# Patient Record
Sex: FEMALE | Race: WHITE | Employment: OTHER
[De-identification: names, ages, dates, MRNs, and addresses within clinical notes are randomized per-mention and may not be internally consistent; named-entity substitution may affect disease eponyms.]

---

## 2017-01-23 RX ORDER — ERGOCALCIFEROL 1.25 MG/1
CAPSULE ORAL
Qty: 12 CAPSULE | Refills: 0 | Status: SHIPPED | OUTPATIENT
Start: 2017-01-23 | End: 2017-04-18 | Stop reason: SDUPTHER

## 2017-01-26 ENCOUNTER — TELEPHONE (OUTPATIENT)
Dept: FAMILY MEDICINE CLINIC | Facility: CLINIC | Age: 52
End: 2017-01-26

## 2017-02-06 ENCOUNTER — OFFICE VISIT (OUTPATIENT)
Dept: FAMILY MEDICINE CLINIC | Facility: CLINIC | Age: 52
End: 2017-02-06

## 2017-02-06 ENCOUNTER — HOSPITAL ENCOUNTER (OUTPATIENT)
Age: 52
Setting detail: SPECIMEN
Discharge: HOME | End: 2017-02-06
Payer: COMMERCIAL

## 2017-02-06 VITALS
SYSTOLIC BLOOD PRESSURE: 126 MMHG | HEART RATE: 82 BPM | WEIGHT: 264.4 LBS | BODY MASS INDEX: 42.49 KG/M2 | DIASTOLIC BLOOD PRESSURE: 84 MMHG | HEIGHT: 66 IN | TEMPERATURE: 97.7 F | OXYGEN SATURATION: 98 %

## 2017-02-06 DIAGNOSIS — F32.A DEPRESSION, UNSPECIFIED DEPRESSION TYPE: ICD-10-CM

## 2017-02-06 DIAGNOSIS — Z72.0 TOBACCO ABUSE: ICD-10-CM

## 2017-02-06 DIAGNOSIS — J44.9 CHRONIC OBSTRUCTIVE PULMONARY DISEASE, UNSPECIFIED COPD TYPE (HCC): ICD-10-CM

## 2017-02-06 DIAGNOSIS — F41.9 ANXIETY: ICD-10-CM

## 2017-02-06 DIAGNOSIS — K76.0 HEPATIC STEATOSIS: ICD-10-CM

## 2017-02-06 DIAGNOSIS — E55.9 VITAMIN D DEFICIENCY: ICD-10-CM

## 2017-02-06 DIAGNOSIS — D58.2 ELEVATED HEMOGLOBIN (HCC): ICD-10-CM

## 2017-02-06 DIAGNOSIS — R73.09 OTHER ABNORMAL GLUCOSE: ICD-10-CM

## 2017-02-06 DIAGNOSIS — R73.9 HYPERGLYCEMIA: Primary | ICD-10-CM

## 2017-02-06 DIAGNOSIS — E88.81 INSULIN RESISTANCE: ICD-10-CM

## 2017-02-06 DIAGNOSIS — R73.03 PREDIABETES: ICD-10-CM

## 2017-02-06 LAB
ABSOLUTE EOS #: 0.1 K/UL (ref 0–0.4)
ABSOLUTE LYMPH #: 1.9 K/UL (ref 1–4.8)
ABSOLUTE MONO #: 0.6 K/UL (ref 0.1–1.2)
BASOPHILS # BLD: 0 % (ref 0–2)
BASOPHILS ABSOLUTE: 0 K/UL (ref 0–0.2)
DIFFERENTIAL TYPE: ABNORMAL
EOSINOPHILS RELATIVE PERCENT: 1 % (ref 1–4)
HCT VFR BLD CALC: 50.7 % (ref 36–46)
HEMOGLOBIN: 16.6 G/DL (ref 12–16)
LYMPHOCYTES # BLD: 23 % (ref 24–44)
MCH RBC QN AUTO: 29.7 PG (ref 26–34)
MCHC RBC AUTO-ENTMCNC: 32.7 G/DL (ref 31–37)
MCV RBC AUTO: 91 FL (ref 80–100)
MONOCYTES # BLD: 7 % (ref 2–11)
PDW BLD-RTO: 15.6 % (ref 12.5–15.4)
PLATELET # BLD: 235 K/UL (ref 140–450)
PLATELET ESTIMATE: ABNORMAL
PMV BLD AUTO: 10.3 FL (ref 6–12)
RBC # BLD: 5.58 M/UL (ref 4–5.2)
RBC # BLD: ABNORMAL 10*6/UL
SEG NEUTROPHILS: 69 % (ref 36–66)
SEGMENTED NEUTROPHILS ABSOLUTE COUNT: 6 K/UL (ref 1.8–7.7)
VITAMIN D 25-HYDROXY: 15.8 NG/ML (ref 30–100)
WBC # BLD: 8.6 K/UL (ref 3.5–11)
WBC # BLD: ABNORMAL 10*3/UL

## 2017-02-06 PROCEDURE — 99214 OFFICE O/P EST MOD 30 MIN: CPT | Performed by: PHYSICIAN ASSISTANT

## 2017-02-06 RX ORDER — NICOTINE 21 MG/24HR
1 PATCH, TRANSDERMAL 24 HOURS TRANSDERMAL EVERY 24 HOURS
Qty: 30 PATCH | Refills: 1 | Status: SHIPPED | OUTPATIENT
Start: 2017-02-06 | End: 2017-07-24 | Stop reason: ALTCHOICE

## 2017-02-06 RX ORDER — METHOCARBAMOL 500 MG/1
500 TABLET, FILM COATED ORAL 4 TIMES DAILY PRN
Qty: 60 TABLET | Refills: 1 | Status: SHIPPED | OUTPATIENT
Start: 2017-02-06 | End: 2017-05-08 | Stop reason: SDUPTHER

## 2017-02-06 RX ORDER — BUSPIRONE HYDROCHLORIDE 5 MG/1
5 TABLET ORAL
COMMUNITY
Start: 2017-01-19 | End: 2017-02-18

## 2017-02-06 RX ORDER — METFORMIN HYDROCHLORIDE 500 MG/1
TABLET, EXTENDED RELEASE ORAL
Qty: 60 TABLET | Refills: 2 | Status: SHIPPED | OUTPATIENT
Start: 2017-02-06 | End: 2017-05-07 | Stop reason: SDUPTHER

## 2017-02-06 ASSESSMENT — ENCOUNTER SYMPTOMS
BOWEL INCONTINENCE: 0
NAUSEA: 0
EYE DISCHARGE: 0
BACK PAIN: 1
CHEST TIGHTNESS: 0
SHORTNESS OF BREATH: 1
EYE ITCHING: 0
CHEST TIGHTNESS: 1
ABDOMINAL PAIN: 0
COUGH: 1
RHINORRHEA: 0
SINUS PRESSURE: 0
DIARRHEA: 0
TROUBLE SWALLOWING: 0
VOMITING: 0
SORE THROAT: 0

## 2017-02-06 ASSESSMENT — COPD QUESTIONNAIRES: COPD: 1

## 2017-02-07 LAB
ESTIMATED AVERAGE GLUCOSE: 108 MG/DL
HBA1C MFR BLD: 5.4 % (ref 4–6)

## 2017-02-24 RX ORDER — PREGABALIN 50 MG/1
50 CAPSULE ORAL 3 TIMES DAILY
Qty: 90 CAPSULE | Refills: 0 | OUTPATIENT
Start: 2017-02-24

## 2017-03-06 RX ORDER — OMEPRAZOLE 20 MG/1
CAPSULE, DELAYED RELEASE ORAL
Qty: 30 CAPSULE | Refills: 4 | Status: SHIPPED | OUTPATIENT
Start: 2017-03-06 | End: 2017-08-20 | Stop reason: SDUPTHER

## 2017-03-17 ENCOUNTER — HOSPITAL ENCOUNTER (OUTPATIENT)
Age: 52
Discharge: HOME | End: 2017-03-17
Payer: COMMERCIAL

## 2017-03-28 ENCOUNTER — TELEPHONE (OUTPATIENT)
Dept: FAMILY MEDICINE CLINIC | Age: 52
End: 2017-03-28

## 2017-04-10 ENCOUNTER — OFFICE VISIT (OUTPATIENT)
Dept: FAMILY MEDICINE CLINIC | Age: 52
End: 2017-04-10
Payer: COMMERCIAL

## 2017-04-10 VITALS
SYSTOLIC BLOOD PRESSURE: 126 MMHG | DIASTOLIC BLOOD PRESSURE: 82 MMHG | TEMPERATURE: 97.8 F | BODY MASS INDEX: 43.92 KG/M2 | WEIGHT: 273.3 LBS | OXYGEN SATURATION: 95 % | HEART RATE: 88 BPM | HEIGHT: 66 IN

## 2017-04-10 DIAGNOSIS — E88.81 INSULIN RESISTANCE: ICD-10-CM

## 2017-04-10 DIAGNOSIS — M47.22 OSTEOARTHRITIS OF SPINE WITH RADICULOPATHY, CERVICAL REGION: ICD-10-CM

## 2017-04-10 DIAGNOSIS — M47.814 SPONDYLOSIS OF THORACIC REGION WITHOUT MYELOPATHY OR RADICULOPATHY: ICD-10-CM

## 2017-04-10 DIAGNOSIS — J38.1 VOCAL CORD POLYP: Primary | ICD-10-CM

## 2017-04-10 DIAGNOSIS — J04.0 LARYNGITIS: ICD-10-CM

## 2017-04-10 DIAGNOSIS — R73.9 HYPERGLYCEMIA: ICD-10-CM

## 2017-04-10 DIAGNOSIS — E66.3 OVERWEIGHT: ICD-10-CM

## 2017-04-10 DIAGNOSIS — F41.9 ANXIETY: ICD-10-CM

## 2017-04-10 DIAGNOSIS — M79.672 PAIN IN BOTH FEET: ICD-10-CM

## 2017-04-10 DIAGNOSIS — Z72.0 TOBACCO ABUSE: ICD-10-CM

## 2017-04-10 DIAGNOSIS — E55.9 VITAMIN D DEFICIENCY: ICD-10-CM

## 2017-04-10 DIAGNOSIS — M17.0 PRIMARY OSTEOARTHRITIS OF BOTH KNEES: ICD-10-CM

## 2017-04-10 DIAGNOSIS — J44.9 CHRONIC OBSTRUCTIVE PULMONARY DISEASE, UNSPECIFIED COPD TYPE (HCC): ICD-10-CM

## 2017-04-10 DIAGNOSIS — M79.671 PAIN IN BOTH FEET: ICD-10-CM

## 2017-04-10 DIAGNOSIS — F32.A DEPRESSION, UNSPECIFIED DEPRESSION TYPE: ICD-10-CM

## 2017-04-10 PROCEDURE — 99214 OFFICE O/P EST MOD 30 MIN: CPT | Performed by: PHYSICIAN ASSISTANT

## 2017-04-10 RX ORDER — AZITHROMYCIN 250 MG/1
TABLET, FILM COATED ORAL
Qty: 1 PACKET | Refills: 0 | Status: SHIPPED | OUTPATIENT
Start: 2017-04-10 | End: 2017-04-20

## 2017-04-10 RX ORDER — VARENICLINE TARTRATE 0.5 MG/1
TABLET, FILM COATED ORAL
Qty: 95 TABLET | Refills: 0 | Status: SHIPPED | OUTPATIENT
Start: 2017-04-10 | End: 2017-05-31 | Stop reason: ALTCHOICE

## 2017-04-10 RX ORDER — FLUTICASONE PROPIONATE 50 MCG
2 SPRAY, SUSPENSION (ML) NASAL DAILY
Qty: 1 BOTTLE | Refills: 3 | Status: SHIPPED | OUTPATIENT
Start: 2017-04-10 | End: 2017-08-18 | Stop reason: ALTCHOICE

## 2017-04-10 RX ORDER — OXYCODONE HYDROCHLORIDE AND ACETAMINOPHEN 5; 325 MG/1; MG/1
TABLET ORAL
COMMUNITY
Start: 2017-03-20 | End: 2017-04-10 | Stop reason: ALTCHOICE

## 2017-04-10 ASSESSMENT — ENCOUNTER SYMPTOMS
RHINORRHEA: 0
SORE THROAT: 0
COUGH: 0
EYE ITCHING: 0
VOMITING: 0
NAUSEA: 0
DIARRHEA: 0
ABDOMINAL PAIN: 0
CHEST TIGHTNESS: 0
SINUS PRESSURE: 0
SHORTNESS OF BREATH: 0
EYE DISCHARGE: 0

## 2017-04-12 DIAGNOSIS — J44.9 CHRONIC OBSTRUCTIVE PULMONARY DISEASE, UNSPECIFIED COPD TYPE (HCC): ICD-10-CM

## 2017-04-12 DIAGNOSIS — M79.671 PAIN IN BOTH FEET: Primary | ICD-10-CM

## 2017-04-12 DIAGNOSIS — M79.672 PAIN IN BOTH FEET: Primary | ICD-10-CM

## 2017-04-12 RX ORDER — BUDESONIDE AND FORMOTEROL FUMARATE DIHYDRATE 160; 4.5 UG/1; UG/1
AEROSOL RESPIRATORY (INHALATION)
Qty: 1 INHALER | Refills: 3 | Status: SHIPPED | OUTPATIENT
Start: 2017-04-12 | End: 2017-08-20 | Stop reason: SDUPTHER

## 2017-04-18 RX ORDER — ERGOCALCIFEROL 1.25 MG/1
CAPSULE ORAL
Qty: 12 CAPSULE | Refills: 0 | Status: SHIPPED | OUTPATIENT
Start: 2017-04-18 | End: 2017-07-12 | Stop reason: SDUPTHER

## 2017-04-20 ENCOUNTER — HOSPITAL ENCOUNTER (OUTPATIENT)
Dept: MRI IMAGING | Facility: CLINIC | Age: 52
Discharge: HOME | End: 2017-04-20
Payer: COMMERCIAL

## 2017-04-20 ENCOUNTER — HOSPITAL ENCOUNTER (OUTPATIENT)
Facility: CLINIC | Age: 52
Discharge: HOME | End: 2017-04-20
Payer: COMMERCIAL

## 2017-04-20 DIAGNOSIS — E88.81 INSULIN RESISTANCE: ICD-10-CM

## 2017-04-20 DIAGNOSIS — J44.9 CHRONIC OBSTRUCTIVE PULMONARY DISEASE, UNSPECIFIED COPD TYPE (HCC): ICD-10-CM

## 2017-04-20 DIAGNOSIS — Z72.0 TOBACCO ABUSE: ICD-10-CM

## 2017-04-20 DIAGNOSIS — E55.9 VITAMIN D DEFICIENCY: ICD-10-CM

## 2017-04-20 DIAGNOSIS — M79.672 PAIN IN BOTH FEET: ICD-10-CM

## 2017-04-20 DIAGNOSIS — J04.0 LARYNGITIS: ICD-10-CM

## 2017-04-20 DIAGNOSIS — M17.0 PRIMARY OSTEOARTHRITIS OF BOTH KNEES: ICD-10-CM

## 2017-04-20 DIAGNOSIS — J38.1 VOCAL CORD POLYP: ICD-10-CM

## 2017-04-20 DIAGNOSIS — G89.29 CHRONIC RIGHT-SIDED LOW BACK PAIN WITH RIGHT-SIDED SCIATICA: ICD-10-CM

## 2017-04-20 DIAGNOSIS — M47.814 SPONDYLOSIS OF THORACIC REGION WITHOUT MYELOPATHY OR RADICULOPATHY: ICD-10-CM

## 2017-04-20 DIAGNOSIS — M54.41 CHRONIC RIGHT-SIDED LOW BACK PAIN WITH RIGHT-SIDED SCIATICA: ICD-10-CM

## 2017-04-20 DIAGNOSIS — G89.29 CHRONIC NECK PAIN: ICD-10-CM

## 2017-04-20 DIAGNOSIS — F41.9 ANXIETY: ICD-10-CM

## 2017-04-20 DIAGNOSIS — F32.A DEPRESSION, UNSPECIFIED DEPRESSION TYPE: ICD-10-CM

## 2017-04-20 DIAGNOSIS — M79.671 PAIN IN BOTH FEET: ICD-10-CM

## 2017-04-20 DIAGNOSIS — E66.3 OVERWEIGHT: ICD-10-CM

## 2017-04-20 DIAGNOSIS — M47.22 OSTEOARTHRITIS OF SPINE WITH RADICULOPATHY, CERVICAL REGION: ICD-10-CM

## 2017-04-20 DIAGNOSIS — M54.2 CHRONIC NECK PAIN: ICD-10-CM

## 2017-04-20 DIAGNOSIS — R73.9 HYPERGLYCEMIA: ICD-10-CM

## 2017-04-20 LAB
ABSOLUTE EOS #: 0.1 K/UL (ref 0–0.4)
ABSOLUTE LYMPH #: 1.7 K/UL (ref 1–4.8)
ABSOLUTE MONO #: 0.6 K/UL (ref 0.1–1.2)
ALBUMIN SERPL-MCNC: 4.2 G/DL (ref 3.5–5.2)
ALBUMIN/GLOBULIN RATIO: 1.4 (ref 1–2.5)
ALP BLD-CCNC: 73 U/L (ref 35–104)
ALT SERPL-CCNC: 20 U/L (ref 5–33)
ANION GAP SERPL CALCULATED.3IONS-SCNC: 9 MMOL/L (ref 9–17)
AST SERPL-CCNC: 16 U/L
BASOPHILS # BLD: 0 % (ref 0–2)
BASOPHILS ABSOLUTE: 0 K/UL (ref 0–0.2)
BILIRUB SERPL-MCNC: 0.51 MG/DL (ref 0.3–1.2)
BUN BLDV-MCNC: 14 MG/DL (ref 6–20)
BUN/CREAT BLD: ABNORMAL (ref 9–20)
CALCIUM SERPL-MCNC: 9.1 MG/DL (ref 8.6–10.4)
CHLORIDE BLD-SCNC: 104 MMOL/L (ref 98–107)
CHOLESTEROL/HDL RATIO: 3.8
CHOLESTEROL: 167 MG/DL
CO2: 31 MMOL/L (ref 20–31)
CREAT SERPL-MCNC: 0.56 MG/DL (ref 0.5–0.9)
DIFFERENTIAL TYPE: ABNORMAL
EOSINOPHILS RELATIVE PERCENT: 1 % (ref 1–4)
ESTIMATED AVERAGE GLUCOSE: 111 MG/DL
GFR AFRICAN AMERICAN: >60 ML/MIN
GFR NON-AFRICAN AMERICAN: >60 ML/MIN
GFR SERPL CREATININE-BSD FRML MDRD: ABNORMAL ML/MIN/{1.73_M2}
GFR SERPL CREATININE-BSD FRML MDRD: ABNORMAL ML/MIN/{1.73_M2}
GLUCOSE BLD-MCNC: 102 MG/DL (ref 70–99)
HBA1C MFR BLD: 5.5 % (ref 4–6)
HCT VFR BLD CALC: 46.5 % (ref 36–46)
HDLC SERPL-MCNC: 44 MG/DL
HEMOGLOBIN: 15.6 G/DL (ref 12–16)
INSULIN COMMENT: NORMAL
INSULIN REFERENCE RANGE:: NORMAL
INSULIN: 9.8 MU/L
LDL CHOLESTEROL: 94 MG/DL (ref 0–130)
LYMPHOCYTES # BLD: 18 % (ref 24–44)
MCH RBC QN AUTO: 29.9 PG (ref 26–34)
MCHC RBC AUTO-ENTMCNC: 33.6 G/DL (ref 31–37)
MCV RBC AUTO: 89 FL (ref 80–100)
MONOCYTES # BLD: 6 % (ref 2–11)
PDW BLD-RTO: 14.8 % (ref 12.5–15.4)
PLATELET # BLD: 246 K/UL (ref 140–450)
PLATELET ESTIMATE: ABNORMAL
PMV BLD AUTO: 9.5 FL (ref 6–12)
POTASSIUM SERPL-SCNC: 4.7 MMOL/L (ref 3.7–5.3)
RBC # BLD: 5.23 M/UL (ref 4–5.2)
RBC # BLD: ABNORMAL 10*6/UL
SEG NEUTROPHILS: 75 % (ref 36–66)
SEGMENTED NEUTROPHILS ABSOLUTE COUNT: 7.4 K/UL (ref 1.8–7.7)
SODIUM BLD-SCNC: 144 MMOL/L (ref 135–144)
TOTAL PROTEIN: 7.1 G/DL (ref 6.4–8.3)
TRIGL SERPL-MCNC: 143 MG/DL
TSH SERPL DL<=0.05 MIU/L-ACNC: 2.11 MIU/L (ref 0.3–5)
VITAMIN D 25-HYDROXY: 14 NG/ML (ref 30–100)
VLDLC SERPL CALC-MCNC: NORMAL MG/DL (ref 1–30)
WBC # BLD: 9.9 K/UL (ref 3.5–11)
WBC # BLD: ABNORMAL 10*3/UL

## 2017-04-20 PROCEDURE — 72148 MRI LUMBAR SPINE W/O DYE: CPT

## 2017-04-20 PROCEDURE — 82306 VITAMIN D 25 HYDROXY: CPT

## 2017-04-20 PROCEDURE — 80053 COMPREHEN METABOLIC PANEL: CPT

## 2017-04-20 PROCEDURE — 85025 COMPLETE CBC W/AUTO DIFF WBC: CPT

## 2017-04-20 PROCEDURE — 84443 ASSAY THYROID STIM HORMONE: CPT

## 2017-04-20 PROCEDURE — 80061 LIPID PANEL: CPT

## 2017-04-20 PROCEDURE — 36415 COLL VENOUS BLD VENIPUNCTURE: CPT

## 2017-04-20 PROCEDURE — 83525 ASSAY OF INSULIN: CPT

## 2017-04-20 PROCEDURE — 83036 HEMOGLOBIN GLYCOSYLATED A1C: CPT

## 2017-04-20 PROCEDURE — 72141 MRI NECK SPINE W/O DYE: CPT

## 2017-04-21 DIAGNOSIS — M47.816 SPONDYLOSIS OF LUMBAR REGION WITHOUT MYELOPATHY OR RADICULOPATHY: Primary | ICD-10-CM

## 2017-05-07 DIAGNOSIS — R73.03 PREDIABETES: ICD-10-CM

## 2017-05-08 RX ORDER — METHOCARBAMOL 500 MG/1
TABLET, FILM COATED ORAL
Qty: 60 TABLET | Refills: 0 | Status: SHIPPED | OUTPATIENT
Start: 2017-05-08 | End: 2017-07-24 | Stop reason: SDUPTHER

## 2017-05-08 RX ORDER — METFORMIN HYDROCHLORIDE 500 MG/1
TABLET, EXTENDED RELEASE ORAL
Qty: 60 TABLET | Refills: 1 | Status: SHIPPED | OUTPATIENT
Start: 2017-05-08 | End: 2017-07-14 | Stop reason: SDUPTHER

## 2017-05-15 RX ORDER — LORATADINE 10 MG/1
10 TABLET ORAL DAILY
Qty: 30 TABLET | Refills: 11 | Status: SHIPPED | OUTPATIENT
Start: 2017-05-15 | End: 2017-12-05

## 2017-05-31 ENCOUNTER — OFFICE VISIT (OUTPATIENT)
Dept: NEUROLOGY | Age: 52
End: 2017-05-31
Payer: COMMERCIAL

## 2017-05-31 VITALS
HEIGHT: 66 IN | BODY MASS INDEX: 44.84 KG/M2 | SYSTOLIC BLOOD PRESSURE: 119 MMHG | WEIGHT: 279 LBS | HEART RATE: 73 BPM | DIASTOLIC BLOOD PRESSURE: 82 MMHG

## 2017-05-31 DIAGNOSIS — G89.29 CHRONIC LOW BACK PAIN WITH SCIATICA, SCIATICA LATERALITY UNSPECIFIED, UNSPECIFIED BACK PAIN LATERALITY: ICD-10-CM

## 2017-05-31 DIAGNOSIS — M54.40 CHRONIC LOW BACK PAIN WITH SCIATICA, SCIATICA LATERALITY UNSPECIFIED, UNSPECIFIED BACK PAIN LATERALITY: ICD-10-CM

## 2017-05-31 DIAGNOSIS — M50.20 CERVICAL DISC HERNIATION: Primary | ICD-10-CM

## 2017-05-31 PROCEDURE — 99204 OFFICE O/P NEW MOD 45 MIN: CPT | Performed by: PSYCHIATRY & NEUROLOGY

## 2017-05-31 RX ORDER — GABAPENTIN 300 MG/1
CAPSULE ORAL
Qty: 90 CAPSULE | Refills: 3 | Status: SHIPPED | OUTPATIENT
Start: 2017-05-31 | End: 2017-09-01 | Stop reason: SDUPTHER

## 2017-06-06 RX ORDER — FUROSEMIDE 20 MG/1
TABLET ORAL
Qty: 60 TABLET | Refills: 2 | Status: SHIPPED | OUTPATIENT
Start: 2017-06-06 | End: 2017-12-02 | Stop reason: SDUPTHER

## 2017-07-12 RX ORDER — ERGOCALCIFEROL 1.25 MG/1
CAPSULE ORAL
Qty: 12 CAPSULE | Refills: 0 | Status: SHIPPED | OUTPATIENT
Start: 2017-07-12 | End: 2017-10-05 | Stop reason: SDUPTHER

## 2017-07-14 DIAGNOSIS — R73.03 PREDIABETES: ICD-10-CM

## 2017-07-17 RX ORDER — METFORMIN HYDROCHLORIDE 500 MG/1
TABLET, EXTENDED RELEASE ORAL
Qty: 60 TABLET | Refills: 0 | Status: SHIPPED | OUTPATIENT
Start: 2017-07-17 | End: 2017-08-20 | Stop reason: SDUPTHER

## 2017-07-24 ENCOUNTER — OFFICE VISIT (OUTPATIENT)
Dept: FAMILY MEDICINE CLINIC | Age: 52
End: 2017-07-24
Payer: COMMERCIAL

## 2017-07-24 ENCOUNTER — HOSPITAL ENCOUNTER (OUTPATIENT)
Age: 52
Setting detail: SPECIMEN
Discharge: HOME | End: 2017-07-24
Payer: COMMERCIAL

## 2017-07-24 VITALS
BODY MASS INDEX: 45.77 KG/M2 | DIASTOLIC BLOOD PRESSURE: 80 MMHG | WEIGHT: 284.8 LBS | SYSTOLIC BLOOD PRESSURE: 124 MMHG | TEMPERATURE: 97.4 F | HEART RATE: 91 BPM | HEIGHT: 66 IN | OXYGEN SATURATION: 96 %

## 2017-07-24 DIAGNOSIS — Z12.11 SCREENING FOR COLON CANCER: ICD-10-CM

## 2017-07-24 DIAGNOSIS — F32.A DEPRESSION, UNSPECIFIED DEPRESSION TYPE: Primary | ICD-10-CM

## 2017-07-24 DIAGNOSIS — H91.90 DECREASED HEARING, UNSPECIFIED LATERALITY: ICD-10-CM

## 2017-07-24 DIAGNOSIS — Z13.9 SCREENING: ICD-10-CM

## 2017-07-24 DIAGNOSIS — H93.13 TINNITUS, BILATERAL: ICD-10-CM

## 2017-07-24 DIAGNOSIS — F41.9 ANXIETY: ICD-10-CM

## 2017-07-24 DIAGNOSIS — R35.0 URINE FREQUENCY: ICD-10-CM

## 2017-07-24 DIAGNOSIS — E55.9 VITAMIN D DEFICIENCY: ICD-10-CM

## 2017-07-24 DIAGNOSIS — J44.9 CHRONIC OBSTRUCTIVE PULMONARY DISEASE, UNSPECIFIED COPD TYPE (HCC): ICD-10-CM

## 2017-07-24 DIAGNOSIS — R73.9 HYPERGLYCEMIA: ICD-10-CM

## 2017-07-24 LAB
BILIRUBIN, POC: NORMAL
BLOOD URINE, POC: NORMAL
CLARITY, POC: CLEAR
COLOR, POC: YELLOW
GLUCOSE URINE, POC: NORMAL
KETONES, POC: NORMAL
LEUKOCYTE EST, POC: NORMAL
NITRITE, POC: NORMAL
PH, POC: 6.5
PROTEIN, POC: NORMAL
SPECIFIC GRAVITY, POC: 1.02
UROBILINOGEN, POC: 0.2

## 2017-07-24 PROCEDURE — G0328 FECAL BLOOD SCRN IMMUNOASSAY: HCPCS | Performed by: PHYSICIAN ASSISTANT

## 2017-07-24 PROCEDURE — 99214 OFFICE O/P EST MOD 30 MIN: CPT | Performed by: PHYSICIAN ASSISTANT

## 2017-07-24 PROCEDURE — 81003 URINALYSIS AUTO W/O SCOPE: CPT | Performed by: PHYSICIAN ASSISTANT

## 2017-07-24 RX ORDER — DULOXETIN HYDROCHLORIDE 30 MG/1
30 CAPSULE, DELAYED RELEASE ORAL DAILY
COMMUNITY
End: 2017-08-18 | Stop reason: DRUGHIGH

## 2017-07-24 RX ORDER — VARENICLINE TARTRATE 1 MG/1
1 TABLET, FILM COATED ORAL 2 TIMES DAILY
COMMUNITY
End: 2017-09-27

## 2017-07-24 ASSESSMENT — LIFESTYLE VARIABLES
HOW OFTEN DURING THE LAST YEAR HAVE YOU FOUND THAT YOU WERE NOT ABLE TO STOP DRINKING ONCE YOU HAD STARTED: 0
HOW MANY STANDARD DRINKS CONTAINING ALCOHOL DO YOU HAVE ON A TYPICAL DAY: 0
HOW OFTEN DO YOU HAVE A DRINK CONTAINING ALCOHOL: 1
AUDIT TOTAL SCORE: 1
AUDIT-C TOTAL SCORE: 1
HAS A RELATIVE, FRIEND, DOCTOR, OR ANOTHER HEALTH PROFESSIONAL EXPRESSED CONCERN ABOUT YOUR DRINKING OR SUGGESTED YOU CUT DOWN: 0
HOW OFTEN DURING THE LAST YEAR HAVE YOU HAD A FEELING OF GUILT OR REMORSE AFTER DRINKING: 0
HOW OFTEN DURING THE LAST YEAR HAVE YOU NEEDED AN ALCOHOLIC DRINK FIRST THING IN THE MORNING TO GET YOURSELF GOING AFTER A NIGHT OF HEAVY DRINKING: 0
HOW OFTEN DURING THE LAST YEAR HAVE YOU BEEN UNABLE TO REMEMBER WHAT HAPPENED THE NIGHT BEFORE BECAUSE YOU HAD BEEN DRINKING: 0
HAVE YOU OR SOMEONE ELSE BEEN INJURED AS A RESULT OF YOUR DRINKING: 0
HOW OFTEN DO YOU HAVE SIX OR MORE DRINKS ON ONE OCCASION: 0
HOW OFTEN DURING THE LAST YEAR HAVE YOU FAILED TO DO WHAT WAS NORMALLY EXPECTED FROM YOU BECAUSE OF DRINKING: 0

## 2017-07-24 ASSESSMENT — PATIENT HEALTH QUESTIONNAIRE - PHQ9: SUM OF ALL RESPONSES TO PHQ QUESTIONS 1-9: 2

## 2017-07-24 ASSESSMENT — ANXIETY QUESTIONNAIRES: GAD7 TOTAL SCORE: 3

## 2017-07-24 ASSESSMENT — ENCOUNTER SYMPTOMS
SINUS PRESSURE: 0
NAUSEA: 0
SHORTNESS OF BREATH: 0
EYE DISCHARGE: 0
EYE ITCHING: 0
CHEST TIGHTNESS: 0

## 2017-07-25 LAB
CULTURE: NORMAL
CULTURE: NORMAL
Lab: NORMAL
SPECIMEN DESCRIPTION: NORMAL
STATUS: NORMAL
VITAMIN D 25-HYDROXY: 23.5 NG/ML (ref 30–100)

## 2017-08-02 RX ORDER — IBUPROFEN 800 MG/1
TABLET ORAL
Qty: 60 TABLET | Refills: 0 | Status: SHIPPED | OUTPATIENT
Start: 2017-08-02 | End: 2017-09-09 | Stop reason: SDUPTHER

## 2017-08-10 ENCOUNTER — HOSPITAL ENCOUNTER (OUTPATIENT)
Dept: MAMMOGRAPHY | Facility: CLINIC | Age: 52
Discharge: HOME | End: 2017-08-10
Payer: COMMERCIAL

## 2017-08-10 DIAGNOSIS — Z12.31 ENCOUNTER FOR SCREENING MAMMOGRAM FOR MALIGNANT NEOPLASM OF BREAST: ICD-10-CM

## 2017-08-10 DIAGNOSIS — Z12.39 SCREENING FOR BREAST CANCER: ICD-10-CM

## 2017-08-10 PROCEDURE — G0202 SCR MAMMO BI INCL CAD: HCPCS

## 2017-08-17 ENCOUNTER — TELEPHONE (OUTPATIENT)
Dept: FAMILY MEDICINE CLINIC | Age: 52
End: 2017-08-17

## 2017-08-18 ENCOUNTER — OFFICE VISIT (OUTPATIENT)
Dept: NEUROLOGY | Age: 52
End: 2017-08-18
Payer: COMMERCIAL

## 2017-08-18 VITALS — BODY MASS INDEX: 45.93 KG/M2 | HEIGHT: 66 IN | WEIGHT: 285.8 LBS

## 2017-08-18 DIAGNOSIS — M50.20 CERVICAL DISC HERNIATION: Primary | ICD-10-CM

## 2017-08-18 DIAGNOSIS — M54.40 CHRONIC LOW BACK PAIN WITH SCIATICA, SCIATICA LATERALITY UNSPECIFIED, UNSPECIFIED BACK PAIN LATERALITY: ICD-10-CM

## 2017-08-18 DIAGNOSIS — G89.29 CHRONIC LOW BACK PAIN WITH SCIATICA, SCIATICA LATERALITY UNSPECIFIED, UNSPECIFIED BACK PAIN LATERALITY: ICD-10-CM

## 2017-08-18 PROCEDURE — 99214 OFFICE O/P EST MOD 30 MIN: CPT | Performed by: PSYCHIATRY & NEUROLOGY

## 2017-08-18 RX ORDER — GABAPENTIN 600 MG/1
TABLET ORAL
Qty: 60 TABLET | Refills: 5 | Status: SHIPPED | OUTPATIENT
Start: 2017-08-18 | End: 2018-04-09 | Stop reason: SDUPTHER

## 2017-08-18 RX ORDER — DULOXETIN HYDROCHLORIDE 60 MG/1
1 CAPSULE, DELAYED RELEASE ORAL DAILY
COMMUNITY
Start: 2017-08-16 | End: 2018-07-12

## 2017-08-20 DIAGNOSIS — J44.9 CHRONIC OBSTRUCTIVE PULMONARY DISEASE, UNSPECIFIED COPD TYPE (HCC): ICD-10-CM

## 2017-08-20 DIAGNOSIS — R73.03 PREDIABETES: ICD-10-CM

## 2017-08-21 RX ORDER — OMEPRAZOLE 20 MG/1
CAPSULE, DELAYED RELEASE ORAL
Qty: 30 CAPSULE | Refills: 3 | Status: SHIPPED | OUTPATIENT
Start: 2017-08-21 | End: 2017-12-29 | Stop reason: SDUPTHER

## 2017-08-21 RX ORDER — BUDESONIDE AND FORMOTEROL FUMARATE DIHYDRATE 160; 4.5 UG/1; UG/1
AEROSOL RESPIRATORY (INHALATION)
Qty: 1 INHALER | Refills: 3 | Status: SHIPPED | OUTPATIENT
Start: 2017-08-21 | End: 2018-07-12 | Stop reason: ALTCHOICE

## 2017-08-21 RX ORDER — METFORMIN HYDROCHLORIDE 500 MG/1
TABLET, EXTENDED RELEASE ORAL
Qty: 60 TABLET | Refills: 3 | Status: SHIPPED | OUTPATIENT
Start: 2017-08-21 | End: 2017-12-29 | Stop reason: SDUPTHER

## 2017-09-01 ENCOUNTER — HOSPITAL ENCOUNTER (OUTPATIENT)
Age: 52
Setting detail: SPECIMEN
Discharge: HOME | End: 2017-09-01
Payer: COMMERCIAL

## 2017-09-01 ENCOUNTER — OFFICE VISIT (OUTPATIENT)
Dept: FAMILY MEDICINE CLINIC | Age: 52
End: 2017-09-01
Payer: COMMERCIAL

## 2017-09-01 VITALS
SYSTOLIC BLOOD PRESSURE: 122 MMHG | OXYGEN SATURATION: 98 % | DIASTOLIC BLOOD PRESSURE: 80 MMHG | HEIGHT: 66 IN | BODY MASS INDEX: 46.59 KG/M2 | HEART RATE: 85 BPM | WEIGHT: 289.9 LBS | TEMPERATURE: 97.7 F

## 2017-09-01 DIAGNOSIS — Z23 NEED FOR INFLUENZA VACCINATION: ICD-10-CM

## 2017-09-01 DIAGNOSIS — Z12.11 SCREENING FOR COLORECTAL CANCER: ICD-10-CM

## 2017-09-01 DIAGNOSIS — L03.90 CELLULITIS, UNSPECIFIED CELLULITIS SITE: ICD-10-CM

## 2017-09-01 DIAGNOSIS — R73.9 HYPERGLYCEMIA: Primary | ICD-10-CM

## 2017-09-01 DIAGNOSIS — R35.0 URINARY FREQUENCY: ICD-10-CM

## 2017-09-01 DIAGNOSIS — Z23 NEED FOR PNEUMOCOCCAL VACCINATION: ICD-10-CM

## 2017-09-01 DIAGNOSIS — F32.A DEPRESSION, UNSPECIFIED DEPRESSION TYPE: ICD-10-CM

## 2017-09-01 DIAGNOSIS — E88.81 INSULIN RESISTANCE: ICD-10-CM

## 2017-09-01 DIAGNOSIS — E66.9 OBESITY, UNSPECIFIED OBESITY SEVERITY, UNSPECIFIED OBESITY TYPE: ICD-10-CM

## 2017-09-01 DIAGNOSIS — J44.9 CHRONIC OBSTRUCTIVE PULMONARY DISEASE, UNSPECIFIED COPD TYPE (HCC): ICD-10-CM

## 2017-09-01 DIAGNOSIS — M47.22 OSTEOARTHRITIS OF SPINE WITH RADICULOPATHY, CERVICAL REGION: ICD-10-CM

## 2017-09-01 DIAGNOSIS — F41.9 ANXIETY: ICD-10-CM

## 2017-09-01 DIAGNOSIS — Z72.0 TOBACCO ABUSE: ICD-10-CM

## 2017-09-01 DIAGNOSIS — Z12.12 SCREENING FOR COLORECTAL CANCER: ICD-10-CM

## 2017-09-01 DIAGNOSIS — E55.9 VITAMIN D DEFICIENCY: ICD-10-CM

## 2017-09-01 PROCEDURE — 99214 OFFICE O/P EST MOD 30 MIN: CPT | Performed by: PHYSICIAN ASSISTANT

## 2017-09-01 PROCEDURE — 81003 URINALYSIS AUTO W/O SCOPE: CPT | Performed by: PHYSICIAN ASSISTANT

## 2017-09-01 PROCEDURE — G0009 ADMIN PNEUMOCOCCAL VACCINE: HCPCS | Performed by: PHYSICIAN ASSISTANT

## 2017-09-01 PROCEDURE — 90732 PPSV23 VACC 2 YRS+ SUBQ/IM: CPT | Performed by: PHYSICIAN ASSISTANT

## 2017-09-01 PROCEDURE — 90686 IIV4 VACC NO PRSV 0.5 ML IM: CPT | Performed by: PHYSICIAN ASSISTANT

## 2017-09-01 PROCEDURE — G0008 ADMIN INFLUENZA VIRUS VAC: HCPCS | Performed by: PHYSICIAN ASSISTANT

## 2017-09-01 RX ORDER — DOXYCYCLINE HYCLATE 100 MG
100 TABLET ORAL 2 TIMES DAILY
Qty: 20 TABLET | Refills: 0 | Status: SHIPPED | OUTPATIENT
Start: 2017-09-01 | End: 2017-09-11

## 2017-09-01 RX ORDER — BUSPIRONE HYDROCHLORIDE 15 MG/1
30 TABLET ORAL 2 TIMES DAILY
COMMUNITY
End: 2019-09-17

## 2017-09-01 ASSESSMENT — ENCOUNTER SYMPTOMS
EYE ITCHING: 0
TROUBLE SWALLOWING: 0
CHEST TIGHTNESS: 0
SHORTNESS OF BREATH: 0
SINUS PRESSURE: 0
ABDOMINAL PAIN: 0
EYE DISCHARGE: 0
BELCHING: 0
NAUSEA: 0

## 2017-09-02 LAB
CULTURE: NORMAL
CULTURE: NORMAL
Lab: NORMAL
SPECIMEN DESCRIPTION: NORMAL
STATUS: NORMAL

## 2017-09-11 ENCOUNTER — TELEPHONE (OUTPATIENT)
Dept: FAMILY MEDICINE CLINIC | Age: 52
End: 2017-09-11

## 2017-09-12 RX ORDER — IBUPROFEN 800 MG/1
TABLET ORAL
Qty: 60 TABLET | Refills: 0 | Status: SHIPPED | OUTPATIENT
Start: 2017-09-12 | End: 2017-11-02 | Stop reason: SDUPTHER

## 2017-09-20 RX ORDER — NYSTATIN 100000 [USP'U]/G
POWDER TOPICAL
Qty: 1 BOTTLE | Refills: 2 | Status: SHIPPED | OUTPATIENT
Start: 2017-09-20 | End: 2017-10-09 | Stop reason: SDUPTHER

## 2017-09-26 ENCOUNTER — TELEPHONE (OUTPATIENT)
Dept: FAMILY MEDICINE CLINIC | Age: 52
End: 2017-09-26

## 2017-09-27 ENCOUNTER — HOSPITAL ENCOUNTER (OUTPATIENT)
Age: 52
Setting detail: SPECIMEN
Discharge: HOME | End: 2017-09-27
Payer: COMMERCIAL

## 2017-09-27 ENCOUNTER — OFFICE VISIT (OUTPATIENT)
Dept: FAMILY MEDICINE CLINIC | Age: 52
End: 2017-09-27
Payer: COMMERCIAL

## 2017-09-27 VITALS
TEMPERATURE: 97.7 F | BODY MASS INDEX: 46.52 KG/M2 | DIASTOLIC BLOOD PRESSURE: 80 MMHG | HEIGHT: 66 IN | WEIGHT: 289.5 LBS | OXYGEN SATURATION: 95 % | SYSTOLIC BLOOD PRESSURE: 124 MMHG | HEART RATE: 102 BPM

## 2017-09-27 DIAGNOSIS — N81.10 BLADDER PROLAPSE, FEMALE, ACQUIRED: ICD-10-CM

## 2017-09-27 DIAGNOSIS — R32 URINARY INCONTINENCE, UNSPECIFIED TYPE: Primary | ICD-10-CM

## 2017-09-27 DIAGNOSIS — Z12.11 SCREENING FOR COLON CANCER: ICD-10-CM

## 2017-09-27 DIAGNOSIS — N76.0 ACUTE VAGINITIS: ICD-10-CM

## 2017-09-27 LAB
DIRECT EXAM: ABNORMAL
Lab: ABNORMAL
SPECIMEN DESCRIPTION: ABNORMAL
STATUS: ABNORMAL

## 2017-09-27 PROCEDURE — 99214 OFFICE O/P EST MOD 30 MIN: CPT | Performed by: PHYSICIAN ASSISTANT

## 2017-09-27 RX ORDER — FLUCONAZOLE 150 MG/1
150 TABLET ORAL ONCE
Qty: 1 TABLET | Refills: 0 | Status: SHIPPED | OUTPATIENT
Start: 2017-09-27 | End: 2017-09-27

## 2017-09-27 RX ORDER — VARENICLINE TARTRATE
KIT
COMMUNITY
Start: 2017-06-27 | End: 2017-09-27

## 2017-09-27 ASSESSMENT — ENCOUNTER SYMPTOMS
SHORTNESS OF BREATH: 0
CHEST TIGHTNESS: 0
SORE THROAT: 0
VOMITING: 0
DIARRHEA: 0
COUGH: 0
CONSTIPATION: 0
RHINORRHEA: 0
EYE DISCHARGE: 0
EYE ITCHING: 0
BACK PAIN: 0
NAUSEA: 0
ABDOMINAL PAIN: 0
SINUS PRESSURE: 0
TROUBLE SWALLOWING: 0
BELCHING: 0

## 2017-09-27 NOTE — PROGRESS NOTES
Value   2017 94   10/20/2016 77     LDL Calculated (mg/dL)   Date Value   2015 81       (goal LDL is <100)   AST (U/L)   Date Value   2017 16     ALT (U/L)   Date Value   2017 20     BUN (mg/dL)   Date Value   2017 14     BP Readings from Last 3 Encounters:   17 124/80   17 122/80   17 124/80          (goal 120/80)    Past Medical History:   Diagnosis Date    Anxiety     Asthma     Cancer ([facility] 1996    cervical; treated with hysterectomy    COPD (chronic obstructive pulmonary disease) ([facility]     Depression     Diabetes mellitus ([facility]     Epigastric pain     Hepatic steatosis     History of cataract extraction with lens replacement     bilateral    History of stress test     Hyperglycemia     Intertrigo     Lumbar degenerative disc disease     Nausea     Obesity 2017    Osteoarthritis     right knee    Osteoarthritis cervical spine     Osteoarthritis thoracic spine     Tobacco abuse 2017      Past Surgical History:   Procedure Laterality Date    CATARACT REMOVAL       SECTION      x1    HYSTERECTOMY      secondary to cervical cancer    THROAT SURGERY      throat polyps removed       Family History   Problem Relation Age of Onset    Diabetes Mother     Heart Disease Mother     Arthritis Mother     Depression Mother     Mental Illness Mother     Diabetes Father     Heart Disease Father     Depression Brother     Cancer Brother      bladder    Depression Sister     Cancer Maternal Grandmother      breast    Asthma Other      grandson    Cancer Other      breast     High Blood Pressure Brother     Diabetes Brother     Arthritis Sister     Cancer Maternal Uncle      pancreatic     High Cholesterol Neg Hx     Kidney Disease Neg Hx     Stroke Neg Hx        Social History   Substance Use Topics    Smoking status: Current Every Day Smoker     Packs/day: 0.25     Years: 35.00     Types: Cigarettes    Smokeless tobacco: Never Used    Alcohol use No      Current Outpatient Prescriptions   Medication Sig Dispense Refill    PROAIR  (90 Base) MCG/ACT inhaler INHALE TWO PUFFS BY MOUTH EVERY 6 HOURS AS NEEDED FOR WHEEZING 1 Inhaler 4    NYAMYC 926203 UNIT/GM powder APPLY TO AFFECTED AREA(S) TWO TIMES A DAY 1 Bottle 2    ibuprofen (ADVIL;MOTRIN) 800 MG tablet TAKE ONE TABLET BY MOUTH EVERY 8 HOURS AS NEEDED FOR PAIN 60 tablet 0    diclofenac sodium 1 % GEL Apply 2 g topically 2 times daily 1 Tube 3    busPIRone (BUSPAR) 15 MG tablet Take 15 mg by mouth 2 times daily      omeprazole (PRILOSEC) 20 MG delayed release capsule TAKE ONE CAPSULE BY MOUTH DAILY 30 capsule 3    SYMBICORT 160-4.5 MCG/ACT AERO INHALE TWO PUFFS BY MOUTH TWICE A DAY 1 Inhaler 3    metFORMIN (GLUCOPHAGE-XR) 500 MG extended release tablet TAKE ONE TABLET BY MOUTH TWICE A DAY 60 tablet 3    DULoxetine (CYMBALTA) 60 MG extended release capsule 1 capsule daily      gabapentin (NEURONTIN) 600 MG tablet Take 1 po bid 60 tablet 5    methocarbamol (ROBAXIN) 500 MG tablet TAKE ONE TABLET BY MOUTH FOUR TIMES A DAY AS NEEDED FOR SPASM 60 tablet 3    vitamin D (ERGOCALCIFEROL) 36242 units CAPS capsule TAKE ONE CAPSULE BY MOUTH ONCE WEEKLY 12 capsule 0    furosemide (LASIX) 20 MG tablet TAKE ONE TABLET BY MOUTH DAILY 60 tablet 2    glucose blood VI test strips (ASCENSIA AUTODISC VI;ONE TOUCH ULTRA TEST VI) strip 1 each by In Vitro route daily As needed.  100 each 3    Blood Glucose Monitoring Suppl (ACURA BLOOD GLUCOSE METER) W/DEVICE KIT 1 each by Does not apply route 2 times daily 1 kit 0    loratadine (CLARITIN) 10 MG tablet Take 1 tablet by mouth daily 30 tablet 11    Thumb Spica MISC 1 each by Does not apply route continuous 1 each 0    Elastic Bandages & Supports (KNEE BRACE) MISC 1 Device by Does not apply route daily Right knee pain; osteoarthritis right knee 1 each 1    Respiratory Therapy Supplies (NEBULIZER COMPRESSOR) KIT 1 kit by Does not apply route once for 1 dose Patient with mild to moderate COPD per pulmonary function test; to use up to 4 times per day with albuterol 1 kit 0    Nebulizers (COMPRESSOR/NEBULIZER) MISC 1 vial by Does not apply route 4 times daily as needed. Patient has albuterol prescription at home; needs aerosol machine and set up diagnosis chronic bronchitis 1 each 0     No current facility-administered medications for this visit. Allergies   Allergen Reactions    Augmentin [Amoxicillin-Pot Clavulanate] Diarrhea    Medrol [Methylprednisolone] Other (See Comments)     Redness of hands and itching  Redness of hands and itching    Tape [Adhesive Tape] Rash       Health Maintenance   Topic Date Due    Hepatitis C screen  1965    Diabetic foot exam  03/30/1975    Diabetic retinal exam  03/30/1975    HIV screen  03/30/1980    Diabetic microalbuminuria test  03/30/1983    Cervical cancer screen  09/27/2017    Diabetic hemoglobin A1C test  04/20/2018    Lipid screen  04/20/2018    Breast cancer screen  08/10/2019    Colon cancer screen colonoscopy  03/17/2026    DTaP/Tdap/Td vaccine (2 - Td) 08/24/2026    Flu vaccine  Completed    Pneumococcal med risk  Completed       Subjective:      Review of Systems   Constitutional: Negative for appetite change, chills, diaphoresis, fatigue and fever. HENT: Negative for congestion, ear discharge, ear pain, postnasal drip, rhinorrhea, sinus pressure, sore throat and trouble swallowing. Eyes: Negative for discharge and itching. Respiratory: Negative for cough, chest tightness and shortness of breath. Cardiovascular: Negative for chest pain, palpitations, leg swelling and syncope. Gastrointestinal: Negative for abdominal pain, anorexia, constipation, diarrhea, nausea and vomiting. Genitourinary: Positive for vaginal discharge. Negative for dysuria, frequency, missed menses and pelvic pain. Musculoskeletal: Positive for neck pain.  Negative for arthralgias, back pain and neck stiffness. Skin: Negative for rash. Neurological: Negative for dizziness, weakness, light-headedness, numbness and headaches. Psychiatric/Behavioral: The patient does not have insomnia. All other systems reviewed and are negative. Objective:     Physical Exam   Constitutional: She is oriented to person, place, and time. She appears well-developed and well-nourished. No distress. /82  Pulse 88  Temp 97.8 °F (36.6 °C) (Oral)   Ht 5' 6\" (1.676 m)  Wt 273 lb 4.8 oz (124 kg)  SpO2 95%  BMI 44.11 kg/m2     HENT:   Head: Normocephalic and atraumatic. Right Ear: External ear normal.   Left Ear: External ear normal.   Nose: Nose normal.   Mouth/Throat: Oropharynx is clear and moist.   PND   Eyes: Conjunctivae and EOM are normal. Pupils are equal, round, and reactive to light. Right eye exhibits no discharge. Left eye exhibits no discharge. No scleral icterus. Neck: Normal range of motion. Neck supple. No tracheal deviation present. No thyromegaly present. Cardiovascular: Normal rate, regular rhythm and normal heart sounds. Exam reveals no gallop and no friction rub. No murmur heard. Pulmonary/Chest: Effort normal and breath sounds normal. No stridor. No respiratory distress. She has no wheezes. She has no rales. She exhibits no tenderness. Abdominal: Soft. Bowel sounds are normal. She exhibits no distension. There is no tenderness. There is no rebound and no guarding. Genitourinary: Vaginal discharge found. Genitourinary Comments: Poss cysto/rectocele    Musculoskeletal: She exhibits no edema. Neurological: She is alert and oriented to person, place, and time. Gait normal.   Skin: Skin is warm and dry. No rash noted. She is not diaphoretic. There is erythema. Suprapubic wound with surrounding erythema and calor - quarter size   Psychiatric: She has a normal mood and affect. Her affect is not inappropriate.    Nursing note and vitals reviewed. /80  Pulse 102  Temp 97.7 °F (36.5 °C) (Oral)   Ht 5' 6\" (1.676 m)  Wt 289 lb 8 oz (131.3 kg)  SpO2 95%  BMI 46.73 kg/m2    Assessment:      1. Urinary incontinence, unspecified type  [address] for Urogynecology & [address] [facility], DO   2. Bladder prolapse, female, acquired  [address] for [address], DO   3. Screening for colon cancer  Tabatha Ritter MD, [facility]*   4. Acute vaginitis  VAGINITIS DNA PROBE    C. Trachomatis / N. Gonorrhoeae, DNA             Plan:      No Follow-up on file. Orders Placed This Encounter   Procedures    VAGINITIS DNA PROBE    C. Trachomatis / N. Gonorrhoeae, DNA     Standing Status:   Future     Number of Occurrences:   1     Standing Expiration Date:   9/27/2018   [facility] for [address] [facility],      Referral Priority:   Routine     Referral Type:   Consult for Advice and Opinion     Referral Reason:   Specialty Services Required     Referred to Provider:   Arsenio Pan DO     Requested Specialty:   Urogynecology     Number of Visits Requested:   David Hall MD, [facility]*     Referral Priority:   Routine     Referral Type:   Consult for Advice and Opinion     Referral Reason:   Specialty Services Required     Referred to Provider:   Ann Hastings MD     Requested Specialty:   Gastroenterology     Number of Visits Requested:   1     Orders Placed This Encounter   Medications    fluconazole (DIFLUCAN) 150 MG tablet     Sig: Take 1 tablet by mouth once for 1 dose     Dispense:  1 tablet     Refill:  0    Labs from 4/2017 reviewed      Insulin resistance - On metformin. Diet and exercise encouraged.      Anxiety and depression - Taking celexa with some relief. No SI or HI. Also on xanax. Referral to psych. Started on buspar. Doing much better with med. Seeing psych.   GOing to counseling now.     Elevated HGB - Pt reports family hx of same. Will repeat. Referral to heme. Saw Dr. Afia Cardoza.        Lung nodule - Seen on CT chest 5/11/16 stable. Seeing pulm.      Tobacco abuse/COPD - Smoking since age 13, 1/2-1 PPD. On symbicort and prn albuterol. 1/18/17 emphysema. Pt has been on chantix in past.  No SI. HI or vivid dreams. Will watch for and go to ER if it develops.        Chronic back pain / right hip pain - Radicular pain down right leg. No recent imaging. Xray showed degenerative changes 9/1/16. MRI ordered. On robaxin. Previously on norco. Now on ultram. Referral to pain management.       Chronic neck pain - Radiation down right arm. Pt does c/o right hand weakness. Xray showed degenerative changes 10/20/16. MRI ordered. Saw pain management. No rx as pt abuses marijuana      Fibromyalgia - On lyrica.      Hepatic steatosis - F/u GI.      GERD - On prilosec.       Peripheral edema - On lasix      GB disease - States suppose to have HIDA scan through GI, but GI wouldn't take ins. Still with nausea. HIDA ordered. F/u GI. CT chest 5/11/16 Showed mild distention of the gallbladder with slight pericholecystic haziness. HIDDA scan nl 9/1/16      CP - Admitted 1/2017. Nl stress echo.     Vit D def - Will replace and recheck 12 wks.     Laryngitis / vocal cord polyp - Surgery 3/2017 plans to go back in to remove more. Heel spur - L foot. Bilat foot pain. Referral to pod    Balanced diet and routine exercise encouraged.     Multivitamin with vitamin D daily encouraged.     Good sleep hygiene encouraged.     Dental exam q 6 mo.     Vision yearly.     Sunscreen encouraged.     Immunizations reviewed.     DEXA - 5/2014 NL. Repeat ordered.      [facility] - Pt has orders. Did not get. Colonoscopy - FIT kid given.      Smoking marijuana. Pt encouraged to stop.     F/u for chantix    Schedule f/[facility] appt with [facility] for dilated eye exam, with dentistry for cleaning, and audiology.     Flu - 2017    Prevnar 13 - 8/24/16    Pneumovax 23 - 9/1/17    Tdap - 8/24/17     Patient given educational materials - see patient instructions. Discussed use, benefit, and side effects of prescribed medications. All patient questions answered. Pt voiced understanding. Reviewed health maintenance. Instructed to continue current medications, diet and exercise. Patient agreed with treatment plan. Follow up as directed.      Electronically signed by Carter Nelson PA-C on 10/3/2017 at 8:40 AM

## 2017-09-27 NOTE — MR AVS SNAPSHOT
After Visit Summary             Carmen De Paz   2017 4:00 PM   Office Visit    Description:  Female : 1965   Provider:  Rosey Mtz PA-C   Department:  [facility]              Your Follow-Up and Future Appointments         Below is a list of your follow-up and future appointments. This may not be a complete list as you may have made appointments directly with providers that we are not aware of or your providers may have made some for you. Please call your providers to confirm appointments. It is important to keep your appointments. Please bring your current insurance card, photo ID, co-pay, and all medication bottles to your appointment. If self-pay, payment is expected at the time of service. Your To-Do List     Future Appointments Provider Department Dept Phone    10/9/2017 9:45 AM Selma Decker [phone]    2017 9:15 AM Rosey Mtz PA-C [facility] [phone]    Please arrive 15 minutes prior to appointment, bring photo ID and insurance card. 2017 11:20 AM Candelaria Nobles, [address] [place] [address] Neurology Specialist [phone]    Please arrive 15 minutes prior to appointment time, bring insurance card and photo ID. Future Orders Complete By Amandeep Ham / NUA Ochoa [[id number] Custom]  10/27/2017 (Approximate) 2018         Information from Your Visit        Department     Name Address Phone Fax    [address] Mario Alberto Cordova [phone]      You Were Seen for:         Comments    Urinary incontinence, unspecified type   [[id number]]         Vital Signs     Blood Pressure Pulse Temperature Height Weight Oxygen Saturation    124/80 102 97.7 °F (36.5 °C) (Oral) 5' 6\" (1.676 m) 289 lb 8 oz (131.3 kg) 95%    Body Mass Index Smoking Status 46.73 kg/m2 Current Every Day Smoker          Additional Information about your Body Mass Index (BMI)           Your BMI as listed above is considered obese (30 or more). BMI is an estimate of body fat, calculated from your height and weight. The higher your BMI, the greater your risk of heart disease, high blood pressure, type 2 diabetes, stroke, gallstones, arthritis, sleep apnea, and certain cancers. BMI is not perfect. It may overestimate body fat in athletes and people who are more muscular. Even a small weight loss (between 5 and 10 percent of your current weight) by decreasing your calorie intake and becoming more physically active will help lower your risk of developing or worsening diseases associated with obesity. Learn more at: Filepicker.io.uk             Today's Medication Changes          These changes are accurate as of: 9/27/17  4:56 PM.  If you have any questions, ask your nurse or doctor. START taking these medications           fluconazole 150 MG tablet   Commonly known as:  DIFLUCAN   Instructions:   Take 1 tablet by mouth once for 1 dose   Quantity:  1 tablet   Refills:  0   Started by:  Marciano Holt PA-C         STOP taking these medications           CHANTIX 1 MG tablet   Generic drug:  varenicline   Stopped by:  Marciano Holt PA-C       CHANTIX STARTING MONTH KRISTI 0.5 MG X 11 & 1 MG X 42 tablet   Generic drug:  varenicline   Stopped by:  Marciano Holt PA-C            Where to Get Your Medications      These medications were sent to [place] [address] [phone] Marcelino Bond [phone]  [facility], [address] [facility]     Phone:  [phone]     fluconazole 150 MG tablet               Your Current Medications Are              fluconazole (DIFLUCAN) 150 MG tablet Take 1 tablet by mouth once for 1 dose    PROAIR  (90 Base) MCG/ACT inhaler INHALE TWO PUFFS BY MOUTH EVERY 6 HOURS AS NEEDED FOR WHEEZING    NYAMYC 673950 UNIT/GM powder APPLY TO AFFECTED AREA(S) TWO TIMES A DAY    ibuprofen (ADVIL;MOTRIN) 800 MG tablet TAKE ONE TABLET BY MOUTH EVERY 8 HOURS AS NEEDED FOR PAIN    diclofenac sodium 1 % GEL Apply 2 g topically 2 times daily    busPIRone (BUSPAR) 15 MG tablet Take 15 mg by mouth 2 times daily    omeprazole (PRILOSEC) 20 MG delayed release capsule TAKE ONE CAPSULE BY MOUTH DAILY    SYMBICORT 160-4.5 MCG/ACT AERO INHALE TWO PUFFS BY MOUTH TWICE A DAY    metFORMIN (GLUCOPHAGE-XR) 500 MG extended release tablet TAKE ONE TABLET BY MOUTH TWICE A DAY    DULoxetine (CYMBALTA) 60 MG extended release capsule 1 capsule daily    gabapentin (NEURONTIN) 600 MG tablet Take 1 po bid    methocarbamol (ROBAXIN) 500 MG tablet TAKE ONE TABLET BY MOUTH FOUR TIMES A DAY AS NEEDED FOR SPASM    vitamin D (ERGOCALCIFEROL) 77432 units CAPS capsule TAKE ONE CAPSULE BY MOUTH ONCE WEEKLY    furosemide (LASIX) 20 MG tablet TAKE ONE TABLET BY MOUTH DAILY    glucose blood VI test strips (ASCENSIA AUTODISC VI;ONE TOUCH ULTRA TEST VI) strip 1 each by In Vitro route daily As needed. Blood Glucose Monitoring Suppl (ACURA BLOOD GLUCOSE METER) W/DEVICE KIT 1 each by Does not apply route 2 times daily    loratadine (CLARITIN) 10 MG tablet Take 1 tablet by mouth daily    Thumb Spica MISC 1 each by Does not apply route continuous    Elastic Bandages & Supports (KNEE BRACE) MISC 1 Device by Does not apply route daily Right knee pain; osteoarthritis right knee    Respiratory Therapy Supplies (NEBULIZER COMPRESSOR) KIT 1 kit by Does not apply route once for 1 dose Patient with mild to moderate COPD per pulmonary function test; to use up to 4 times per day with albuterol    Nebulizers (COMPRESSOR/NEBULIZER) MISC 1 vial by Does not apply route 4 times daily as needed.  Patient has albuterol prescription at home; needs aerosol machine and set up diagnosis chronic bronchitis      Allergies Augmentin [Amoxicillin-pot Clavulanate] Diarrhea    Medrol [Methylprednisolone] Other (See Comments)    Redness of hands and itching  Redness of hands and itching    Tape Beau Regulus Tape] Rash      We Ordered/Performed the following           Tangela Adan MD, [facility]*     Scheduling Instructions:    [facility]- [address], [address]  [phone]    Comments: The patient can be scheduled with any member of the group, including the provider with the first available appointments. [address] for [address] [place], [place]     Scheduling Instructions:    [address] for Urogynecology & [address] [place] [place] [address], [address] [place] [address]   [phone]    Comments: The patient can be scheduled with any member of the group, including the provider with the first available appointments.      VAGINITIS DNA PROBE          Additional Information        Basic Information     Date Of Birth Sex Race Ethnicity Preferred Language    1965 Female White Non-/Non  English      Problem List as of 9/27/2017  Date Reviewed: 9/27/2017                Obesity    Vocal cord polyp    Laryngitis    Vitamin D deficiency    Tobacco abuse    Hepatic steatosis    Epigastric pain    Nausea    Peripheral edema    COPD (chronic obstructive pulmonary disease) (HCC)    Osteoarthritis thoracic spine    Osteoarthritis cervical spine    Prediabetes    Insulin resistance    Degenerative arthritis of lumbar spine    Osteoarthritis of knee    Hyperglycemia    Anxiety    Bronchitis    Depression      Immunizations as of 9/27/2017     Name Date    Influenza Virus Vaccine 1/18/2014    Influenza, Intradermal, Preservative free 11/1/2016, 1/18/2014    Influenza, Quadrivalent, 3 Years and above, IM 11/1/2016    Influenza, Quadrivalent, 3 yrs and above, IM, Preservative Free 9/1/2017 Pneumococcal 13-valent Conjugate ([id number]) 8/24/2016    Pneumococcal Polysaccharide ([id number]) 9/1/2017    Tdap (Boostrix, Adacel) 8/24/2016      Preventive Care        Date Due    Hepatitis C screening is recommended for all adults regardless of risk factors born between [facility] at least once (lifetime) who have never been tested. 1965    Diabetic Foot Exam 3/30/1975    Eye Exam By An Eye Doctor 3/30/1975    HIV screening is recommended for all people regardless of risk factors  aged 15-65 years at least once (lifetime) who have never been HIV tested. 3/30/1980    Urine Check For Kidney Problems 3/30/1983    Pap Smear 9/27/2017    Hemoglobin A1C (Test For Long-Term Glucose Control) 4/20/2018    Cholesterol Screening 4/20/2018    Mammograms are recommended every 2 years for low/average risk patients aged 48 - 69, and every year for high risk patients per updated national guidelines. However these guidelines can be individualized by your provider. 8/10/2019    Colonoscopy 3/17/2026    Tetanus Combination Vaccine (2 - Td) 8/24/2026            FoneSense Signup           Our records indicate that you have an active FoneS[employer] account. You can view your After Visit Summary by going to [email or web address] and logging in with your FoneSense username and password. If you don't have a FoneSense username and password but a parent or guardian has access to your record, the parent or guardian should login with their own FoneSense username and password and access your record to view the After Visit Summary. Additional Information  If you have questions, please contact the physician practice where you receive care. Remember, FoneSense is NOT to be used for urgent needs. For medical emergencies, dial 911. For questions regarding your FoneSense account call [phone]. If you have a clinical question, please call your doctor's office.

## 2017-09-28 LAB
C TRACH DNA GENITAL QL NAA+PROBE: NEGATIVE
N. GONORRHOEAE DNA: NEGATIVE

## 2017-10-03 ENCOUNTER — APPOINTMENT (OUTPATIENT)
Dept: GENERAL RADIOLOGY | Facility: CLINIC | Age: 52
End: 2017-10-03
Payer: COMMERCIAL

## 2017-10-03 ENCOUNTER — HOSPITAL ENCOUNTER (EMERGENCY)
Facility: CLINIC | Age: 52
Discharge: HOME | End: 2017-10-03
Attending: EMERGENCY MEDICINE
Payer: COMMERCIAL

## 2017-10-03 VITALS
WEIGHT: 287 LBS | SYSTOLIC BLOOD PRESSURE: 131 MMHG | BODY MASS INDEX: 46.12 KG/M2 | DIASTOLIC BLOOD PRESSURE: 69 MMHG | HEART RATE: 96 BPM | TEMPERATURE: 97.5 F | RESPIRATION RATE: 18 BRPM | OXYGEN SATURATION: 97 % | HEIGHT: 66 IN

## 2017-10-03 DIAGNOSIS — S63.602A SPRAIN OF LEFT THUMB, UNSPECIFIED SITE OF FINGER, INITIAL ENCOUNTER: Primary | ICD-10-CM

## 2017-10-03 PROCEDURE — 73130 X-RAY EXAM OF HAND: CPT

## 2017-10-03 PROCEDURE — 99283 EMERGENCY DEPT VISIT LOW MDM: CPT

## 2017-10-03 RX ORDER — TRAMADOL HYDROCHLORIDE 50 MG/1
50 TABLET ORAL EVERY 6 HOURS PRN
Qty: 10 TABLET | Refills: 0 | Status: SHIPPED | OUTPATIENT
Start: 2017-10-03 | End: 2017-10-13

## 2017-10-03 ASSESSMENT — ENCOUNTER SYMPTOMS
ABDOMINAL PAIN: 0
SHORTNESS OF BREATH: 0
EYE PAIN: 0
BLOOD IN STOOL: 0
NAUSEA: 0
COUGH: 0
VOMITING: 0
DIARRHEA: 0
BACK PAIN: 0
CONSTIPATION: 0

## 2017-10-03 ASSESSMENT — PAIN DESCRIPTION - DESCRIPTORS: DESCRIPTORS: TINGLING;SHARP

## 2017-10-03 ASSESSMENT — PAIN DESCRIPTION - LOCATION: LOCATION: HAND

## 2017-10-03 ASSESSMENT — PAIN DESCRIPTION - FREQUENCY: FREQUENCY: CONTINUOUS

## 2017-10-03 ASSESSMENT — PAIN DESCRIPTION - PROGRESSION: CLINICAL_PROGRESSION: NOT CHANGED

## 2017-10-03 ASSESSMENT — PAIN DESCRIPTION - ONSET: ONSET: ON-GOING

## 2017-10-03 ASSESSMENT — PAIN DESCRIPTION - ORIENTATION: ORIENTATION: LEFT

## 2017-10-03 ASSESSMENT — PAIN SCALES - GENERAL: PAINLEVEL_OUTOF10: 8

## 2017-10-03 ASSESSMENT — PAIN DESCRIPTION - PAIN TYPE: TYPE: ACUTE PAIN

## 2017-10-03 NOTE — ED PROVIDER NOTES
[facility]  [address] [id number]  Phone: [phone]        Pt Name: Syble Schaumann  MRN: [id number]  [place] 1965  Date of evaluation: 10/3/17      CHIEF COMPLAINT       Chief Complaint   Patient presents with    Hand Pain     thumb on the left hand. lifted 4yr old grandson up and felt like a pop with instant pain. HISTORY OF PRESENT ILLNESS    Syble Schaumann is a 46 y.o. female who presentsHis left thumb pain, patient injured herself lifting up her granddaughter felt a pop in the thumb points to the base of the thumb and the thenar eminence is a region of the most pain is no pain to the wrist elbow or shoulder. This is her nondominant hand. She does have a history of arthritis. At her with rest.  Injury happened on 8:30 this morning      REVIEW OF SYSTEMS         Review of Systems   Constitutional: Negative for chills and fever. HENT: Negative for congestion and ear pain. Eyes: Negative for pain and visual disturbance. Respiratory: Negative for cough and shortness of breath. Cardiovascular: Negative for chest pain, palpitations and leg swelling. Gastrointestinal: Negative for abdominal pain, blood in stool, constipation, diarrhea, nausea and vomiting. Endocrine: Negative for polydipsia and polyuria. Genitourinary: Negative for difficulty urinating, dysuria, frequency, vaginal bleeding and vaginal discharge. Musculoskeletal: Negative for back pain, joint swelling, myalgias, neck pain and neck stiffness. Left thumb pain   Skin: Negative for rash. Neurological: Negative for dizziness, weakness and headaches. Hematological: Negative for adenopathy. Does not bruise/bleed easily. Psychiatric/Behavioral: Negative for confusion, self-injury and suicidal ideas. PAST MEDICAL HISTORY    has a past medical history of Anxiety;  Asthma; Bladder prolapse, female, acquired; Cancer ([facility] [address] COPD (chronic obstructive pulmonary disease) ([facility] [address] [facility] Depression; Diabetes mellitus ([facility] [address] Epigastric pain; Hepatic steatosis; History of cataract extraction with lens replacement; History of stress test; Hyperglycemia; Intertrigo; Lumbar degenerative disc disease; Nausea; Obesity; Osteoarthritis; Osteoarthritis cervical spine; Osteoarthritis thoracic spine; and Tobacco abuse. SURGICAL HISTORY      has a past surgical history that includes Hysterectomy ();  section; Cataract removal (); and Throat surgery (). CURRENT MEDICATIONS       Previous Medications    BLOOD GLUCOSE MONITORING SUPPL (ACURA BLOOD GLUCOSE METER) W/DEVICE KIT    1 each by Does not apply route 2 times daily    BUSPIRONE (BUSPAR) 15 MG TABLET    Take 30 mg by mouth 2 times daily     DICLOFENAC SODIUM 1 % GEL    Apply 2 g topically 2 times daily    DULOXETINE (CYMBALTA) 60 MG EXTENDED RELEASE CAPSULE    1 capsule daily    ELASTIC BANDAGES & SUPPORTS (KNEE BRACE) MISC    1 Device by Does not apply route daily Right knee pain; osteoarthritis right knee    FUROSEMIDE (LASIX) 20 MG TABLET    TAKE ONE TABLET BY MOUTH DAILY    GABAPENTIN (NEURONTIN) 600 MG TABLET    Take 1 po bid    GLUCOSE BLOOD VI TEST STRIPS (ASCENSIA AUTODISC VI;ONE TOUCH ULTRA TEST VI) STRIP    1 each by In Vitro route daily As needed. IBUPROFEN (ADVIL;MOTRIN) 800 MG TABLET    TAKE ONE TABLET BY MOUTH EVERY 8 HOURS AS NEEDED FOR PAIN    LORATADINE (CLARITIN) 10 MG TABLET    Take 1 tablet by mouth daily    METFORMIN (GLUCOPHAGE-XR) 500 MG EXTENDED RELEASE TABLET    TAKE ONE TABLET BY MOUTH TWICE A DAY    METHOCARBAMOL (ROBAXIN) 500 MG TABLET    TAKE ONE TABLET BY MOUTH FOUR TIMES A DAY AS NEEDED FOR SPASM    NEBULIZERS (COMPRESSOR/NEBULIZER) MISC    1 vial by Does not apply route 4 times daily as needed.  Patient has albuterol prescription at home; needs aerosol machine and set up diagnosis chronic bronchitis    [id number] [place]GM POWDER    APPLY TO AFFECTED AREA(S) TWO TIMES A DAY    OMEPRAZOLE (PRILOSEC) smokeless tobacco. She reports that she does not drink alcohol or use illicit drugs. PHYSICAL EXAM     INITIAL VITALS:  height is 5' 6\" (1.676 m) and weight is 130.2 kg (287 lb). Her oral temperature is 97.5 °F (36.4 °C). Her blood pressure is 131/69 and her pulse is 96. Her respiration is 18 and oxygen saturation is 97%. Physical Exam   Constitutional: She is oriented to person, place, and time. She appears well-developed and well-nourished. No distress. HENT:   Head: Normocephalic and atraumatic. Eyes: Conjunctivae are normal. Pupils are equal, round, and reactive to light. Neck: Normal range of motion. Neck supple. Cardiovascular: Normal rate, regular rhythm, normal heart sounds and intact distal pulses. Pulmonary/Chest: Effort normal and breath sounds normal.   Musculoskeletal: Normal range of motion. She exhibits tenderness. She exhibits no edema. Patient has tenderness over the thenar eminence of the left hand is not is deformity there is no tenderness at the IP joint or the MCP joint no tenderness in the snuffbox or with axial load of the thumb neurovascular status of the hands attack is no tenderness at the wrist elbow or shoulder   Lymphadenopathy:     She has no cervical adenopathy. Neurological: She is alert and oriented to person, place, and time. No cranial nerve deficit. Skin: Skin is dry. No rash noted. She is not diaphoretic. Psychiatric: She has a normal mood and affect.  Her behavior is normal.       DIFFERENTIAL DIAGNOSIS/ MDM:     Thumb sprain versus DJD    DIAGNOSTIC RESULTS     EKG: All EKG's are interpreted by the Emergency Department Physician who either signs or Co-signs this chart in the absence of a cardiologist.        RADIOLOGY:   Non-plain film images such as CT, Ultrasound and MRI are read by the radiologist. Plain radiographic images are visualized and the radiologist interpretations are reviewed as follows:        1.  No acute osseous abnormality in the left hand.  2.  Moderate   osteoarthritis at the base of the thumb/ 1st carpometacarpal joint.             LABS:  No results found for this visit on 10/03/17. EMERGENCY DEPARTMENT COURSE:   Vitals:    Vitals:    10/03/17 1407   BP: 131/69   Pulse: 96   Resp: 18   Temp: 97.5 °F (36.4 °C)   TempSrc: Oral   SpO2: 97%   Weight: 130.2 kg (287 lb)   Height: 5' 6\" (1.676 m)     -------------------------  BP: 131/69, Temp: 97.5 °F (36.4 °C), Pulse: 96, Resp: 18    Patient has her own thumb splint which she has placed on we'll discharge her home in stable condition    CONSULTS:  Controlled Substances Monitoring:     Attestation: The Prescription Monitoring Report for this patient was reviewed today. Duane Gonzalez MD)  Documentation: No signs of potential drug abuse or diversion identified. Duane Gonzalez MD)    PROCEDURES:  None    FINAL IMPRESSION      1. Sprain of left thumb, unspecified site of finger, initial encounter          DISPOSITION/PLAN     Discharge in stable condition  PATIENT REFERRED TO:  Geraldine Diaz PA-C  [address] [place] [address]  [phone]    Schedule an appointment as soon as possible for a visit in 3 days        DISCHARGE MEDICATIONS:  New Prescriptions    TRAMADOL (ULTRAM) 50 MG TABLET    Take 1 tablet by mouth every 6 hours as needed for Pain       (Please note that portions of this note were completed with a voice recognition program.  Efforts were made to edit the dictations but occasionally words are mis-transcribed.)    Reyna MD, F.A.A.E.M.   Attending Emergency Medicine Physician         Duane Gonzalez MD  10/03/17 3747

## 2017-10-03 NOTE — ED NOTES
Patient states she was lifting her grandchild at 0830 this am and felt a pop in her left thumb at the base. Patient states 8/10 pain. Patient states some tingling in her fingers. Hx of arthritis, pt used to type frequently before becoming disabled.       Annelise Chu RN  10/03/17 4724

## 2017-10-03 NOTE — ED AVS SNAPSHOT
After Visit Summary  (Discharge Instructions)    Medication List for Home    Based on the information you provided to us as well as any changes during this visit, the following is your updated medication list.  Compare this with your prescription bottles at home. If you have any questions or concerns, contact your primary care physician's office. Daily Medication List (This medication list can be shared with any Healthcare provider who is helping you manage your medications)      There are NEW medications for you. START taking them after you leave the hospital     traMADol 50 MG tablet   Commonly known as:  ULTRAM   Take 1 tablet by mouth every 6 hours as needed for Pain         ASK your doctor about these medications if you have questions     ACURA BLOOD GLUCOSE METER w/Device Kit   1 each by Does not apply route 2 times daily       busPIRone 15 MG tablet   Commonly known as:  BUSPAR   Take 30 mg by mouth 2 times daily       Compressor/Nebulizer Misc   1 vial by Does not apply route 4 times daily as needed. Patient has albuterol prescription at home; needs aerosol machine and set up diagnosis chronic bronchitis       diclofenac sodium 1 % Gel   Apply 2 g topically 2 times daily       DULoxetine 60 MG extended release capsule   Commonly known as:  CYMBALTA   1 capsule daily       furosemide 20 MG tablet   Commonly known as:  LASIX   TAKE ONE TABLET BY MOUTH DAILY       gabapentin 600 MG tablet   Commonly known as:  NEURONTIN   Take 1 po bid       glucose blood VI test strips strip   Commonly known as:  ASCENSIA AUTODISC VI;ONE TOUCH ULTRA TEST VI   1 each by In Vitro route daily As needed.        ibuprofen 800 MG tablet   Commonly known as:  ADVIL;MOTRIN   TAKE ONE TABLET BY MOUTH EVERY 8 HOURS AS NEEDED FOR PAIN       Knee Brace Misc   1 Device by Does not apply route daily Right knee pain; osteoarthritis right knee       loratadine 10 MG tablet   Commonly known as:  CLARITIN Influenza, Quadv, 3 yrs and older, IM, Preservative Free 2017 0.5 mL 2015 Intramuscular    Pneumococcal 13-valent Conjugate ([id number]) 2016 0.5 mL 2015 Intramuscular    Pneumococcal Polysaccharide (Wlmnctogp10) 2017 0.5 mL 2015 Intramuscular    Tdap (Boostrix, Adacel) 2016 0.5 mL 2015 Intramuscular         After Visit Summary    This summary was created for you. Thank you for entrusting your care to us. The following information includes details about your hospital/visit stay along with steps you should take to help with your recovery once you leave the hospital.  In this packet, you will find information about the topics listed below:    · Instructions about your medications including a list of your home medications  · A summary of your hospital visit  · Follow-up appointments once you have left the hospital  · Your care plan at home      You may receive a survey regarding the care you received during your stay. Your input is valuable to us. We encourage you to complete and return your survey in the envelope provided. We hope you will choose us in the future for your healthcare needs. Patient Information     Patient Name AMITA Echavarria 1965      Care Provided at:     Name             [facility]              Your Visit    Here you will find information about your visit, including the reason for your visit. Please take this sheet with you when you visit your doctor or other health care provider in the future. It will help determine the best possible medical care for you at that time. If you have any questions once you leave the hospital, please call the department phone number listed below. Diagnoses this visit     Your diagnosis was SPRAIN OF LEFT THUMB, UNSPECIFIED SITE OF FINGER, INITIAL ENCOUNTER.       Visit Information     Date of Visit Department Dept Phone    10/3/2017 [facility] [phone] You were seen by     You were seen by Urmila Radford MD.       Follow-up Appointments    Below is a list of your follow-up and future appointments. This may not be a complete list as you may have made appointments directly with providers that we are not aware of or your providers may have made some for you. Please call your providers to confirm appointments. It is important to keep your appointments. Please bring your current insurance card, photo ID, co-pay, and all medication bottles to your appointment. If self-pay, payment is expected at the time of service. Follow-up Information     Follow up with Zina Nino PA-C. Schedule an appointment as soon as possible for a visit in 3 days. Specialties:  Physician Assistant, Family Medicine, Emergency Medicine    Contact information:    [address] [address] [phone]        Future Appointments     10/9/2017 9:45 AM     Appointment with Chau Granda PA-C at [facility] ([phone])   [address] [facility] [address]       12/5/2017 9:15 AM     Appointment with Chau Granda PA-C at [facility] ([phone])   Please arrive 15 minutes prior to appointment, bring photo ID and insurance card. [address] [facility] [address]       12/20/2017 11:20 AM     Appointment with Jose David Escoto MD at [facility] Neurology Specialist ([phone])   Please arrive 15 minutes prior to appointment time, bring insurance card and photo ID.    [facility] [address] [facility] [address]         Preventive Care        Date Due    Hepatitis C screening is recommended for all adults regardless of risk factors born between [facility] at least once (lifetime) who have never been tested.  1965    Diabetic Foot Exam 3/30/1975    Eye Exam By An Eye Doctor 3/30/1975    HIV screening is recommended for all people regardless of risk factors 2. Touch your affected thumb to each finger, one finger at a time. This will look like an \"okay\" sign, but try to keep your other fingers straight and pointing upward as much as you can. 3. Repeat 8 to 12 times. Follow-up care is a key part of your treatment and safety. Be sure to make and go to all appointments, and call your doctor if you are having problems. It's also a good idea to know your test results and keep a list of the medicines you take. Where can you learn more? Go to [email or web address] org and sign in to your OpenSearchServer account. Enter D278 in the Personal On Demand box to learn more about \"Thumb Sprain: Rehab Exercises. \"     If you do not have an account, please click on the \"Sign Up Now\" link. Current as of: March 21, 2017  Content Version: 11.3  © 3293-8347 [employer]. Care instructions adapted under license by [facility] ([facility]). If you have questions about a medical condition or this instruction, always ask your healthcare professional. [address] [address] any warranty or liability for your use of this information.

## 2017-10-06 RX ORDER — ERGOCALCIFEROL 1.25 MG/1
CAPSULE ORAL
Qty: 12 CAPSULE | Refills: 0 | Status: SHIPPED | OUTPATIENT
Start: 2017-10-06 | End: 2017-12-29 | Stop reason: SDUPTHER

## 2017-10-06 NOTE — TELEPHONE ENCOUNTER
resistance     COPD (chronic obstructive pulmonary disease) (HCC)     Osteoarthritis thoracic spine     Osteoarthritis cervical spine     Prediabetes     Peripheral edema     Hepatic steatosis     Epigastric pain     Nausea     Vitamin D deficiency     Tobacco abuse     Vocal cord polyp     Laryngitis     Obesity

## 2017-10-09 ENCOUNTER — HOSPITAL ENCOUNTER (OUTPATIENT)
Age: 52
Setting detail: SPECIMEN
Discharge: HOME | End: 2017-10-09
Payer: COMMERCIAL

## 2017-10-09 ENCOUNTER — OFFICE VISIT (OUTPATIENT)
Dept: FAMILY MEDICINE CLINIC | Age: 52
End: 2017-10-09
Payer: COMMERCIAL

## 2017-10-09 VITALS
TEMPERATURE: 98 F | HEIGHT: 66 IN | HEART RATE: 82 BPM | OXYGEN SATURATION: 97 % | DIASTOLIC BLOOD PRESSURE: 82 MMHG | WEIGHT: 288 LBS | SYSTOLIC BLOOD PRESSURE: 124 MMHG | BODY MASS INDEX: 46.28 KG/M2

## 2017-10-09 DIAGNOSIS — E88.81 INSULIN RESISTANCE: ICD-10-CM

## 2017-10-09 DIAGNOSIS — L91.8 SKIN TAG: ICD-10-CM

## 2017-10-09 DIAGNOSIS — R73.9 HYPERGLYCEMIA: ICD-10-CM

## 2017-10-09 DIAGNOSIS — M79.642 PAIN OF LEFT HAND: Primary | ICD-10-CM

## 2017-10-09 DIAGNOSIS — R73.03 PREDIABETES: ICD-10-CM

## 2017-10-09 DIAGNOSIS — G89.29 OTHER CHRONIC PAIN: ICD-10-CM

## 2017-10-09 LAB
CREATININE URINE POCT: 100
MICROALBUMIN/CREAT 24H UR: 10 MG/G{CREAT}
MICROALBUMIN/CREAT UR-RTO: <30

## 2017-10-09 PROCEDURE — 11100 PR BIOPSY OF SKIN LESION: CPT | Performed by: PHYSICIAN ASSISTANT

## 2017-10-09 PROCEDURE — 99214 OFFICE O/P EST MOD 30 MIN: CPT | Performed by: PHYSICIAN ASSISTANT

## 2017-10-09 PROCEDURE — 82044 UR ALBUMIN SEMIQUANTITATIVE: CPT | Performed by: PHYSICIAN ASSISTANT

## 2017-10-09 RX ORDER — TRIAMCINOLONE ACETONIDE 40 MG/ML
40 INJECTION, SUSPENSION INTRA-ARTICULAR; INTRAMUSCULAR ONCE
Status: COMPLETED | OUTPATIENT
Start: 2017-10-09 | End: 2017-10-09

## 2017-10-09 RX ORDER — DULOXETIN HYDROCHLORIDE 30 MG/1
30 CAPSULE, DELAYED RELEASE ORAL DAILY
Qty: 30 CAPSULE | Refills: 3 | Status: SHIPPED | OUTPATIENT
Start: 2017-10-09 | End: 2018-07-12 | Stop reason: DRUGHIGH

## 2017-10-09 RX ORDER — KETOROLAC TROMETHAMINE 30 MG/ML
60 INJECTION, SOLUTION INTRAMUSCULAR; INTRAVENOUS ONCE
Status: COMPLETED | OUTPATIENT
Start: 2017-10-09 | End: 2017-10-09

## 2017-10-09 RX ORDER — NYSTATIN 100000 [USP'U]/G
POWDER TOPICAL
Qty: 45 G | Refills: 1 | Status: SHIPPED | OUTPATIENT
Start: 2017-10-09 | End: 2018-06-11 | Stop reason: SDUPTHER

## 2017-10-09 RX ADMIN — KETOROLAC TROMETHAMINE 60 MG: 30 INJECTION, SOLUTION INTRAMUSCULAR; INTRAVENOUS at 10:32

## 2017-10-09 RX ADMIN — TRIAMCINOLONE ACETONIDE 40 MG: 40 INJECTION, SUSPENSION INTRA-ARTICULAR; INTRAMUSCULAR at 10:32

## 2017-10-09 ASSESSMENT — ENCOUNTER SYMPTOMS
SINUS PRESSURE: 0
EYE DISCHARGE: 0
SHORTNESS OF BREATH: 0
CHEST TIGHTNESS: 0
TROUBLE SWALLOWING: 0
COUGH: 0
RHINORRHEA: 0
BELCHING: 0
EYE ITCHING: 0

## 2017-10-09 NOTE — PROGRESS NOTES
pain is present in the midline. The pain is moderate. Pertinent negatives include no chest pain, leg pain, numbness, pain with swallowing, syncope, trouble swallowing or weakness. Mental Health Problem     Additional symptoms of the illness include anhedonia. Additional symptoms of the illness do not include insomnia, hypersomnia, appetite change or fatigue. She does not admit to suicidal ideas. She does not have a plan to commit suicide. She does not contemplate harming herself. She has not already injured self. She does not contemplate injuring another person. She has not already  injured another person. Foot Pain   This is a chronic problem. Associated symptoms include neck pain. Pertinent negatives include no arthralgias, chest pain, congestion, coughing, diaphoresis, fatigue, numbness or weakness. Wound Check   Previous treatment included wound cleansing or irrigation. There is new redness present. There is new swelling present. Gastroesophageal Reflux   She reports no belching, no chest pain or no coughing. This is a chronic problem. Pertinent negatives include no fatigue. Hand Pain    The incident occurred more than 1 week ago. There was no injury mechanism. The quality of the pain is described as aching. The pain is at a severity of 6/10. The pain is moderate. Pertinent negatives include no chest pain or numbness.        Hemoglobin A1C (%)   Date Value   04/20/2017 5.5   02/06/2017 5.4   10/20/2016 5.2             ( goal A1C is < 7)   No results found for: LABMICR  LDL Cholesterol (mg/dL)   Date Value   04/20/2017 94   10/20/2016 77     LDL Calculated (mg/dL)   Date Value   04/27/2015 81       (goal LDL is <100)   AST (U/L)   Date Value   04/20/2017 16     ALT (U/L)   Date Value   04/20/2017 20     BUN (mg/dL)   Date Value   04/20/2017 14     BP Readings from Last 3 Encounters:   10/09/17 124/82   10/03/17 131/69   09/27/17 124/80          (goal 120/80)    Past Medical History:   Diagnosis Date    tablet by mouth every 6 hours as needed for Pain 10 tablet 0    PROAIR  (90 Base) MCG/ACT inhaler INHALE TWO PUFFS BY MOUTH EVERY 6 HOURS AS NEEDED FOR WHEEZING 1 Inhaler 4    NYAMYC 784867 UNIT/GM powder APPLY TO AFFECTED AREA(S) TWO TIMES A DAY 1 Bottle 2    ibuprofen (ADVIL;MOTRIN) 800 MG tablet TAKE ONE TABLET BY MOUTH EVERY 8 HOURS AS NEEDED FOR PAIN 60 tablet 0    diclofenac sodium 1 % GEL Apply 2 g topically 2 times daily 1 Tube 3    busPIRone (BUSPAR) 15 MG tablet Take 30 mg by mouth 2 times daily       omeprazole (PRILOSEC) 20 MG delayed release capsule TAKE ONE CAPSULE BY MOUTH DAILY 30 capsule 3    SYMBICORT 160-4.5 MCG/ACT AERO INHALE TWO PUFFS BY MOUTH TWICE A DAY 1 Inhaler 3    metFORMIN (GLUCOPHAGE-XR) 500 MG extended release tablet TAKE ONE TABLET BY MOUTH TWICE A DAY 60 tablet 3    DULoxetine (CYMBALTA) 60 MG extended release capsule 1 capsule daily      gabapentin (NEURONTIN) 600 MG tablet Take 1 po bid (Patient taking differently: 600 mg 2 times daily Take 1 po bid) 60 tablet 5    methocarbamol (ROBAXIN) 500 MG tablet TAKE ONE TABLET BY MOUTH FOUR TIMES A DAY AS NEEDED FOR SPASM 60 tablet 3    furosemide (LASIX) 20 MG tablet TAKE ONE TABLET BY MOUTH DAILY 60 tablet 2    glucose blood VI test strips (ASCENSIA AUTODISC VI;ONE TOUCH ULTRA TEST VI) strip 1 each by In Vitro route daily As needed.  100 each 3    Blood Glucose Monitoring Suppl (ACURA BLOOD GLUCOSE METER) W/DEVICE KIT 1 each by Does not apply route 2 times daily 1 kit 0    loratadine (CLARITIN) 10 MG tablet Take 1 tablet by mouth daily 30 tablet 11    Thumb Spica MISC 1 each by Does not apply route continuous 1 each 0    Elastic Bandages & Supports (KNEE BRACE) MISC 1 Device by Does not apply route daily Right knee pain; osteoarthritis right knee 1 each 1    Respiratory Therapy Supplies (NEBULIZER COMPRESSOR) KIT 1 kit by Does not apply route once for 1 dose Patient with mild to moderate COPD per pulmonary function Outpatient             Plan:      No Follow-up on file. Orders Placed This Encounter   Procedures    Specimen to Pathology Outpatient     Order Specific Question:   PREVIOUS BIOPSY     Answer:   No     Order Specific Question:   PREOP DIAGNOSIS     Answer:   Skin tag     Order Specific Question:   FROZEN SECTION - NO OR YES/SPECIMEN     Answer:   No    Amb External Referral To Orthopedic Surgery     Referral Priority:   Routine     Referral Type:   Consult for Advice and Opinion     Referred to Provider:   Carlos A Greene MD     Requested Specialty:   Orthopedic Surgery     Number of Visits Requested:   1    POCT microalbumin    92012 - [place] BIOPSY OF SKIN LESION     Orders Placed This Encounter   Medications    triamcinolone acetonide (KENALOG-40) injection 40 mg    ketorolac (TORADOL) injection 60 mg    DULoxetine (CYMBALTA) 30 MG extended release capsule     Sig: Take 1 capsule by mouth daily     Dispense:  30 capsule     Refill:  3    Labs from 4/2017 reviewed      Insulin resistance - On metformin. Diet and exercise encouraged.      Anxiety and depression - Taking celexa with some relief. No SI or HI. Also on xanax. Referral to psych. Started on buspar. Doing much better with med. Seeing psych. GOing to counseling now.     Elevated HGB - Pt reports family hx of same. Will repeat. Referral to heme. Saw Dr. Eugene Velez.        Lung nodule - Seen on CT chest 5/11/16 stable. Seeing pulm.      Tobacco abuse/COPD - Smoking since age 13, 1/2-1 PPD. On symbicort and prn albuterol. 1/18/17 emphysema. Pt has been on chantix in past.  No SI. HI or vivid dreams. Will watch for and go to ER if it develops.        Chronic back pain / right hip pain - Radicular pain down right leg. No recent imaging. Xray showed degenerative changes 9/1/16. MRI ordered. On robaxin. Previously on norco. Now on ultram. Referral to pain management.       Chronic neck pain - Radiation down right arm. Pt does c/o right hand weakness.  Xray

## 2017-10-11 LAB — DERMATOLOGY PATHOLOGY REPORT: NORMAL

## 2017-10-11 RX ORDER — AZITHROMYCIN 250 MG/1
TABLET, FILM COATED ORAL
Qty: 1 PACKET | Refills: 0 | Status: SHIPPED | OUTPATIENT
Start: 2017-10-11 | End: 2017-10-21

## 2017-10-30 ENCOUNTER — TELEPHONE (OUTPATIENT)
Dept: OTHER | Facility: CLINIC | Age: 52
End: 2017-10-30

## 2017-11-03 RX ORDER — IBUPROFEN 800 MG/1
TABLET ORAL
Qty: 60 TABLET | Refills: 0 | Status: SHIPPED | OUTPATIENT
Start: 2017-11-03 | End: 2018-01-04 | Stop reason: SDUPTHER

## 2017-11-06 RX ORDER — POLYETHYLENE GLYCOL 3350 17 G/17G
POWDER, FOR SOLUTION ORAL
Qty: 255 G | Refills: 0 | Status: SHIPPED | OUTPATIENT
Start: 2017-11-06 | End: 2017-12-01

## 2017-12-04 ENCOUNTER — HOSPITAL ENCOUNTER (OUTPATIENT)
Facility: CLINIC | Age: 52
Discharge: HOME | End: 2017-12-04
Payer: MEDICARE

## 2017-12-04 DIAGNOSIS — E55.9 VITAMIN D DEFICIENCY: ICD-10-CM

## 2017-12-04 DIAGNOSIS — Z72.0 TOBACCO ABUSE: ICD-10-CM

## 2017-12-04 DIAGNOSIS — E66.9 OBESITY, UNSPECIFIED OBESITY SEVERITY, UNSPECIFIED OBESITY TYPE: ICD-10-CM

## 2017-12-04 DIAGNOSIS — Z23 NEED FOR INFLUENZA VACCINATION: ICD-10-CM

## 2017-12-04 DIAGNOSIS — L03.90 CELLULITIS, UNSPECIFIED CELLULITIS SITE: ICD-10-CM

## 2017-12-04 DIAGNOSIS — J44.9 CHRONIC OBSTRUCTIVE PULMONARY DISEASE, UNSPECIFIED COPD TYPE (HCC): ICD-10-CM

## 2017-12-04 DIAGNOSIS — Z23 NEED FOR PNEUMOCOCCAL VACCINATION: ICD-10-CM

## 2017-12-04 DIAGNOSIS — F32.A DEPRESSION, UNSPECIFIED DEPRESSION TYPE: ICD-10-CM

## 2017-12-04 DIAGNOSIS — F41.9 ANXIETY: ICD-10-CM

## 2017-12-04 DIAGNOSIS — Z12.12 SCREENING FOR COLORECTAL CANCER: ICD-10-CM

## 2017-12-04 DIAGNOSIS — E88.81 INSULIN RESISTANCE: ICD-10-CM

## 2017-12-04 DIAGNOSIS — Z12.11 SCREENING FOR COLORECTAL CANCER: ICD-10-CM

## 2017-12-04 DIAGNOSIS — R73.9 HYPERGLYCEMIA: ICD-10-CM

## 2017-12-04 DIAGNOSIS — M47.22 OSTEOARTHRITIS OF SPINE WITH RADICULOPATHY, CERVICAL REGION: ICD-10-CM

## 2017-12-04 LAB
-: ABNORMAL
ABSOLUTE EOS #: 0.17 K/UL (ref 0–0.44)
ABSOLUTE IMMATURE GRANULOCYTE: 0.04 K/UL (ref 0–0.3)
ABSOLUTE LYMPH #: 2.23 K/UL (ref 1.1–3.7)
ABSOLUTE MONO #: 0.82 K/UL (ref 0.1–1.2)
ALBUMIN SERPL-MCNC: 4.1 G/DL (ref 3.5–5.2)
ALBUMIN/GLOBULIN RATIO: 1.4 (ref 1–2.5)
ALP BLD-CCNC: 79 U/L (ref 35–104)
ALT SERPL-CCNC: 24 U/L (ref 5–33)
AMORPHOUS: ABNORMAL
ANION GAP SERPL CALCULATED.3IONS-SCNC: 13 MMOL/L (ref 9–17)
AST SERPL-CCNC: 18 U/L
BACTERIA: ABNORMAL
BASOPHILS # BLD: 1 % (ref 0–2)
BASOPHILS ABSOLUTE: 0.05 K/UL (ref 0–0.2)
BILIRUB SERPL-MCNC: 0.27 MG/DL (ref 0.3–1.2)
BILIRUBIN URINE: NEGATIVE
BUN BLDV-MCNC: 20 MG/DL (ref 6–20)
BUN/CREAT BLD: ABNORMAL (ref 9–20)
CALCIUM SERPL-MCNC: 9 MG/DL (ref 8.6–10.4)
CASTS UA: ABNORMAL /LPF (ref 0–2)
CHLORIDE BLD-SCNC: 105 MMOL/L (ref 98–107)
CO2: 28 MMOL/L (ref 20–31)
COLOR: YELLOW
COMMENT UA: ABNORMAL
CREAT SERPL-MCNC: 0.95 MG/DL (ref 0.5–0.9)
CRYSTALS, UA: ABNORMAL /HPF
DIFFERENTIAL TYPE: ABNORMAL
EOSINOPHILS RELATIVE PERCENT: 2 % (ref 1–4)
EPITHELIAL CELLS UA: ABNORMAL /HPF (ref 0–5)
GFR AFRICAN AMERICAN: >60 ML/MIN
GFR NON-AFRICAN AMERICAN: >60 ML/MIN
GFR SERPL CREATININE-BSD FRML MDRD: ABNORMAL ML/MIN/{1.73_M2}
GFR SERPL CREATININE-BSD FRML MDRD: ABNORMAL ML/MIN/{1.73_M2}
GLUCOSE BLD-MCNC: 127 MG/DL (ref 70–99)
GLUCOSE URINE: NEGATIVE
HCT VFR BLD CALC: 51.4 % (ref 36.3–47.1)
HEMOGLOBIN: 15.9 G/DL (ref 11.9–15.1)
IMMATURE GRANULOCYTES: 0 %
INSULIN COMMENT: NORMAL
INSULIN REFERENCE RANGE:: NORMAL
INSULIN: 109.8 MU/L
KETONES, URINE: ABNORMAL
LEUKOCYTE ESTERASE, URINE: ABNORMAL
LYMPHOCYTES # BLD: 22 % (ref 24–43)
MCH RBC QN AUTO: 29.3 PG (ref 25.2–33.5)
MCHC RBC AUTO-ENTMCNC: 30.9 G/DL (ref 28.4–34.8)
MCV RBC AUTO: 94.7 FL (ref 82.6–102.9)
MONOCYTES # BLD: 8 % (ref 3–12)
MUCUS: ABNORMAL
NITRITE, URINE: NEGATIVE
OTHER OBSERVATIONS UA: ABNORMAL
PDW BLD-RTO: 14.2 % (ref 11.8–14.4)
PH UA: 5 (ref 5–8)
PLATELET # BLD: 287 K/UL (ref 138–453)
PLATELET ESTIMATE: ABNORMAL
PMV BLD AUTO: 11.9 FL (ref 8.1–13.5)
POTASSIUM SERPL-SCNC: 3.9 MMOL/L (ref 3.7–5.3)
PROTEIN UA: NEGATIVE
RBC # BLD: 5.43 M/UL (ref 3.95–5.11)
RBC # BLD: ABNORMAL 10*6/UL
RBC UA: ABNORMAL /HPF (ref 0–2)
RENAL EPITHELIAL, UA: ABNORMAL /HPF
SEG NEUTROPHILS: 67 % (ref 36–65)
SEGMENTED NEUTROPHILS ABSOLUTE COUNT: 6.71 K/UL (ref 1.5–8.1)
SODIUM BLD-SCNC: 146 MMOL/L (ref 135–144)
SPECIFIC GRAVITY UA: 1.03 (ref 1–1.03)
TOTAL PROTEIN: 7 G/DL (ref 6.4–8.3)
TRICHOMONAS: ABNORMAL
TSH SERPL DL<=0.05 MIU/L-ACNC: 2.56 MIU/L (ref 0.3–5)
TURBIDITY: ABNORMAL
URINE HGB: NEGATIVE
UROBILINOGEN, URINE: NORMAL
VITAMIN B-12: 266 PG/ML (ref 211–946)
WBC # BLD: 10 K/UL (ref 3.5–11.3)
WBC # BLD: ABNORMAL 10*3/UL
WBC UA: ABNORMAL /HPF (ref 0–5)
YEAST: ABNORMAL

## 2017-12-04 PROCEDURE — 84443 ASSAY THYROID STIM HORMONE: CPT

## 2017-12-04 PROCEDURE — 83525 ASSAY OF INSULIN: CPT

## 2017-12-04 PROCEDURE — 85025 COMPLETE CBC W/AUTO DIFF WBC: CPT

## 2017-12-04 PROCEDURE — 80053 COMPREHEN METABOLIC PANEL: CPT

## 2017-12-04 PROCEDURE — 81001 URINALYSIS AUTO W/SCOPE: CPT

## 2017-12-04 PROCEDURE — 82607 VITAMIN B-12: CPT

## 2017-12-04 PROCEDURE — 83036 HEMOGLOBIN GLYCOSYLATED A1C: CPT

## 2017-12-04 PROCEDURE — 36415 COLL VENOUS BLD VENIPUNCTURE: CPT

## 2017-12-04 RX ORDER — FUROSEMIDE 20 MG/1
TABLET ORAL
Qty: 60 TABLET | Refills: 1 | Status: SHIPPED | OUTPATIENT
Start: 2017-12-04 | End: 2018-04-02 | Stop reason: SDUPTHER

## 2017-12-04 NOTE — TELEPHONE ENCOUNTER
Next Visit Date:  Future Appointments  Date Time Provider Hollis Fayi   12/5/2017 9:15 AM SONIDO Villalba [place] [facility]   12/20/2017 11:20 AM Jackie Delacruz MD Neuro Spec [address] Maintenance   Topic Date Due    Hepatitis C screen  1965    Diabetic foot exam  03/30/1975    Diabetic retinal exam  03/30/1975    HIV screen  03/30/1980    Cervical cancer screen  09/27/2017    Diabetic hemoglobin A1C test  04/20/2018    Lipid screen  04/20/2018    Diabetic microalbuminuria test  10/09/2018    Breast cancer screen  08/10/2019    Colon cancer screen colonoscopy  03/17/2026    DTaP/Tdap/Td vaccine (2 - Td) 08/24/2026    Flu vaccine  Completed    Pneumococcal med risk  Completed       Hemoglobin A1C (%)   Date Value   04/20/2017 5.5   02/06/2017 5.4   10/20/2016 5.2             ( goal A1C is < 7)   No results found for: LABMICR  LDL Cholesterol (mg/dL)   Date Value   04/20/2017 94     LDL Calculated (mg/dL)   Date Value   04/27/2015 81       (goal LDL is <100)   AST (U/L)   Date Value   04/20/2017 16     ALT (U/L)   Date Value   04/20/2017 20     BUN (mg/dL)   Date Value   04/20/2017 14     BP Readings from Last 3 Encounters:   10/09/17 124/82   10/03/17 131/69   09/27/17 124/80          (goal 120/80)    All Future Testing planned in [facility]  Lab Frequency Next Occurrence   PT eval and treat Once 07/15/2017   POCT Fecal Immunochemical Test (FIT) Once 09/01/2018   CBC Auto Differential Once 09/01/2018   Comprehensive Metabolic Panel Once 17/07/3810   Lipid Panel Once 09/01/2018   TSH with Reflex Once 09/01/2018   Insulin, Total Once 09/01/2018   Hemoglobin A1C Once 09/01/2018   Vitamin B12 Once 09/01/2018   Vitamin D 25 Hydroxy Once 09/01/2018               Patient Active Problem List:     Hyperglycemia     Anxiety     Bronchitis     Depression     Degenerative arthritis of lumbar spine     Osteoarthritis of knee     Insulin resistance     COPD (chronic obstructive pulmonary disease) (HCC)     Osteoarthritis thoracic spine     Osteoarthritis cervical spine     Prediabetes     Peripheral edema     Hepatic steatosis     Epigastric pain     Nausea     Vitamin D deficiency     Tobacco abuse     Vocal cord polyp     Laryngitis     Obesity

## 2017-12-05 ENCOUNTER — HOSPITAL ENCOUNTER (OUTPATIENT)
Age: 52
Setting detail: SPECIMEN
Discharge: HOME | End: 2017-12-05
Payer: MEDICARE

## 2017-12-05 ENCOUNTER — OFFICE VISIT (OUTPATIENT)
Dept: FAMILY MEDICINE CLINIC | Age: 52
End: 2017-12-05
Payer: MEDICARE

## 2017-12-05 VITALS
BODY MASS INDEX: 46.61 KG/M2 | WEIGHT: 290 LBS | SYSTOLIC BLOOD PRESSURE: 125 MMHG | OXYGEN SATURATION: 97 % | DIASTOLIC BLOOD PRESSURE: 84 MMHG | HEART RATE: 76 BPM | TEMPERATURE: 98.1 F | HEIGHT: 66 IN

## 2017-12-05 DIAGNOSIS — Z01.00 DIABETIC EYE EXAM (HCC): ICD-10-CM

## 2017-12-05 DIAGNOSIS — E88.81 INSULIN RESISTANCE: ICD-10-CM

## 2017-12-05 DIAGNOSIS — R73.03 PREDIABETES: ICD-10-CM

## 2017-12-05 DIAGNOSIS — Z11.59 NEED FOR HEPATITIS C SCREENING TEST: ICD-10-CM

## 2017-12-05 DIAGNOSIS — G47.33 OSA (OBSTRUCTIVE SLEEP APNEA): ICD-10-CM

## 2017-12-05 DIAGNOSIS — Z11.4 ENCOUNTER FOR SCREENING FOR HIV: ICD-10-CM

## 2017-12-05 DIAGNOSIS — E11.9 DIABETIC EYE EXAM (HCC): ICD-10-CM

## 2017-12-05 DIAGNOSIS — E55.9 VITAMIN D DEFICIENCY: Primary | ICD-10-CM

## 2017-12-05 DIAGNOSIS — J44.9 CHRONIC OBSTRUCTIVE PULMONARY DISEASE, UNSPECIFIED COPD TYPE (HCC): ICD-10-CM

## 2017-12-05 DIAGNOSIS — R53.83 OTHER FATIGUE: ICD-10-CM

## 2017-12-05 DIAGNOSIS — R35.0 URINARY FREQUENCY: ICD-10-CM

## 2017-12-05 DIAGNOSIS — F32.A DEPRESSION, UNSPECIFIED DEPRESSION TYPE: ICD-10-CM

## 2017-12-05 DIAGNOSIS — E11.9 ENCOUNTER FOR DIABETIC FOOT EXAM (HCC): ICD-10-CM

## 2017-12-05 LAB
ESTIMATED AVERAGE GLUCOSE: 111 MG/DL
HBA1C MFR BLD: 5.5 % (ref 4–6)
HEPATITIS C ANTIBODY: NONREACTIVE
HIV AG/AB: NONREACTIVE

## 2017-12-05 PROCEDURE — 3023F SPIROM DOC REV: CPT | Performed by: PHYSICIAN ASSISTANT

## 2017-12-05 PROCEDURE — 3014F SCREEN MAMMO DOC REV: CPT | Performed by: PHYSICIAN ASSISTANT

## 2017-12-05 PROCEDURE — G8926 SPIRO NO PERF OR DOC: HCPCS | Performed by: PHYSICIAN ASSISTANT

## 2017-12-05 PROCEDURE — 99214 OFFICE O/P EST MOD 30 MIN: CPT | Performed by: PHYSICIAN ASSISTANT

## 2017-12-05 PROCEDURE — G8427 DOCREV CUR MEDS BY ELIG CLIN: HCPCS | Performed by: PHYSICIAN ASSISTANT

## 2017-12-05 PROCEDURE — 3017F COLORECTAL CA SCREEN DOC REV: CPT | Performed by: PHYSICIAN ASSISTANT

## 2017-12-05 PROCEDURE — 3044F HG A1C LEVEL LT 7.0%: CPT | Performed by: PHYSICIAN ASSISTANT

## 2017-12-05 PROCEDURE — G8417 CALC BMI ABV UP PARAM F/U: HCPCS | Performed by: PHYSICIAN ASSISTANT

## 2017-12-05 PROCEDURE — 4004F PT TOBACCO SCREEN RCVD TLK: CPT | Performed by: PHYSICIAN ASSISTANT

## 2017-12-05 PROCEDURE — G8484 FLU IMMUNIZE NO ADMIN: HCPCS | Performed by: PHYSICIAN ASSISTANT

## 2017-12-05 RX ORDER — METHOCARBAMOL 500 MG/1
500 TABLET, FILM COATED ORAL 4 TIMES DAILY
Qty: 60 TABLET | Refills: 3 | Status: SHIPPED | OUTPATIENT
Start: 2017-12-05 | End: 2018-10-03

## 2017-12-05 ASSESSMENT — ENCOUNTER SYMPTOMS
TROUBLE SWALLOWING: 0
SINUS PRESSURE: 0
SHORTNESS OF BREATH: 0
BELCHING: 0
VISUAL CHANGE: 0
RHINORRHEA: 0
PHOTOPHOBIA: 0
EYE DISCHARGE: 0
EYE ITCHING: 0
CHOKING: 0
COUGH: 0
CHEST TIGHTNESS: 0
GLOBUS SENSATION: 0

## 2017-12-05 NOTE — PROGRESS NOTES
Visit Information    Have you changed or started any medications since your last visit including any over-the-counter medicines, vitamins, or herbal medicines? no   Have you stopped taking any of your medications? Is so, why? -  no  Are you having any side effects from any of your medications? - no    Have you seen any other physician or provider since your last visit?  no   Have you had any other diagnostic tests since your last visit?  no   Have you been seen in the emergency room and/or had an admission in a hospital since we last saw you?  no   Have you had your routine dental cleaning in the past 6 months?  no     Do you have an active MyChart account? If no, what is the barrier?   Yes    Patient Care Team:  Libertad Quintanilla PA-C as PCP - General (Physician Assistant)  Chiqui Lott MD as Consulting Physician (Gastroenterology)    Medical History Review  Past Medical, Family, and Social History reviewed and does contribute to the patient presenting condition    Health Maintenance   Topic Date Due    Hepatitis C screen  1965    Diabetic foot exam  03/30/1975    Diabetic retinal exam  03/30/1975    HIV screen  03/30/1980    Cervical cancer screen  09/27/2017    Diabetic hemoglobin A1C test  04/20/2018    Lipid screen  04/20/2018    Diabetic microalbuminuria test  10/09/2018    Breast cancer screen  08/10/2019    Colon cancer screen colonoscopy  03/17/2026    DTaP/Tdap/Td vaccine (2 - Td) 08/24/2026    Flu vaccine  Completed    Pneumococcal med risk  Completed

## 2017-12-05 NOTE — PROGRESS NOTES
[facility]  [address] [place] [id number]  Dept: [phone]  Dept Fax: [phone]    Marci Wheeler is a 46 y.o. female who presents today for her medical conditions/complaints as noted below. Marci Wheeler is c/o of   Chief Complaint   Patient presents with    Fatigue     couple of weeks    Other     hot flash and tongue issue     Discuss Labs     Visit Information    Have you changed or started any medications since your last visit including any over-the-counter medicines, vitamins, or herbal medicines? no   Have you stopped taking any of your medications? Is so, why? -  no  Are you having any side effects from any of your medications? - no    Have you seen any other physician or provider since your last visit? yes - ENT   Have you had any other diagnostic tests since your last visit?  no   Have you been seen in the emergency room and/or had an admission in a hospital since we last saw you?  yes - Anxiety    Have you had your routine dental cleaning in the past 6 months?  no     Do you have an active MyChart account? If no, what is the barrier?   Yes    Patient Care Team:  Laverne Walsh PA-C as PCP - General (Physician Assistant)  Glen Gusman MD as Consulting Physician (Gastroenterology)    Medical History Review  Past Medical, Family, and Social History reviewed and does contribute to the patient presenting condition    Health Maintenance   Topic Date Due    Hepatitis C screen  1965    Diabetic foot exam  03/30/1975    Diabetic retinal exam  03/30/1975    HIV screen  03/30/1980    Cervical cancer screen  09/27/2017    Diabetic hemoglobin A1C test  04/20/2018    Lipid screen  04/20/2018    Diabetic microalbuminuria test  10/09/2018    Breast cancer screen  08/10/2019    Colon cancer screen colonoscopy  03/17/2026    DTaP/Tdap/Td vaccine (2 - Td) 08/24/2026    Flu vaccine  Completed    Pneumococcal med risk  Completed Depression     Diabetes mellitus ([address]     Epigastric pain     Hepatic steatosis     History of cataract extraction with lens replacement 2004    bilateral    History of stress test     Hyperglycemia     Intertrigo     Lumbar degenerative disc disease     Nausea     Obesity 2017    Osteoarthritis     right knee    Osteoarthritis cervical spine     Osteoarthritis thoracic spine     Tobacco abuse 2017      Past Surgical History:   Procedure Laterality Date    CATARACT REMOVAL  2008     SECTION      x1    HYSTERECTOMY  1996    secondary to cervical cancer    THROAT SURGERY      throat polyps removed       Family History   Problem Relation Age of Onset    Diabetes Mother     Heart Disease Mother     Arthritis Mother     Depression Mother     Mental Illness Mother     Diabetes Father     Heart Disease Father     Depression Brother     Cancer Brother      bladder    Depression Sister     Cancer Maternal Grandmother      breast    Asthma Other      grandson    Cancer Other      breast     High Blood Pressure Brother     Diabetes Brother     Arthritis Sister     Cancer Maternal Uncle      pancreatic     High Cholesterol Neg Hx     Kidney Disease Neg Hx     Stroke Neg Hx        Social History   Substance Use Topics    Smoking status: Current Every Day Smoker     Packs/day: 0.25     Years: 35.00     Types: Cigarettes    Smokeless tobacco: Never Used    Alcohol use No      Current Outpatient Prescriptions   Medication Sig Dispense Refill    methocarbamol (ROBAXIN) 500 MG tablet Take 1 tablet by mouth 4 times daily 60 tablet 3    furosemide (LASIX) 20 MG tablet TAKE ONE TABLET BY MOUTH DAILY 60 tablet 1    ibuprofen (ADVIL;MOTRIN) 800 MG tablet TAKE ONE TABLET BY MOUTH EVERY 8 HOURS AS NEEDED FOR PAIN 60 tablet 0    DULoxetine (CYMBALTA) 30 MG extended release capsule Take 1 capsule by mouth daily 30 capsule 3    NYAMYC 823588 UNIT/GM powder APPLY TO AFFECTED AREA(S) TWO TIMES A DAY 45 g 1    vitamin D (ERGOCALCIFEROL) 11571 units CAPS capsule TAKE ONE CAPSULE BY MOUTH ONCE WEEKLY 12 capsule 0    PROAIR  (90 Base) MCG/ACT inhaler INHALE TWO PUFFS BY MOUTH EVERY 6 HOURS AS NEEDED FOR WHEEZING 1 Inhaler 4    diclofenac sodium 1 % GEL Apply 2 g topically 2 times daily 1 Tube 3    busPIRone (BUSPAR) 15 MG tablet Take 30 mg by mouth 2 times daily       omeprazole (PRILOSEC) 20 MG delayed release capsule TAKE ONE CAPSULE BY MOUTH DAILY 30 capsule 3    SYMBICORT 160-4.5 MCG/ACT AERO INHALE TWO PUFFS BY MOUTH TWICE A DAY 1 Inhaler 3    metFORMIN (GLUCOPHAGE-XR) 500 MG extended release tablet TAKE ONE TABLET BY MOUTH TWICE A DAY 60 tablet 3    DULoxetine (CYMBALTA) 60 MG extended release capsule 1 capsule daily      gabapentin (NEURONTIN) 600 MG tablet Take 1 po bid (Patient taking differently: 600 mg 2 times daily Take 1 po bid) 60 tablet 5    glucose blood VI test strips (ASCENSIA AUTODISC VI;ONE TOUCH ULTRA TEST VI) strip 1 each by In Vitro route daily As needed. 100 each 3    Blood Glucose Monitoring Suppl (ACURA BLOOD GLUCOSE METER) W/DEVICE KIT 1 each by Does not apply route 2 times daily 1 kit 0    Thumb Spica MISC 1 each by Does not apply route continuous 1 each 0    Elastic Bandages & Supports (KNEE BRACE) MISC 1 Device by Does not apply route daily Right knee pain; osteoarthritis right knee 1 each 1    Respiratory Therapy Supplies (NEBULIZER COMPRESSOR) KIT 1 kit by Does not apply route once for 1 dose Patient with mild to moderate COPD per pulmonary function test; to use up to 4 times per day with albuterol 1 kit 0    Nebulizers (COMPRESSOR/NEBULIZER) MISC 1 vial by Does not apply route 4 times daily as needed. Patient has albuterol prescription at home; needs aerosol machine and set up diagnosis chronic bronchitis 1 each 0     No current facility-administered medications for this visit.       Allergies   Allergen Reactions    Augmentin Culture   9. Encounter for screening for HIV  HIV Screen   10. Need for hepatitis C screening test  Hepatitis C Antibody   11. Other fatigue  Amb External Referral To Sleep Medicine   12. Encounter for diabetic foot exam ([facility] [address]   DIABETES FOOT EXAM   13. Diabetic eye exam [facility])  Jelani Alex MD             Plan:      No Follow-up on file. Orders Placed This Encounter   Procedures    Urine Culture     Order Specific Question:   Specify (ex-cath, midstream, cysto, etc)? Answer:   clean catch    Hepatitis C Antibody     Standing Status:   Future     Standing Expiration Date:   12/5/2018    HIV Screen     Standing Status:   Future     Standing Expiration Date:   12/5/2018    Amb External Referral To Sleep Medicine     Referral Priority:   Routine     Referral Type:   Consult for Advice and Opinion     Referral Reason:   Specialty Services Required     Referred to Provider:   Diana Carroll MD     Requested Specialty:   Psychiatry     Number of Visits Requested:   Tucker Rocha MD     Referral Priority:   Routine     Referral Type:   Consult for Advice and Opinion     Referral Reason:   Specialty Services Required     Referred to Provider:   Ivette Baird MD     Requested Specialty:   Ophthalmology     Number of Visits Requested:   1     DIABETES FOOT EXAM     Orders Placed This Encounter   Medications    methocarbamol (ROBAXIN) 500 MG tablet     Sig: Take 1 tablet by mouth 4 times daily     Dispense:  60 tablet     Refill:  3    Labs from 12/2017 reviewed      Insulin resistance - On metformin. Diet and exercise encouraged.      Anxiety and depression - Taking celexa with some relief. No SI or HI. Also on xanax. Referral to psych. Started on buspar. Doing much better with med. Seeing psych. GOing to counseling now.     Elevated HGB - Pt reports family hx of same. Will repeat. Referral to heme.   Saw Dr. Marisol Bolton

## 2017-12-06 LAB
CULTURE: ABNORMAL
CULTURE: ABNORMAL
Lab: ABNORMAL
ORGANISM: ABNORMAL
SPECIMEN DESCRIPTION: ABNORMAL
STATUS: ABNORMAL

## 2017-12-08 RX ORDER — NITROFURANTOIN 25; 75 MG/1; MG/1
100 CAPSULE ORAL 2 TIMES DAILY
Qty: 14 CAPSULE | Refills: 0 | Status: SHIPPED | OUTPATIENT
Start: 2017-12-08 | End: 2017-12-15

## 2017-12-19 ENCOUNTER — TELEPHONE (OUTPATIENT)
Dept: FAMILY MEDICINE CLINIC | Age: 52
End: 2017-12-19

## 2017-12-29 DIAGNOSIS — R73.03 PREDIABETES: ICD-10-CM

## 2017-12-29 RX ORDER — METFORMIN HYDROCHLORIDE 500 MG/1
TABLET, EXTENDED RELEASE ORAL
Qty: 60 TABLET | Refills: 2 | Status: SHIPPED | OUTPATIENT
Start: 2017-12-29 | End: 2018-01-16

## 2017-12-29 RX ORDER — OMEPRAZOLE 20 MG/1
CAPSULE, DELAYED RELEASE ORAL
Qty: 30 CAPSULE | Refills: 2 | Status: SHIPPED | OUTPATIENT
Start: 2017-12-29 | End: 2019-01-14 | Stop reason: SDUPTHER

## 2017-12-29 RX ORDER — ERGOCALCIFEROL 1.25 MG/1
CAPSULE ORAL
Qty: 12 CAPSULE | Refills: 0 | Status: SHIPPED | OUTPATIENT
Start: 2017-12-29 | End: 2018-10-03

## 2017-12-29 NOTE — TELEPHONE ENCOUNTER
Next Visit Date:  No future appointments.     Health Maintenance   Topic Date Due    Diabetic retinal exam  03/30/1975    Cervical cancer screen  09/27/2017    Lipid screen  04/20/2018    Diabetic microalbuminuria test  10/09/2018    Diabetic hemoglobin A1C test  12/04/2018    Diabetic foot exam  12/05/2018    Breast cancer screen  08/10/2019    Colon cancer screen colonoscopy  03/17/2026    DTaP/Tdap/Td vaccine (2 - Td) 08/24/2026    Flu vaccine  Completed    Pneumococcal med risk  Completed    Hepatitis C screen  Completed    HIV screen  Completed       Hemoglobin A1C (%)   Date Value   12/04/2017 5.5   04/20/2017 5.5   02/06/2017 5.4             ( goal A1C is < 7)   No results found for: LABMICR  LDL Cholesterol (mg/dL)   Date Value   04/20/2017 94     LDL Calculated (mg/dL)   Date Value   04/27/2015 81       (goal LDL is <100)   AST (U/L)   Date Value   12/04/2017 18     ALT (U/L)   Date Value   12/04/2017 24     BUN (mg/dL)   Date Value   12/04/2017 20     BP Readings from Last 3 Encounters:   12/05/17 125/84   10/09/17 124/82   10/03/17 131/69          (goal 120/80)    All Future Testing planned in [facility]  Lab Frequency Next Occurrence   PT eval and treat Once 07/15/2017   POCT Fecal Immunochemical Test (FIT) Once 09/01/2018   Lipid Panel Once 09/01/2018   Vitamin D 25 Hydroxy Once 09/01/2018               Patient Active Problem List:     Hyperglycemia     Anxiety     Bronchitis     Depression     Degenerative arthritis of lumbar spine     Osteoarthritis of knee     Insulin resistance     COPD (chronic obstructive pulmonary disease) (HCC)     Osteoarthritis thoracic spine     Osteoarthritis cervical spine     Prediabetes     Peripheral edema     Hepatic steatosis     Epigastric pain     Nausea     Vitamin D deficiency     Tobacco abuse     Vocal cord polyp     Laryngitis     Obesity     Encounter for diabetic foot exam ([facility] [address]

## 2018-01-05 RX ORDER — IBUPROFEN 800 MG/1
TABLET ORAL
Qty: 60 TABLET | Refills: 0 | Status: SHIPPED | OUTPATIENT
Start: 2018-01-05 | End: 2018-03-02 | Stop reason: SDUPTHER

## 2018-01-16 ENCOUNTER — OFFICE VISIT (OUTPATIENT)
Dept: FAMILY MEDICINE CLINIC | Age: 53
End: 2018-01-16
Payer: MEDICARE

## 2018-01-16 ENCOUNTER — HOSPITAL ENCOUNTER (OUTPATIENT)
Age: 53
Setting detail: SPECIMEN
Discharge: HOME | End: 2018-01-16
Payer: MEDICARE

## 2018-01-16 VITALS
SYSTOLIC BLOOD PRESSURE: 124 MMHG | TEMPERATURE: 97.3 F | BODY MASS INDEX: 46.61 KG/M2 | OXYGEN SATURATION: 98 % | HEIGHT: 66 IN | HEART RATE: 76 BPM | WEIGHT: 290 LBS | DIASTOLIC BLOOD PRESSURE: 82 MMHG

## 2018-01-16 DIAGNOSIS — R79.9 ABNORMAL FINDING OF BLOOD CHEMISTRY: ICD-10-CM

## 2018-01-16 DIAGNOSIS — R11.0 NAUSEA: ICD-10-CM

## 2018-01-16 DIAGNOSIS — R73.9 HYPERGLYCEMIA: ICD-10-CM

## 2018-01-16 DIAGNOSIS — J44.9 CHRONIC OBSTRUCTIVE PULMONARY DISEASE, UNSPECIFIED COPD TYPE (HCC): ICD-10-CM

## 2018-01-16 DIAGNOSIS — E88.81 INSULIN RESISTANCE: ICD-10-CM

## 2018-01-16 DIAGNOSIS — R53.83 OTHER FATIGUE: ICD-10-CM

## 2018-01-16 DIAGNOSIS — G89.29 CHRONIC NECK AND BACK PAIN: ICD-10-CM

## 2018-01-16 DIAGNOSIS — M54.9 CHRONIC NECK AND BACK PAIN: ICD-10-CM

## 2018-01-16 DIAGNOSIS — Z72.0 TOBACCO ABUSE: ICD-10-CM

## 2018-01-16 DIAGNOSIS — M25.552 PAIN OF LEFT HIP JOINT: Primary | ICD-10-CM

## 2018-01-16 DIAGNOSIS — E55.9 VITAMIN D DEFICIENCY: ICD-10-CM

## 2018-01-16 DIAGNOSIS — M25.50 ARTHRALGIA, UNSPECIFIED JOINT: ICD-10-CM

## 2018-01-16 DIAGNOSIS — F32.A DEPRESSION, UNSPECIFIED DEPRESSION TYPE: ICD-10-CM

## 2018-01-16 DIAGNOSIS — F41.9 ANXIETY: ICD-10-CM

## 2018-01-16 DIAGNOSIS — M54.2 CHRONIC NECK AND BACK PAIN: ICD-10-CM

## 2018-01-16 LAB
C-REACTIVE PROTEIN: 2.9 MG/L (ref 0–5)
FERRITIN: 74 UG/L (ref 13–150)
IRON SATURATION: 21 % (ref 20–55)
IRON: 67 UG/DL (ref 37–145)
RHEUMATOID FACTOR: <10 IU/ML
SEDIMENTATION RATE, ERYTHROCYTE: 0 MM (ref 0–20)
TOTAL IRON BINDING CAPACITY: 322 UG/DL (ref 250–450)
UNSATURATED IRON BINDING CAPACITY: 255 UG/DL (ref 112–347)
VITAMIN B-12: 324 PG/ML (ref 232–1245)

## 2018-01-16 PROCEDURE — G8926 SPIRO NO PERF OR DOC: HCPCS | Performed by: PHYSICIAN ASSISTANT

## 2018-01-16 PROCEDURE — 4004F PT TOBACCO SCREEN RCVD TLK: CPT | Performed by: PHYSICIAN ASSISTANT

## 2018-01-16 PROCEDURE — G8417 CALC BMI ABV UP PARAM F/U: HCPCS | Performed by: PHYSICIAN ASSISTANT

## 2018-01-16 PROCEDURE — 99214 OFFICE O/P EST MOD 30 MIN: CPT | Performed by: PHYSICIAN ASSISTANT

## 2018-01-16 PROCEDURE — 3014F SCREEN MAMMO DOC REV: CPT | Performed by: PHYSICIAN ASSISTANT

## 2018-01-16 PROCEDURE — 3017F COLORECTAL CA SCREEN DOC REV: CPT | Performed by: PHYSICIAN ASSISTANT

## 2018-01-16 PROCEDURE — G8427 DOCREV CUR MEDS BY ELIG CLIN: HCPCS | Performed by: PHYSICIAN ASSISTANT

## 2018-01-16 PROCEDURE — 96372 THER/PROPH/DIAG INJ SC/IM: CPT | Performed by: PHYSICIAN ASSISTANT

## 2018-01-16 PROCEDURE — 3023F SPIROM DOC REV: CPT | Performed by: PHYSICIAN ASSISTANT

## 2018-01-16 PROCEDURE — G8484 FLU IMMUNIZE NO ADMIN: HCPCS | Performed by: PHYSICIAN ASSISTANT

## 2018-01-16 RX ORDER — BUDESONIDE AND FORMOTEROL FUMARATE DIHYDRATE 160; 4.5 UG/1; UG/1
AEROSOL RESPIRATORY (INHALATION)
COMMUNITY
Start: 2016-12-28 | End: 2018-01-16

## 2018-01-16 RX ORDER — TRIAMCINOLONE ACETONIDE 40 MG/ML
40 INJECTION, SUSPENSION INTRA-ARTICULAR; INTRAMUSCULAR ONCE
Status: COMPLETED | OUTPATIENT
Start: 2018-01-16 | End: 2018-01-16

## 2018-01-16 RX ORDER — METFORMIN HYDROCHLORIDE 750 MG/1
1500 TABLET, EXTENDED RELEASE ORAL
Qty: 60 TABLET | Refills: 3 | Status: SHIPPED | OUTPATIENT
Start: 2018-01-16 | End: 2018-04-16

## 2018-01-16 RX ADMIN — TRIAMCINOLONE ACETONIDE 40 MG: 40 INJECTION, SUSPENSION INTRA-ARTICULAR; INTRAMUSCULAR at 11:53

## 2018-01-16 ASSESSMENT — ENCOUNTER SYMPTOMS
GLOBUS SENSATION: 0
VISUAL CHANGE: 0
PHOTOPHOBIA: 0
SINUS PRESSURE: 0
HEARTBURN: 0
EYE DISCHARGE: 0
EYE ITCHING: 0
BLURRED VISION: 0
CHEST TIGHTNESS: 0
TROUBLE SWALLOWING: 0
BELCHING: 0
SHORTNESS OF BREATH: 0
RHINORRHEA: 0
COUGH: 0
HOARSE VOICE: 0
CHOKING: 0

## 2018-01-16 NOTE — PROGRESS NOTES
[facility] [address]  [address] [place] [id number]  Dept: [phone]  Dept Fax: [phone]    Charlee Doss is a 46 y.o. female who presents today for her medical conditions/complaints as noted below. Charlee Doss is c/o of   Chief Complaint   Patient presents with    Hip Pain     left - sharp pain constant     Visit Information    Have you changed or started any medications since your last visit including any over-the-counter medicines, vitamins, or herbal medicines? no   Have you stopped taking any of your medications? Is so, why? -  no  Are you having any side effects from any of your medications? - no    Have you seen any other physician or provider since your last visit? yes - ENT   Have you had any other diagnostic tests since your last visit?  no   Have you been seen in the emergency room and/or had an admission in a hospital since we last saw you?  yes - Anxiety    Have you had your routine dental cleaning in the past 6 months?  no     Do you have an active [employer] account? If no, what is the barrier?   Yes    Patient Care Team:  Corona Deluna PA-C as PCP - General (Physician Assistant)  Jagdeep Masters MD as Consulting Physician (Gastroenterology)    Medical History Review  Past Medical, Family, and Social History reviewed and does contribute to the patient presenting condition    Health Maintenance   Topic Date Due    Diabetic retinal exam  03/30/1975    Cervical cancer screen  09/27/2017    Lipid screen  04/20/2018    Diabetic microalbuminuria test  10/09/2018    A1C test (Diabetic or Prediabetic)  12/04/2018    Potassium monitoring  12/04/2018    Creatinine monitoring  12/04/2018    Diabetic foot exam  12/05/2018    Breast cancer screen  08/10/2019    Colon cancer screen colonoscopy  03/17/2026    DTaP/Tdap/Td vaccine (2 - Td) 08/24/2026    Flu vaccine  Completed    Pneumococcal med risk  Completed    Hepatitis C screen Completed    HIV screen  Completed               HPI:     Neck Pain    This is a chronic problem. The pain is present in the midline. The pain is moderate. Pertinent negatives include no chest pain, fever, headaches, leg pain, pain with swallowing, paresis, photophobia, syncope, trouble swallowing, visual change or weakness. Mental Health Problem     Additional symptoms of the illness include anhedonia. Additional symptoms of the illness do not include insomnia, hypersomnia, appetite change, fatigue, psychomotor retardation, feelings of worthlessness, attention impairment, euphoric mood, decreased need for sleep, distractible, visual change or headaches. She does not admit to suicidal ideas. She does not have a plan to commit suicide. She does not contemplate harming herself. She has not already injured self. She does not contemplate injuring another person. She has not already  injured another person. Gastroesophageal Reflux   She reports no belching, no chest pain, no choking, no coughing, no early satiety, no globus sensation, no heartburn or no hoarse voice. This is a chronic problem. Pertinent negatives include no fatigue. Diabetes   Pertinent negatives for hypoglycemia include no dizziness, headaches, mood changes or nervousness/anxiousness. Pertinent negatives for diabetes include no blurred vision, no chest pain, no fatigue, no visual change and no weakness. Hip Pain    The incident occurred more than 1 week ago. The pain is at a severity of 5/10. The pain is moderate. Associated symptoms include an inability to bear weight and a loss of motion.        Hemoglobin A1C (%)   Date Value   12/04/2017 5.5   04/20/2017 5.5   02/06/2017 5.4             ( goal A1C is < 7)   No results found for: LABMICR  LDL Cholesterol (mg/dL)   Date Value   04/20/2017 94   10/20/2016 77     LDL Calculated (mg/dL)   Date Value   04/27/2015 81       (goal LDL is <100)   AST (U/L)   Date Value   12/04/2017 18     ALT (U/L) Seeing psych. GOing to counseling now.     Elevated HGB - Pt reports family hx of same. Will repeat. Referral to heme. Saw Dr. Uche Fernandez.        Lung nodule - Seen on CT chest 5/11/16 stable. Seeing pulm.      Tobacco abuse/COPD - Smoking since age 13, 1/2-1 PPD. On symbicort and prn albuterol. 1/18/17 emphysema. Pt has been on chantix in past.  No SI. HI or vivid dreams. Will watch for and go to ER if it develops.        Chronic back pain / right hip pain - Radicular pain down right leg. No recent imaging. Xray showed degenerative changes 9/1/16. MRI ordered. On robaxin. Previously on norco. Now on ultram. Referral to pain management.       Chronic neck pain - Radiation down right arm. Pt does c/o right hand weakness. Xray showed degenerative changes 10/20/16. MRI ordered. Saw pain management. No rx as pt abuses marijuana      Fibromyalgia - On lyrica.      Hepatic steatosis - F/u GI.      GERD - On prilosec.       Peripheral edema - On lasix      GB disease - States suppose to have HIDA scan through GI, but GI wouldn't take ins. Still with nausea. HIDA ordered. F/u GI. CT chest 5/11/16 Showed mild distention of the gallbladder with slight pericholecystic haziness. HIDDA scan nl 9/1/16      CP - Admitted 1/2017. Nl stress echo.     Vit D def - Will replace and recheck 12 wks.     Laryngitis / vocal cord polyp - Surgery 3/2017 plans to go back in to remove more. Heel spur - L foot. Bilat foot pain. Referral to pod    Fatigue - No wt loss or night sweats. Upcoming sleep study. Labs,     Balanced diet and routine exercise encouraged.     Multivitamin with vitamin D daily encouraged.     Good sleep hygiene encouraged.     Dental exam q 6 mo.     Vision yearly.     Sunscreen encouraged.     Immunizations reviewed.     DEXA - 5/2014 NL. Repeat ordered.      [facility] - Pt has orders. Did not get. Colonoscopy - FIT kid given.      Smoking marijuana. Pt encouraged to stop.     Schedule f/u appt with

## 2018-01-16 NOTE — PROGRESS NOTES
After obtaining consent, and per orders of Dr. Rico Bravo, injection of toradol and kenalog given in left and right gluteal by Sly Corona. Patient instructed to remain in clinic for 20 minutes afterwards, and to report any adverse reaction to me immediately.  No reactions at this time

## 2018-01-17 ENCOUNTER — HOSPITAL ENCOUNTER (OUTPATIENT)
Facility: CLINIC | Age: 53
Discharge: HOME | End: 2018-01-17
Payer: MEDICARE

## 2018-01-17 ENCOUNTER — HOSPITAL ENCOUNTER (OUTPATIENT)
Dept: GENERAL RADIOLOGY | Facility: CLINIC | Age: 53
Discharge: HOME | End: 2018-01-17
Payer: MEDICARE

## 2018-01-17 DIAGNOSIS — M25.552 PAIN OF LEFT HIP JOINT: ICD-10-CM

## 2018-01-17 DIAGNOSIS — J44.9 CHRONIC OBSTRUCTIVE PULMONARY DISEASE, UNSPECIFIED COPD TYPE (HCC): ICD-10-CM

## 2018-01-17 LAB — ANTI-NUCLEAR ANTIBODY (ANA): NEGATIVE

## 2018-01-17 PROCEDURE — 73502 X-RAY EXAM HIP UNI 2-3 VIEWS: CPT

## 2018-01-17 PROCEDURE — 71046 X-RAY EXAM CHEST 2 VIEWS: CPT

## 2018-02-01 ENCOUNTER — TELEPHONE (OUTPATIENT)
Dept: FAMILY MEDICINE CLINIC | Age: 53
End: 2018-02-01

## 2018-02-02 ENCOUNTER — OFFICE VISIT (OUTPATIENT)
Dept: ORTHOPEDIC SURGERY | Age: 53
End: 2018-02-02
Payer: MEDICARE

## 2018-02-02 VITALS
BODY MASS INDEX: 46.61 KG/M2 | HEART RATE: 86 BPM | HEIGHT: 66 IN | SYSTOLIC BLOOD PRESSURE: 124 MMHG | WEIGHT: 290 LBS | DIASTOLIC BLOOD PRESSURE: 85 MMHG | RESPIRATION RATE: 18 BRPM

## 2018-02-02 DIAGNOSIS — G89.29 CHRONIC LEFT HIP PAIN: Primary | ICD-10-CM

## 2018-02-02 DIAGNOSIS — M25.552 CHRONIC LEFT HIP PAIN: Primary | ICD-10-CM

## 2018-02-02 PROCEDURE — G8427 DOCREV CUR MEDS BY ELIG CLIN: HCPCS | Performed by: ORTHOPAEDIC SURGERY

## 2018-02-02 PROCEDURE — 3014F SCREEN MAMMO DOC REV: CPT | Performed by: ORTHOPAEDIC SURGERY

## 2018-02-02 PROCEDURE — 4004F PT TOBACCO SCREEN RCVD TLK: CPT | Performed by: ORTHOPAEDIC SURGERY

## 2018-02-02 PROCEDURE — 20610 DRAIN/INJ JOINT/BURSA W/O US: CPT | Performed by: ORTHOPAEDIC SURGERY

## 2018-02-02 PROCEDURE — G8417 CALC BMI ABV UP PARAM F/U: HCPCS | Performed by: ORTHOPAEDIC SURGERY

## 2018-02-02 PROCEDURE — 3017F COLORECTAL CA SCREEN DOC REV: CPT | Performed by: ORTHOPAEDIC SURGERY

## 2018-02-02 PROCEDURE — G8484 FLU IMMUNIZE NO ADMIN: HCPCS | Performed by: ORTHOPAEDIC SURGERY

## 2018-02-02 PROCEDURE — 99203 OFFICE O/P NEW LOW 30 MIN: CPT | Performed by: ORTHOPAEDIC SURGERY

## 2018-02-02 RX ORDER — LIDOCAINE HYDROCHLORIDE 10 MG/ML
3 INJECTION, SOLUTION EPIDURAL; INFILTRATION; INTRACAUDAL; PERINEURAL ONCE
Status: COMPLETED | OUTPATIENT
Start: 2018-02-02 | End: 2018-02-02

## 2018-02-02 RX ORDER — BETAMETHASONE SODIUM PHOSPHATE AND BETAMETHASONE ACETATE 3; 3 MG/ML; MG/ML
12 INJECTION, SUSPENSION INTRA-ARTICULAR; INTRALESIONAL; INTRAMUSCULAR; SOFT TISSUE ONCE
Status: COMPLETED | OUTPATIENT
Start: 2018-02-02 | End: 2018-02-02

## 2018-02-02 RX ORDER — BUPIVACAINE HYDROCHLORIDE 5 MG/ML
3 INJECTION, SOLUTION PERINEURAL ONCE
Status: COMPLETED | OUTPATIENT
Start: 2018-02-02 | End: 2018-02-02

## 2018-02-02 RX ADMIN — LIDOCAINE HYDROCHLORIDE 3 ML: 10 INJECTION, SOLUTION EPIDURAL; INFILTRATION; INTRACAUDAL; PERINEURAL at 11:19

## 2018-02-02 RX ADMIN — BUPIVACAINE HYDROCHLORIDE 15 MG: 5 INJECTION, SOLUTION PERINEURAL at 11:19

## 2018-02-02 RX ADMIN — BETAMETHASONE SODIUM PHOSPHATE AND BETAMETHASONE ACETATE 12 MG: 3; 3 INJECTION, SUSPENSION INTRA-ARTICULAR; INTRALESIONAL; INTRAMUSCULAR; SOFT TISSUE at 11:18

## 2018-02-02 NOTE — PROGRESS NOTES
spine     Tobacco abuse 2017     Past Surgical History:   Procedure Laterality Date    CATARACT REMOVAL       SECTION      x1    HYSTERECTOMY      secondary to cervical cancer    THROAT SURGERY  2017    throat polyps removed     Family History   Problem Relation Age of Onset    Diabetes Mother     Heart Disease Mother     Arthritis Mother     Depression Mother     Mental Illness Mother     Diabetes Father     Heart Disease Father     Depression Brother     Cancer Brother      bladder    Depression Sister     Cancer Maternal Grandmother      breast    Asthma Other      grandson    Cancer Other      breast     High Blood Pressure Brother     Diabetes Brother     Arthritis Sister     Cancer Maternal Uncle      pancreatic     High Cholesterol Neg Hx     Kidney Disease Neg Hx     Stroke Neg Hx            Orders Placed This Encounter   Procedures    MT ARTHROCENTESIS ASPIR&/INJ MAJOR JT/BURSA W/O US       1. Chronic left hip pain         MD Jackelin    Please note that this chart was generated using voice recognition Dragon dictation software. Although every effort was made to ensure the accuracy of this automated transcription, some errors in transcription may have occurred.

## 2018-02-07 ENCOUNTER — TELEPHONE (OUTPATIENT)
Dept: FAMILY MEDICINE CLINIC | Age: 53
End: 2018-02-07

## 2018-02-07 ENCOUNTER — PATIENT MESSAGE (OUTPATIENT)
Dept: FAMILY MEDICINE CLINIC | Age: 53
End: 2018-02-07

## 2018-02-07 NOTE — TELEPHONE ENCOUNTER
From: Ottoniel Leonard  To: Monika Kay PA-C  Sent: 2/7/2018 3:04 AM EST  Subject: Prescription Question    Dalton Corona  I was hoping you could prescribe a zpak to me. I feel I have a sinus infection and bronchitis . My symptoms are. Headache,thick phlem in nose and also coughing up. My drainage is green in color.    Thank you   Mulugeta Lea

## 2018-02-19 ENCOUNTER — TELEPHONE (OUTPATIENT)
Dept: GASTROENTEROLOGY | Age: 53
End: 2018-02-19

## 2018-02-20 NOTE — TELEPHONE ENCOUNTER
Cx Colon/EGD w/MAC  [facility] @[email or web address]  [facility] [place] mon 1/22/18@ 830am colon egd w/mac miralax sw    Patient was scheduled in 2016 and cancelled - last OV was previous to this scheduling --patient was recently scheduled for procedure and NO showed. Is  [facility] is okay to reschedule this again? OV?

## 2018-03-07 RX ORDER — IBUPROFEN 800 MG/1
TABLET ORAL
Qty: 60 TABLET | Refills: 0 | Status: SHIPPED | OUTPATIENT
Start: 2018-03-07 | End: 2018-05-01 | Stop reason: SDUPTHER

## 2018-04-03 ENCOUNTER — TELEPHONE (OUTPATIENT)
Dept: FAMILY MEDICINE CLINIC | Age: 53
End: 2018-04-03

## 2018-04-09 DIAGNOSIS — M50.20 CERVICAL DISC HERNIATION: ICD-10-CM

## 2018-04-09 DIAGNOSIS — G89.29 CHRONIC LOW BACK PAIN WITH SCIATICA, SCIATICA LATERALITY UNSPECIFIED, UNSPECIFIED BACK PAIN LATERALITY: Primary | ICD-10-CM

## 2018-04-09 DIAGNOSIS — M54.40 CHRONIC LOW BACK PAIN WITH SCIATICA, SCIATICA LATERALITY UNSPECIFIED, UNSPECIFIED BACK PAIN LATERALITY: Primary | ICD-10-CM

## 2018-04-09 RX ORDER — GABAPENTIN 600 MG/1
TABLET ORAL
Qty: 60 TABLET | Refills: 1 | Status: SHIPPED | OUTPATIENT
Start: 2018-04-09 | End: 2018-06-29 | Stop reason: SDUPTHER

## 2018-04-10 ENCOUNTER — HOSPITAL ENCOUNTER (OUTPATIENT)
Age: 53
Setting detail: SPECIMEN
Discharge: HOME | End: 2018-04-10
Payer: MEDICARE

## 2018-04-10 ENCOUNTER — OFFICE VISIT (OUTPATIENT)
Dept: FAMILY MEDICINE CLINIC | Age: 53
End: 2018-04-10
Payer: MEDICARE

## 2018-04-10 VITALS
WEIGHT: 293 LBS | TEMPERATURE: 98 F | HEART RATE: 89 BPM | BODY MASS INDEX: 47.09 KG/M2 | DIASTOLIC BLOOD PRESSURE: 82 MMHG | SYSTOLIC BLOOD PRESSURE: 126 MMHG | OXYGEN SATURATION: 98 % | HEIGHT: 66 IN

## 2018-04-10 DIAGNOSIS — M79.645 THUMB PAIN, LEFT: ICD-10-CM

## 2018-04-10 DIAGNOSIS — B96.89 ACUTE BACTERIAL SINUSITIS: ICD-10-CM

## 2018-04-10 DIAGNOSIS — R60.9 PERIPHERAL EDEMA: ICD-10-CM

## 2018-04-10 DIAGNOSIS — D64.9 ANEMIA, UNSPECIFIED TYPE: ICD-10-CM

## 2018-04-10 DIAGNOSIS — J01.90 ACUTE BACTERIAL SINUSITIS: ICD-10-CM

## 2018-04-10 DIAGNOSIS — J40 BRONCHITIS: ICD-10-CM

## 2018-04-10 DIAGNOSIS — E55.9 VITAMIN D DEFICIENCY: ICD-10-CM

## 2018-04-10 DIAGNOSIS — R73.9 HYPERGLYCEMIA: Primary | ICD-10-CM

## 2018-04-10 DIAGNOSIS — Z13.220 SCREENING FOR HYPERLIPIDEMIA: ICD-10-CM

## 2018-04-10 DIAGNOSIS — J44.9 CHRONIC OBSTRUCTIVE PULMONARY DISEASE, UNSPECIFIED COPD TYPE (HCC): ICD-10-CM

## 2018-04-10 DIAGNOSIS — R79.9 ABNORMAL FINDING OF BLOOD CHEMISTRY: ICD-10-CM

## 2018-04-10 DIAGNOSIS — Z23 NEED FOR PROPHYLACTIC VACCINATION AND INOCULATION AGAINST VARICELLA: ICD-10-CM

## 2018-04-10 DIAGNOSIS — E88.81 INSULIN RESISTANCE: ICD-10-CM

## 2018-04-10 DIAGNOSIS — R21 RASH: ICD-10-CM

## 2018-04-10 DIAGNOSIS — Z72.0 TOBACCO ABUSE: ICD-10-CM

## 2018-04-10 DIAGNOSIS — R35.0 URINARY FREQUENCY: ICD-10-CM

## 2018-04-10 DIAGNOSIS — F32.A DEPRESSION, UNSPECIFIED DEPRESSION TYPE: ICD-10-CM

## 2018-04-10 LAB
BILIRUBIN, POC: NORMAL
BLOOD URINE, POC: NORMAL
CLARITY, POC: CLEAR
COLOR, POC: NORMAL
GLUCOSE URINE, POC: NORMAL
HBA1C MFR BLD: 5.5 %
KETONES, POC: NORMAL
LEUKOCYTE EST, POC: NORMAL
NITRITE, POC: NORMAL
PH, POC: 5
PROTEIN, POC: NORMAL
SPECIFIC GRAVITY, POC: 1.02
UROBILINOGEN, POC: 0.2

## 2018-04-10 PROCEDURE — G8427 DOCREV CUR MEDS BY ELIG CLIN: HCPCS | Performed by: PHYSICIAN ASSISTANT

## 2018-04-10 PROCEDURE — 3023F SPIROM DOC REV: CPT | Performed by: PHYSICIAN ASSISTANT

## 2018-04-10 PROCEDURE — 3017F COLORECTAL CA SCREEN DOC REV: CPT | Performed by: PHYSICIAN ASSISTANT

## 2018-04-10 PROCEDURE — G8926 SPIRO NO PERF OR DOC: HCPCS | Performed by: PHYSICIAN ASSISTANT

## 2018-04-10 PROCEDURE — 81002 URINALYSIS NONAUTO W/O SCOPE: CPT | Performed by: PHYSICIAN ASSISTANT

## 2018-04-10 PROCEDURE — 4004F PT TOBACCO SCREEN RCVD TLK: CPT | Performed by: PHYSICIAN ASSISTANT

## 2018-04-10 PROCEDURE — G8417 CALC BMI ABV UP PARAM F/U: HCPCS | Performed by: PHYSICIAN ASSISTANT

## 2018-04-10 PROCEDURE — 83036 HEMOGLOBIN GLYCOSYLATED A1C: CPT | Performed by: PHYSICIAN ASSISTANT

## 2018-04-10 PROCEDURE — 99214 OFFICE O/P EST MOD 30 MIN: CPT | Performed by: PHYSICIAN ASSISTANT

## 2018-04-10 PROCEDURE — 3014F SCREEN MAMMO DOC REV: CPT | Performed by: PHYSICIAN ASSISTANT

## 2018-04-10 RX ORDER — DOXYCYCLINE HYCLATE 100 MG
100 TABLET ORAL 2 TIMES DAILY
Qty: 20 TABLET | Refills: 0 | Status: SHIPPED | OUTPATIENT
Start: 2018-04-10 | End: 2018-04-20

## 2018-04-10 RX ORDER — BLOOD SUGAR DIAGNOSTIC
1 STRIP MISCELLANEOUS DAILY
Qty: 100 EACH | Refills: 11 | Status: SHIPPED | OUTPATIENT
Start: 2018-04-10 | End: 2018-09-18 | Stop reason: SDUPTHER

## 2018-04-10 RX ORDER — PREDNISONE 20 MG/1
TABLET ORAL
Qty: 18 TABLET | Refills: 0 | Status: SHIPPED | OUTPATIENT
Start: 2018-04-10 | End: 2018-04-20

## 2018-04-11 LAB
CULTURE: NORMAL
CULTURE: NORMAL
Lab: NORMAL
SPECIMEN DESCRIPTION: NORMAL
STATUS: NORMAL

## 2018-04-16 DIAGNOSIS — R73.03 PREDIABETES: ICD-10-CM

## 2018-04-20 ASSESSMENT — ENCOUNTER SYMPTOMS
COUGH: 0
SINUS PRESSURE: 1
EYE DISCHARGE: 0
CHEST TIGHTNESS: 0
RHINORRHEA: 0
VISUAL CHANGE: 0
PHOTOPHOBIA: 0
NAUSEA: 0
CHOKING: 0
EYE ITCHING: 0
SHORTNESS OF BREATH: 0
TROUBLE SWALLOWING: 0
GLOBUS SENSATION: 0
BELCHING: 0
BLURRED VISION: 0
HEARTBURN: 0
HOARSE VOICE: 0

## 2018-05-01 RX ORDER — IBUPROFEN 800 MG/1
TABLET ORAL
Qty: 60 TABLET | Refills: 0 | Status: SHIPPED | OUTPATIENT
Start: 2018-05-01 | End: 2018-05-18

## 2018-05-18 ENCOUNTER — OFFICE VISIT (OUTPATIENT)
Dept: FAMILY MEDICINE CLINIC | Age: 53
End: 2018-05-18
Payer: MEDICARE

## 2018-05-18 ENCOUNTER — HOSPITAL ENCOUNTER (OUTPATIENT)
Facility: CLINIC | Age: 53
Discharge: HOME | End: 2018-05-18
Payer: MEDICARE

## 2018-05-18 VITALS
TEMPERATURE: 97.6 F | BODY MASS INDEX: 47.09 KG/M2 | OXYGEN SATURATION: 97 % | HEIGHT: 66 IN | WEIGHT: 293 LBS | DIASTOLIC BLOOD PRESSURE: 78 MMHG | HEART RATE: 90 BPM | SYSTOLIC BLOOD PRESSURE: 122 MMHG

## 2018-05-18 DIAGNOSIS — R60.9 PERIPHERAL EDEMA: ICD-10-CM

## 2018-05-18 DIAGNOSIS — R53.83 FATIGUE, UNSPECIFIED TYPE: ICD-10-CM

## 2018-05-18 DIAGNOSIS — R06.09 DOE (DYSPNEA ON EXERTION): ICD-10-CM

## 2018-05-18 DIAGNOSIS — R07.9 CHEST PAIN, UNSPECIFIED TYPE: ICD-10-CM

## 2018-05-18 DIAGNOSIS — R07.9 CHEST PAIN, UNSPECIFIED TYPE: Primary | ICD-10-CM

## 2018-05-18 PROBLEM — R60.0 BILATERAL LEG EDEMA: Status: ACTIVE | Noted: 2018-05-18

## 2018-05-18 PROBLEM — R42 DIZZINESS: Status: ACTIVE | Noted: 2018-05-18

## 2018-05-18 PROBLEM — Z82.49 FAMILY HISTORY OF MI (MYOCARDIAL INFARCTION): Status: ACTIVE | Noted: 2018-05-18

## 2018-05-18 PROBLEM — R06.02 SOB (SHORTNESS OF BREATH): Status: ACTIVE | Noted: 2018-05-18

## 2018-05-18 LAB
ABSOLUTE EOS #: 0.17 K/UL (ref 0–0.44)
ABSOLUTE IMMATURE GRANULOCYTE: 0.03 K/UL (ref 0–0.3)
ABSOLUTE LYMPH #: 1.98 K/UL (ref 1.1–3.7)
ABSOLUTE MONO #: 0.59 K/UL (ref 0.1–1.2)
ALBUMIN SERPL-MCNC: 4 G/DL (ref 3.5–5.2)
ALBUMIN/GLOBULIN RATIO: 1.5 (ref 1–2.5)
ALP BLD-CCNC: 71 U/L (ref 35–104)
ALT SERPL-CCNC: 28 U/L (ref 5–33)
ANION GAP SERPL CALCULATED.3IONS-SCNC: 11 MMOL/L (ref 9–17)
AST SERPL-CCNC: 19 U/L
BASOPHILS # BLD: 1 % (ref 0–2)
BASOPHILS ABSOLUTE: 0.05 K/UL (ref 0–0.2)
BILIRUB SERPL-MCNC: 0.42 MG/DL (ref 0.3–1.2)
BNP INTERPRETATION: NORMAL
BUN BLDV-MCNC: 12 MG/DL (ref 6–20)
BUN/CREAT BLD: ABNORMAL (ref 9–20)
CALCIUM SERPL-MCNC: 8.8 MG/DL (ref 8.6–10.4)
CHLORIDE BLD-SCNC: 101 MMOL/L (ref 98–107)
CO2: 28 MMOL/L (ref 20–31)
CREAT SERPL-MCNC: 0.5 MG/DL (ref 0.5–0.9)
DIFFERENTIAL TYPE: ABNORMAL
EOSINOPHILS RELATIVE PERCENT: 2 % (ref 1–4)
GFR AFRICAN AMERICAN: >60 ML/MIN
GFR NON-AFRICAN AMERICAN: >60 ML/MIN
GFR SERPL CREATININE-BSD FRML MDRD: ABNORMAL ML/MIN/{1.73_M2}
GFR SERPL CREATININE-BSD FRML MDRD: ABNORMAL ML/MIN/{1.73_M2}
GLUCOSE BLD-MCNC: 113 MG/DL (ref 70–99)
HCT VFR BLD CALC: 47.4 % (ref 36.3–47.1)
HEMOGLOBIN: 14.9 G/DL (ref 11.9–15.1)
IMMATURE GRANULOCYTES: 0 %
LYMPHOCYTES # BLD: 20 % (ref 24–43)
MCH RBC QN AUTO: 29.7 PG (ref 25.2–33.5)
MCHC RBC AUTO-ENTMCNC: 31.4 G/DL (ref 28.4–34.8)
MCV RBC AUTO: 94.4 FL (ref 82.6–102.9)
MONOCYTES # BLD: 6 % (ref 3–12)
NRBC AUTOMATED: 0 PER 100 WBC
PDW BLD-RTO: 14.7 % (ref 11.8–14.4)
PLATELET # BLD: 237 K/UL (ref 138–453)
PLATELET ESTIMATE: ABNORMAL
PMV BLD AUTO: 11.4 FL (ref 8.1–13.5)
POTASSIUM SERPL-SCNC: 3.6 MMOL/L (ref 3.7–5.3)
PRO-BNP: 50 PG/ML
RBC # BLD: 5.02 M/UL (ref 3.95–5.11)
RBC # BLD: ABNORMAL 10*6/UL
SEG NEUTROPHILS: 71 % (ref 36–65)
SEGMENTED NEUTROPHILS ABSOLUTE COUNT: 7.17 K/UL (ref 1.5–8.1)
SODIUM BLD-SCNC: 140 MMOL/L (ref 135–144)
T4 TOTAL: 8.4 UG/DL (ref 4.5–12)
TOTAL PROTEIN: 6.7 G/DL (ref 6.4–8.3)
TSH SERPL DL<=0.05 MIU/L-ACNC: 4.17 MIU/L (ref 0.3–5)
WBC # BLD: 10 K/UL (ref 3.5–11.3)
WBC # BLD: ABNORMAL 10*3/UL

## 2018-05-18 PROCEDURE — 99214 OFFICE O/P EST MOD 30 MIN: CPT | Performed by: PHYSICIAN ASSISTANT

## 2018-05-18 PROCEDURE — 84443 ASSAY THYROID STIM HORMONE: CPT

## 2018-05-18 PROCEDURE — 85025 COMPLETE CBC W/AUTO DIFF WBC: CPT

## 2018-05-18 PROCEDURE — 36415 COLL VENOUS BLD VENIPUNCTURE: CPT

## 2018-05-18 PROCEDURE — G8417 CALC BMI ABV UP PARAM F/U: HCPCS | Performed by: PHYSICIAN ASSISTANT

## 2018-05-18 PROCEDURE — G8427 DOCREV CUR MEDS BY ELIG CLIN: HCPCS | Performed by: PHYSICIAN ASSISTANT

## 2018-05-18 PROCEDURE — 80053 COMPREHEN METABOLIC PANEL: CPT

## 2018-05-18 PROCEDURE — 4004F PT TOBACCO SCREEN RCVD TLK: CPT | Performed by: PHYSICIAN ASSISTANT

## 2018-05-18 PROCEDURE — 84436 ASSAY OF TOTAL THYROXINE: CPT

## 2018-05-18 PROCEDURE — 3017F COLORECTAL CA SCREEN DOC REV: CPT | Performed by: PHYSICIAN ASSISTANT

## 2018-05-18 PROCEDURE — 83880 ASSAY OF NATRIURETIC PEPTIDE: CPT

## 2018-05-18 ASSESSMENT — ENCOUNTER SYMPTOMS
VISUAL CHANGE: 0
GLOBUS SENSATION: 0
PHOTOPHOBIA: 0
NAUSEA: 0
CHOKING: 0
CHEST TIGHTNESS: 0
HEARTBURN: 0
TROUBLE SWALLOWING: 0
BLURRED VISION: 0
EYE DISCHARGE: 0
BELCHING: 0
EYE ITCHING: 0

## 2018-05-24 ASSESSMENT — ENCOUNTER SYMPTOMS: SHORTNESS OF BREATH: 1

## 2018-06-11 RX ORDER — NYSTATIN 100000 [USP'U]/G
POWDER TOPICAL
Qty: 45 G | Refills: 1 | Status: SHIPPED | OUTPATIENT
Start: 2018-06-11 | End: 2018-07-12

## 2018-06-21 ENCOUNTER — HOSPITAL ENCOUNTER (OUTPATIENT)
Dept: GENERAL RADIOLOGY | Facility: CLINIC | Age: 53
Discharge: HOME | End: 2018-06-23
Payer: MEDICARE

## 2018-06-21 ENCOUNTER — HOSPITAL ENCOUNTER (OUTPATIENT)
Facility: CLINIC | Age: 53
Discharge: HOME | End: 2018-06-23
Payer: MEDICARE

## 2018-06-21 DIAGNOSIS — M79.642 HAND PAIN, LEFT: ICD-10-CM

## 2018-06-21 PROCEDURE — 73130 X-RAY EXAM OF HAND: CPT

## 2018-06-26 PROBLEM — G47.33 OSA ON CPAP: Status: ACTIVE | Noted: 2018-06-26

## 2018-06-26 PROBLEM — Z99.89 OSA ON CPAP: Status: ACTIVE | Noted: 2018-06-26

## 2018-06-26 PROBLEM — I27.20 PULMONARY HTN (HCC): Status: ACTIVE | Noted: 2018-06-26

## 2018-06-26 PROBLEM — R42 DIZZINESS: Status: RESOLVED | Noted: 2018-05-18 | Resolved: 2018-06-26

## 2018-06-29 DIAGNOSIS — M54.40 CHRONIC LOW BACK PAIN WITH SCIATICA, SCIATICA LATERALITY UNSPECIFIED, UNSPECIFIED BACK PAIN LATERALITY: ICD-10-CM

## 2018-06-29 DIAGNOSIS — M50.20 CERVICAL DISC HERNIATION: ICD-10-CM

## 2018-06-29 DIAGNOSIS — G89.29 CHRONIC LOW BACK PAIN WITH SCIATICA, SCIATICA LATERALITY UNSPECIFIED, UNSPECIFIED BACK PAIN LATERALITY: ICD-10-CM

## 2018-06-29 RX ORDER — GABAPENTIN 600 MG/1
TABLET ORAL
Qty: 60 TABLET | Refills: 0 | Status: SHIPPED | OUTPATIENT
Start: 2018-06-29 | End: 2018-10-03

## 2018-07-12 ENCOUNTER — HOSPITAL ENCOUNTER (OUTPATIENT)
Age: 53
Setting detail: SPECIMEN
Discharge: HOME | End: 2018-07-12
Payer: MEDICARE

## 2018-07-12 ENCOUNTER — OFFICE VISIT (OUTPATIENT)
Dept: FAMILY MEDICINE CLINIC | Age: 53
End: 2018-07-12
Payer: MEDICARE

## 2018-07-12 VITALS
BODY MASS INDEX: 47.09 KG/M2 | HEIGHT: 66 IN | OXYGEN SATURATION: 96 % | DIASTOLIC BLOOD PRESSURE: 68 MMHG | SYSTOLIC BLOOD PRESSURE: 112 MMHG | TEMPERATURE: 97.4 F | HEART RATE: 72 BPM | WEIGHT: 293 LBS

## 2018-07-12 DIAGNOSIS — J44.9 CHRONIC OBSTRUCTIVE PULMONARY DISEASE, UNSPECIFIED COPD TYPE (HCC): ICD-10-CM

## 2018-07-12 DIAGNOSIS — F41.9 ANXIETY: ICD-10-CM

## 2018-07-12 DIAGNOSIS — R60.9 PERIPHERAL EDEMA: ICD-10-CM

## 2018-07-12 DIAGNOSIS — E55.9 VITAMIN D DEFICIENCY: ICD-10-CM

## 2018-07-12 DIAGNOSIS — Z72.0 TOBACCO ABUSE: ICD-10-CM

## 2018-07-12 DIAGNOSIS — E88.81 INSULIN RESISTANCE: ICD-10-CM

## 2018-07-12 DIAGNOSIS — F32.A DEPRESSION, UNSPECIFIED DEPRESSION TYPE: ICD-10-CM

## 2018-07-12 DIAGNOSIS — H66.002 ACUTE SUPPURATIVE OTITIS MEDIA OF LEFT EAR WITHOUT SPONTANEOUS RUPTURE OF TYMPANIC MEMBRANE, RECURRENCE NOT SPECIFIED: ICD-10-CM

## 2018-07-12 DIAGNOSIS — Z99.89 OSA ON CPAP: ICD-10-CM

## 2018-07-12 DIAGNOSIS — R73.9 HYPERGLYCEMIA: Primary | ICD-10-CM

## 2018-07-12 DIAGNOSIS — G47.33 OSA ON CPAP: ICD-10-CM

## 2018-07-12 LAB
ANION GAP SERPL CALCULATED.3IONS-SCNC: 13 MMOL/L (ref 9–17)
BUN BLDV-MCNC: 11 MG/DL (ref 6–20)
BUN/CREAT BLD: NORMAL (ref 9–20)
CALCIUM SERPL-MCNC: 9.1 MG/DL (ref 8.6–10.4)
CHLORIDE BLD-SCNC: 100 MMOL/L (ref 98–107)
CO2: 28 MMOL/L (ref 20–31)
CREAT SERPL-MCNC: 0.61 MG/DL (ref 0.5–0.9)
GFR AFRICAN AMERICAN: >60 ML/MIN
GFR NON-AFRICAN AMERICAN: >60 ML/MIN
GFR SERPL CREATININE-BSD FRML MDRD: NORMAL ML/MIN/{1.73_M2}
GFR SERPL CREATININE-BSD FRML MDRD: NORMAL ML/MIN/{1.73_M2}
GLUCOSE BLD-MCNC: 75 MG/DL (ref 70–99)
POTASSIUM SERPL-SCNC: 4.2 MMOL/L (ref 3.7–5.3)
SODIUM BLD-SCNC: 141 MMOL/L (ref 135–144)

## 2018-07-12 PROCEDURE — G8417 CALC BMI ABV UP PARAM F/U: HCPCS | Performed by: PHYSICIAN ASSISTANT

## 2018-07-12 PROCEDURE — 3023F SPIROM DOC REV: CPT | Performed by: PHYSICIAN ASSISTANT

## 2018-07-12 PROCEDURE — 99214 OFFICE O/P EST MOD 30 MIN: CPT | Performed by: PHYSICIAN ASSISTANT

## 2018-07-12 PROCEDURE — 3017F COLORECTAL CA SCREEN DOC REV: CPT | Performed by: PHYSICIAN ASSISTANT

## 2018-07-12 PROCEDURE — G8427 DOCREV CUR MEDS BY ELIG CLIN: HCPCS | Performed by: PHYSICIAN ASSISTANT

## 2018-07-12 PROCEDURE — G8926 SPIRO NO PERF OR DOC: HCPCS | Performed by: PHYSICIAN ASSISTANT

## 2018-07-12 PROCEDURE — 4004F PT TOBACCO SCREEN RCVD TLK: CPT | Performed by: PHYSICIAN ASSISTANT

## 2018-07-12 RX ORDER — METHOCARBAMOL 500 MG/1
TABLET, FILM COATED ORAL
COMMUNITY
End: 2018-07-12

## 2018-07-12 RX ORDER — NYSTATIN 100000 [USP'U]/G
POWDER TOPICAL
COMMUNITY
End: 2019-09-17 | Stop reason: SDUPTHER

## 2018-07-12 RX ORDER — AZITHROMYCIN 250 MG/1
TABLET, FILM COATED ORAL
Qty: 1 PACKET | Refills: 0 | Status: SHIPPED | OUTPATIENT
Start: 2018-07-12 | End: 2018-07-22

## 2018-07-12 RX ORDER — IBUPROFEN 800 MG/1
TABLET ORAL
COMMUNITY
End: 2018-10-03

## 2018-07-12 RX ORDER — GABAPENTIN 600 MG/1
TABLET ORAL
COMMUNITY
End: 2018-07-12

## 2018-07-12 RX ORDER — CELECOXIB 100 MG/1
100 CAPSULE ORAL DAILY
Qty: 90 CAPSULE | Refills: 1 | Status: SHIPPED | OUTPATIENT
Start: 2018-07-12 | End: 2018-09-16 | Stop reason: SDUPTHER

## 2018-07-12 RX ORDER — DULOXETIN HYDROCHLORIDE 60 MG/1
CAPSULE, DELAYED RELEASE ORAL
COMMUNITY
End: 2019-09-17

## 2018-07-12 RX ORDER — METFORMIN HYDROCHLORIDE 500 MG/1
TABLET, EXTENDED RELEASE ORAL
COMMUNITY
End: 2018-07-27

## 2018-07-12 RX ORDER — BUSPIRONE HYDROCHLORIDE 15 MG/1
TABLET ORAL
COMMUNITY
End: 2018-07-12

## 2018-07-12 RX ORDER — IBUPROFEN 800 MG/1
800 TABLET ORAL EVERY 8 HOURS PRN
Qty: 120 TABLET | Refills: 0 | Status: CANCELLED | OUTPATIENT
Start: 2018-07-12

## 2018-07-12 RX ORDER — VARENICLINE TARTRATE 1 MG/1
TABLET, FILM COATED ORAL
COMMUNITY
End: 2018-10-03

## 2018-07-12 RX ORDER — ARIPIPRAZOLE 2 MG/1
2 TABLET ORAL DAILY
COMMUNITY
End: 2018-10-03

## 2018-07-12 RX ORDER — OMEPRAZOLE 20 MG/1
CAPSULE, DELAYED RELEASE ORAL
COMMUNITY
End: 2018-07-12

## 2018-07-12 RX ORDER — GABAPENTIN 300 MG/1
CAPSULE ORAL
COMMUNITY
End: 2018-07-12

## 2018-07-12 RX ORDER — FUROSEMIDE 20 MG/1
TABLET ORAL
COMMUNITY
End: 2018-07-12

## 2018-07-12 RX ORDER — LORATADINE 10 MG/1
TABLET ORAL
COMMUNITY
End: 2020-07-01

## 2018-07-12 ASSESSMENT — ENCOUNTER SYMPTOMS
CHOKING: 0
SORE THROAT: 0
NAUSEA: 0
SWOLLEN GLANDS: 0
BELCHING: 0
BACK PAIN: 0
ABDOMINAL PAIN: 0
GLOBUS SENSATION: 0
EYE ITCHING: 0
VISUAL CHANGE: 0
SHORTNESS OF BREATH: 1
CHEST TIGHTNESS: 0
TROUBLE SWALLOWING: 0
BLURRED VISION: 0
PHOTOPHOBIA: 0
EYE DISCHARGE: 0
HEARTBURN: 0

## 2018-07-12 NOTE — PROGRESS NOTES
[address] [id number]  [address] [place] [id number]  Dept: [phone]  Dept Fax: [phone]    Melissa Tracey is a 48 y.o. female who presents today for her medical conditions/complaints as noted below. Melissa Tracey is c/o of   Chief Complaint   Patient presents with    Fatigue     patient wants tramadol    Medication Refill     Visit Information    Have you changed or started any medications since your last visit including any over-the-counter medicines, vitamins, or herbal medicines? no   Have you stopped taking any of your medications? Is so, why? -  no  Are you having any side effects from any of your medications? - no    Have you seen any other physician or provider since your last visit? yes - ENT   Have you had any other diagnostic tests since your last visit?  no   Have you been seen in the emergency room and/or had an admission in a hospital since we last saw you?  yes - Anxiety    Have you had your routine dental cleaning in the past 6 months?  no     Do you have an active [employer] account? If no, what is the barrier?   Yes    Patient Care Team:  Broderick Curtis PA-C as PCP - General (Physician Assistant)  Genoveva Quezada MD as Consulting Physician (Gastroenterology)    Medical History Review  Past Medical, Family, and Social History reviewed and does contribute to the patient presenting condition    Health Maintenance   Topic Date Due    Diabetic retinal exam  03/30/1975    Cervical cancer screen  09/27/2017    Lipid screen  04/20/2018    Shingles Vaccine (1 of 2 - 2 Dose Series) 07/12/2018 (Originally 3/30/2015)    Flu vaccine (1) 09/01/2018    Diabetic microalbuminuria test  10/09/2018    Diabetic foot exam  12/05/2018    A1C test (Diabetic or Prediabetic)  04/10/2019    Potassium monitoring  05/18/2019    Creatinine monitoring  05/18/2019    Breast cancer screen  08/10/2019    Colon cancer screen colonoscopy  03/17/2026    each 11    furosemide (LASIX) 20 MG tablet TAKE ONE TABLET BY MOUTH DAILY 60 tablet 3    omeprazole (PRILOSEC) 20 MG delayed release capsule TAKE ONE CAPSULE BY MOUTH DAILY 30 capsule 2    methocarbamol (ROBAXIN) 500 MG tablet Take 1 tablet by mouth 4 times daily 60 tablet 3    diclofenac sodium 1 % GEL Apply 2 g topically 2 times daily 1 Tube 3    busPIRone (BUSPAR) 15 MG tablet Take 30 mg by mouth 2 times daily       glucose blood VI test strips (ASCENSIA AUTODISC VI;ONE TOUCH ULTRA TEST VI) strip 1 each by In Vitro route daily As needed. 100 each 3    Blood Glucose Monitoring Suppl (ACURA BLOOD GLUCOSE METER) W/DEVICE KIT 1 each by Does not apply route 2 times daily 1 kit 0    Thumb Spica MISC 1 each by Does not apply route continuous 1 each 0    Elastic Bandages & Supports (KNEE BRACE) MISC 1 Device by Does not apply route daily Right knee pain; osteoarthritis right knee 1 each 1    Respiratory Therapy Supplies (NEBULIZER COMPRESSOR) KIT 1 kit by Does not apply route once for 1 dose Patient with mild to moderate COPD per pulmonary function test; to use up to 4 times per day with albuterol 1 kit 0    Nebulizers (COMPRESSOR/NEBULIZER) MISC 1 vial by Does not apply route 4 times daily as needed.  Patient has albuterol prescription at home; needs aerosol machine and set up diagnosis chronic bronchitis 1 each 0    ibuprofen (ADVIL;MOTRIN) 800 MG tablet ibuprofen 800 mg tablet      loratadine (CLARITIN) 10 MG tablet loratadine 10 mg tablet      diclofenac sodium 1 % GEL diclofenac 1 % topical gel      DULoxetine (CYMBALTA) 60 MG extended release capsule duloxetine 60 mg capsule,delayed release      metFORMIN (GLUCOPHAGE-XR) 500 MG extended release tablet metformin  mg tablet,extended release 24 hr      nystatin (NYAMYC) 112729 UNIT/GM powder Nyamyc 100,000 unit/gram topical powder      vitamin D (ERGOCALCIFEROL) 32240 units CAPS capsule TAKE ONE CAPSULE BY MOUTH ONCE WEEKLY 12 capsule 0     No current facility-administered medications for this visit. Allergies   Allergen Reactions    Augmentin [Amoxicillin-Pot Clavulanate] Diarrhea and Other (See Comments)    Medrol [Methylprednisolone] Other (See Comments)     Redness of hands and itching  Redness of hands and itching    Tape [Adhesive Tape] Rash       Health Maintenance   Topic Date Due    Diabetic retinal exam  03/30/1975    Cervical cancer screen  09/27/2017    Lipid screen  04/20/2018    Shingles Vaccine (1 of 2 - 2 Dose Series) 07/12/2018 (Originally 3/30/2015)    Flu vaccine (1) 09/01/2018    Diabetic microalbuminuria test  10/09/2018    Diabetic foot exam  12/05/2018    A1C test (Diabetic or Prediabetic)  04/10/2019    Potassium monitoring  05/18/2019    Creatinine monitoring  05/18/2019    Breast cancer screen  08/10/2019    Colon cancer screen colonoscopy  03/17/2026    DTaP/Tdap/Td vaccine (2 - Td) 08/24/2026    Pneumococcal med risk  Completed    Hepatitis C screen  Completed    HIV screen  Completed       Subjective:      Review of Systems   Constitutional: Positive for fatigue. Negative for appetite change and weight loss. HENT: Negative for ear discharge, postnasal drip, sore throat and trouble swallowing. Eyes: Negative for blurred vision, photophobia, discharge and itching. Respiratory: Positive for shortness of breath. Negative for choking and chest tightness. Cardiovascular: Negative for chest pain, palpitations, leg swelling and syncope. Gastrointestinal: Negative for abdominal pain, dysphagia, heartburn and nausea. Endocrine: Negative for polydipsia. Musculoskeletal: Positive for neck pain. Negative for back pain and neck stiffness. Skin: Negative for rash. Neurological: Negative for dizziness, tingling, weakness, light-headedness, numbness and headaches. Psychiatric/Behavioral: Negative for dysphoric mood. The patient is not nervous/anxious and does not have insomnia.     All other systems reviewed and are negative. Objective:     Physical Exam   Constitutional: She is oriented to person, place, and time. She appears well-developed and well-nourished. No distress. /82  Pulse 88  Temp 97.8 °F (36.6 °C) (Oral)   Ht 5' 6\" (1.676 m)  Wt 273 lb 4.8 oz (124 kg)  SpO2 95%  BMI 44.11 kg/m2     HENT:   Head: Normocephalic and atraumatic. Right Ear: External ear normal.   Left Ear: External ear normal.   Nose: Nose normal.   Mouth/Throat: Oropharynx is clear and moist.   Eyes: Conjunctivae and EOM are normal. Pupils are equal, round, and reactive to light. Right eye exhibits no discharge. Left eye exhibits no discharge. No scleral icterus. Neck: Normal range of motion. Neck supple. No tracheal deviation present. No thyromegaly present. Cardiovascular: Normal rate, regular rhythm and normal heart sounds. Exam reveals no gallop and no friction rub. No murmur heard. Distant   Pulmonary/Chest: Effort normal and breath sounds normal. No stridor. No respiratory distress. She has no wheezes. She has no rales. She exhibits no tenderness. Abdominal: Soft. Bowel sounds are normal. She exhibits no distension. There is no tenderness. There is no rebound and no guarding. Genitourinary: No vaginal discharge found. Musculoskeletal: She exhibits edema. Palpable pedal pulses. 2+ edema bilat  Neg bea   Neurological: She is alert and oriented to person, place, and time. Gait normal.   Skin: Skin is warm and dry. No rash noted. She is not diaphoretic. No erythema. Psychiatric: She has a normal mood and affect. Her affect is not inappropriate. Nursing note and vitals reviewed.     Diabetic Foot Exam  Observation: normal  Sensation:   Monofilament Sensation:  abnormal - Decreased distally    Light Touch: abnormal Decreased distally  Color:  normal  Pulses:  normal,   Edema: normal  Skin: normal        /68   Pulse 72   Temp 97.4 °F (36.3 °C) (Oral)   Ht 5' 6\" (1.676 m)   Wt (!) 303 lb 1.6 oz (137.5 kg)   SpO2 96%   BMI 48.92 kg/m²     Assessment:       Diagnosis Orders   1. Hyperglycemia     2. Chronic obstructive pulmonary disease, unspecified COPD type ([address]     3. Anxiety     4. Depression, unspecified depression type     5. Insulin resistance     6. Peripheral edema  Basic Metabolic Panel   7. Vitamin D deficiency     8. Tobacco abuse     9. TAMARA on CPAP               Plan:      No Follow-up on file. Orders Placed This Encounter   Procedures    Basic Metabolic Panel     Standing Status:   Future     Standing Expiration Date:   2019     Orders Placed This Encounter   Medications    Elastic Bandages & Supports (JOBST KNEE HIGH COMPRESSION SM) MISC     Si each by Does not apply route daily Please measure     Dispense:  3 each     Refill:  2    celecoxib (CELEBREX) 100 MG capsule     Sig: Take 1 capsule by mouth daily     Dispense:  90 capsule     Refill:  1         Insulin resistance - On metformin. Diet and exercise encouraged. Insulin level extremely high. Increase metformin.      Anxiety and depression - Taking celexa with some relief. No SI or HI. Also on xanax. Referral to psych. Started on buspar. Doing much better with med. Seeing psych. GOing to counseling now.     Elevated HGB - Pt reports family hx of same. Will repeat. Referral to heme. Saw Dr. Pam Carbajal.        Lung nodule - Seen on CT chest 16 stable. Seeing pulm.      Tobacco abuse/COPD - Smoking since age 13, 1/2-1 PPD. On symbicort and prn albuterol. 17 emphysema. Pt has been on chantix in past.  No SI. HI or vivid dreams. Will watch for and go to ER if it develops.        Chronic back pain / right hip pain - Radicular pain down right leg. No recent imaging. Xray showed degenerative changes 16. MRI ordered. On robaxin. Previously on norco. Now on ultram. Referral to pain management.       Chronic neck pain - Radiation down right arm. Pt does c/o right hand weakness.  Xray showed degenerative changes 10/20/16. MRI ordered. Saw pain management. No rx as pt abuses marijuana. Start celebrex. Risks s/s GI bleeding discussed.      Fibromyalgia - On lyrica.      Hepatic steatosis - F/u GI.      GERD - On prilosec.       Peripheral edema - On lasix      GB disease - States suppose to have HIDA scan through GI, but GI wouldn't take ins. Still with nausea. HIDA ordered. F/u GI. CT chest 5/11/16 Showed mild distention of the gallbladder with slight pericholecystic haziness. HIDDA scan nl 9/1/16      CP - Admitted 1/2017. Nl stress echo. 5/2018 worsening edema 2+ bilat pitting. 10 lb wt gain in 2-4 wks. SOB/MARY. CP coming and going substernal without radiation. No CP now. Pt sent to cardio - appt today concerns for cardiac event. Labs. Start asa.     Vit D def - Will replace and recheck 12 wks.     Laryngitis / vocal cord polyp - Surgery 3/2017 plans to go back in to remove more. Heel spur - L foot. Bilat foot pain. Referral to pod    Fatigue - No wt loss or night sweats. Upcoming sleep study. Labs,     Balanced diet and routine exercise encouraged.     Multivitamin with vitamin D daily encouraged.     Good sleep hygiene encouraged.     Dental exam q 6 mo.     Vision yearly.     Sunscreen encouraged.     Immunizations reviewed.     DEXA - 5/2014 NL. Repeat ordered.      [facility] - Pt has orders. Did not get. Colonoscopy - FIT kid given.      Smoking marijuana. Pt encouraged to stop. Schedule f/u appt with [facility] for dilated eye exam, with dentistry for cleaning, and audiology. Flu - 2017    Prevnar 13 - 8/24/16    Pneumovax 23 - 9/1/17    Tdap - 8/24/17     Patient given educational materials - see patient instructions. Discussed use, benefit, and side effects of prescribed medications. All patient questions answered. Pt voiced understanding. Reviewed health maintenance. Instructed to continue current medications, diet and exercise.   Patient agreed with treatment plan. Follow up as directed.      Electronically signed by Kat Ortiz PA-C on 7/12/2018 at 11:20 AM

## 2018-07-13 ENCOUNTER — TELEPHONE (OUTPATIENT)
Dept: FAMILY MEDICINE CLINIC | Age: 53
End: 2018-07-13

## 2018-07-16 NOTE — TELEPHONE ENCOUNTER
Also this is a new problem.  She fawnut saw Galindo Barney and there is nothing in note about this problem

## 2018-07-16 NOTE — TELEPHONE ENCOUNTER
Yes ,i reviewed her appt with RB. Nothing noted about cramping. . If she would like to discuss  /cramping swelling, I feel she should make appt. Then I can evaluate the swelling and cramping she is having. I have never see this pt and cannot diagnose pt w/o seeing her . Thank you.

## 2018-07-27 NOTE — TELEPHONE ENCOUNTER
Once 04/10/2019   Iron and TIBC Once 04/10/2019   Ferritin Once 04/10/2019   Folate Once 04/10/2019   Vitamin B1 Once 04/10/2019   Vitamin B12 Once 04/10/2019   XR CHEST STANDARD (2 VW) Once 05/18/2019   XR HAND RIGHT (MIN 3 VIEWS) Once 06/19/2018   US Venous Insuff Left Once 06/26/2018   US Venous Insuff Right Once 06/26/2018               Patient Active Problem List:     Hyperglycemia     Anxiety     Bronchitis     Depression     Degenerative arthritis of lumbar spine     Osteoarthritis of knee     Insulin resistance     COPD (chronic obstructive pulmonary disease) (HCC)     Osteoarthritis thoracic spine     Osteoarthritis cervical spine     Prediabetes     Peripheral edema     Hepatic steatosis     Epigastric pain     Nausea     Vitamin D deficiency     Tobacco abuse     Vocal cord polyp     Laryngitis     Obesity     Encounter for diabetic foot exam ([facility] [address]     SOB (shortness of breath)     Family history of MI (myocardial infarction)     Bilateral leg edema     Pulmonary HTN     TAMARA on CPAP

## 2018-09-17 RX ORDER — CELECOXIB 100 MG/1
100 CAPSULE ORAL DAILY
Qty: 90 CAPSULE | Refills: 1 | Status: SHIPPED | OUTPATIENT
Start: 2018-09-17 | End: 2018-10-03

## 2018-09-18 ENCOUNTER — TELEPHONE (OUTPATIENT)
Dept: FAMILY MEDICINE CLINIC | Age: 53
End: 2018-09-18

## 2018-09-18 RX ORDER — BLOOD SUGAR DIAGNOSTIC
1 STRIP MISCELLANEOUS DAILY
Qty: 100 EACH | Refills: 11 | Status: ON HOLD | OUTPATIENT
Start: 2018-09-18 | End: 2020-10-07

## 2018-09-18 NOTE — TELEPHONE ENCOUNTER
Patient needs a script for one touch ultra  Lancets she stated she never got a script for them but prescribed the meter.

## 2018-09-18 NOTE — TELEPHONE ENCOUNTER
Next Visit Date:  Future Appointments  Date Time Provider Hollis Jacobo   10/12/2018 10:15 AM Margarita Owens PA-C [facility] [id number] [facility]   Topic Date Due    Diabetic retinal exam  03/30/1975    Shingles Vaccine (1 of 2 - 2 Dose Series) 03/30/2015    Cervical cancer screen  09/27/2017    Lipid screen  04/20/2018    Flu vaccine (1) 09/01/2018    Diabetic microalbuminuria test  10/09/2018    Diabetic foot exam  12/05/2018    A1C test (Diabetic or Prediabetic)  04/10/2019    Potassium monitoring  07/12/2019    Creatinine monitoring  07/12/2019    Breast cancer screen  08/10/2019    Colon cancer screen colonoscopy  03/17/2026    DTaP/Tdap/Td vaccine (2 - Td) 08/24/2026    Pneumococcal med risk  Completed    Hepatitis C screen  Completed    HIV screen  Completed       Hemoglobin A1C (%)   Date Value   04/10/2018 5.5   12/04/2017 5.5   04/20/2017 5.5             ( goal A1C is < 7)   No results found for: LABMICR  LDL Cholesterol (mg/dL)   Date Value   04/20/2017 94   10/20/2016 77     LDL Calculated (mg/dL)   Date Value   04/27/2015 81       (goal LDL is <100)   AST (U/L)   Date Value   05/18/2018 19     ALT (U/L)   Date Value   05/18/2018 28     BUN (mg/dL)   Date Value   07/12/2018 11     BP Readings from Last 3 Encounters:   07/18/18 (!) 124/91   07/12/18 112/68   06/26/18 110/68          (goal 120/80)    All Future Testing planned in [facility]  Lab Frequency Next Occurrence   Basic Metabolic Panel Once 58/24/0922   Insulin, Total Once 01/16/2019   Hemoglobin A1C Once 01/16/2019   CBC Auto Differential Once 04/10/2019   Comprehensive Metabolic Panel Once 87/29/2333   Lipid Panel Once 04/10/2019   TSH with Reflex Once 04/10/2019   Insulin, Total Once 04/10/2019   Hemoglobin A1C Once 04/10/2019   Iron and TIBC Once 04/10/2019   Ferritin Once 04/10/2019   Folate Once 04/10/2019   Vitamin B1 Once 04/10/2019   Vitamin B12 Once 04/10/2019   XR CHEST STANDARD (2 VW) Once 05/18/2019   XR HAND RIGHT (MIN 3 VIEWS) Once 06/19/2018   US Venous Insuff Left Once 06/26/2018   US Venous Insuff Right Once 06/26/2018               Patient Active Problem List:     Hyperglycemia     Anxiety     Bronchitis     Depression     Degenerative arthritis of lumbar spine     Osteoarthritis of knee     Insulin resistance     COPD (chronic obstructive pulmonary disease) (HCC)     Osteoarthritis thoracic spine     Osteoarthritis cervical spine     Prediabetes     Peripheral edema     Hepatic steatosis     Epigastric pain     Nausea     Vitamin D deficiency     Tobacco abuse     Vocal cord polyp     Laryngitis     Obesity     Encounter for diabetic foot exam ([facility] [address]     SOB (shortness of breath)     Family history of MI (myocardial infarction)     Bilateral leg edema     Pulmonary HTN (HCC)     TAMARA on CPAP

## 2018-10-03 ENCOUNTER — OFFICE VISIT (OUTPATIENT)
Dept: FAMILY MEDICINE CLINIC | Age: 53
End: 2018-10-03
Payer: MEDICARE

## 2018-10-03 VITALS
OXYGEN SATURATION: 93 % | TEMPERATURE: 97.7 F | HEART RATE: 83 BPM | DIASTOLIC BLOOD PRESSURE: 74 MMHG | SYSTOLIC BLOOD PRESSURE: 122 MMHG | HEIGHT: 66 IN | WEIGHT: 293 LBS | BODY MASS INDEX: 47.09 KG/M2

## 2018-10-03 DIAGNOSIS — R05.9 COUGH: ICD-10-CM

## 2018-10-03 DIAGNOSIS — Z12.39 SCREENING FOR BREAST CANCER: ICD-10-CM

## 2018-10-03 DIAGNOSIS — E55.9 VITAMIN D DEFICIENCY: ICD-10-CM

## 2018-10-03 DIAGNOSIS — R73.9 HYPERGLYCEMIA: ICD-10-CM

## 2018-10-03 DIAGNOSIS — Z72.0 TOBACCO ABUSE: ICD-10-CM

## 2018-10-03 DIAGNOSIS — F32.A DEPRESSION, UNSPECIFIED DEPRESSION TYPE: ICD-10-CM

## 2018-10-03 DIAGNOSIS — R73.03 PRE-DIABETES: ICD-10-CM

## 2018-10-03 DIAGNOSIS — R06.02 SOB (SHORTNESS OF BREATH): ICD-10-CM

## 2018-10-03 DIAGNOSIS — J44.9 CHRONIC OBSTRUCTIVE PULMONARY DISEASE, UNSPECIFIED COPD TYPE (HCC): ICD-10-CM

## 2018-10-03 DIAGNOSIS — F41.9 ANXIETY: ICD-10-CM

## 2018-10-03 DIAGNOSIS — R32 URINARY INCONTINENCE, UNSPECIFIED TYPE: ICD-10-CM

## 2018-10-03 DIAGNOSIS — E88.81 INSULIN RESISTANCE: Primary | ICD-10-CM

## 2018-10-03 LAB
BILIRUBIN, POC: NEGATIVE
BLOOD URINE, POC: NORMAL
CLARITY, POC: CLEAR
COLOR, POC: YELLOW
GLUCOSE URINE, POC: NEGATIVE
HBA1C MFR BLD: 5.3 %
KETONES, POC: NORMAL
LEUKOCYTE EST, POC: NEGATIVE
NITRITE, POC: NEGATIVE
PH, POC: 5.5
PROTEIN, POC: NEGATIVE
SPECIFIC GRAVITY, POC: >=1.03
UROBILINOGEN, POC: NORMAL

## 2018-10-03 PROCEDURE — 83036 HEMOGLOBIN GLYCOSYLATED A1C: CPT | Performed by: PHYSICIAN ASSISTANT

## 2018-10-03 PROCEDURE — 3023F SPIROM DOC REV: CPT | Performed by: PHYSICIAN ASSISTANT

## 2018-10-03 PROCEDURE — 99214 OFFICE O/P EST MOD 30 MIN: CPT | Performed by: PHYSICIAN ASSISTANT

## 2018-10-03 PROCEDURE — G8926 SPIRO NO PERF OR DOC: HCPCS | Performed by: PHYSICIAN ASSISTANT

## 2018-10-03 PROCEDURE — 81003 URINALYSIS AUTO W/O SCOPE: CPT | Performed by: PHYSICIAN ASSISTANT

## 2018-10-03 PROCEDURE — G8427 DOCREV CUR MEDS BY ELIG CLIN: HCPCS | Performed by: PHYSICIAN ASSISTANT

## 2018-10-03 PROCEDURE — 4004F PT TOBACCO SCREEN RCVD TLK: CPT | Performed by: PHYSICIAN ASSISTANT

## 2018-10-03 PROCEDURE — G8484 FLU IMMUNIZE NO ADMIN: HCPCS | Performed by: PHYSICIAN ASSISTANT

## 2018-10-03 PROCEDURE — G8417 CALC BMI ABV UP PARAM F/U: HCPCS | Performed by: PHYSICIAN ASSISTANT

## 2018-10-03 PROCEDURE — 3017F COLORECTAL CA SCREEN DOC REV: CPT | Performed by: PHYSICIAN ASSISTANT

## 2018-10-03 RX ORDER — ARIPIPRAZOLE 5 MG/1
5 TABLET ORAL DAILY
Qty: 30 TABLET | Refills: 3 | Status: SHIPPED | OUTPATIENT
Start: 2018-10-03 | End: 2019-09-17 | Stop reason: ALTCHOICE

## 2018-10-03 ASSESSMENT — ENCOUNTER SYMPTOMS
GLOBUS SENSATION: 0
SORE THROAT: 0
PHOTOPHOBIA: 0
VISUAL CHANGE: 0
CHOKING: 0
BELCHING: 0
ABDOMINAL PAIN: 0
TROUBLE SWALLOWING: 0
BACK PAIN: 0
SWOLLEN GLANDS: 0
CHEST TIGHTNESS: 0
NAUSEA: 0
EYE DISCHARGE: 0
BLURRED VISION: 0
SHORTNESS OF BREATH: 1
RHINORRHEA: 0
HEARTBURN: 0
EYE ITCHING: 0

## 2018-10-03 ASSESSMENT — COPD QUESTIONNAIRES: COPD: 1

## 2018-10-03 NOTE — PROGRESS NOTES
[facility] [address] [place] [id number]  Dept: [phone]  Dept Fax: [phone]    Manish Olivera is a 48 y.o. female who presents today for her medical conditions/complaints as noted below. Manish Olivera is c/o of   Chief Complaint   Patient presents with    COPD     3 month visit. Pt states SOB since starting Celebrex. Onset 2 weeks.  Incontinence     Urinary frequency, unable to make it to the bathroom to void. Onset 2 months.  Health Maintenance     Declines flu shot today due to SOB issues. Visit Information    Have you changed or started any medications since your last visit including any over-the-counter medicines, vitamins, or herbal medicines? no   Have you stopped taking any of your medications? Is so, why? -  no  Are you having any side effects from any of your medications? - no    Have you seen any other physician or provider since your last visit? yes - ENT   Have you had any other diagnostic tests since your last visit?  no   Have you been seen in the emergency room and/or had an admission in a hospital since we last saw you?  yes - Anxiety    Have you had your routine dental cleaning in the past 6 months?  no     Do you have an active MyChart account? If no, what is the barrier?   Yes    Patient Care Team:  Adalgisa Enrique PA-C as PCP - General (Physician Assistant)  Alyce Mckeon MD as Consulting Physician (Gastroenterology)    Medical History Review  Past Medical, Family, and Social History reviewed and does contribute to the patient presenting condition    Health Maintenance   Topic Date Due    Diabetic retinal exam  03/30/1975    Shingles Vaccine (1 of 2 - 2 Dose Series) 03/30/2015    Cervical cancer screen  09/27/2017    Lipid screen  04/20/2018    Flu vaccine (1) 09/01/2018    Diabetic microalbuminuria test  10/09/2018    Diabetic foot exam  12/05/2018    A1C test (Diabetic or Prediabetic)  04/10/2019

## 2018-10-08 ENCOUNTER — TELEPHONE (OUTPATIENT)
Dept: FAMILY MEDICINE CLINIC | Age: 53
End: 2018-10-08

## 2018-10-08 RX ORDER — IBUPROFEN 800 MG/1
800 TABLET ORAL EVERY 6 HOURS PRN
Qty: 60 TABLET | Refills: 1 | Status: SHIPPED | OUTPATIENT
Start: 2018-10-08 | End: 2018-12-04 | Stop reason: SDUPTHER

## 2018-10-09 ENCOUNTER — HOSPITAL ENCOUNTER (OUTPATIENT)
Dept: GENERAL RADIOLOGY | Facility: CLINIC | Age: 53
Discharge: HOME | End: 2018-10-11
Payer: MEDICARE

## 2018-10-09 ENCOUNTER — HOSPITAL ENCOUNTER (OUTPATIENT)
Dept: MAMMOGRAPHY | Facility: CLINIC | Age: 53
Discharge: HOME | End: 2018-10-11
Payer: MEDICARE

## 2018-10-09 ENCOUNTER — TELEPHONE (OUTPATIENT)
Dept: FAMILY MEDICINE CLINIC | Age: 53
End: 2018-10-09

## 2018-10-09 DIAGNOSIS — R05.9 COUGH: ICD-10-CM

## 2018-10-09 DIAGNOSIS — Z12.39 SCREENING FOR BREAST CANCER: ICD-10-CM

## 2018-10-09 DIAGNOSIS — Z72.0 TOBACCO ABUSE: ICD-10-CM

## 2018-10-09 DIAGNOSIS — R06.02 SOB (SHORTNESS OF BREATH): ICD-10-CM

## 2018-10-09 PROCEDURE — 71046 X-RAY EXAM CHEST 2 VIEWS: CPT

## 2018-10-09 PROCEDURE — 77067 SCR MAMMO BI INCL CAD: CPT

## 2018-10-09 NOTE — TELEPHONE ENCOUNTER
PT informed. Wants to know if you are calling In anything for the bladder control or no ?  Please advice, thank you

## 2018-10-09 NOTE — TELEPHONE ENCOUNTER
Sometimes this can happen with irritation/kidney stones, contaminated specimen etc.  We will repeat it again next visit.   If present will often do a CT and urology referral

## 2018-10-15 NOTE — TELEPHONE ENCOUNTER
Please Approve or Refuse.   Send to Pharmacy per Pt's Request:      Next Visit Date:  Visit date not found   Last Visit Date: Visit date not found    Hemoglobin A1C (%)   Date Value   10/03/2018 5.3   04/10/2018 5.5   12/04/2017 5.5             ( goal A1C is < 7)   BP Readings from Last 3 Encounters:   10/03/18 122/74   07/18/18 (!) 124/91   07/12/18 112/68          (goal 120/80)  BUN   Date Value Ref Range Status   07/12/2018 11 6 - 20 mg/dL Final     CREATININE   Date Value Ref Range Status   07/12/2018 0.61 0.50 - 0.90 mg/dL Final     Potassium   Date Value Ref Range Status   07/12/2018 4.2 3.7 - 5.3 mmol/L Final

## 2018-12-04 RX ORDER — IBUPROFEN 800 MG/1
TABLET ORAL
Qty: 60 TABLET | Refills: 0 | Status: SHIPPED | OUTPATIENT
Start: 2018-12-04 | End: 2019-02-02 | Stop reason: SDUPTHER

## 2019-01-11 RX ORDER — MIRABEGRON 25 MG/1
TABLET, FILM COATED, EXTENDED RELEASE ORAL
Qty: 90 TABLET | Refills: 1 | Status: SHIPPED | OUTPATIENT
Start: 2019-01-11 | End: 2019-07-31 | Stop reason: SDUPTHER

## 2019-01-14 RX ORDER — OMEPRAZOLE 20 MG/1
20 CAPSULE, DELAYED RELEASE ORAL DAILY
Qty: 90 CAPSULE | Refills: 1 | Status: SHIPPED | OUTPATIENT
Start: 2019-01-14 | End: 2019-07-31 | Stop reason: SDUPTHER

## 2019-02-04 RX ORDER — IBUPROFEN 800 MG/1
TABLET ORAL
Qty: 90 TABLET | Refills: 1 | Status: SHIPPED | OUTPATIENT
Start: 2019-02-04 | End: 2020-07-01 | Stop reason: ALTCHOICE

## 2019-09-17 ENCOUNTER — OFFICE VISIT (OUTPATIENT)
Dept: FAMILY MEDICINE CLINIC | Age: 54
End: 2019-09-17
Payer: MEDICARE

## 2019-09-17 ENCOUNTER — HOSPITAL ENCOUNTER (OUTPATIENT)
Age: 54
Setting detail: SPECIMEN
Discharge: HOME | End: 2019-09-17
Payer: MEDICARE

## 2019-09-17 VITALS
TEMPERATURE: 97 F | HEART RATE: 70 BPM | HEIGHT: 66 IN | OXYGEN SATURATION: 94 % | DIASTOLIC BLOOD PRESSURE: 71 MMHG | BODY MASS INDEX: 46.77 KG/M2 | WEIGHT: 291 LBS | SYSTOLIC BLOOD PRESSURE: 128 MMHG

## 2019-09-17 DIAGNOSIS — M17.0 PRIMARY OSTEOARTHRITIS OF BOTH KNEES: ICD-10-CM

## 2019-09-17 DIAGNOSIS — Z12.73 SCREENING FOR OVARIAN CANCER: ICD-10-CM

## 2019-09-17 DIAGNOSIS — R53.83 OTHER FATIGUE: ICD-10-CM

## 2019-09-17 DIAGNOSIS — Z72.0 TOBACCO ABUSE: ICD-10-CM

## 2019-09-17 DIAGNOSIS — Z99.89 OSA ON CPAP: ICD-10-CM

## 2019-09-17 DIAGNOSIS — E88.81 INSULIN RESISTANCE: ICD-10-CM

## 2019-09-17 DIAGNOSIS — E55.9 VITAMIN D DEFICIENCY: ICD-10-CM

## 2019-09-17 DIAGNOSIS — R60.9 PERIPHERAL EDEMA: ICD-10-CM

## 2019-09-17 DIAGNOSIS — R11.2 NAUSEA AND VOMITING, INTRACTABILITY OF VOMITING NOT SPECIFIED, UNSPECIFIED VOMITING TYPE: ICD-10-CM

## 2019-09-17 DIAGNOSIS — R44.2 PHANTOSMIA: ICD-10-CM

## 2019-09-17 DIAGNOSIS — R73.9 HYPERGLYCEMIA: Primary | ICD-10-CM

## 2019-09-17 DIAGNOSIS — R93.3 ABNORMAL FINDINGS ON DIAGNOSTIC IMAGING OF OTHER PARTS OF DIGESTIVE TRACT: ICD-10-CM

## 2019-09-17 DIAGNOSIS — E66.01 CLASS 3 SEVERE OBESITY WITH BODY MASS INDEX (BMI) OF 45.0 TO 49.9 IN ADULT, UNSPECIFIED OBESITY TYPE, UNSPECIFIED WHETHER SERIOUS COMORBIDITY PRESENT (HCC): ICD-10-CM

## 2019-09-17 DIAGNOSIS — Z23 NEED FOR PROPHYLACTIC VACCINATION AND INOCULATION AGAINST VARICELLA: ICD-10-CM

## 2019-09-17 DIAGNOSIS — F32.A DEPRESSION, UNSPECIFIED DEPRESSION TYPE: ICD-10-CM

## 2019-09-17 DIAGNOSIS — R35.0 URINARY FREQUENCY: ICD-10-CM

## 2019-09-17 DIAGNOSIS — J44.9 CHRONIC OBSTRUCTIVE PULMONARY DISEASE, UNSPECIFIED COPD TYPE (HCC): ICD-10-CM

## 2019-09-17 DIAGNOSIS — Z13.220 SCREENING FOR LIPID DISORDERS: ICD-10-CM

## 2019-09-17 DIAGNOSIS — R32 URINARY INCONTINENCE, UNSPECIFIED TYPE: ICD-10-CM

## 2019-09-17 DIAGNOSIS — I27.20 PULMONARY HTN (HCC): ICD-10-CM

## 2019-09-17 DIAGNOSIS — G47.33 OSA ON CPAP: ICD-10-CM

## 2019-09-17 DIAGNOSIS — Z12.11 SCREENING FOR COLON CANCER: ICD-10-CM

## 2019-09-17 LAB
BILIRUBIN, POC: NORMAL
BLOOD URINE, POC: NORMAL
CLARITY, POC: CLEAR
COLOR, POC: NORMAL
GLUCOSE URINE, POC: NORMAL
KETONES, POC: NORMAL
LEUKOCYTE EST, POC: NORMAL
NITRITE, POC: NORMAL
PH, POC: 6
PROTEIN, POC: NORMAL
SPECIFIC GRAVITY, POC: 1.03
UROBILINOGEN, POC: 0.2

## 2019-09-17 PROCEDURE — 3017F COLORECTAL CA SCREEN DOC REV: CPT | Performed by: PHYSICIAN ASSISTANT

## 2019-09-17 PROCEDURE — 4004F PT TOBACCO SCREEN RCVD TLK: CPT | Performed by: PHYSICIAN ASSISTANT

## 2019-09-17 PROCEDURE — 99215 OFFICE O/P EST HI 40 MIN: CPT | Performed by: PHYSICIAN ASSISTANT

## 2019-09-17 PROCEDURE — G8427 DOCREV CUR MEDS BY ELIG CLIN: HCPCS | Performed by: PHYSICIAN ASSISTANT

## 2019-09-17 PROCEDURE — G8417 CALC BMI ABV UP PARAM F/U: HCPCS | Performed by: PHYSICIAN ASSISTANT

## 2019-09-17 PROCEDURE — G8926 SPIRO NO PERF OR DOC: HCPCS | Performed by: PHYSICIAN ASSISTANT

## 2019-09-17 PROCEDURE — 3023F SPIROM DOC REV: CPT | Performed by: PHYSICIAN ASSISTANT

## 2019-09-17 PROCEDURE — 81003 URINALYSIS AUTO W/O SCOPE: CPT | Performed by: PHYSICIAN ASSISTANT

## 2019-09-17 RX ORDER — NYSTATIN 100000 [USP'U]/G
POWDER TOPICAL
Qty: 4 BOTTLE | Refills: 0 | Status: SHIPPED | OUTPATIENT
Start: 2019-09-17 | End: 2019-09-17

## 2019-09-17 RX ORDER — ARIPIPRAZOLE 10 MG/1
10 TABLET ORAL DAILY
COMMUNITY
End: 2020-07-01 | Stop reason: SDDI

## 2019-09-17 RX ORDER — ALBUTEROL SULFATE 90 UG/1
2 AEROSOL, METERED RESPIRATORY (INHALATION) EVERY 6 HOURS PRN
Qty: 1 INHALER | Refills: 3 | Status: SHIPPED | OUTPATIENT
Start: 2019-09-17 | End: 2020-07-01 | Stop reason: SDUPTHER

## 2019-09-17 RX ORDER — IBUPROFEN 800 MG/1
800 TABLET ORAL EVERY 6 HOURS PRN
Qty: 90 TABLET | Refills: 1 | Status: CANCELLED | OUTPATIENT
Start: 2019-09-17

## 2019-09-17 ASSESSMENT — ENCOUNTER SYMPTOMS
NAUSEA: 0
GLOBUS SENSATION: 0
VOMITING: 0
RHINORRHEA: 0
DIARRHEA: 0
SWOLLEN GLANDS: 0
SHORTNESS OF BREATH: 0
CHEST TIGHTNESS: 0
ABDOMINAL PAIN: 0
EYE DISCHARGE: 0
SORE THROAT: 0
COUGH: 0
SINUS PRESSURE: 0
EYE ITCHING: 0

## 2019-09-17 NOTE — PROGRESS NOTES
[address] PRIMARY CARE  [address] [facility] [address]  Dept: [phone]  Dept Fax: [phone]     Ashlie Sands is a 47 y.o. female who presents today for her medical conditions/complaintsas noted below. Ashlie Sands is c/o of   Chief Complaint   Patient presents with    Knee Pain     left     Taste Change    Hand Pain     thumb painleft hand         HPI:      Hypertension   This is a chronic problem. Associated symptoms include anxiety. Pertinent negatives include no chest pain, headaches, neck pain, palpitations or shortness of breath. Mental Health Problem     Additional symptoms of the illness include anhedonia and fatigue. Additional symptoms of the illness do not include psychomotor retardation, feelings of worthlessness, attention impairment, headaches or abdominal pain. She does not admit to suicidal ideas. She does not have a plan to commit suicide. She does not contemplate harming herself. She has not already injured self. She does not contemplate injuring another person. She has not already  injured another person. Knee Pain    The incident occurred more than 1 week ago. There was no injury mechanism. The pain is at a severity of 5/10. The pain is moderate. Associated symptoms include an inability to bear weight and a loss of motion. Pertinent negatives include no numbness. Gastroesophageal Reflux   She reports no abdominal pain, no chest pain, no coughing, no early satiety, no globus sensation, no nausea or no sore throat. This is a chronic problem. Associated symptoms include fatigue. Fatigue   This is a chronic problem. Associated symptoms include arthralgias, fatigue and urinary symptoms. Pertinent negatives include no abdominal pain, anorexia, chest pain, chills, congestion, coughing, diaphoresis, fever, headaches, nausea, neck pain, numbness, rash, sore throat, swollen glands, vomiting or weakness.    Urinary Frequency grandson    Cancer Other         breast     High Blood Pressure Brother     Diabetes Brother     Arthritis Sister     Cancer Maternal Uncle         pancreatic     High Cholesterol Neg Hx     Kidney Disease Neg Hx     Stroke Neg Hx           Social History     Tobacco Use    Smoking status: Current Every Day Smoker     Packs/day: 0.25     Years: 35.00     Pack years: 8.75     Types: Cigarettes    Smokeless tobacco: Never Used   Substance Use Topics    Alcohol use: No     Alcohol/week: 0.0 standard drinks         Current Outpatient Medications   Medication Sig Dispense Refill    ARIPiprazole (ABILIFY) 10 MG tablet Take 10 mg by mouth daily      VORTIoxetine (TRINTELLIX) 5 MG tablet Take 10 mg by mouth daily      nystatin (NYAMYC) 981897 UNIT/GM powder Apply topically once as needed (PRN) Apply topically 4 times daily. 4 Bottle 0    albuterol sulfate HFA (PROAIR HFA) 108 (90 Base) MCG/ACT inhaler Inhale 2 puffs into the lungs every 6 hours as needed for Wheezing 1 Inhaler 3    zoster recombinant adjuvanted vaccine (SHINGRIX) 50 MCG/0.5ML SUSR injection Inject 0.5 mLs into the muscle once for 1 dose 50 MCG IM then repeat 2-6 months. 1 each 1    omeprazole (PRILOSEC) 20 MG delayed release capsule TAKE ONE CAPSULE BY MOUTH DAILY 30 capsule 0     MG tablet TAKE ONE TABLET BY MOUTH EVERY 6 HOURS AS NEEDED FOR PAIN 90 tablet 1    metFORMIN (GLUCOPHAGE) 500 MG tablet TAKE TWO TABLETS BY MOUTH TWICE A DAY WITH MEALS 360 tablet 2    Insulin Syringe-Needle U-100 30G X 5/16\" 0.5 ML MISC 1 each by Does not apply route daily 100 each 11    blood glucose test strips (ASCENSIA AUTODISC VI;ONE TOUCH ULTRA TEST VI) strip 1 each by In Vitro route daily As needed.  100 each 3    loratadine (CLARITIN) 10 MG tablet loratadine 10 mg tablet      Elastic Bandages & Supports (JOBST KNEE HIGH COMPRESSION SM) MISC 1 each by Does not apply route daily Please measure 3 each 2    aspirin 81 MG tablet Take 1 diaphoresis and fever. HENT: Negative for congestion, ear discharge, ear pain, postnasal drip, rhinorrhea, sinus pressure and sore throat. Eyes: Negative for discharge and itching. Respiratory: Negative for cough, chest tightness and shortness of breath. Cardiovascular: Negative for chest pain, palpitations and leg swelling. Gastrointestinal: Negative for abdominal pain, anorexia, diarrhea, nausea and vomiting. Genitourinary: Negative for dysuria, frequency, hematuria and hesitancy. Musculoskeletal: Positive for arthralgias. Negative for neck pain and neck stiffness. Skin: Negative for rash. Neurological: Negative for dizziness, weakness, light-headedness, numbness and headaches. All other systems reviewed and are negative. Objective:     Physical Exam   Constitutional: She is oriented to person, place, and time. She appears well-developed and well-nourished. No distress. /71   Pulse 70   Temp 97 °F (36.1 °C) (Oral)   Ht 5' 6\" (1.676 m)   Wt 291 lb (132 kg)   SpO2 94%   BMI 46.97 kg/m²      HENT:   Head: Normocephalic and atraumatic. Right Ear: External ear normal.   Left Ear: External ear normal.   Nose: Nose normal.   Mouth/Throat: Oropharynx is clear and moist.   Eyes: Pupils are equal, round, and reactive to light. Conjunctivae and EOM are normal. Right eye exhibits no discharge. Left eye exhibits no discharge. No scleral icterus. Neck: Normal range of motion. Neck supple. No tracheal deviation present. No thyromegaly present. Cardiovascular: Normal rate, regular rhythm and normal heart sounds. Exam reveals no gallop and no friction rub. No murmur heard. Pulmonary/Chest: Effort normal and breath sounds normal. No stridor. No respiratory distress. She has no wheezes. She has no rales. She exhibits no tenderness. Abdominal: Soft. Bowel sounds are normal. She exhibits no distension. There is no tenderness. There is no rebound and no guarding.    Musculoskeletal: Standing Status:   Future     Standing Expiration Date:   9/17/2020    Folate     Standing Status:   Future     Standing Expiration Date:   9/17/2020    Vitamin B1     Standing Status:   Future     Standing Expiration Date:   9/17/2020    H. Pylori Breath Test     Standing Status:   Future     Standing Expiration Date:   9/17/2020   Sherie Manzo MD, Gastroenterology, [place]     Referral Priority:   Routine     Referral Type:   Eval and Treat     Referral Reason:   Specialty Services Required     Referred to Provider:   Rosamaria Fisher MD     Requested Specialty:   Gastroenterology     Number of Visits Requested:   1   Erik Hudson, Urogynecology, [place]     Referral Priority:   Routine     Referral Type:   Eval and Treat     Referral Reason:   Specialty Services Required     Referred to Provider:   Abbie Goudl DO     Requested Specialty:   Urogynecology     Number of Visits Requested:   Dagoberto Keating MD, Orthopedic Surgery, [place]     Referral Priority:   Routine     Referral Type:   Eval and Treat     Referral Reason:   Specialty Services Required     Referred to Provider:   Rhiannon James MD     Requested Specialty:   Orthopedic Surgery     Number of Visits Requested:   1    POCT Urinalysis No Micro (Auto)        Orders Placed This Encounter   Medications    nystatin ([address]) 457289 UNIT/GM powder     Sig: Apply topically once as needed (PRN) Apply topically 4 times daily. Dispense:  4 Bottle     Refill:  0    albuterol sulfate HFA (PROAIR HFA) 108 (90 Base) MCG/ACT inhaler     Sig: Inhale 2 puffs into the lungs every 6 hours as needed for Wheezing     Dispense:  1 Inhaler     Refill:  3    zoster recombinant adjuvanted vaccine (SHINGRIX) 50 MCG/0.5ML SUSR injection     Sig: Inject 0.5 mLs into the muscle once for 1 dose 50 MCG IM then repeat 2-6 months. Dispense:  1 each     Refill:  1        Insulin resistance - On metformin.  Diet and exercise encouraged. Insulin level extremely high. Increase metformin.      Anxiety and depression - Seeing psych. On abilify and trintellix. No SI or HI. Elevated HGB - Pt reports family hx of same. Will repeat. Referral to heme. Saw Dr. Bob Rodriguez.        Lung nodule - Seen on CT chest 5/11/16 stable. Seeing pulm. CT ordered      Tobacco abuse/COPD - Smoking since age 13, 1/2-1 PPD. On symbicort and prn albuterol. 1/18/17 emphysema. Pt has been on chantix in past.  No SI. HI or vivid dreams. Will watch for and go to ER if it develops.        Chronic back pain / right hip pain - Radicular pain down right leg. No recent imaging. Xray showed degenerative changes 9/1/16. MRI ordered. On robaxin. Previously on norco. Now on ultram. Referral to pain management.       Chronic neck pain - Radiation down right arm. Pt does c/o right hand weakness. Xray showed degenerative changes 10/20/16. MRI ordered. Saw pain management. No rx as pt abuses marijuana. Fibromyalgia - Was on lyrica. Not currently taking.      Hepatic steatosis - F/u GI.      GERD - On prilosec prn.      Peripheral edema - On lasix prn      Nausea - Chronic. Occ vomiting. STOP NSAIDs. Labs. Referral to GI for EGD. Vit D def - Will replace and recheck 12 wks.     Urinary incontinence - UA. Meds if not improving.     Laryngitis / vocal cord polyp - Surgery 3/2017 plans to go back in to remove more. TAMARA - Not wearing CPAP. Upcoming appt with Dr. Joi Atkinson    Fatigue - No wt loss, night sweats or chills. F/u sleep study. Labs. Phantosmia - Months. Some HA, extreme fatigue, MRI.     Balanced diet and routine exercise encouraged.     Multivitamin with vitamin D daily encouraged.     Good sleep hygiene encouraged.     Dental exam q 6 mo.     Vision yearly.     Sunscreen encouraged.     Immunizations reviewed.     DEXA - 5/2014 NL. Repeat ordered.      [facility] - Pt has orders.   Did not get.       Colonoscopy - FIT kid

## 2019-09-18 ENCOUNTER — TELEPHONE (OUTPATIENT)
Dept: FAMILY MEDICINE CLINIC | Age: 54
End: 2019-09-18

## 2019-09-18 LAB
CULTURE: NORMAL
Lab: NORMAL
SPECIMEN DESCRIPTION: NORMAL

## 2019-09-20 ENCOUNTER — TELEPHONE (OUTPATIENT)
Dept: FAMILY MEDICINE CLINIC | Age: 54
End: 2019-09-20

## 2019-09-20 ENCOUNTER — HOSPITAL ENCOUNTER (OUTPATIENT)
Facility: CLINIC | Age: 54
Discharge: HOME | End: 2019-09-20
Payer: MEDICARE

## 2019-09-20 DIAGNOSIS — Z23 NEED FOR PROPHYLACTIC VACCINATION AND INOCULATION AGAINST VARICELLA: ICD-10-CM

## 2019-09-20 DIAGNOSIS — R93.3 ABNORMAL FINDINGS ON DIAGNOSTIC IMAGING OF OTHER PARTS OF DIGESTIVE TRACT: ICD-10-CM

## 2019-09-20 DIAGNOSIS — F32.A DEPRESSION, UNSPECIFIED DEPRESSION TYPE: ICD-10-CM

## 2019-09-20 DIAGNOSIS — E66.01 CLASS 3 SEVERE OBESITY WITH BODY MASS INDEX (BMI) OF 45.0 TO 49.9 IN ADULT, UNSPECIFIED OBESITY TYPE, UNSPECIFIED WHETHER SERIOUS COMORBIDITY PRESENT (HCC): ICD-10-CM

## 2019-09-20 DIAGNOSIS — I27.20 PULMONARY HTN (HCC): ICD-10-CM

## 2019-09-20 DIAGNOSIS — E88.81 INSULIN RESISTANCE: ICD-10-CM

## 2019-09-20 DIAGNOSIS — G47.33 OSA ON CPAP: ICD-10-CM

## 2019-09-20 DIAGNOSIS — M17.0 PRIMARY OSTEOARTHRITIS OF BOTH KNEES: ICD-10-CM

## 2019-09-20 DIAGNOSIS — Z99.89 OSA ON CPAP: ICD-10-CM

## 2019-09-20 DIAGNOSIS — R73.9 HYPERGLYCEMIA: ICD-10-CM

## 2019-09-20 DIAGNOSIS — R53.83 OTHER FATIGUE: ICD-10-CM

## 2019-09-20 DIAGNOSIS — E55.9 VITAMIN D DEFICIENCY: ICD-10-CM

## 2019-09-20 DIAGNOSIS — Z72.0 TOBACCO ABUSE: ICD-10-CM

## 2019-09-20 DIAGNOSIS — J44.9 CHRONIC OBSTRUCTIVE PULMONARY DISEASE, UNSPECIFIED COPD TYPE (HCC): ICD-10-CM

## 2019-09-20 DIAGNOSIS — Z87.891 PERSONAL HISTORY OF TOBACCO USE, PRESENTING HAZARDS TO HEALTH: Primary | ICD-10-CM

## 2019-09-20 DIAGNOSIS — R11.2 NAUSEA AND VOMITING, INTRACTABILITY OF VOMITING NOT SPECIFIED, UNSPECIFIED VOMITING TYPE: ICD-10-CM

## 2019-09-20 DIAGNOSIS — R44.2 PHANTOSMIA: ICD-10-CM

## 2019-09-20 DIAGNOSIS — Z13.220 SCREENING FOR LIPID DISORDERS: ICD-10-CM

## 2019-09-20 DIAGNOSIS — R60.9 PERIPHERAL EDEMA: ICD-10-CM

## 2019-09-20 LAB
ABSOLUTE EOS #: 0.13 K/UL (ref 0–0.44)
ABSOLUTE IMMATURE GRANULOCYTE: <0.03 K/UL (ref 0–0.3)
ABSOLUTE LYMPH #: 1.25 K/UL (ref 1.1–3.7)
ABSOLUTE MONO #: 0.52 K/UL (ref 0.1–1.2)
ALBUMIN SERPL-MCNC: 4.1 G/DL (ref 3.5–5.2)
ALBUMIN/GLOBULIN RATIO: 1.4 (ref 1–2.5)
ALP BLD-CCNC: 84 U/L (ref 35–104)
ALT SERPL-CCNC: 26 U/L (ref 5–33)
ANION GAP SERPL CALCULATED.3IONS-SCNC: 14 MMOL/L (ref 9–17)
AST SERPL-CCNC: 18 U/L
BASOPHILS # BLD: 1 % (ref 0–2)
BASOPHILS ABSOLUTE: 0.08 K/UL (ref 0–0.2)
BILIRUB SERPL-MCNC: 0.43 MG/DL (ref 0.3–1.2)
BUN BLDV-MCNC: 14 MG/DL (ref 6–20)
BUN/CREAT BLD: ABNORMAL (ref 9–20)
CALCIUM SERPL-MCNC: 9.3 MG/DL (ref 8.6–10.4)
CHLORIDE BLD-SCNC: 105 MMOL/L (ref 98–107)
CHOLESTEROL/HDL RATIO: 3.2
CHOLESTEROL: 135 MG/DL
CO2: 26 MMOL/L (ref 20–31)
CREAT SERPL-MCNC: 0.5 MG/DL (ref 0.5–0.9)
DIFFERENTIAL TYPE: ABNORMAL
EOSINOPHILS RELATIVE PERCENT: 2 % (ref 1–4)
ESTIMATED AVERAGE GLUCOSE: 111 MG/DL
FERRITIN: 46 UG/L (ref 13–150)
FOLATE: 9.9 NG/ML
GFR AFRICAN AMERICAN: >60 ML/MIN
GFR NON-AFRICAN AMERICAN: >60 ML/MIN
GFR SERPL CREATININE-BSD FRML MDRD: ABNORMAL ML/MIN/{1.73_M2}
GFR SERPL CREATININE-BSD FRML MDRD: ABNORMAL ML/MIN/{1.73_M2}
GLUCOSE BLD-MCNC: 104 MG/DL (ref 70–99)
HBA1C MFR BLD: 5.5 % (ref 4–6)
HCT VFR BLD CALC: 52.8 % (ref 36.3–47.1)
HDLC SERPL-MCNC: 42 MG/DL
HEMOGLOBIN: 17 G/DL (ref 11.9–15.1)
IMMATURE GRANULOCYTES: 0 %
INSULIN COMMENT: NORMAL
INSULIN REFERENCE RANGE:: NORMAL
INSULIN: 22 MU/L
IRON SATURATION: 22 % (ref 20–55)
IRON: 68 UG/DL (ref 37–145)
LDL CHOLESTEROL: 69 MG/DL (ref 0–130)
LYMPHOCYTES # BLD: 16 % (ref 24–43)
MCH RBC QN AUTO: 30.3 PG (ref 25.2–33.5)
MCHC RBC AUTO-ENTMCNC: 32.2 G/DL (ref 28.4–34.8)
MCV RBC AUTO: 94.1 FL (ref 82.6–102.9)
MONOCYTES # BLD: 7 % (ref 3–12)
NRBC AUTOMATED: 0 PER 100 WBC
PDW BLD-RTO: 14.4 % (ref 11.8–14.4)
PLATELET # BLD: 250 K/UL (ref 138–453)
PLATELET ESTIMATE: ABNORMAL
PMV BLD AUTO: 11.6 FL (ref 8.1–13.5)
POTASSIUM SERPL-SCNC: 4.2 MMOL/L (ref 3.7–5.3)
RBC # BLD: 5.61 M/UL (ref 3.95–5.11)
RBC # BLD: ABNORMAL 10*6/UL
SEG NEUTROPHILS: 74 % (ref 36–65)
SEGMENTED NEUTROPHILS ABSOLUTE COUNT: 5.75 K/UL (ref 1.5–8.1)
SODIUM BLD-SCNC: 145 MMOL/L (ref 135–144)
TOTAL IRON BINDING CAPACITY: 308 UG/DL (ref 250–450)
TOTAL PROTEIN: 7 G/DL (ref 6.4–8.3)
TRIGL SERPL-MCNC: 119 MG/DL
TSH SERPL DL<=0.05 MIU/L-ACNC: 1.63 MIU/L (ref 0.3–5)
UNSATURATED IRON BINDING CAPACITY: 240 UG/DL (ref 112–347)
VITAMIN B-12: 344 PG/ML (ref 232–1245)
VITAMIN D 25-HYDROXY: 17.6 NG/ML (ref 30–100)
VLDLC SERPL CALC-MCNC: NORMAL MG/DL (ref 1–30)
WBC # BLD: 7.8 K/UL (ref 3.5–11.3)
WBC # BLD: ABNORMAL 10*3/UL

## 2019-09-20 PROCEDURE — 82607 VITAMIN B-12: CPT

## 2019-09-20 PROCEDURE — 82746 ASSAY OF FOLIC ACID SERUM: CPT

## 2019-09-20 PROCEDURE — 83540 ASSAY OF IRON: CPT

## 2019-09-20 PROCEDURE — 83550 IRON BINDING TEST: CPT

## 2019-09-20 PROCEDURE — 85025 COMPLETE CBC W/AUTO DIFF WBC: CPT

## 2019-09-20 PROCEDURE — 83525 ASSAY OF INSULIN: CPT

## 2019-09-20 PROCEDURE — 36415 COLL VENOUS BLD VENIPUNCTURE: CPT

## 2019-09-20 PROCEDURE — 80053 COMPREHEN METABOLIC PANEL: CPT

## 2019-09-20 PROCEDURE — 80061 LIPID PANEL: CPT

## 2019-09-20 PROCEDURE — 84443 ASSAY THYROID STIM HORMONE: CPT

## 2019-09-20 PROCEDURE — 84425 ASSAY OF VITAMIN B-1: CPT

## 2019-09-20 PROCEDURE — 82728 ASSAY OF FERRITIN: CPT

## 2019-09-20 PROCEDURE — 83036 HEMOGLOBIN GLYCOSYLATED A1C: CPT

## 2019-09-20 PROCEDURE — 82306 VITAMIN D 25 HYDROXY: CPT

## 2019-09-25 LAB — VITAMIN B1 WHOLE BLOOD: 141 NMOL/L (ref 70–180)

## 2019-09-26 DIAGNOSIS — M25.561 ACUTE PAIN OF BOTH KNEES: Primary | ICD-10-CM

## 2019-09-26 DIAGNOSIS — M25.562 ACUTE PAIN OF BOTH KNEES: Primary | ICD-10-CM

## 2019-09-28 ENCOUNTER — HOSPITAL ENCOUNTER (EMERGENCY)
Facility: CLINIC | Age: 54
Discharge: HOME | End: 2019-09-28
Attending: EMERGENCY MEDICINE
Payer: MEDICARE

## 2019-09-28 ENCOUNTER — APPOINTMENT (OUTPATIENT)
Dept: GENERAL RADIOLOGY | Facility: CLINIC | Age: 54
End: 2019-09-28
Payer: MEDICARE

## 2019-09-28 VITALS
HEIGHT: 67 IN | TEMPERATURE: 97.9 F | RESPIRATION RATE: 16 BRPM | DIASTOLIC BLOOD PRESSURE: 56 MMHG | OXYGEN SATURATION: 97 % | WEIGHT: 287 LBS | BODY MASS INDEX: 45.04 KG/M2 | SYSTOLIC BLOOD PRESSURE: 103 MMHG | HEART RATE: 81 BPM

## 2019-09-28 DIAGNOSIS — S86.912A STRAIN OF LEFT KNEE, INITIAL ENCOUNTER: Primary | ICD-10-CM

## 2019-09-28 PROCEDURE — 73562 X-RAY EXAM OF KNEE 3: CPT

## 2019-09-28 PROCEDURE — 6370000000 HC RX 637 (ALT 250 FOR IP): Performed by: EMERGENCY MEDICINE

## 2019-09-28 PROCEDURE — 99283 EMERGENCY DEPT VISIT LOW MDM: CPT

## 2019-09-28 RX ORDER — HYDROCODONE BITARTRATE AND ACETAMINOPHEN 5; 325 MG/1; MG/1
1 TABLET ORAL EVERY 6 HOURS PRN
Qty: 15 TABLET | Refills: 0 | Status: SHIPPED | OUTPATIENT
Start: 2019-09-28 | End: 2019-10-05

## 2019-09-28 RX ORDER — HYDROCODONE BITARTRATE AND ACETAMINOPHEN 5; 325 MG/1; MG/1
1 TABLET ORAL ONCE
Status: COMPLETED | OUTPATIENT
Start: 2019-09-28 | End: 2019-09-28

## 2019-09-28 RX ADMIN — HYDROCODONE BITARTRATE AND ACETAMINOPHEN 1 TABLET: 5; 325 TABLET ORAL at 14:45

## 2019-09-28 ASSESSMENT — PAIN - FUNCTIONAL ASSESSMENT: PAIN_FUNCTIONAL_ASSESSMENT: PREVENTS OR INTERFERES SOME ACTIVE ACTIVITIES AND ADLS

## 2019-09-28 ASSESSMENT — PAIN DESCRIPTION - PROGRESSION: CLINICAL_PROGRESSION: GRADUALLY WORSENING

## 2019-09-28 ASSESSMENT — PAIN SCALES - GENERAL
PAINLEVEL_OUTOF10: 6
PAINLEVEL_OUTOF10: 8
PAINLEVEL_OUTOF10: 8

## 2019-09-28 ASSESSMENT — PAIN DESCRIPTION - LOCATION: LOCATION: KNEE

## 2019-09-28 ASSESSMENT — PAIN DESCRIPTION - ONSET: ONSET: AWAKENED FROM SLEEP

## 2019-09-28 ASSESSMENT — PAIN DESCRIPTION - DESCRIPTORS: DESCRIPTORS: BURNING;STABBING;THROBBING

## 2019-09-28 ASSESSMENT — PAIN DESCRIPTION - FREQUENCY: FREQUENCY: CONTINUOUS

## 2019-09-28 ASSESSMENT — PAIN DESCRIPTION - PAIN TYPE: TYPE: ACUTE PAIN

## 2019-09-28 ASSESSMENT — PAIN DESCRIPTION - ORIENTATION: ORIENTATION: LEFT;ANTERIOR

## 2019-09-28 NOTE — ED PROVIDER NOTES
[facility]  [address] [facility]  Phone: [address]      Pt Name: Braeden Cobian  MRN: [id number]  [place] 1965  Date of evaluation: 9/28/2019    CHIEF COMPLAINT       Chief Complaint   Patient presents with    Knee Pain     left knee; started about a month ago, pain is progressively worsening       HISTORY OF PRESENT ILLNESS    Braeden Cobian is a 47 y.o. female who presents to the emergency department complaining of left anterior knee pain throbbing burning and stabbing over the past 3 to 4 weeks. Progressively worsening has an appointment with her orthopedist coming up but the pain is unbearable. Denies any significant swelling no specific injury. No other symptoms. No calf pain. No chest pain or shortness of breath. She rates her pain 8/10 worse with movement    REVIEW OF SYSTEMS       Constitutional: No fevers or chills   HEENT: No sore throat, rhinorrhea, or earache   Eyes: No blurry vision or double vision no drainage   Cardiovascular: No chest pain or tachycardia   Respiratory: No wheezing or shortness of breath no cough   Gastrointestinal: No nausea, vomiting, diarrhea, constipation, or abdominal pain   : No hematuria or dysuria   Musculoskeletal: Left knee pain  Skin: No rash   Neurological: No focal neurologic complaints, paresthesias, weakness, or headache     PAST MEDICAL HISTORY    has a past medical history of Anxiety, Asthma, Bladder prolapse, female, acquired, Cancer ([address] COPD (chronic obstructive pulmonary disease) ([address] Depression, Diabetes mellitus ([address] Epigastric pain, Hepatic steatosis, History of cataract extraction with lens replacement, History of stress test, Hyperglycemia, Intertrigo, Lumbar degenerative disc disease, Nausea, Obesity, Osteoarthritis, Osteoarthritis cervical spine, Osteoarthritis thoracic spine, and Tobacco abuse.     SURGICAL HISTORY      has a past surgical history that includes There is no calf tenderness there is no posterior knee pain. There is no swelling erythema or ecchymosis. No deformity. Low suspicion for DVT. Denies chest pain or shortness of breath. Discharged with a prescription for Vicodin for breakthrough pain which is her biggest concern until she follows up with her orthopedist.  No driving when taking. I have reviewed the disposition diagnosis with the patient and or their family/guardian. I have answered their questions and given discharge instructions. They voiced understanding of these instructions and did not have any furtherquestions or complaints. CRITICAL CARE:    None    CONSULTS:    None    PROCEDURES:    None      OARRS Report if indicated    Periodic Controlled Substance Monitoring: No signs of potential drug abuse or diversion identified. (Broderick Severino DO)        FINAL IMPRESSION      1. Strain of left knee, initial encounter          DISPOSITION/PLAN   DISPOSITION Decision To Discharge 09/28/2019 03:01:07 PM        CONDITION ON DISPOSITION       PATIENT REFERRED TO:  Orthopedist    Go to   as scheduled      DISCHARGE MEDICATIONS:  New Prescriptions    HYDROCODONE-ACETAMINOPHEN (NORCO) 5-325 MG PER TABLET    Take 1 tablet by mouth every 6 hours as needed for Pain for up to 7 days. WARNING:  May cause drowsiness. May impair ability to operate vehicles or machinery. Do not use in combination with alcohol.     OARRS Reviewed: ICD-10-CM Diagnosis Code R52 : Pain       (Please note that portions of thisnote were completed with a voice recognition program.  Efforts were made to edit the dictations but occasionally words are mis-transcribed.)    Antonio DO  Attending Emergency Physician        Hardy Young DO  09/28/19 1414

## 2019-09-28 NOTE — ED NOTES
Pt to ED with c/o left knee pain which started one month ago and has been worsening. Pt denies injury or trauma to the area. C/o pain directly below the patella. Pt ambulatory with a limp. Full sensation, limited ROM due to pain. Pain worse with weightbearing. Pt has not taken any pain meds prior to arrival. No swelling, redness, or deformity noted. Pt resting in bed, call light within reach.       Alvaro Santoyo RN  09/28/19 [id number]

## 2019-10-02 ENCOUNTER — OFFICE VISIT (OUTPATIENT)
Dept: ORTHOPEDIC SURGERY | Age: 54
End: 2019-10-02
Payer: MEDICARE

## 2019-10-02 VITALS — BODY MASS INDEX: 46.12 KG/M2 | HEIGHT: 66 IN | WEIGHT: 287 LBS

## 2019-10-02 DIAGNOSIS — M17.12 PRIMARY OSTEOARTHRITIS OF LEFT KNEE: Primary | ICD-10-CM

## 2019-10-02 PROCEDURE — 20610 DRAIN/INJ JOINT/BURSA W/O US: CPT | Performed by: ORTHOPAEDIC SURGERY

## 2019-10-02 PROCEDURE — G8484 FLU IMMUNIZE NO ADMIN: HCPCS | Performed by: ORTHOPAEDIC SURGERY

## 2019-10-02 PROCEDURE — 99213 OFFICE O/P EST LOW 20 MIN: CPT | Performed by: ORTHOPAEDIC SURGERY

## 2019-10-02 PROCEDURE — G8427 DOCREV CUR MEDS BY ELIG CLIN: HCPCS | Performed by: ORTHOPAEDIC SURGERY

## 2019-10-02 PROCEDURE — G8417 CALC BMI ABV UP PARAM F/U: HCPCS | Performed by: ORTHOPAEDIC SURGERY

## 2019-10-02 RX ORDER — TRIAMCINOLONE ACETONIDE 40 MG/ML
40 INJECTION, SUSPENSION INTRA-ARTICULAR; INTRAMUSCULAR ONCE
Status: COMPLETED | OUTPATIENT
Start: 2019-10-02 | End: 2019-10-02

## 2019-10-02 RX ORDER — BUPIVACAINE HYDROCHLORIDE 2.5 MG/ML
2 INJECTION, SOLUTION INFILTRATION; PERINEURAL ONCE
Status: COMPLETED | OUTPATIENT
Start: 2019-10-02 | End: 2019-10-02

## 2019-10-02 RX ADMIN — TRIAMCINOLONE ACETONIDE 40 MG: 40 INJECTION, SUSPENSION INTRA-ARTICULAR; INTRAMUSCULAR at 13:29

## 2019-10-02 RX ADMIN — BUPIVACAINE HYDROCHLORIDE 5 MG: 2.5 INJECTION, SOLUTION INFILTRATION; PERINEURAL at 13:29

## 2019-10-02 ASSESSMENT — ENCOUNTER SYMPTOMS
DIARRHEA: 0
COUGH: 0
CONSTIPATION: 0
NAUSEA: 0

## 2019-10-04 ENCOUNTER — OFFICE VISIT (OUTPATIENT)
Dept: FAMILY MEDICINE CLINIC | Age: 54
End: 2019-10-04
Payer: MEDICARE

## 2019-10-04 VITALS
OXYGEN SATURATION: 96 % | SYSTOLIC BLOOD PRESSURE: 123 MMHG | HEART RATE: 80 BPM | DIASTOLIC BLOOD PRESSURE: 74 MMHG | TEMPERATURE: 97.1 F | HEIGHT: 66 IN | WEIGHT: 288.2 LBS | BODY MASS INDEX: 46.32 KG/M2

## 2019-10-04 DIAGNOSIS — Z12.39 SCREENING FOR BREAST CANCER: ICD-10-CM

## 2019-10-04 DIAGNOSIS — Z00.00 MEDICARE ANNUAL WELLNESS VISIT, INITIAL: Primary | ICD-10-CM

## 2019-10-04 DIAGNOSIS — Z12.31 ENCOUNTER FOR SCREENING MAMMOGRAM FOR MALIGNANT NEOPLASM OF BREAST: ICD-10-CM

## 2019-10-04 PROCEDURE — 3017F COLORECTAL CA SCREEN DOC REV: CPT | Performed by: PHYSICIAN ASSISTANT

## 2019-10-04 PROCEDURE — G0008 ADMIN INFLUENZA VIRUS VAC: HCPCS | Performed by: PHYSICIAN ASSISTANT

## 2019-10-04 PROCEDURE — 90686 IIV4 VACC NO PRSV 0.5 ML IM: CPT | Performed by: PHYSICIAN ASSISTANT

## 2019-10-04 PROCEDURE — G0402 INITIAL PREVENTIVE EXAM: HCPCS | Performed by: PHYSICIAN ASSISTANT

## 2019-10-04 ASSESSMENT — ENCOUNTER SYMPTOMS
RHINORRHEA: 0
VOMITING: 0
EYE ITCHING: 0
SORE THROAT: 0
DIARRHEA: 0
ABDOMINAL PAIN: 0
EYE DISCHARGE: 0
CHEST TIGHTNESS: 0
NAUSEA: 0
SINUS PRESSURE: 0
GLOBUS SENSATION: 0
SHORTNESS OF BREATH: 0
SWOLLEN GLANDS: 0
COUGH: 0

## 2019-10-04 ASSESSMENT — PATIENT HEALTH QUESTIONNAIRE - PHQ9
SUM OF ALL RESPONSES TO PHQ QUESTIONS 1-9: 0
SUM OF ALL RESPONSES TO PHQ QUESTIONS 1-9: 0

## 2019-10-04 ASSESSMENT — LIFESTYLE VARIABLES: HOW OFTEN DO YOU HAVE A DRINK CONTAINING ALCOHOL: 0

## 2019-10-07 ENCOUNTER — TELEPHONE (OUTPATIENT)
Dept: ONCOLOGY | Age: 54
End: 2019-10-07

## 2019-10-07 DIAGNOSIS — Z87.891 PERSONAL HISTORY OF NICOTINE DEPENDENCE: Primary | ICD-10-CM

## 2019-10-10 ENCOUNTER — APPOINTMENT (OUTPATIENT)
Dept: CT IMAGING | Age: 54
End: 2019-10-10
Payer: MEDICARE

## 2019-10-10 ENCOUNTER — HOSPITAL ENCOUNTER (OUTPATIENT)
Dept: MRI IMAGING | Age: 54
Discharge: HOME | End: 2019-10-12
Payer: MEDICARE

## 2019-10-10 DIAGNOSIS — R44.2 PHANTOSMIA: ICD-10-CM

## 2019-10-10 DIAGNOSIS — R53.83 OTHER FATIGUE: ICD-10-CM

## 2019-10-10 PROCEDURE — 70551 MRI BRAIN STEM W/O DYE: CPT

## 2019-10-22 ENCOUNTER — TELEPHONE (OUTPATIENT)
Dept: GASTROENTEROLOGY | Age: 54
End: 2019-10-22

## 2020-01-02 NOTE — TELEPHONE ENCOUNTER
Faxed medication request pending. Please advise. Thank you! Last visit: 10/4/19   Last Med refill: 01/19/20      Next Visit Date:  No future appointments.     Health Maintenance   Topic Date Due    Shingles Vaccine (1 of 2) 03/30/2015    Cervical cancer screen  09/27/2017    A1C test (Diabetic or Prediabetic)  09/20/2020    Potassium monitoring  09/20/2020    Creatinine monitoring  09/20/2020    Annual Wellness Visit (AWV)  10/03/2020    Breast cancer screen  10/09/2020    Lipid screen  09/20/2024    Colon cancer screen colonoscopy  03/17/2026    DTaP/Tdap/Td vaccine (2 - Td) 08/24/2026    Flu vaccine  Completed    Pneumococcal 0-64 years Vaccine  Completed    Hepatitis C screen  Completed    HIV screen  Completed       Hemoglobin A1C (%)   Date Value   09/20/2019 5.5   10/03/2018 5.3   04/10/2018 5.5             ( goal A1C is < 7)   No results found for: LABMICR  LDL Cholesterol (mg/dL)   Date Value   09/20/2019 69   04/20/2017 94     LDL Calculated (mg/dL)   Date Value   04/27/2015 81       (goal LDL is <100)   AST (U/L)   Date Value   09/20/2019 18     ALT (U/L)   Date Value   09/20/2019 26     BUN (mg/dL)   Date Value   09/20/2019 14     BP Readings from Last 3 Encounters:   10/04/19 123/74   09/28/19 (!) 103/56   09/17/19 128/71          (goal 120/80)    All Future Testing planned in [facility]  Lab Frequency Next Occurrence   H. Pylori Breath Test Once 09/17/2019   CT lung screen [Initial/Annual] Once 09/17/2019   XR KNEE LEFT (3 VIEWS) Once 09/17/2019   CT lung screen [Initial/Annual] Once 09/24/2019   ZEN DIGITAL SCREEN W CAD BILATERAL Once 11/03/2019   CT LUNG SCREENING Once 10/07/2019               Patient Active Problem List:     Hyperglycemia     Anxiety     Bronchitis     Depression     Degenerative arthritis of lumbar spine     Osteoarthritis of knee     Insulin resistance     COPD (chronic obstructive pulmonary disease) ([facility] [address] [facility]     Osteoarthritis thoracic spine     Osteoarthritis

## 2020-03-25 ENCOUNTER — TELEPHONE (OUTPATIENT)
Dept: ONCOLOGY | Age: 55
End: 2020-03-25

## 2020-03-25 NOTE — LETTER
3/25/2020        [address] [place] [address] [employer] [id number]    Dear Hallie Poon: Your healthcare provider has ordered a low dose CT scan of the chest for lung cancer screening. You will find enclosed, information about CT lung screening. Please review the statement of understanding, you will be asked to sign a copy of this at the time of your CT scan    If you have not already been contacted to make the appointment for your scan, please call our scheduling department at [phone]    Keep in mind that CT lung screening does not take the place of smoking cessation. If you are a current smoker, you will find enclosed smoking cessation resources. Please do not hesitate to contact me if you have any questions or concerns.     [address] [place] [address] Lung Screening Program  [phone]

## 2020-05-27 ENCOUNTER — HOSPITAL ENCOUNTER (OUTPATIENT)
Dept: CT IMAGING | Facility: CLINIC | Age: 55
Discharge: HOME | End: 2020-05-29
Payer: MEDICARE

## 2020-05-27 PROCEDURE — G0297 LDCT FOR LUNG CA SCREEN: HCPCS

## 2020-07-01 ENCOUNTER — OFFICE VISIT (OUTPATIENT)
Dept: FAMILY MEDICINE CLINIC | Age: 55
End: 2020-07-01
Payer: MEDICARE

## 2020-07-01 VITALS
TEMPERATURE: 97.5 F | BODY MASS INDEX: 46.65 KG/M2 | DIASTOLIC BLOOD PRESSURE: 76 MMHG | SYSTOLIC BLOOD PRESSURE: 118 MMHG | WEIGHT: 289 LBS

## 2020-07-01 PROCEDURE — 99214 OFFICE O/P EST MOD 30 MIN: CPT | Performed by: FAMILY MEDICINE

## 2020-07-01 PROCEDURE — 3017F COLORECTAL CA SCREEN DOC REV: CPT | Performed by: FAMILY MEDICINE

## 2020-07-01 PROCEDURE — G8427 DOCREV CUR MEDS BY ELIG CLIN: HCPCS | Performed by: FAMILY MEDICINE

## 2020-07-01 PROCEDURE — G8926 SPIRO NO PERF OR DOC: HCPCS | Performed by: FAMILY MEDICINE

## 2020-07-01 PROCEDURE — 3023F SPIROM DOC REV: CPT | Performed by: FAMILY MEDICINE

## 2020-07-01 PROCEDURE — G8417 CALC BMI ABV UP PARAM F/U: HCPCS | Performed by: FAMILY MEDICINE

## 2020-07-01 PROCEDURE — 4004F PT TOBACCO SCREEN RCVD TLK: CPT | Performed by: FAMILY MEDICINE

## 2020-07-01 RX ORDER — NYSTATIN 100000 [USP'U]/G
POWDER TOPICAL
Qty: 1 BOTTLE | Refills: 3 | Status: SHIPPED | OUTPATIENT
Start: 2020-07-01 | End: 2021-02-03 | Stop reason: SDUPTHER

## 2020-07-01 RX ORDER — MELOXICAM 7.5 MG/1
7.5 TABLET ORAL DAILY PRN
Qty: 30 TABLET | Refills: 0 | Status: SHIPPED
Start: 2020-07-01 | End: 2020-10-08

## 2020-07-01 RX ORDER — FEXOFENADINE HCL 180 MG/1
180 TABLET ORAL DAILY
Qty: 90 TABLET | Refills: 1 | Status: SHIPPED | OUTPATIENT
Start: 2020-07-01 | End: 2020-08-31

## 2020-07-01 RX ORDER — BUDESONIDE AND FORMOTEROL FUMARATE DIHYDRATE 160; 4.5 UG/1; UG/1
AEROSOL RESPIRATORY (INHALATION)
Qty: 1 INHALER | Refills: 2 | Status: SHIPPED | OUTPATIENT
Start: 2020-07-01 | End: 2021-02-03 | Stop reason: SDUPTHER

## 2020-07-01 RX ORDER — AZELASTINE HYDROCHLORIDE 0.5 MG/ML
1 SOLUTION/ DROPS OPHTHALMIC 2 TIMES DAILY
Qty: 6 ML | Refills: 1 | Status: SHIPPED | OUTPATIENT
Start: 2020-07-01 | End: 2020-07-31

## 2020-07-01 RX ORDER — ALBUTEROL SULFATE 90 UG/1
2 AEROSOL, METERED RESPIRATORY (INHALATION) EVERY 6 HOURS PRN
Qty: 1 INHALER | Refills: 3 | Status: SHIPPED | OUTPATIENT
Start: 2020-07-01 | End: 2021-02-03 | Stop reason: SDUPTHER

## 2020-07-01 RX ORDER — OXYBUTYNIN CHLORIDE 5 MG/1
5 TABLET, EXTENDED RELEASE ORAL DAILY
Qty: 30 TABLET | Refills: 1 | Status: SHIPPED | OUTPATIENT
Start: 2020-07-01 | End: 2020-09-05

## 2020-07-01 NOTE — PROGRESS NOTES
[address] PRIMARY CARE  [address] [place] [address] [address] [place] [address]  Dept: [phone]  Dept Fax: [phone]    Tish Hansen is a 54 y.o. female who presents today for her medical conditions/complaints as noted below. Tish Hansen is c/o of   Chief Complaint   Patient presents with    Establish Care         HPI:     The patient presents to [facility]. The patient does complain of a scratchy throat and having seasonal allergies. She also complains of itchy eyes which she attributes to seasonal allergies. She has a history of prediabetes but has had normal a1c levels for the past 4-5 years. She is not currently taking any medications for diabetes. The patient does have a history of anxiety and depression which was well controlled on medications through psychiatry but she has not  followed up with them in a while. She was on trintellix and a mood stabilizer. She denies any SI/HI. She is interested in getting back on trintellix. The patient also has a history of COPD in the past and is using her inhalers as prescribed. She denies any recent increase in sputum production, fever, chills. She does have a lot of pain d/t arthritis and chronic back issues. She has not seen pain management. Diabetes   She presents for her follow-up diabetic visit. Diabetes type: prediabetes. Her disease course has been stable. There are no hypoglycemic associated symptoms. There are no diabetic associated symptoms. There are no hypoglycemic complications. There are no diabetic complications. Risk factors for coronary artery disease include diabetes mellitus, dyslipidemia, family history, hypertension, obesity, sedentary lifestyle and tobacco exposure. Her weight is stable. She is following a generally unhealthy diet. When asked about meal planning, she reported none. She has not had a previous visit with a dietitian.        Hemoglobin A1C (%)   Date Value 2019 5.5   10/03/2018 5.3   04/10/2018 5.5             ( goal A1Cis < 7)   No results found for: LABMICR  LDL Cholesterol (mg/dL)   Date Value   2019 69   2017 94   10/20/2016 77     LDL Calculated (mg/dL)   Date Value   2015 81       (goal LDL is <100)   AST (U/L)   Date Value   2019 18     ALT (U/L)   Date Value   2019 26     BUN (mg/dL)   Date Value   2019 14     BP Readings from Last 3 Encounters:   20 118/76   10/04/19 123/74   19 (!) 103/56          (goal 120/80)    Past Medical History:   Diagnosis Date    Anxiety     Asthma     Bladder prolapse, female, acquired     Cancer ([facility] 1996    cervical; treated with hysterectomy    COPD (chronic obstructive pulmonary disease) ([facility] [facility]     Depression     Diabetes mellitus ([facility]     Epigastric pain     Hepatic steatosis     History of cataract extraction with lens replacement     bilateral    History of stress test     Hyperglycemia     Intertrigo     Lumbar degenerative disc disease     Nausea     Obesity 2017    Osteoarthritis     right knee    Osteoarthritis cervical spine     Osteoarthritis thoracic spine     Tobacco abuse 2017      Past Surgical History:   Procedure Laterality Date    CATARACT REMOVAL       SECTION      x1    HYSTERECTOMY, VAGINAL      secondary to cervical cancer--ovaries still present    THROAT SURGERY      throat polyps removed       Family History   Problem Relation Age of Onset    Diabetes Mother         from pancreatic resection    Heart Disease Mother     Arthritis Mother     Depression Mother     Mental Illness Mother     Diabetes Father     Heart Disease Father     Depression Brother     Cancer Brother         bladder    Lung Cancer Brother 64        cause of death    Depression Sister     Breast Cancer Maternal Grandmother     Asthma Other         grandson    Breast Cancer Other         diagnosed in her 39's    (ASCENSIA AUTODISC VI;ONE TOUCH ULTRA TEST VI) strip 1 each by In Vitro route daily As needed. 100 each 3    Elastic Bandages & Supports (JOBST KNEE HIGH COMPRESSION SM) MISC 1 each by Does not apply route daily Please measure 3 each 2    Blood Glucose Monitoring Suppl (ACURA BLOOD GLUCOSE METER) W/DEVICE KIT 1 each by Does not apply route 2 times daily 1 kit 0    Elastic Bandages & Supports (KNEE BRACE) MISC 1 Device by Does not apply route daily Right knee pain; osteoarthritis right knee 1 each 1    Nebulizers (COMPRESSOR/NEBULIZER) MISC 1 vial by Does not apply route 4 times daily as needed. Patient has albuterol prescription at home; needs aerosol machine and set up diagnosis chronic bronchitis 1 each 0    nicotine (NICODERM CQ) 21 MG/24HR Place 1 patch onto the skin daily 42 patch 0    aspirin 81 MG tablet Take 1 tablet by mouth daily (with breakfast) (Patient not taking: Reported on 7/1/2020) 90 tablet 1    furosemide (LASIX) 20 MG tablet TAKE ONE TABLET BY MOUTH DAILY (Patient not taking: Reported on 7/1/2020) 60 tablet 3    diclofenac sodium 1 % GEL Apply 2 g topically 2 times daily (Patient not taking: Reported on 7/1/2020) 1 Tube 3     No current facility-administered medications for this visit.       Allergies   Allergen Reactions    Amoxicillin-Pot Clavulanate Diarrhea and Other (See Comments)    Medrol [Methylprednisolone] Other (See Comments)     Redness of hands and itching  Redness of hands and itching    Tape [Adhesive Tape] Rash       Health Maintenance   Topic Date Due    Shingles Vaccine (1 of 2) 03/30/2015    Flu vaccine (1) 09/01/2020    A1C test (Diabetic or Prediabetic)  09/20/2020    Potassium monitoring  09/20/2020    Creatinine monitoring  09/20/2020    Annual Wellness Visit (AWV)  10/04/2020    Breast cancer screen  10/09/2020    Low dose CT lung screening  05/27/2021    Lipid screen  09/20/2024    Colon cancer screen colonoscopy  03/17/2026    DTaP/Tdap/Td vaccine (2 - Td) 08/24/2026    Pneumococcal 0-64 years Vaccine  Completed    Hepatitis C screen  Completed    HIV screen  Completed    Hepatitis A vaccine  Aged Out    Hepatitis B vaccine  Aged Out    Hib vaccine  Aged Out    Meningococcal (ACWY) vaccine  Aged Out   REVIEWED--ORDERS PLACED    Subjective:     Review of Systems   Constitutional: Negative. HENT: Positive for congestion, postnasal drip, sneezing and sore throat. Eyes: Negative. Respiratory: Positive for cough (chronic in nature from copd). Cardiovascular: Negative. Gastrointestinal: Negative. Genitourinary: Negative. Stress incontinence. Musculoskeletal: Negative. Skin: Positive for rash (underneath the breast). Allergic/Immunologic: Negative for environmental allergies, food allergies and immunocompromised state. Neurological: Negative. Psychiatric/Behavioral: Negative. Objective:     Physical Exam  Vitals signs and nursing note reviewed. Constitutional:       General: She is awake. She is not in acute distress. Appearance: Normal appearance. She is obese. She is not toxic-appearing. HENT:      Head: Normocephalic and atraumatic. Right Ear: External ear normal.      Left Ear: External ear normal.      Nose: Nose normal.   Eyes:      Extraocular Movements: Extraocular movements intact. Conjunctiva/sclera: Conjunctivae normal.   Neck:      Musculoskeletal: Normal range of motion. Cardiovascular:      Rate and Rhythm: Normal rate and regular rhythm. Pulses: Normal pulses. Heart sounds: Normal heart sounds. No murmur. Pulmonary:      Effort: Pulmonary effort is normal.      Breath sounds: Normal breath sounds. Abdominal:      General: Abdomen is flat. Bowel sounds are normal.      Palpations: Abdomen is soft. Musculoskeletal: Normal range of motion. Neurological:      General: No focal deficit present. Mental Status: She is alert and oriented to person, place, and time. Psychiatric:         Attention and Perception: Attention normal.         Mood and Affect: Mood and affect normal.         Speech: Speech normal.         Behavior: Behavior normal.       /76   Temp 97.5 °F (36.4 °C)   Wt 289 lb (131.1 kg)   BMI 46.65 kg/m²     Assessment:       Diagnosis Orders   1. Chronic obstructive pulmonary disease, unspecified COPD type ([facility])  budesonide-formoterol (SYMBICORT) 160-4.5 MCG/ACT AERO    Spirometry With Bronchodilator   2. Class 3 severe obesity with body mass index (BMI) of 45.0 to 49.9 in adult, unspecified obesity type, unspecified whether serious comorbidity present ([facility])  Lipid, Fasting   3. Laryngitis     4. Seasonal allergies  fexofenadine (ALLEGRA) 180 MG tablet    azelastine (OPTIVAR) 0.05 % ophthalmic solution   5. Allergic conjunctivitis of both eyes  azelastine (OPTIVAR) 0.05 % ophthalmic solution   6. Depression, unspecified depression type  VORTIoxetine (TRINTELLIX) 5 MG tablet    CBC Auto Differential    TSH With Reflex Ft4   7. Anxiety  VORTIoxetine (TRINTELLIX) 5 MG tablet    CBC Auto Differential    TSH With Reflex Ft4   8. Prediabetes  Hemoglobin A1C   9. Insulin resistance  Hemoglobin A1C   10. Primary osteoarthritis of both knees  meloxicam (MOBIC) 7.5 MG tablet    Abdirashid Pina MD, Pain Management, [place]   11. Spondylosis of lumbar region without myelopathy or radiculopathy  meloxicam (MOBIC) 7.5 MG tablet    Abdirashid Pina MD, Pain Management, [place]   12. Spondylosis of thoracic region without myelopathy or radiculopathy  meloxicam (MOBIC) 7.5 MG tablet    Abdirashid Pina MD, Pain Management, [place]   13. Osteoarthritis of spine with radiculopathy, cervical region  meloxicam (MOBIC) 7.5 MG tablet    Abdirashid Pina MD, Pain Management, [place]   14. Hepatic steatosis  Comprehensive Metabolic Panel, Fasting   15. Hyperglycemia  Comprehensive Metabolic Panel, Fasting   16. Vitamin D deficiency     17.  Tobacco abuse     18. Overactive bladder  oxybutynin (DITROPAN-XL) 5 MG extended release tablet   19. Skin irritation  nystatin (NYAMYC) 995342 UNIT/GM powder   20. Post-menopausal  DEXA BONE DENSITY 2 SITES   21. Osteoporosis screening  DEXA BONE DENSITY 2 SITES   22. Encounter for screening colonoscopy  [facility]   23. Visit for screening mammogram  ZEN DIGITAL SCREEN W CAD BILATERAL   24. Need for prophylactic vaccination and inoculation against varicella  zoster recombinant adjuvanted vaccine [facility]) 50 MCG/0.5ML SUSR injection             Plan:    COPD - PFT's ordered to get baseline idea of copd severity. Continue current medications as discussed. Encouraged smoking cessation. Obesity - d/w patient the importance of weight loss to prevent diabetes, CVD, and other morbidities    Laryngitis/seasonal allergies/allergic conjunctivitis - patient to take allegra daily. Optivar also prescribed to help with eye symptoms    Anxiety/depression - patient restart on trintellix. Encouraged her to be compliant with medication and to return if depression/anxiety worsens. The patient declines returning to psychiatry at this time. Pre diabetes/insulin resistance - patient not on medication. Encouraged weight loss to prevent diabetes and encouraged her to make sure she is compliant with treatment plan. OA Knees/OA spine - patient to go to pain management to discuss treatment options. Obesity does contribute to symptoms as well. Patient on mobic. Continue as needed    Hepatic steatosis - patient encouraged to get fasting labs to done and if elevated LFT's will reassess liver status    Vitamin D def - check labs, replace as needed    Tobacco use - patient encouraged to quit smoking    Overactive bladder - patient on ditropan    Skin irritation underneath the breast - patient to use nystatin powder on the area as needed and encouraged her to keep the area clean and dry.     Return in about 3 months (around 10/1/2020) for prediabetes, obesity. Orders Placed This Encounter   Procedures    ZEN DIGITAL SCREEN W CAD BILATERAL     Standing Status:   Future     Standing Expiration Date:   2021     Order Specific Question:   Reason for exam:     Answer:   screening Z12.31    DEXA BONE DENSITY 2 SITES     Standing Status:   Future     Standing Expiration Date:   2021     Order Specific Question:   Reason for exam:     Answer:   osteoporosis screening    Lipid, Fasting     Standing Status:   Future     Standing Expiration Date:   2021    Comprehensive Metabolic Panel, Fasting     Standing Status:   Future     Standing Expiration Date:   2021    CBC Auto Differential     Standing Status:   Future     Standing Expiration Date:   2021    TSH With Reflex Ft4     Standing Status:   Future     Standing Expiration Date:   2021    Hemoglobin A1C     Standing Status:   Future     Standing Expiration Date:   2021    Raymon Leon MD, Pain Management, [place]     Referral Priority:   Routine     Referral Type:   Eval and Treat     Referral Reason:   Specialty Services Required     Referred to Provider:   Alfredo Loza MD     Requested Specialty:   Pain Management     Number of Visits Requested:   [address] Screening Colonoscopy     Referral Priority:   Routine     Referral Type: Other     Referral Reason:   Specialty Services Required     Number of Visits Requested:   1    Spirometry With Bronchodilator     Standing Status:   Future     Standing Expiration Date:   2021     Orders Placed This Encounter   Medications    zoster recombinant adjuvanted vaccine (SHINGRIX) 50 MCG/0.5ML SUSR injection     Si MCG IM then repeat 2-6 months.      Dispense:  0.5 mL     Refill:  1    fexofenadine (ALLEGRA) 180 MG tablet     Sig: Take 1 tablet by mouth daily     Dispense:  90 tablet     Refill:  1    azelastine (OPTIVAR) 0.05 % ophthalmic solution     Sig: Place 1 drop into both eyes 2 times daily     Dispense:  6 mL     Refill:  1    VORTIoxetine (TRINTELLIX) 5 MG tablet     Sig: Take 1 tablet by mouth daily     Dispense:  30 tablet     Refill:  2    meloxicam (MOBIC) 7.5 MG tablet     Sig: Take 1 tablet by mouth daily as needed for Pain     Dispense:  30 tablet     Refill:  0    oxybutynin (DITROPAN-XL) 5 MG extended release tablet     Sig: Take 1 tablet by mouth daily     Dispense:  30 tablet     Refill:  1    albuterol sulfate HFA (PROAIR HFA) 108 (90 Base) MCG/ACT inhaler     Sig: Inhale 2 puffs into the lungs every 6 hours as needed for Wheezing     Dispense:  1 Inhaler     Refill:  3    budesonide-formoterol (SYMBICORT) 160-4.5 MCG/ACT AERO     Sig: INHALE TWO PUFFS BY MOUTH TWICE A DAY     Dispense:  1 Inhaler     Refill:  2    nystatin (NYAMYC) 189607 UNIT/GM powder     Sig: APPLY ONE DOSE TOPICALLY TWICE A DAY     Dispense:  1 Bottle     Refill:  3       Patient given educational materials - see patient instructions. Discussed use, benefit, and side effects of prescribed medications. All patientquestions answered. Pt voiced understanding. Reviewed health maintenance. Instructedto continue current medications, diet and exercise. Patient agreed with treatmentplan. Follow up as directed.      Electronically signed by Jessi Brown MD on 7/8/2020 at 7:07 AM

## 2020-07-02 ENCOUNTER — PATIENT MESSAGE (OUTPATIENT)
Dept: FAMILY MEDICINE CLINIC | Age: 55
End: 2020-07-02

## 2020-07-02 RX ORDER — NICOTINE 21 MG/24HR
1 PATCH, TRANSDERMAL 24 HOURS TRANSDERMAL DAILY
Qty: 42 PATCH | Refills: 0 | Status: SHIPPED | OUTPATIENT
Start: 2020-07-02 | End: 2020-08-31

## 2020-07-02 NOTE — TELEPHONE ENCOUNTER
From: Jameel Azul  To: Maci Cruz MD  Sent: 7/2/2020 9:43 AM EDT  Subject: Prescription Question    I would like the patch to help me quit smoking. Could you please send me a script to my pharmacy?

## 2020-07-08 ENCOUNTER — TELEPHONE (OUTPATIENT)
Dept: GASTROENTEROLOGY | Age: 55
End: 2020-07-08

## 2020-07-08 ASSESSMENT — ENCOUNTER SYMPTOMS
GASTROINTESTINAL NEGATIVE: 1
COUGH: 1
EYES NEGATIVE: 1
SORE THROAT: 1

## 2020-08-19 ENCOUNTER — HOSPITAL ENCOUNTER (OUTPATIENT)
Facility: CLINIC | Age: 55
Discharge: HOME | End: 2020-08-19
Payer: MEDICARE

## 2020-08-19 LAB
ABSOLUTE EOS #: 0.15 K/UL (ref 0–0.44)
ABSOLUTE IMMATURE GRANULOCYTE: <0.03 K/UL (ref 0–0.3)
ABSOLUTE LYMPH #: 1.51 K/UL (ref 1.1–3.7)
ABSOLUTE MONO #: 0.62 K/UL (ref 0.1–1.2)
ALBUMIN SERPL-MCNC: 4 G/DL (ref 3.5–5.2)
ALBUMIN/GLOBULIN RATIO: 1.6 (ref 1–2.5)
ALP BLD-CCNC: 82 U/L (ref 35–104)
ALT SERPL-CCNC: 23 U/L (ref 5–33)
ANION GAP SERPL CALCULATED.3IONS-SCNC: 14 MMOL/L (ref 9–17)
AST SERPL-CCNC: 19 U/L
BASOPHILS # BLD: 1 % (ref 0–2)
BASOPHILS ABSOLUTE: 0.04 K/UL (ref 0–0.2)
BILIRUB SERPL-MCNC: 0.45 MG/DL (ref 0.3–1.2)
BUN BLDV-MCNC: 14 MG/DL (ref 6–20)
BUN/CREAT BLD: ABNORMAL (ref 9–20)
CALCIUM SERPL-MCNC: 9.2 MG/DL (ref 8.6–10.4)
CHLORIDE BLD-SCNC: 104 MMOL/L (ref 98–107)
CHOLESTEROL, FASTING: 135 MG/DL
CHOLESTEROL/HDL RATIO: 3.5
CO2: 25 MMOL/L (ref 20–31)
CREAT SERPL-MCNC: 0.53 MG/DL (ref 0.5–0.9)
DIFFERENTIAL TYPE: ABNORMAL
EOSINOPHILS RELATIVE PERCENT: 2 % (ref 1–4)
ESTIMATED AVERAGE GLUCOSE: 123 MG/DL
GFR AFRICAN AMERICAN: >60 ML/MIN
GFR NON-AFRICAN AMERICAN: >60 ML/MIN
GFR SERPL CREATININE-BSD FRML MDRD: ABNORMAL ML/MIN/{1.73_M2}
GFR SERPL CREATININE-BSD FRML MDRD: ABNORMAL ML/MIN/{1.73_M2}
GLUCOSE FASTING: 108 MG/DL (ref 70–99)
HBA1C MFR BLD: 5.9 % (ref 4–6)
HCT VFR BLD CALC: 51.9 % (ref 36.3–47.1)
HDLC SERPL-MCNC: 39 MG/DL
HEMOGLOBIN: 16.6 G/DL (ref 11.9–15.1)
IMMATURE GRANULOCYTES: 0 %
LDL CHOLESTEROL: 64 MG/DL (ref 0–130)
LYMPHOCYTES # BLD: 21 % (ref 24–43)
MCH RBC QN AUTO: 30.4 PG (ref 25.2–33.5)
MCHC RBC AUTO-ENTMCNC: 32 G/DL (ref 28.4–34.8)
MCV RBC AUTO: 95.1 FL (ref 82.6–102.9)
MONOCYTES # BLD: 9 % (ref 3–12)
NRBC AUTOMATED: 0 PER 100 WBC
PDW BLD-RTO: 13.9 % (ref 11.8–14.4)
PLATELET # BLD: 232 K/UL (ref 138–453)
PLATELET ESTIMATE: ABNORMAL
PMV BLD AUTO: 11.4 FL (ref 8.1–13.5)
POTASSIUM SERPL-SCNC: 4.6 MMOL/L (ref 3.7–5.3)
RBC # BLD: 5.46 M/UL (ref 3.95–5.11)
RBC # BLD: ABNORMAL 10*6/UL
SEG NEUTROPHILS: 67 % (ref 36–65)
SEGMENTED NEUTROPHILS ABSOLUTE COUNT: 4.91 K/UL (ref 1.5–8.1)
SODIUM BLD-SCNC: 143 MMOL/L (ref 135–144)
TOTAL PROTEIN: 6.5 G/DL (ref 6.4–8.3)
TRIGLYCERIDE, FASTING: 160 MG/DL
TSH SERPL DL<=0.05 MIU/L-ACNC: 1.72 MIU/L (ref 0.3–5)
VLDLC SERPL CALC-MCNC: ABNORMAL MG/DL (ref 1–30)
WBC # BLD: 7.3 K/UL (ref 3.5–11.3)
WBC # BLD: ABNORMAL 10*3/UL

## 2020-08-19 PROCEDURE — 84443 ASSAY THYROID STIM HORMONE: CPT

## 2020-08-19 PROCEDURE — 36415 COLL VENOUS BLD VENIPUNCTURE: CPT

## 2020-08-19 PROCEDURE — 80053 COMPREHEN METABOLIC PANEL: CPT

## 2020-08-19 PROCEDURE — 80061 LIPID PANEL: CPT

## 2020-08-19 PROCEDURE — 85025 COMPLETE CBC W/AUTO DIFF WBC: CPT

## 2020-08-19 PROCEDURE — 83036 HEMOGLOBIN GLYCOSYLATED A1C: CPT

## 2020-08-31 ENCOUNTER — NURSE TRIAGE (OUTPATIENT)
Dept: OTHER | Facility: CLINIC | Age: 55
End: 2020-08-31

## 2020-08-31 ENCOUNTER — HOSPITAL ENCOUNTER (EMERGENCY)
Facility: CLINIC | Age: 55
Discharge: HOME | End: 2020-08-31
Attending: EMERGENCY MEDICINE
Payer: MEDICARE

## 2020-08-31 VITALS
DIASTOLIC BLOOD PRESSURE: 70 MMHG | BODY MASS INDEX: 47.09 KG/M2 | SYSTOLIC BLOOD PRESSURE: 114 MMHG | TEMPERATURE: 98.1 F | HEART RATE: 93 BPM | WEIGHT: 293 LBS | HEIGHT: 66 IN | OXYGEN SATURATION: 95 % | RESPIRATION RATE: 20 BRPM

## 2020-08-31 PROCEDURE — 99282 EMERGENCY DEPT VISIT SF MDM: CPT

## 2020-08-31 RX ORDER — OXYCODONE HYDROCHLORIDE AND ACETAMINOPHEN 5; 325 MG/1; MG/1
1-2 TABLET ORAL EVERY 6 HOURS PRN
Qty: 15 TABLET | Refills: 0 | Status: SHIPPED | OUTPATIENT
Start: 2020-08-31 | End: 2020-09-07

## 2020-08-31 ASSESSMENT — PAIN DESCRIPTION - LOCATION
LOCATION: GENERALIZED
LOCATION: GENERALIZED

## 2020-08-31 ASSESSMENT — PAIN DESCRIPTION - DESCRIPTORS
DESCRIPTORS: ACHING
DESCRIPTORS: BURNING;THROBBING;SHARP

## 2020-08-31 ASSESSMENT — PAIN - FUNCTIONAL ASSESSMENT: PAIN_FUNCTIONAL_ASSESSMENT: 0-10

## 2020-08-31 ASSESSMENT — PAIN DESCRIPTION - PAIN TYPE
TYPE: CHRONIC PAIN
TYPE: CHRONIC PAIN

## 2020-08-31 ASSESSMENT — PAIN SCALES - GENERAL
PAINLEVEL_OUTOF10: 8
PAINLEVEL_OUTOF10: 8

## 2020-08-31 ASSESSMENT — PAIN DESCRIPTION - FREQUENCY: FREQUENCY: CONTINUOUS

## 2020-08-31 NOTE — TELEPHONE ENCOUNTER
Reason for Disposition   SEVERE pain (e.g., excruciating, unable to use hand at all)    Answer Assessment - Initial Assessment Questions  1. ONSET: \"When did the pain start? \"      Pt has chronic pain all over body but for the past few weeks it has increased in her hands with swelling in thumb and warmth to the area    2. LOCATION: \"Where is the pain located? \"       BLUE but mainly L     3. PAIN: \"How bad is the pain? \" (Scale 1-10; or mild, moderate, severe)    - MILD (1-3): doesn't interfere with normal activities    - MODERATE (4-7): interferes with normal activities (e.g., work or school) or awakens from sleep    - SEVERE (8-10): excruciating pain, unable to use hand at all               8/10    4. WORK OR EXERCISE: \"Has there been any recent work or exercise that involved this part of the body? \"      Denies    5. CAUSE: \"What do you think is causing the pain? \"       Unsure    6. AGGRAVATING FACTORS: \"What makes the pain worse? \" (e.g., using computer)        Movement     7. OTHER SYMPTOMS: \"Do you have any other symptoms? \" (e.g., neck pain, swelling, rash, numbness, fever)         Burning in back, top of feet pain, balance is off due to pain in knees     8. PREGNANCY: \"Is there any chance you are pregnant? \" \"When was your last menstrual period? \"    Protocols used: HAND AND WRIST PAIN-ADULT-[place]    Patient called [facility]) to schedule appointment, with red flag complaint, transferred to RN access for triage. Caller reports symptoms as documented above. Caller informed of disposition. Attempted to soft transfer to pre-service center to schedule appointment as recommended, sent message to  to call pt for scheduling. Care advice as documented. Pt verbalized understanding and is agreeable to plan. Please do not respond to the triage nurse through this encounter. Any subsequent communication should be directly with the patient.

## 2020-08-31 NOTE — ED PROVIDER NOTES
[facility]  [facility]  Phone: [address] [place]      Pt Name: Alfonso Prieto  MRN: [id number]  [place] 1965  Date of evaluation: 8/31/2020    CHIEF COMPLAINT       Chief Complaint   Patient presents with    Generalized Body Aches     chronic pain, wrist, hips and back. usually takes Ibuprofen. called the pain clinic and they told her to come here. HISTORY OF PRESENT ILLNESS    Alfonso Prieto is a 54 y.o. female who presents to the emergency department with generalized body pain from arthritis her hips her back her feet and her wrist.  We will concerns her most however is her left thenar eminence which has apparently a cyst and it that has been bothering her off and on for some time now worse over the past few days denies injury but she has been wearing a wrist splint which presses on that area. She called her doctor is scheduled to see her pain management physician in 10 days but they could not get her in the office today. She does not have any pain medicines at home. No other symptoms no injuries. Pain worse with movement.     REVIEW OF SYSTEMS       Constitutional: No fevers or chills   HEENT: No sore throat, rhinorrhea, or earache   Eyes: No blurry vision or double vision no drainage   Cardiovascular: No chest pain or tachycardia   Respiratory: No wheezing or shortness of breath no cough   Gastrointestinal: No nausea, vomiting, diarrhea, constipation, or abdominal pain   : No hematuria or dysuria   Musculoskeletal: Diffuse pain  Skin: No rash   Neurological: No focal neurologic complaints, paresthesias, weakness, or headache     PAST MEDICAL HISTORY    has a past medical history of Anxiety, Asthma, Bladder prolapse, female, acquired, Cancer ([address] COPD (chronic obstructive pulmonary disease) ([address] Depression, Diabetes mellitus ([address] Epigastric pain, Hepatic steatosis, History of cataract extraction with lens replacement, RECOMBINANT ADJUVANTED VACCINE (SHINGRIX) 50 MCG/0.5ML SUSR INJECTION    50 MCG IM then repeat 2-6 months. ALLERGIES     is allergic to amoxicillin-pot clavulanate; medrol [methylprednisolone]; and tape [adhesive tape]. FAMILY HISTORY     She indicated that her mother is . She indicated that her father is . She indicated that all of her five sisters are alive. She indicated that only one of her two brothers is alive. She indicated that her maternal grandmother is . She indicated that her maternal grandfather is . She indicated that her paternal grandmother is . She indicated that her paternal grandfather is . She indicated that the status of her maternal uncle is unknown. She indicated that both of her others are alive. She indicated that the status of her neg hx is unknown.     family history includes Arthritis in her mother and sister; Asthma in an other family member; Breast Cancer in her maternal grandmother and another family member; Cancer in her brother; Depression in her brother, mother, and sister; Diabetes in her brother, father, and mother; Heart Disease in her father and mother; High Blood Pressure in her brother; Lung Cancer (age of onset: 64) in her brother; Mental Illness in her mother; No Known Problems in her maternal grandfather, paternal grandfather, and paternal grandmother; Pancreatic Cancer in her maternal uncle. SOCIAL HISTORY      reports that she has been smoking cigarettes. She has a 40.00 pack-year smoking history. She has never used smokeless tobacco. She reports current drug use. Frequency: 4.00 times per week. Drug: Marijuana. She reports that she does not drink alcohol.     PHYSICAL EXAM       ED Triage Vitals [20 1316]   BP Temp Temp Source Pulse Resp SpO2 Height Weight   114/70 98.1 °F (36.7 °C) Oral 93 20 95 % 5' 6\" (1.676 m) 295 lb (133.8 kg)     Constitutional: Alert, oriented x3, nontoxic, answering questions appropriately, acting properly for age, in no acute distress   HEENT: Extraocular muscles intact,  Neck: Trachea midline   Cardiovascular: Regular rhythm and rate no S3, S4, or murmurs   Respiratory: Clear to auscultation bilaterally no wheezes, rhonchi, rales, no respiratory distress no tachypnea no retractions no hypoxia  Gastrointestinal: Soft, nontender, nondistended, positive bowel sounds. No rebound, rigidity, or guarding. Musculoskeletal: Left upper extremity no pain at the elbow or shoulder. Radial and ulnar arteries Dr. Cheatham Riser for less than 2 seconds. Thenar eminence there is a 1 to 2 cm cyst present mobile no erythema moderate tenderness with decreased range of motion. Diffuse body aches to palpation  Neurologic: Moving all 4 extremities without difficulty there are no gross focal neurologic deficits   Skin: Warm and dry         DIFFERENTIAL DIAGNOSIS/ MDM:     Chronic pain. Left hand cyst.  Follow-up with family physician for possible referral to hand specialist and follow-up with pain management on September 10 as scheduled. Small prescription for Percocet for pain. DIAGNOSTIC RESULTS     EKG: All EKG's are interpreted by the Emergency Department Physician who either signs or Co-signs this chart in the absence of a cardiologist.        Not indicated unless otherwise documented above    LABS:  No results found for this visit on 08/31/20. Not indicated unless otherwise documented above    RADIOLOGY:   I reviewed the radiologist interpretations:    No orders to display       Not indicated unless otherwise documented above    EMERGENCY DEPARTMENT COURSE:     The patient was given the following medications:  Orders Placed This Encounter   Medications    oxyCODONE-acetaminophen (PERCOCET) 5-325 MG per tablet     Sig: Take 1-2 tablets by mouth every 6 hours as needed for Pain for up to 7 days. WARNING:  May cause drowsiness. May impair ability to operate vehicles or machinery.   Do not use in combination with alcohol. OARRS Reviewed: ICD-10-CM Diagnosis Code R52 : Pain     Dispense:  15 tablet     Refill:  0        Vitals:   -------------------------  /70   Pulse 93   Temp 98.1 °F (36.7 °C) (Oral)   Resp 20   Ht 5' 6\" (1.676 m)   Wt 133.8 kg (295 lb)   SpO2 95%   BMI 47.61 kg/m²         I have reviewed the disposition diagnosis with the patient and or their family/guardian. I have answered their questions and given discharge instructions. They voiced understanding of these instructions and did not have any furtherquestions or complaints. CRITICAL CARE:    None    CONSULTS:    None    PROCEDURES:    None      OARRS Report if indicated    Periodic Controlled Substance Monitoring: No signs of potential drug abuse or diversion identified. (Broderick Severino DO)        FINAL IMPRESSION      1. Ganglion cyst of finger of left hand    2. Arthritis of hand, right          DISPOSITION/PLAN   DISPOSITION Decision To Discharge 08/31/2020 01:29:29 PM        CONDITION ON DISPOSITION: STABLE       PATIENT REFERRED TO:  MD Liudmila Sanchez [address] [place] [address]  [phone]    Schedule an appointment as soon as possible for a visit in 2 days      Pain management    On 9/10/2020  as scheduled      DISCHARGE MEDICATIONS:  New Prescriptions    OXYCODONE-ACETAMINOPHEN (PERCOCET) 5-325 MG PER TABLET    Take 1-2 tablets by mouth every 6 hours as needed for Pain for up to 7 days. WARNING:  May cause drowsiness. May impair ability to operate vehicles or machinery. Do not use in combination with alcohol.     OARRS Reviewed: ICD-10-CM Diagnosis Code R52 : Pain       (Please note that portions of thisnote were completed with a voice recognition program.  Efforts were made to edit the dictations but occasionally words are mis-transcribed.)    Jane DO  Attending Emergency Physician       Merissa Bender DO  08/31/20 1035

## 2020-09-01 ENCOUNTER — CARE COORDINATION (OUTPATIENT)
Dept: FAMILY MEDICINE CLINIC | Age: 55
End: 2020-09-01

## 2020-09-02 NOTE — TELEPHONE ENCOUNTER
Rec'd colon screen questionnaire, all screening questions are answered, no.  Prefers STA for facility.  Please call to sched at [phone]

## 2020-09-03 RX ORDER — POLYETHYLENE GLYCOL 3350 17 G/17G
POWDER, FOR SOLUTION ORAL
Qty: 238 G | Refills: 0 | Status: SHIPPED | OUTPATIENT
Start: 2020-09-03 | End: 2020-10-08 | Stop reason: ALTCHOICE

## 2020-09-03 NOTE — TELEPHONE ENCOUNTER
Talked to [facility] regarding scheduling. She is now scheduled STA Wed 10/07/20 @ 11 am colon scr miralax Dr Aravind Birch. Covid-19 test STA 10/03 requested. Bowel prep instructions emailed to [email or web address]

## 2020-09-04 NOTE — TELEPHONE ENCOUNTER
Next Visit Date:  Future Appointments   Date Time Provider Hollis Jacobo   9/10/2020  2:15 PM Kendal Cabrales MD [address]   10/1/2020 11:30 AM MD Mark Rueda  [facility]   10/3/2020 10:50 AM STA PAT RM 4 STAZ PAT [facility] Maintenance   Topic Date Due    Shingles Vaccine (1 of 2) 03/30/2015    Flu vaccine (1) 09/01/2020    Annual Wellness Visit (AWV)  10/04/2020    Breast cancer screen  10/09/2020    Low dose CT lung screening  05/27/2021    A1C test (Diabetic or Prediabetic)  08/19/2021    Lipid screen  08/19/2025    Colon cancer screen colonoscopy  03/17/2026    DTaP/Tdap/Td vaccine (2 - Td) 08/24/2026    Pneumococcal 0-64 years Vaccine  Completed    Hepatitis C screen  Completed    HIV screen  Completed    Hepatitis A vaccine  Aged Out    Hepatitis B vaccine  Aged Out    Hib vaccine  Aged Out    Meningococcal (ACWY) vaccine  Aged Out       Hemoglobin A1C (%)   Date Value   08/19/2020 5.9   09/20/2019 5.5   10/03/2018 5.3             ( goal A1C is < 7)   No results found for: LABMICR  LDL Cholesterol (mg/dL)   Date Value   08/19/2020 64   09/20/2019 69     LDL Calculated (mg/dL)   Date Value   04/27/2015 81       (goal LDL is <100)   AST (U/L)   Date Value   08/19/2020 19     ALT (U/L)   Date Value   08/19/2020 23     BUN (mg/dL)   Date Value   08/19/2020 14     BP Readings from Last 3 Encounters:   08/31/20 114/70   07/01/20 118/76   10/04/19 123/74          (goal 120/80)    All Future Testing planned in [facility]  Lab Frequency Next Occurrence   ZEN DIGITAL SCREEN W CAD BILATERAL Once 07/15/2020   DEXA BONE DENSITY 2 SITES Once 07/01/2020   Spirometry With Bronchodilator Once 07/01/2020               Patient Active Problem List:     Hyperglycemia     Anxiety     Bronchitis     Depression     Degenerative arthritis of lumbar spine     Osteoarthritis of knee     Insulin resistance     COPD (chronic obstructive pulmonary disease) (HCC)     Osteoarthritis thoracic

## 2020-09-05 RX ORDER — OXYBUTYNIN CHLORIDE 5 MG/1
TABLET, EXTENDED RELEASE ORAL
Qty: 30 TABLET | Refills: 0 | Status: SHIPPED | OUTPATIENT
Start: 2020-09-05 | End: 2020-10-08 | Stop reason: ALTCHOICE

## 2020-09-09 ENCOUNTER — TELEPHONE (OUTPATIENT)
Dept: PAIN MANAGEMENT | Age: 55
End: 2020-09-09

## 2020-09-10 ENCOUNTER — INITIAL CONSULT (OUTPATIENT)
Dept: PAIN MANAGEMENT | Age: 55
End: 2020-09-10
Payer: MEDICARE

## 2020-09-10 VITALS
DIASTOLIC BLOOD PRESSURE: 68 MMHG | TEMPERATURE: 97.9 F | BODY MASS INDEX: 47.13 KG/M2 | SYSTOLIC BLOOD PRESSURE: 122 MMHG | WEIGHT: 292 LBS | HEART RATE: 81 BPM

## 2020-09-10 PROCEDURE — 99204 OFFICE O/P NEW MOD 45 MIN: CPT | Performed by: PAIN MEDICINE

## 2020-09-10 RX ORDER — OXYCODONE HYDROCHLORIDE AND ACETAMINOPHEN 5; 325 MG/1; MG/1
1 TABLET ORAL EVERY 8 HOURS PRN
COMMUNITY
End: 2020-10-08 | Stop reason: ALTCHOICE

## 2020-09-10 ASSESSMENT — ENCOUNTER SYMPTOMS
VISUAL CHANGE: 0
PHOTOPHOBIA: 0
BACK PAIN: 1
COUGH: 1
TROUBLE SWALLOWING: 0
BOWEL INCONTINENCE: 0
POOR WOUND HEALING: 1
ABDOMINAL PAIN: 0
PERSISTENT INFECTIONS: 1

## 2020-09-10 NOTE — PROGRESS NOTES
HPI: Back Pain   This is a chronic problem. The current episode started more than 1 year ago. The problem occurs constantly. The problem has been gradually worsening since onset. The pain is present in the lumbar spine. The quality of the pain is described as aching. The pain radiates to the left thigh and left knee. The pain is at a severity of 6/10. The pain is moderate. The pain is worse during the day. The symptoms are aggravated by bending, position and standing. Stiffness is present all day. Associated symptoms include bladder incontinence, headaches, leg pain, numbness and pelvic pain. Pertinent negatives include no abdominal pain, bowel incontinence, chest pain, dysuria, fever, paresis, paresthesias, perianal numbness, tingling, weakness or weight loss. She has tried NSAIDs, walking, heat, home exercises and bed rest for the symptoms. The treatment provided mild relief. Knee Pain    The incident occurred more than 1 week ago. The pain is present in the left knee and right knee. The quality of the pain is described as stabbing, aching and burning. The pain is at a severity of 4/10. The pain is mild. The pain has been constant since onset. Associated symptoms include an inability to bear weight, muscle weakness and numbness. Pertinent negatives include no loss of motion, loss of sensation or tingling. She reports no foreign bodies present. The symptoms are aggravated by movement, weight bearing and palpation. She has tried heat, elevation and NSAIDs for the symptoms. The treatment provided mild relief. Neck Pain    This is a chronic problem. The current episode started more than 1 year ago. The problem occurs constantly. The problem has been gradually worsening. The pain is present in the left side. The quality of the pain is described as aching and burning. The pain is at a severity of 3/10. The pain is mild. The symptoms are aggravated by sneezing, bending, position and twisting.  The pain is worse during the night. Stiffness is present all day. Associated symptoms include headaches, leg pain, numbness and syncope. Pertinent negatives include no chest pain, fever, pain with swallowing, paresis, photophobia, tingling, trouble swallowing, visual change, weakness or weight loss. She has tried home exercises, heat, NSAIDs, oral narcotics and bed rest for the symptoms. The treatment provided mild relief. Chronic neck back and knee pain. Is been ongoing for some time now but lately has been getting quite severe. She has done physical therapy in the past however has made things worse. X-ray of her knee shows degenerative changes. She is had knee injection before but it did help temporarily. MRI lumbar spine from 2017 with mild degenerative changes. MRI of the cervical spine from 2017 with moderate C5-6 disc herniation. Has had injections in the low back in the past with some benefit. Also has history of fibromyalgia. Is tried Lyrica and gabapentin with no benefit. Has seen orthopedic surgery for the knees. Patient denies any new neurological symptoms. No bowel or bladder incontinence, no weakness, and no falling. Review of OARRS does not show any aberrant prescription behavior.      Past Medical History:   Diagnosis Date    Anxiety     Asthma     Bladder prolapse, female, acquired     Cancer ([address] 11/1996    cervical; treated with hysterectomy    COPD (chronic obstructive pulmonary disease) ([address]     Depression     Diabetes mellitus ([address]     Epigastric pain     Hepatic steatosis     History of cataract extraction with lens replacement 2004    bilateral    History of stress test     Hyperglycemia     Intertrigo     Lumbar degenerative disc disease     Nausea     Obesity 9/1/2017    Osteoarthritis     right knee    Osteoarthritis cervical spine     Osteoarthritis thoracic spine     Tobacco abuse 2/6/2017       Past Surgical History:   Procedure Laterality Date    CATARACT REMOVAL  2008   SECTION      x1    HYSTERECTOMY, VAGINAL      secondary to cervical cancer--ovaries still present    THROAT SURGERY  2017    throat polyps removed       Allergies   Allergen Reactions    Amoxicillin-Pot Clavulanate Diarrhea and Other (See Comments)    Medrol [Methylprednisolone] Other (See Comments)     Redness of hands and itching  Redness of hands and itching    Tape Cara Chatead Tape] Rash         Current Outpatient Medications:     metFORMIN (GLUCOPHAGE) 500 MG tablet, , Disp: , Rfl:     oxyCODONE-acetaminophen (PERCOCET) 5-325 MG per tablet, Take 1 tablet by mouth every 8 hours as needed for Pain., Disp: , Rfl:     oxybutynin (DITROPAN-XL) 5 MG extended release tablet, TAKE ONE TABLET BY MOUTH DAILY, Disp: 30 tablet, Rfl: 0    polyethylene glycol (GLYCOLAX) 17 GM/SCOOP powder, Use as directed by following your patient instructions given by office. , Disp: 238 g, Rfl: 0    bisacodyl (DULCOLAX) 5 MG EC tablet, TAKE 4 TABS AS DIRECTED BY PHYSICIAN OFFICE, Disp: 4 tablet, Rfl: 0    zoster recombinant adjuvanted vaccine (SHINGRIX) 50 MCG/0.5ML SUSR injection, 50 MCG IM then repeat 2-6 months., Disp: 0.5 mL, Rfl: 1    VORTIoxetine (TRINTELLIX) 5 MG tablet, Take 1 tablet by mouth daily, Disp: 30 tablet, Rfl: 2    meloxicam (MOBIC) 7.5 MG tablet, Take 1 tablet by mouth daily as needed for Pain, Disp: 30 tablet, Rfl: 0    albuterol sulfate HFA (PROAIR HFA) 108 (90 Base) MCG/ACT inhaler, Inhale 2 puffs into the lungs every 6 hours as needed for Wheezing, Disp: 1 Inhaler, Rfl: 3    budesonide-formoterol (SYMBICORT) 160-4.5 MCG/ACT AERO, INHALE TWO PUFFS BY MOUTH TWICE A DAY, Disp: 1 Inhaler, Rfl: 2    nystatin (NYAMYC) 130153 UNIT/GM powder, APPLY ONE DOSE TOPICALLY TWICE A DAY, Disp: 1 Bottle, Rfl: 3    Insulin Syringe-Needle U-100 30G X 5/16\" 0.5 ML MISC, 1 each by Does not apply route daily, Disp: 100 each, Rfl: 11    blood glucose test strips (ASCENSIA AUTODISC VI;ONE TOUCH ULTRA TEST VI) strip, 1 each by In Vitro route daily As needed. , Disp: 100 each, Rfl: 3    Elastic Bandages & Supports (JOBST KNEE HIGH COMPRESSION SM) MISC, 1 each by Does not apply route daily Please measure, Disp: 3 each, Rfl: 2    aspirin 81 MG tablet, Take 1 tablet by mouth daily (with breakfast), Disp: 90 tablet, Rfl: 1    diclofenac sodium 1 % GEL, Apply 2 g topically 2 times daily, Disp: 1 Tube, Rfl: 3    Blood Glucose Monitoring Suppl (ACURA BLOOD GLUCOSE METER) W/DEVICE KIT, 1 each by Does not apply route 2 times daily, Disp: 1 kit, Rfl: 0    Elastic Bandages & Supports (KNEE BRACE) MISC, 1 Device by Does not apply route daily Right knee pain; osteoarthritis right knee, Disp: 1 each, Rfl: 1    Nebulizers (COMPRESSOR/NEBULIZER) MISC, 1 vial by Does not apply route 4 times daily as needed.  Patient has albuterol prescription at home; needs aerosol machine and set up diagnosis chronic bronchitis, Disp: 1 each, Rfl: 0    Family History   Problem Relation Age of Onset    Diabetes Mother         from pancreatic resection    Heart Disease Mother     Arthritis Mother     Depression Mother     Mental Illness Mother     Diabetes Father     Heart Disease Father     Depression Brother     Cancer Brother         bladder    Lung Cancer Brother 64        cause of death    Depression Sister     Breast Cancer Maternal Grandmother     Asthma Other         grandson    Breast Cancer Other         diagnosed in her 42's    High Blood Pressure Brother     Diabetes Brother     No Known Problems Maternal Grandfather     No Known Problems Paternal Grandmother     No Known Problems Paternal Grandfather     Arthritis Sister     Pancreatic Cancer Maternal Uncle     High Cholesterol Neg Hx     Kidney Disease Neg Hx     Stroke Neg Hx     Ovarian Cancer Neg Hx     Colon Cancer Neg Hx        Social History     Socioeconomic History    Marital status: Single     Spouse name: Not on file    Number of children: Not on file    Years of education: Not on file    Highest education level: Not on file   Occupational History     Employer: UNEMPLOYED   Social Needs    Financial resource strain: Not on file    Food insecurity     Worry: Not on file     Inability: Not on file    Transportation needs     Medical: Not on file     Non-medical: Not on file   Tobacco Use    Smoking status: Current Every Day Smoker     Packs/day: 1.00     Years: 40.00     Pack years: 40.00     Types: Cigarettes    Smokeless tobacco: Never Used    Tobacco comment: has tried cold turkey, patches, chantix   Substance and Sexual Activity    Alcohol use: No     Alcohol/week: 0.0 standard drinks    Drug use: Yes     Frequency: 4.0 times per week     Types: Marijuana    Sexual activity: Not Currently     Partners: Male     Birth control/protection: Surgical   Lifestyle    Physical activity     Days per week: Not on file     Minutes per session: Not on file    Stress: Not on file   Relationships    Social connections     Talks on phone: Not on file     Gets together: Not on file     Attends Oriental orthodox service: Not on file     Active member of club or organization: Not on file     Attends meetings of clubs or organizations: Not on file     Relationship status: Not on file    Intimate partner violence     Fear of current or ex partner: Not on file     Emotionally abused: Not on file     Physically abused: Not on file     Forced sexual activity: Not on file   Other Topics Concern    Not on file   Social History Narrative    Not on file       Review of Systems:  Review of Systems   Constitution: Negative for fever and weight loss. HENT: Positive for congestion. Negative for trouble swallowing. Eyes: Negative for photophobia. Cardiovascular: Positive for syncope. Negative for chest pain. Respiratory: Positive for cough. Endocrine: Positive for heat intolerance. Hematologic/Lymphatic: Bruises/bleeds easily.    Skin: Positive for poor wound healing. Musculoskeletal: Positive for back pain and neck pain. Gastrointestinal: Negative for abdominal pain and bowel incontinence. Genitourinary: Positive for bladder incontinence and pelvic pain. Negative for dysuria. Neurological: Positive for headaches and numbness. Negative for paresthesias, tingling and weakness. Psychiatric/Behavioral: Positive for depression. The patient has insomnia. Allergic/Immunologic: Positive for persistent infections. Physical Exam:  /68   Pulse 81   Temp 97.9 °F (36.6 °C) (Temporal)   Wt 292 lb (132.5 kg)   BMI 47.13 kg/m²     Physical Exam  Vitals signs reviewed. Constitutional:       General: She is awake. Appearance: She is not ill-appearing or diaphoretic. HENT:      Right Ear: External ear normal.      Left Ear: External ear normal.   Eyes:      General:         Right eye: No discharge. Left eye: No discharge. Conjunctiva/sclera: Conjunctivae normal.   Neck:      Thyroid: No thyromegaly. Trachea: No tracheal deviation. Pulmonary:      Effort: Pulmonary effort is normal. No respiratory distress. Breath sounds: No stridor. Musculoskeletal:      Cervical back: She exhibits tenderness. She exhibits no swelling and no edema. Lumbar back: She exhibits tenderness. She exhibits no edema and no deformity. Skin:     General: Skin is warm and dry. Neurological:      Mental Status: She is alert and oriented to person, place, and time. Gait: Gait abnormal.      Comments: Walks with a cane.    Psychiatric:         Attention and Perception: Attention and perception normal.         Behavior: Behavior normal.       Record/Diagnostics Review:    As above, I did review the imaging    Orders:  Orders Placed This Encounter   Procedures    XR KNEE RIGHT (3 VIEWS)    XR KNEE LEFT (3 VIEWS)    XR LUMBAR SPINE (2-3 VIEWS)    XR CERVICAL SPINE (2-3 VIEWS)    MRI LUMBAR SPINE WO CONTRAST    MRI CERVICAL SPINE WO CONTRAST  Ambulatory referral to Physical Therapy       Assessment:  1. Cervical disc displacement    2. Fibromyalgia    3. Chronic bilateral low back pain, unspecified whether sciatica present    4. Chronic pain of both knees    5. Class 3 severe obesity with body mass index (BMI) of 45.0 to 49.9 in adult, unspecified obesity type, unspecified whether serious comorbidity present ([facility]    6. Lumbosacral spondylosis without myelopathy        Treatment Plan:  DISCUSSION: Treatment options discussed with patient and all questions answered to patient's satisfaction. OARRS Review: Reviewed and acceptable for medications prescribed. TREATMENT OPTIONS:     Discussed different treatment options including continued conservative care such as weight loss, physical therapy, chiropractic care, acupuncture. Discussed different interventional options such as epidural steroids or medial branch blocks. Also discussed neuromodulation in the form of spinal cord stimulation. Also discussed surgical evaluation. At this point I do believe updated imaging is appropriate. Ordered as above. Try compounding cream to the affected area. Has had intra-articular steroid injection with short-lived benefit. Consider Synvisc injections for the knee. Roxi Hobbs M.D. I have reviewed the chief complaint and history of present illness (including ROS and PFSH) and vital documentation by my staff and I agree with their documentation and have added where applicable.

## 2020-09-11 ENCOUNTER — TELEPHONE (OUTPATIENT)
Dept: PAIN MANAGEMENT | Age: 55
End: 2020-09-11

## 2020-09-24 ENCOUNTER — HOSPITAL ENCOUNTER (OUTPATIENT)
Dept: GENERAL RADIOLOGY | Facility: CLINIC | Age: 55
Discharge: HOME | End: 2020-09-26
Payer: MEDICARE

## 2020-09-24 ENCOUNTER — HOSPITAL ENCOUNTER (OUTPATIENT)
Facility: CLINIC | Age: 55
Discharge: HOME | End: 2020-09-26
Payer: MEDICARE

## 2020-09-24 PROCEDURE — 72100 X-RAY EXAM L-S SPINE 2/3 VWS: CPT

## 2020-09-24 PROCEDURE — 73562 X-RAY EXAM OF KNEE 3: CPT

## 2020-09-24 PROCEDURE — 72040 X-RAY EXAM NECK SPINE 2-3 VW: CPT

## 2020-09-30 ENCOUNTER — HOSPITAL ENCOUNTER (OUTPATIENT)
Dept: MRI IMAGING | Facility: CLINIC | Age: 55
Discharge: HOME | End: 2020-10-02
Payer: MEDICARE

## 2020-09-30 PROCEDURE — 72148 MRI LUMBAR SPINE W/O DYE: CPT

## 2020-09-30 PROCEDURE — 72141 MRI NECK SPINE W/O DYE: CPT

## 2020-10-03 ENCOUNTER — HOSPITAL ENCOUNTER (OUTPATIENT)
Dept: PREADMISSION TESTING | Age: 55
Setting detail: SPECIMEN
Discharge: HOME | End: 2020-10-07
Payer: MEDICARE

## 2020-10-03 PROCEDURE — U0003 INFECTIOUS AGENT DETECTION BY NUCLEIC ACID (DNA OR RNA); SEVERE ACUTE RESPIRATORY SYNDROME CORONAVIRUS 2 (SARS-COV-2) (CORONAVIRUS DISEASE [COVID-19]), AMPLIFIED PROBE TECHNIQUE, MAKING USE OF HIGH THROUGHPUT TECHNOLOGIES AS DESCRIBED BY CMS-2020-01-R: HCPCS

## 2020-10-05 LAB — SARS-COV-2, NAA: NOT DETECTED

## 2020-10-06 ENCOUNTER — ANESTHESIA EVENT (OUTPATIENT)
Dept: OPERATING ROOM | Age: 55
End: 2020-10-06
Payer: MEDICARE

## 2020-10-07 ENCOUNTER — ANESTHESIA (OUTPATIENT)
Dept: OPERATING ROOM | Age: 55
End: 2020-10-07
Payer: MEDICARE

## 2020-10-07 ENCOUNTER — HOSPITAL ENCOUNTER (OUTPATIENT)
Age: 55
Setting detail: OUTPATIENT SURGERY
Discharge: HOME | End: 2020-10-07
Attending: INTERNAL MEDICINE | Admitting: INTERNAL MEDICINE
Payer: MEDICARE

## 2020-10-07 VITALS
RESPIRATION RATE: 23 BRPM | OXYGEN SATURATION: 98 % | SYSTOLIC BLOOD PRESSURE: 149 MMHG | TEMPERATURE: 98.2 F | DIASTOLIC BLOOD PRESSURE: 64 MMHG

## 2020-10-07 VITALS
HEIGHT: 66 IN | HEART RATE: 72 BPM | RESPIRATION RATE: 21 BRPM | WEIGHT: 292.77 LBS | BODY MASS INDEX: 47.05 KG/M2 | OXYGEN SATURATION: 92 % | TEMPERATURE: 97.5 F | DIASTOLIC BLOOD PRESSURE: 74 MMHG | SYSTOLIC BLOOD PRESSURE: 142 MMHG

## 2020-10-07 PROCEDURE — 3700000001 HC ADD 15 MINUTES (ANESTHESIA): Performed by: INTERNAL MEDICINE

## 2020-10-07 PROCEDURE — 7100000011 HC PHASE II RECOVERY - ADDTL 15 MIN: Performed by: INTERNAL MEDICINE

## 2020-10-07 PROCEDURE — 2500000003 HC RX 250 WO HCPCS: Performed by: ANESTHESIOLOGY

## 2020-10-07 PROCEDURE — 3700000000 HC ANESTHESIA ATTENDED CARE: Performed by: INTERNAL MEDICINE

## 2020-10-07 PROCEDURE — 45385 COLONOSCOPY W/LESION REMOVAL: CPT | Performed by: INTERNAL MEDICINE

## 2020-10-07 PROCEDURE — 2720000010 HC SURG SUPPLY STERILE: Performed by: INTERNAL MEDICINE

## 2020-10-07 PROCEDURE — 2580000003 HC RX 258: Performed by: ANESTHESIOLOGY

## 2020-10-07 PROCEDURE — 7100000010 HC PHASE II RECOVERY - FIRST 15 MIN: Performed by: INTERNAL MEDICINE

## 2020-10-07 PROCEDURE — 3609010600 HC COLONOSCOPY POLYPECTOMY SNARE/COLD BIOPSY: Performed by: INTERNAL MEDICINE

## 2020-10-07 PROCEDURE — 45380 COLONOSCOPY AND BIOPSY: CPT | Performed by: INTERNAL MEDICINE

## 2020-10-07 PROCEDURE — 2709999900 HC NON-CHARGEABLE SUPPLY: Performed by: INTERNAL MEDICINE

## 2020-10-07 PROCEDURE — 88305 TISSUE EXAM BY PATHOLOGIST: CPT

## 2020-10-07 PROCEDURE — 6360000002 HC RX W HCPCS: Performed by: NURSE ANESTHETIST, CERTIFIED REGISTERED

## 2020-10-07 RX ORDER — SODIUM CHLORIDE 9 MG/ML
INJECTION, SOLUTION INTRAVENOUS CONTINUOUS
Status: DISCONTINUED | OUTPATIENT
Start: 2020-10-08 | End: 2020-10-07

## 2020-10-07 RX ORDER — FENTANYL CITRATE 50 UG/ML
25 INJECTION, SOLUTION INTRAMUSCULAR; INTRAVENOUS EVERY 5 MIN PRN
Status: DISCONTINUED | OUTPATIENT
Start: 2020-10-07 | End: 2020-10-07 | Stop reason: HOSPADM

## 2020-10-07 RX ORDER — HYDROMORPHONE HCL 110MG/55ML
0.25 PATIENT CONTROLLED ANALGESIA SYRINGE INTRAVENOUS EVERY 5 MIN PRN
Status: DISCONTINUED | OUTPATIENT
Start: 2020-10-07 | End: 2020-10-07 | Stop reason: HOSPADM

## 2020-10-07 RX ORDER — SODIUM CHLORIDE 0.9 % (FLUSH) 0.9 %
10 SYRINGE (ML) INJECTION EVERY 12 HOURS SCHEDULED
Status: DISCONTINUED | OUTPATIENT
Start: 2020-10-07 | End: 2020-10-07 | Stop reason: HOSPADM

## 2020-10-07 RX ORDER — SODIUM CHLORIDE, SODIUM LACTATE, POTASSIUM CHLORIDE, CALCIUM CHLORIDE 600; 310; 30; 20 MG/100ML; MG/100ML; MG/100ML; MG/100ML
INJECTION, SOLUTION INTRAVENOUS CONTINUOUS
Status: DISCONTINUED | OUTPATIENT
Start: 2020-10-07 | End: 2020-10-07 | Stop reason: HOSPADM

## 2020-10-07 RX ORDER — PROPOFOL 10 MG/ML
INJECTION, EMULSION INTRAVENOUS CONTINUOUS PRN
Status: DISCONTINUED | OUTPATIENT
Start: 2020-10-07 | End: 2020-10-07 | Stop reason: SDUPTHER

## 2020-10-07 RX ORDER — LIDOCAINE HYDROCHLORIDE 10 MG/ML
1 INJECTION, SOLUTION EPIDURAL; INFILTRATION; INTRACAUDAL; PERINEURAL
Status: COMPLETED | OUTPATIENT
Start: 2020-10-07 | End: 2020-10-07

## 2020-10-07 RX ORDER — ONDANSETRON 2 MG/ML
4 INJECTION INTRAMUSCULAR; INTRAVENOUS
Status: DISCONTINUED | OUTPATIENT
Start: 2020-10-07 | End: 2020-10-07 | Stop reason: HOSPADM

## 2020-10-07 RX ORDER — SODIUM CHLORIDE 0.9 % (FLUSH) 0.9 %
10 SYRINGE (ML) INJECTION PRN
Status: DISCONTINUED | OUTPATIENT
Start: 2020-10-07 | End: 2020-10-07 | Stop reason: HOSPADM

## 2020-10-07 RX ADMIN — SODIUM CHLORIDE, POTASSIUM CHLORIDE, SODIUM LACTATE AND CALCIUM CHLORIDE: 600; 310; 30; 20 INJECTION, SOLUTION INTRAVENOUS at 10:58

## 2020-10-07 RX ADMIN — LIDOCAINE HYDROCHLORIDE 100 MG: 10 INJECTION, SOLUTION EPIDURAL; INFILTRATION; INTRACAUDAL; PERINEURAL at 10:58

## 2020-10-07 RX ADMIN — PROPOFOL 300 MCG/KG/MIN: 10 INJECTION, EMULSION INTRAVENOUS at 10:58

## 2020-10-07 RX ADMIN — SODIUM CHLORIDE, POTASSIUM CHLORIDE, SODIUM LACTATE AND CALCIUM CHLORIDE: 600; 310; 30; 20 INJECTION, SOLUTION INTRAVENOUS at 10:12

## 2020-10-07 ASSESSMENT — PAIN SCALES - GENERAL
PAINLEVEL_OUTOF10: 0

## 2020-10-07 ASSESSMENT — PULMONARY FUNCTION TESTS
PIF_VALUE: 1

## 2020-10-07 ASSESSMENT — ENCOUNTER SYMPTOMS
BACK PAIN: 1
BOWEL INCONTINENCE: 1
WHEEZING: 1
SHORTNESS OF BREATH: 1

## 2020-10-07 ASSESSMENT — LIFESTYLE VARIABLES: SMOKING_STATUS: 1

## 2020-10-07 NOTE — H&P
GI History and Physical    Pt Name: Ernie Louis  MRN: [id number]  YOB: 1965  Date of evaluation: 10/7/2020  Primary Care Physician: Laly Giraldo MD    SUBJECTIVE:   History of Chief Complaint: This is Ernie Louis a 54 y.o. female who presents today for a COLONOSCOPY  by Dr Oliva Cook  for [address] .   The patient completed the prep as directed Yes. Bowel movements have not significantly changed The patient does not have a family history of colon cancer or polyps. Previous colonoscopy No.  . Patient today denies  fever, chills, night sweats, pain or unexplained weight loss. SHE DOES NOT TAKE BLOOD THINNERS, HSE DOES NOT HAVE DIABETES. Past Medical History    has a past medical history of Anxiety, Asthma, Bladder prolapse, female, acquired, Cancer ([address] COPD (chronic obstructive pulmonary disease) ([address] Depression, Diabetes mellitus ([address] Epigastric pain, Hepatic steatosis, History of cataract extraction with lens replacement, History of stress test, Hyperglycemia, Intertrigo, Lumbar degenerative disc disease, Nausea, Obesity, Osteoarthritis, Osteoarthritis cervical spine, Osteoarthritis thoracic spine, and Tobacco abuse. Past Surgical History   has a past surgical history that includes  section; Cataract removal (); Throat surgery (); and Hysterectomy, vaginal (). Medications  Prior to Admission medications    Medication Sig Start Date End Date Taking? Authorizing Provider   metFORMIN (GLUCOPHAGE) 500 MG tablet  20   Historical Provider, MD   oxyCODONE-acetaminophen (PERCOCET) 5-325 MG per tablet Take 1 tablet by mouth every 8 hours as needed for Pain. Historical Provider, MD   oxybutynin (DITROPAN-XL) 5 MG extended release tablet TAKE ONE TABLET BY MOUTH DAILY 20   Laly Giraldo MD   polyethylene glycol [facility]) 17 GM/SCOOP powder Use as directed by following your patient instructions given by office.  9/3/20   Rosie Junior MD bisacodyl (DULCOLAX) 5 MG EC tablet TAKE 4 TABS AS DIRECTED BY PHYSICIAN OFFICE 9/3/20   Luanna Hamman, MD   zoster recombinant adjuvanted vaccine [facility]) 50 MCG/0.5ML SUSR injection 50 MCG IM then repeat 2-6 months. 7/1/20 7/1/21  Janay Bright MD   VORTIoxetine (TRINTELLIX) 5 MG tablet Take 1 tablet by mouth daily 7/1/20   Janay Bright MD   meloxicam (MOBIC) 7.5 MG tablet Take 1 tablet by mouth daily as needed for Pain 7/1/20   Janay Bright MD   albuterol sulfate HFA (PROAIR HFA) 108 (90 Base) MCG/ACT inhaler Inhale 2 puffs into the lungs every 6 hours as needed for Wheezing 7/1/20   Janay Bright MD   budesonide-formoterol (SYMBICORT) 160-4.5 MCG/ACT AERO INHALE TWO PUFFS BY MOUTH TWICE A DAY 7/1/20   Janay Bright MD   nystatin ([address]) 990327 UNIT/GM powder APPLY ONE DOSE TOPICALLY TWICE A DAY 7/1/20   Janay Bright MD   Insulin Syringe-Needle U-100 30G X 5/16\" 0.5 ML MISC 1 each by Does not apply route daily 9/18/18   Shaniqua Garcia PA-C   blood glucose test strips (ASCENSIA AUTODISC VI;ONE TOUCH ULTRA TEST VI) strip 1 each by In Vitro route daily As needed. 9/17/18   Kenneth Torres PA-C   Elastic Bandages & Supports (JOBST KNEE HIGH COMPRESSION SM) MISC 1 each by Does not apply route daily Please measure 7/12/18   Kenneth Torres PA-C   aspirin 81 MG tablet Take 1 tablet by mouth daily (with breakfast) 5/18/18   Kenneth Torres PA-C   diclofenac sodium 1 % GEL Apply 2 g topically 2 times daily 9/12/17   Kenneth Torres PA-C   Blood Glucose Monitoring Suppl [facility] BLOOD GLUCOSE METER) W/DEVICE KIT 1 each by Does not apply route 2 times daily 5/31/17   Kenneth Torres PA-C   Elastic Bandages & Supports (KNEE BRACE) MISC 1 Device by Does not apply route daily Right knee pain; osteoarthritis right knee 7/16/16   Jaswant Schumacher, APRN - CNP   Nebulizers (COMPRESSOR/NEBULIZER) MISC 1 vial by Does not apply route 4 times daily as needed.  Patient has albuterol prescription at home; needs aerosol machine and set up diagnosis chronic bronchitis 12/20/14   Jose David Coffee, APRN - CNP     Allergies  is allergic to amoxicillin-pot clavulanate; medrol [methylprednisolone]; and tape [adhesive tape]. Family History  family history includes Arthritis in her mother and sister; Asthma in an other family member; Breast Cancer in her maternal grandmother and another family member; Cancer in her brother; Depression in her brother, mother, and sister; Diabetes in her brother, father, and mother; Heart Disease in her father and mother; High Blood Pressure in her brother; Lung Cancer (age of onset: 64) in her brother; Mental Illness in her mother; No Known Problems in her maternal grandfather, paternal grandfather, and paternal grandmother; Pancreatic Cancer in her maternal uncle. Denies esophageal, stomach, pancreatic, liver, or colon CANCERS IN THE FAMILY  ,  Social History   reports that she has been smoking cigarettes. She has a 40.00 pack-year smoking history. She has never used smokeless tobacco.   reports no history of alcohol use. reports current drug use. Frequency: 4.00 times per week. Drug: Marijuana. ROS: Pertinent findings in the HPI above. A comprehensive review of systems was essentially negative  DENIES . exertional chest pain, dyspnea, gastrointestinal symptoms and neurologic symptoms No LMP recorded. Patient has had a hysterectomy. No obstetric history on file. OBJECTIVE:   VITALS:  height is 5' 6\" (1.676 m) and weight is 292 lb 12.3 oz (132.8 kg). Her temporal temperature is 96.8 °F (36 °C). Her blood pressure is 132/68 and her pulse is 80. Her respiration is 17. CONSTITUTIONAL:This is a 54 y.o. female who is cooperative, pleasant, alert & orientated x 3, and in no acute distress   SKIN:  Warm and dry, no rashes   HEAD:  Normocephalic, atraumatic   EYES: PERRL. EOMs intact. EARS:  Hearing grossly WNL. NOSE:  Nares patent.   No rhinorrhea   THROAT:  Airway is patent, membranes are moist   NECK:supple, no lymphadenopathy  LUNGS: Clear to auscultation bilaterally, no wheezes, rales, or rhonchi. CARDIOVASCULAR: Heart sounds are normal.  Regular rate and rhythm without murmur, gallop or rub. ABDOMEN: soft, non tender, non distended, bowel sounds present X 4 OBESE   EXTREMITIES: no peripheral edema bilateral   NEURO: Cranial nerves II-XII grossly intact Strength 5+/5+     Testing:   LAB Review:    CBC:   Lab Results   Component Value Date    WBC 7.3 08/19/2020    RBC 5.46 08/19/2020    HGB 16.6 08/19/2020    HCT 51.9 08/19/2020    MCV 95.1 08/19/2020    MCH 30.4 08/19/2020    MCHC 32.0 08/19/2020    RDW 13.9 08/19/2020     08/19/2020       IMPRESSIONS:   1. COLORECTAL  CANCER SCREENING   2.  has a past medical history of Anxiety, Asthma, Bladder prolapse, female, acquired, Cancer ([facility] [address] (11/1996), COPD (chronic obstructive pulmonary disease) ([facility] [address] Depression, Diabetes mellitus ([facility] [address] Epigastric pain, Hepatic steatosis, History of cataract extraction with lens replacement (2004), History of stress test, Hyperglycemia, Intertrigo, Lumbar degenerative disc disease, Nausea, Obesity (9/1/2017), Osteoarthritis, Osteoarthritis cervical spine, Osteoarthritis thoracic spine, and Tobacco abuse (2/6/2017).    PLANS:   1. COLONOSCOPY     NADER SANTIAGO, ACNP-BC  Electronically signed 10/7/2020 at 9:58 AM

## 2020-10-07 NOTE — PROGRESS NOTES
HPI:     Back Pain   This is a chronic problem. The current episode started more than 1 year ago. The problem occurs daily. The problem has been rapidly worsening since onset. The pain is present in the lumbar spine and thoracic spine. The quality of the pain is described as aching, burning, shooting and stabbing. The pain radiates to the left thigh. The pain is at a severity of 4/10. The pain is mild. The pain is worse during the night. The symptoms are aggravated by standing, sitting, twisting and position. Stiffness is present in the morning and at night. Associated symptoms include bowel incontinence, headaches and numbness. She has tried heat and NSAIDs for the symptoms. The treatment provided no relief. Multiple pain complaints. Complains of bilateral knee pain. X-ray degenerative changes however she feels the pain is manageable at times. Also clear neck pain and left hand pain. MRI with some degenerative change and disc bulging at C5-6 with stenosis at this level. She also claims of some numbness in her hand feels like she does not have a good  in the left hand. In regards to the low back. Chronic constant aching nonradiating low back pain. MRI with multilevel degenerative changes. Patient denies any new neurological symptoms. Nobowel or bladder incontinence, no weakness, and no falling. Review of OARRS does not show any aberrant prescription behavior. Past Medical History:   Diagnosis Date    Anxiety     Asthma     Bladder incontinence     At times per pt.     Bladder prolapse, female, acquired     Cancer [facility]) 11/1996    cervical; treated with hysterectomy    COPD (chronic obstructive [facility] disease) [address] [address]     Depression     Epigastric pain     Hepatic steatosis     History of cataract extraction with lens replacement 2004    bilateral    History of stress test     Hyperglycemia     Intertrigo     Lumbar degenerative disc disease     Nausea     Obesity 9/1/2017  Osteoarthritis     right knee    Osteoarthritis cervical spine     Osteoarthritis thoracic spine     Tobacco abuse 2017       Past Surgical History:   Procedure Laterality Date    CATARACT REMOVAL       SECTION      x1    COLONOSCOPY N/A 10/7/2020    COLONOSCOPY POLYPECTOMY SNARE HOT/BIOPSY WITH CLIPPING performed by Komal Birch MD at [place] [address]    secondary to cervical cancer--ovaries still present    THROAT SURGERY      throat polyps removed       Allergies   Allergen Reactions    Amoxicillin-Pot Clavulanate Diarrhea and Other (See Comments)    Medrol [Methylprednisolone] Other (See Comments)     Redness of hands and itching  Redness of hands and itching    Tape [place] Tape] Rash         Current Outpatient Medications:     albuterol sulfate HFA (PROAIR HFA) 108 (90 Base) MCG/ACT inhaler, Inhale 2 puffs into the lungs every 6 hours as needed for Wheezing, Disp: 1 Inhaler, Rfl: 3    nystatin (NYAMYC) 096018 UNIT/GM powder, APPLY ONE DOSE TOPICALLY TWICE A DAY, Disp: 1 Bottle, Rfl: 3    VORTIoxetine (TRINTELLIX) 5 MG tablet, Take 1 tablet by mouth daily (Patient not taking: Reported on 10/8/2020), Disp: 30 tablet, Rfl: 2    budesonide-formoterol (SYMBICORT) 160-4.5 MCG/ACT AERO, INHALE TWO PUFFS BY MOUTH TWICE A DAY, Disp: 1 Inhaler, Rfl: 2    Elastic Bandages & Supports (JOBST KNEE HIGH COMPRESSION SM) MISC, 1 each by Does not apply route daily Please measure, Disp: 3 each, Rfl: 2    diclofenac sodium 1 % GEL, Apply 2 g topically 2 times daily (Patient not taking: Reported on 10/8/2020), Disp: 1 Tube, Rfl: 3    Blood Glucose Monitoring Suppl (ACURA BLOOD GLUCOSE METER) W/DEVICE KIT, 1 each by Does not apply route 2 times daily, Disp: 1 kit, Rfl: 0    Elastic Bandages & Supports (KNEE BRACE) MISC, 1 Device by Does not apply route daily Right knee pain; osteoarthritis right knee, Disp: 1 each, Rfl: 1    Nebulizers (COMPRESSOR/NEBULIZER) MISC, 1 vial by Does not apply route 4 times daily as needed.  Patient has albuterol prescription at home; needs aerosol machine and set up diagnosis chronic bronchitis, Disp: 1 each, Rfl: 0    Family History   Problem Relation Age of Onset    Diabetes Mother         from pancreatic resection    Heart Disease Mother     Arthritis Mother     Depression Mother     Mental Illness Mother     Diabetes Father     Heart Disease Father     Depression Brother     Cancer Brother         bladder    Lung Cancer Brother 64        cause of death    Depression Sister     Breast Cancer Maternal Grandmother     Asthma Other         grandson    Breast Cancer Other         diagnosed in her 42's    High Blood Pressure Brother     Diabetes Brother     No Known Problems Maternal Grandfather     No Known Problems Paternal Grandmother     No Known Problems Paternal Grandfather     Arthritis Sister     Pancreatic Cancer Maternal Uncle     High Cholesterol Neg Hx     Kidney Disease Neg Hx     Stroke Neg Hx     Ovarian Cancer Neg Hx     Colon Cancer Neg Hx        Social History     Socioeconomic History    Marital status: Single     Spouse name: Not on file    Number of children: Not on file    Years of education: Not on file    Highest education level: Not on file   Occupational History     Employer: UNEMPLOYED   Social Needs    Financial resource strain: Not on file    Food insecurity     Worry: Not on file     Inability: Not on file    Transportation needs     Medical: Not on file     Non-medical: Not on file   Tobacco Use    Smoking status: Current Every Day Smoker     Packs/day: 1.00     Years: 40.00     Pack years: 40.00     Types: Cigarettes    Smokeless tobacco: Never Used    Tobacco comment: has tried cold turkey, patches, chantix   Substance and Sexual Activity    Alcohol use: No     Alcohol/week: 0.0 standard drinks    Drug use: Yes     Frequency: 4.0 times per week     Types: Marijuana Comment: Last used 10/6/20    Sexual activity: Not Currently     Partners: Male     Birth control/protection: Surgical   Lifestyle    Physical activity     Days per week: Not on file     Minutes per session: Not on file    Stress: Not on file   Relationships    Social connections     Talks on phone: Not on file     Gets together: Not on file     Attends Mosque service: Not on file     Active member of club or organization: Not on file     Attends meetings of clubs or organizations: Not on file     Relationship status: Not on file    Intimate partner violence     Fear of current or ex partner: Not on file     Emotionally abused: Not on file     Physically abused: Not on file     Forced sexual activity: Not on file   Other Topics Concern    Not on file   Social History Narrative    Not on file       Review of Systems:  Review of Systems   Constitution: Positive for malaise/fatigue. Respiratory: Positive for shortness of breath and wheezing. Musculoskeletal: Positive for back pain, joint pain and joint swelling. Gastrointestinal: Positive for bowel incontinence. Neurological: Positive for dizziness, headaches and numbness. Physical Exam:  /87   Pulse 94   Temp 97.1 °F (36.2 °C) (Temporal)   Ht 5' 6\" (1.676 m)   Wt 291 lb (132 kg)   BMI 46.97 kg/m²     Physical Exam  Vitals signs reviewed. Musculoskeletal:      Lumbar back: She exhibits tenderness. She exhibits no edema and no deformity. Neurological:      Mental Status: She is alert and oriented to person, place, and time. Gait: Gait normal.         Record/Diagnostics Review:    As above, I did review the imaging    Orders:  Orders Placed This Encounter   Procedures    Ambulatory referral to Neurosurgery    EMG     No orders of the defined types were placed in this encounter. Assessment:  1. Cervical radiculopathy    2. Lumbosacral spondylosis without myelopathy    3. DDD (degenerative disc disease), lumbar    4. Cervicalgia    5. Chronic pain of both knees        Treatment Plan:  DISCUSSION: Treatment options discussed with patient and all questions answered to patient's satisfaction. OARRS Review: Reviewed and acceptable for medications prescribed. TREATMENT OPTIONS:     Discussed different treatment options including continued conservative care such as physical therapy, chiropractic care, acupuncture. Discussed different interventional options such as epidural steroids or medial branch blocks. Also discussed neuromodulation in the form of spinal cord stimulation. Also discussed surgical evaluation. In regards to her knee pain at this point she would like to hold off on further treatment/intervention for now. In regards to her neck she is having significant hand pain with subjective weakness in her , I will obtain an EMG of the upper extremities and also have her touch base with neurosurgeon to evaluate. Consider injection therapies in the future if nothing surgical.    In regards to the low back she wishes to try injections and see how she does. Has failed conservative options, significant axial low back pain with degenerative facet changes on MRI, good candidate for diagnostic lumbar facets at bilateral L3-4, L4-5 and L5-S1 to see if pain is facet mediated, if this is beneficial would be a candidate for radiofrequency ablation. Failed gabapentin in the past, will start Lyrica 50 mg twice a day. Nuria Sumner M.D. I have reviewed the chief complaint and history of present illness (including ROS and PFSH) and vital documentation by my staff and I agree with their documentation and have added where applicable.

## 2020-10-07 NOTE — ANESTHESIA POSTPROCEDURE EVALUATION
Department of Anesthesiology  Postprocedure Note    Patient: Griselda Keepers  MRN: [id number]  YOB: 1965  Date of evaluation: 10/7/2020  Time:  1:28 PM     Procedure Summary     Date:  10/07/20 Room / Location:  [place] [address] / [facility] - INPATIENT    Anesthesia Start:  7258 Anesthesia Stop:  3669    Procedure:  COLONOSCOPY POLYPECTOMY SNARE HOT/BIOPSY WITH CLIPPING (N/A ) Diagnosis:  (DX SCREENING)    Surgeon:  Frida Monroy MD Responsible Provider:  Emily Fong MD    Anesthesia Type:  TIVA ASA Status:  3          Anesthesia Type: TIVA    Karen Phase I: Karen Score: 10    Karen Phase II: Karen Score: 10    Last vitals: Reviewed and per EMR flowsheets.        Anesthesia Post Evaluation    Patient location during evaluation: PACU  Patient participation: complete - patient participated  Level of consciousness: awake  Airway patency: patent  Nausea & Vomiting: no nausea  Complications: no  Cardiovascular status: blood pressure returned to baseline  Respiratory status: acceptable  Hydration status: euvolemic

## 2020-10-07 NOTE — ANESTHESIA PRE PROCEDURE
TOUCH ULTRA TEST VI) strip 1 each by In Vitro route daily As needed. 9/17/18   Alber Avila PA-C   Elastic Bandages & Supports (JOBST KNEE HIGH COMPRESSION SM) MISC 1 each by Does not apply route daily Please measure 7/12/18   Alber Avila PA-C   aspirin 81 MG tablet Take 1 tablet by mouth daily (with breakfast) 5/18/18   Alber Avila PA-C   diclofenac sodium 1 % GEL Apply 2 g topically 2 times daily 9/12/17   Alber Avila PA-C   Blood Glucose Monitoring Suppl [facility] BLOOD GLUCOSE METER) W/DEVICE KIT 1 each by Does not apply route 2 times daily 5/31/17   Alber Avila PA-C   Elastic Bandages & Supports (KNEE BRACE) MISC 1 Device by Does not apply route daily Right knee pain; osteoarthritis right knee 7/16/16   JACK Helms CNP   Nebulizers (COMPRESSOR/NEBULIZER) MISC 1 vial by Does not apply route 4 times daily as needed. Patient has albuterol prescription at home; needs aerosol machine and set up diagnosis chronic bronchitis 12/20/14   JACK Helms CNP       Current medications:    No current facility-administered medications for this encounter. Allergies:     Allergies   Allergen Reactions    Amoxicillin-Pot Clavulanate Diarrhea and Other (See Comments)    Medrol [Methylprednisolone] Other (See Comments)     Redness of hands and itching  Redness of hands and itching    Tape [Adhesive Tape] Rash       Problem List:    Patient Active Problem List   Diagnosis Code    Hyperglycemia R73.9    Anxiety F41.9    Bronchitis J40    Depression F32.9    Degenerative arthritis of lumbar spine M47.816    Osteoarthritis of knee M17.10    Insulin resistance E88.81    COPD (chronic obstructive pulmonary disease) ([facility]) J44.9    Osteoarthritis thoracic spine M47.814    Osteoarthritis cervical spine M47.812    Prediabetes R73.03    Peripheral edema R60.9    Hepatic steatosis K76.0    Epigastric pain R10.13    Nausea R11.0    Vitamin D deficiency E55.9    Tobacco abuse Z72.0    Vocal cord polyp J38.1    Laryngitis J04.0    Obesity E66.9    Encounter for diabetic foot exam ([facility] E11.9    SOB (shortness of breath) R06.02    Family history of MI (myocardial infarction) Z82.49    Bilateral leg edema R60.0    Pulmonary HTN (HCC) I27.20    TAMARA on CPAP G47.33, Z99.89       Past Medical History:        Diagnosis Date    Anxiety     Asthma     Bladder prolapse, female, acquired     Cancer ([facility] 1996    cervical; treated with hysterectomy    COPD (chronic obstructive pulmonary disease) ([facility]     Depression     Diabetes mellitus ([facility]     Epigastric pain     Hepatic steatosis     History of cataract extraction with lens replacement     bilateral    History of stress test     Hyperglycemia     Intertrigo     Lumbar degenerative disc disease     Nausea     Obesity 2017    Osteoarthritis     right knee    Osteoarthritis cervical spine     Osteoarthritis thoracic spine     Tobacco abuse 2017       Past Surgical History:        Procedure Laterality Date    CATARACT REMOVAL       SECTION      x1    HYSTERECTOMY, VAGINAL      secondary to cervical cancer--ovaries still present    THROAT SURGERY      throat polyps removed       Social History:    Social History     Tobacco Use    Smoking status: Current Every Day Smoker     Packs/day: 1.00     Years: 40.00     Pack years: 40.00     Types: Cigarettes    Smokeless tobacco: Never Used    Tobacco comment: has tried cold turkey, patches, chantix   Substance Use Topics    Alcohol use: No     Alcohol/week: 0.0 standard drinks                                Ready to quit: Not Answered  Counseling given: Not Answered  Comment: has tried cold turkey, patches, chantix      Vital Signs (Current):   Vitals:    10/07/20 0953   BP: 132/68   Pulse: 80   Resp: 17   Temp: 96.8 °F (36 °C)   TempSrc: Temporal   Weight: 292 lb 12.3 oz (132.8 kg)   Height: 5' 6\" (1.676 m) BP Readings from Last 3 Encounters:   10/07/20 132/68   09/10/20 122/68   08/31/20 114/70       NPO Status:                                                                                 BMI:   Wt Readings from Last 3 Encounters:   10/07/20 292 lb 12.3 oz (132.8 kg)   09/10/20 292 lb (132.5 kg)   08/31/20 295 lb (133.8 kg)     Body mass index is 47.25 kg/m². CBC:   Lab Results   Component Value Date    WBC 7.3 08/19/2020    RBC 5.46 08/19/2020    HGB 16.6 08/19/2020    HCT 51.9 08/19/2020    MCV 95.1 08/19/2020    RDW 13.9 08/19/2020     08/19/2020       CMP:   Lab Results   Component Value Date     08/19/2020    K 4.6 08/19/2020     08/19/2020    CO2 25 08/19/2020    BUN 14 08/19/2020    CREATININE 0.53 08/19/2020    GFRAA >60 08/19/2020    LABGLOM >60 08/19/2020    GLUCOSE 104 09/20/2019    PROT 6.5 08/19/2020    CALCIUM 9.2 08/19/2020    BILITOT 0.45 08/19/2020    ALKPHOS 82 08/19/2020    AST 19 08/19/2020    ALT 23 08/19/2020       POC Tests: No results for input(s): POCGLU, POCNA, POCK, POCCL, POCBUN, POCHEMO, POCHCT in the last 72 hours.     Coags:   Lab Results   Component Value Date    PROTIME 9.7 01/18/2017    INR 1.0 01/18/2017    APTT 26.6 01/18/2017       HCG (If Applicable): No results found for: PREGTESTUR, PREGSERUM, HCG, HCGQUANT     ABGs: No results found for: PHART, PO2ART, LNY7KQF, DMO5YGW, BEART, E8KYFRCB     Type & Screen (If Applicable):  No results found for: LABABO, LABRH    Drug/Infectious Status (If Applicable):  Lab Results   Component Value Date    HEPCAB NONREACTIVE 12/05/2017       COVID-19 Screening (If Applicable):   Lab Results   Component Value Date    COVID19 Not Detected 10/03/2020         Anesthesia Evaluation   no history of anesthetic complications:   Airway: Mallampati: II  TM distance: >3 FB   Neck ROM: full  Mouth opening: > = 3 FB Dental:          Pulmonary:normal exam    (+) COPD:  sleep apnea: on CPAP,  current smoker                           Cardiovascular:    (+) pulmonary hypertension:,     (-)  angina             ROS comment: 2018 IMPRESSIONS:      ·            Echo complete with color and doppler. .  ·            Normal left ventricular size with normal systolic function. EF 60-65%. ·            Borderline left atrial enlargement. ·            Thickened mitral valve. ·            Trivial mitral and tricuspid regurgitation. ·            Moderately elevated right heart pressures, RVSP 41 mmHg. ·            Trivial pulmonic insufficiency. ·            No prior echo to use for comparison. Neuro/Psych:   (+) depression/anxiety             GI/Hepatic/Renal:   (+) liver disease (hepatic steatosis):,      (-) GERD       Endo/Other:    (+) Diabetes, : arthritis: OA., .                 Abdominal:           Vascular:                                      Anesthesia Plan      TIVA     ASA 3       Induction: intravenous. MIPS: Prophylactic antiemetics administered. Anesthetic plan and risks discussed with patient. Plan discussed with CRNA.     Attending anesthesiologist reviewed and agrees with Radha High MD   10/7/2020

## 2020-10-07 NOTE — OP NOTE
PROCEDURE NOTE    DATE OF PROCEDURE: 10/7/2020    SURGEON: Yong Gilliland MD    ASSISTANT: None    PREOPERATIVE DIAGNOSIS: SCREENING    POSTOPERATIVE DIAGNOSIS: as described below    OPERATION: Total colonoscopy SNARE POLYPECTOMIES  HEMO CLIP APPLICATION TO CONTROL BLEEDING  BIOPSIES     ANESTHESIA: MAC PER ANESTHESIA     ESTIMATED BLOOD LOSS: less than 50     COMPLICATIONS: None. SPECIMENS:  Was Obtained:     HISTORY: The patient is a 54y.o. year old female with history of above preop diagnosis. I recommended colonoscopy with possible biopsy or polypectomy and I explained the risk, benefits, expected outcome, and alternatives to the procedure. Risks included but are not limited to bleeding, infection, respiratory distress, hypotension, and perforation of the colon and possibility of missing a lesion. The patient understands and is in agreement. PROCEDURE: The patient was given IV conscious sedation. The patient's SPO2 remained above 90% throughout the procedure. The colonoscope was inserted per rectum and advanced under direct vision to the cecum without difficulty. The prep was good.       EXTENSIVE SPASMS THROUGHOUT THE COLON  LIQUID PASTY STOOLS    Findings:  Terminal ileum: normal    Cecum/Ascending colon: abnormal: A FLAT ONE CM POLYP WAS REMOVED WITH SNARE CAUTERY FROM THE CECUM  POST POLYPECTOMY OOZING OF BLOOD WAS STOPPED WITH APPLICATION OF HEMO CLIP    ANOTHER ADJACENT 3 MM POLYP WAS EXCISED WITH JUMBO BIOPSY FORCEPS    AT HEPATIC FLEXURE TWO POLYPS 4 MM AND 3 MM WERE BIOPSIED BEHIND THE FOLD    Transverse colon: abnormal: MILD DIVERTICULOSIS    Descending/Sigmoid colon: abnormal: DIVERTICULOSIS    A 3 MM POLYP WAS EXCISED WITH JUMBO BIOPSY FORCEPS FROM THE SIGMOID AT 35 CM      Rectum/Anus: examined in normal and retroflexed positions and was abnormal: A 5 MM POLYP WAS REMOVED WITH SNARE FROM THE RECTUM    MOD INT HEMORRHOIDS    EXT HEMORRHOIDS    Withdrawal Time was (minutes): 22    The colon was decompressed and the scope was removed. The patient tolerated the procedure well. Recommendations/Plan:   1. Lifestyle and dietary modifications as discussed  2. F/U Biopsies  3. F/U In Office in 3-4 weeks  4. Discussed with the family  5. Colonoscopy Recall :2 year  6. POST SEDATION PATIENT WAS STABLE WITH STABLE VITAL SIGNS AND OXYGEN SATURATIONS AND WAS DISCHARGED HOME WITH RIDE IN A STABLE CONDITION.     Electronically signed by Gutierrez Jovel MD  on 10/7/2020 at 11:25 AM

## 2020-10-08 ENCOUNTER — OFFICE VISIT (OUTPATIENT)
Dept: PAIN MANAGEMENT | Age: 55
End: 2020-10-08
Payer: MEDICARE

## 2020-10-08 VITALS
SYSTOLIC BLOOD PRESSURE: 132 MMHG | HEIGHT: 66 IN | WEIGHT: 291 LBS | TEMPERATURE: 97.1 F | HEART RATE: 94 BPM | BODY MASS INDEX: 46.77 KG/M2 | DIASTOLIC BLOOD PRESSURE: 87 MMHG

## 2020-10-08 PROCEDURE — 99214 OFFICE O/P EST MOD 30 MIN: CPT | Performed by: PAIN MEDICINE

## 2020-10-08 RX ORDER — PREGABALIN 50 MG/1
50 CAPSULE ORAL 2 TIMES DAILY
Qty: 60 CAPSULE | Refills: 0 | Status: SHIPPED | OUTPATIENT
Start: 2020-10-08 | End: 2020-11-05 | Stop reason: CLARIF

## 2020-10-09 ENCOUNTER — TELEPHONE (OUTPATIENT)
Dept: PAIN MANAGEMENT | Age: 55
End: 2020-10-09

## 2020-10-09 LAB — SURGICAL PATHOLOGY REPORT: NORMAL

## 2020-10-09 NOTE — TELEPHONE ENCOUNTER
Pt called with questions regarding procedures she scheduled for 11/6 and 11/13-I don't see anything scheduled-please contact her back at [phone]

## 2020-10-12 ENCOUNTER — TELEPHONE (OUTPATIENT)
Dept: PAIN MANAGEMENT | Age: 55
End: 2020-10-12

## 2020-10-12 NOTE — TELEPHONE ENCOUNTER
Pt states she is experiencing severe right sided sciatic nerve pain and she rates the pain today as 8/10. Pt states she started taking Lyrica 50 mg BID on 10/9/20 and has tried stretching and rolling a tennis ball but nothing is helping to relieve the pain. Pt would like to know what else she could try to relieve the pain.

## 2020-10-14 ENCOUNTER — TELEPHONE (OUTPATIENT)
Dept: GASTROENTEROLOGY | Age: 55
End: 2020-10-14

## 2020-10-14 PROBLEM — K63.5 COLON POLYPS: Status: ACTIVE | Noted: 2020-10-07

## 2020-10-14 NOTE — TELEPHONE ENCOUNTER
Spoke with pt and informed her Dr. Kuldip Francisco would like to see her in office tomorrow for further evaluation.  Pt scheduled OV tomorrow 10/15/20

## 2020-10-14 NOTE — TELEPHONE ENCOUNTER
Pt had colon screen. Did discuss pathology results with pt over the phone and did educate on importance of follow up colonoscopies, next due 2 years, per Dr Dianne Harrington. Pt does verbalizes understanding. Pt asked to call office for apt if any change in bowl habits or GI issues in next 2 years.

## 2020-10-15 ENCOUNTER — OFFICE VISIT (OUTPATIENT)
Dept: PAIN MANAGEMENT | Age: 55
End: 2020-10-15
Payer: MEDICARE

## 2020-10-15 VITALS
DIASTOLIC BLOOD PRESSURE: 71 MMHG | TEMPERATURE: 97.2 F | WEIGHT: 291 LBS | HEART RATE: 94 BPM | SYSTOLIC BLOOD PRESSURE: 116 MMHG | BODY MASS INDEX: 46.77 KG/M2 | HEIGHT: 66 IN

## 2020-10-15 PROCEDURE — 99213 OFFICE O/P EST LOW 20 MIN: CPT | Performed by: PAIN MEDICINE

## 2020-10-15 RX ORDER — PREGABALIN 100 MG/1
100 CAPSULE ORAL 2 TIMES DAILY
Qty: 60 CAPSULE | Refills: 1 | Status: SHIPPED | OUTPATIENT
Start: 2020-10-15 | End: 2021-04-26

## 2020-10-15 ASSESSMENT — ENCOUNTER SYMPTOMS
COUGH: 1
WHEEZING: 1
SHORTNESS OF BREATH: 1
BACK PAIN: 1
BOWEL INCONTINENCE: 0

## 2020-10-15 NOTE — PROGRESS NOTES
HPI:     Back Pain   This is a chronic problem. The current episode started more than 1 year ago. The problem occurs constantly. The problem has been rapidly worsening since onset. The pain is present in the lumbar spine. The quality of the pain is described as stabbing, shooting, cramping, burning and aching. The pain radiates to the right thigh, right knee and right foot. The pain is at a severity of 6/10. The pain is moderate. The pain is worse during the night. The symptoms are aggravated by bending, coughing, position, sitting, standing, stress and twisting. Stiffness is present in the morning and at night. Associated symptoms include bladder incontinence, headaches, leg pain, numbness, tingling and weakness. Pertinent negatives include no bowel incontinence or pelvic pain. Risk factors include history of osteoporosis and history of cancer. She has tried heat, NSAIDs and analgesics for the symptoms. The treatment provided no relief. Chronic low back pain pain MRI with degenerative changes disc bulging. Had a significant flareup of her low back and leg pain however this is seemingly back to baseline now. Neurontin in the past without benefit. Lyrica 50 mg twice a day without benefit. Neurosurgical evaluation pending. She has lumbar spinal injections pending as well. EMG also pending. Patient denies any new neurological symptoms. Nobowel or bladder incontinence, no weakness, and no falling. Review of OARRS does not show any aberrant prescription behavior. Medication is helping the patient stay active. Patient denies any side effects and reports adequate analgesia. No sign of misuse/abuse. Past Medical History:   Diagnosis Date    Anxiety     Asthma     Bladder incontinence     At times per pt.     Bladder prolapse, female, acquired     Cancer [facility]) 11/1996    cervical; treated with hysterectomy    Colon polyps 10/07/2020    tubular adenomas x4    COPD [facility]chronic obstructive [facility] disease) ([facility]     Depression     Epigastric pain     Hepatic steatosis     History of cataract extraction with lens replacement 2004    bilateral    History of stress test     Hyperglycemia     Intertrigo     Lumbar degenerative disc disease     Nausea     Obesity 2017    Osteoarthritis     right knee    Osteoarthritis cervical spine     Osteoarthritis thoracic spine     Tobacco abuse 2017       Past Surgical History:   Procedure Laterality Date    CATARACT REMOVAL       SECTION      x1    COLONOSCOPY N/A 10/7/2020    tubular adenomas x4    HYSTERECTOMY, VAGINAL      secondary to cervical cancer--ovaries still present    THROAT SURGERY      throat polyps removed       Allergies   Allergen Reactions    Amoxicillin-Pot Clavulanate Diarrhea and Other (See Comments)    Medrol [Methylprednisolone] Other (See Comments)     Redness of hands and itching  Redness of hands and itching    Tape [Adhesive Tape] Rash         Current Outpatient Medications:     pregabalin (LYRICA) 50 MG capsule, Take 1 capsule by mouth 2 times daily for 30 days. , Disp: 60 capsule, Rfl: 0    VORTIoxetine (TRINTELLIX) 5 MG tablet, Take 1 tablet by mouth daily (Patient not taking: Reported on 10/8/2020), Disp: 30 tablet, Rfl: 2    albuterol sulfate HFA (PROAIR HFA) 108 (90 Base) MCG/ACT inhaler, Inhale 2 puffs into the lungs every 6 hours as needed for Wheezing, Disp: 1 Inhaler, Rfl: 3    budesonide-formoterol (SYMBICORT) 160-4.5 MCG/ACT AERO, INHALE TWO PUFFS BY MOUTH TWICE A DAY, Disp: 1 Inhaler, Rfl: 2    nystatin (NYAMYC) 598464 UNIT/GM powder, APPLY ONE DOSE TOPICALLY TWICE A DAY, Disp: 1 Bottle, Rfl: 3    Elastic Bandages & Supports (JOBST KNEE HIGH COMPRESSION SM) MISC, 1 each by Does not apply route daily Please measure, Disp: 3 each, Rfl: 2    diclofenac sodium 1 % GEL, Apply 2 g topically 2 times daily (Patient not taking: Reported on 10/8/2020), Disp: 1 Tube, Rfl: 3    Blood Glucose Monitoring Suppl (ACURA BLOOD GLUCOSE METER) W/DEVICE KIT, 1 each by Does not apply route 2 times daily, Disp: 1 kit, Rfl: 0    Elastic Bandages & Supports (KNEE BRACE) MISC, 1 Device by Does not apply route daily Right knee pain; osteoarthritis right knee, Disp: 1 each, Rfl: 1    Nebulizers (COMPRESSOR/NEBULIZER) MISC, 1 vial by Does not apply route 4 times daily as needed.  Patient has albuterol prescription at home; needs aerosol machine and set up diagnosis chronic bronchitis, Disp: 1 each, Rfl: 0    Family History   Problem Relation Age of Onset    Diabetes Mother         from pancreatic resection    Heart Disease Mother     Arthritis Mother     Depression Mother     Mental Illness Mother     Diabetes Father     Heart Disease Father     Depression Brother     Cancer Brother         bladder    Lung Cancer Brother 64        cause of death    Depression Sister     Breast Cancer Maternal Grandmother     Asthma Other         grandson    Breast Cancer Other         diagnosed in her 42's    High Blood Pressure Brother     Diabetes Brother     No Known Problems Maternal Grandfather     No Known Problems Paternal Grandmother     No Known Problems Paternal Grandfather     Arthritis Sister     Pancreatic Cancer Maternal Uncle     High Cholesterol Neg Hx     Kidney Disease Neg Hx     Stroke Neg Hx     Ovarian Cancer Neg Hx     Colon Cancer Neg Hx        Social History     Socioeconomic History    Marital status: Single     Spouse name: Not on file    Number of children: Not on file    Years of education: Not on file    Highest education level: Not on file   Occupational History     Employer: UNEMPLOYED   Social Needs    Financial resource strain: Not on file    Food insecurity     Worry: Not on file     Inability: Not on file    Transportation needs     Medical: Not on file     Non-medical: Not on file   Tobacco Use    Smoking status: Current Every Day Smoker     Packs/day: 1.00 Years: 40.00     Pack years: 40.00     Types: Cigarettes    Smokeless tobacco: Never Used    Tobacco comment: has tried cold turkey, patches, chantix   Substance and Sexual Activity    Alcohol use: No     Alcohol/week: 0.0 standard drinks    Drug use: Yes     Frequency: 4.0 times per week     Types: Marijuana     Comment: Last used 10/6/20    Sexual activity: Not Currently     Partners: Male     Birth control/protection: Surgical   Lifestyle    Physical activity     Days per week: Not on file     Minutes per session: Not on file    Stress: Not on file   Relationships    Social connections     Talks on phone: Not on file     Gets together: Not on file     Attends Synagogue service: Not on file     Active member of club or organization: Not on file     Attends meetings of clubs or organizations: Not on file     Relationship status: Not on file    Intimate partner violence     Fear of current or ex partner: Not on file     Emotionally abused: Not on file     Physically abused: Not on file     Forced sexual activity: Not on file   Other Topics Concern    Not on file   Social History Narrative    Not on file       Review of Systems:  Review of Systems   Constitution: Positive for malaise/fatigue. Respiratory: Positive for cough, shortness of breath and wheezing. Musculoskeletal: Positive for back pain, joint pain and joint swelling. Gastrointestinal: Negative for bowel incontinence. Genitourinary: Positive for bladder incontinence. Negative for pelvic pain. Neurological: Positive for headaches, numbness, tingling and weakness. Physical Exam:  /71   Pulse 94   Temp 97.2 °F (36.2 °C) (Temporal)   Ht 5' 6\" (1.676 m)   Wt 291 lb (132 kg)   BMI 46.97 kg/m²     Physical Exam    Record/Diagnostics Review:    As above, I did review the imaging    Orders:  No orders of the defined types were placed in this encounter.     No orders of the defined types were placed in this encounter. Assessment:  1. Lumbosacral spondylosis without myelopathy    2. Lumbar radiculopathy    3. Other chronic pain    4. Cervical radiculopathy        Treatment Plan:  DISCUSSION: Treatment options discussed with patient and all questions answered to patient's satisfaction. OARRS Review: Reviewed and acceptable for medications prescribed. TREATMENT OPTIONS:     Had significant flareup of pain however seemingly back to baseline again. Lyrica 50 mg twice a day without benefit, will increase this to 100 mg twice a day. EMG upper extremities pending. Surgical evaluation pending. Spinal injections pending. Trevor Goodson M.D. I have reviewed the chief complaint and history of present illness (including ROS and PFSH) and vital documentation by my staff and I agree with their documentation and have added where applicable.

## 2020-10-16 ENCOUNTER — NURSE TRIAGE (OUTPATIENT)
Dept: OTHER | Facility: CLINIC | Age: 55
End: 2020-10-16

## 2020-10-16 ENCOUNTER — TELEPHONE (OUTPATIENT)
Dept: FAMILY MEDICINE CLINIC | Age: 55
End: 2020-10-16

## 2020-10-16 NOTE — TELEPHONE ENCOUNTER
Reason for Disposition   Patient wants to be seen    Answer Assessment - Initial Assessment Questions  1. ONSET: \"When did the nasal discharge start? \"       Runny nose started 3 days ago    2. AMOUNT: \"How much discharge is there? \"       Mild    3. COUGH: \"Do you have a cough? \" If yes, ask: \"Describe the color of your sputum\" (clear, white, yellow, green)      Mild, coughing produces yellow mucous now    4. RESPIRATORY DISTRESS: \"Describe your breathing. \"       Breathing is same as usual, per pt report. 5. FEVER: \"Do you have a fever? \" If so, ask: \"What is your temperature, how was it measured, and when did it start? \"      Denies    6. SEVERITY: \"Overall, how bad are you feeling right now? \" (e.g., doesn't interfere with normal activities, staying home from school/work, staying in bed)       Able to do normal activities, but feels Beverlie Jackson. Pt reports this happens every year at this time. 7. OTHER SYMPTOMS: \"Do you have any other symptoms? \" (e.g., sore throat, earache, wheezing, vomiting)      Denies   C/o the nasal congestion     8. PREGNANCY: \"Is there any chance you are pregnant? \" \"When was your last menstrual period? \"      Denies    Pt reports she always get a steroid shot and antibiotic around this time of year for the same symptoms. Protocols used: COMMON COLD-ADULT-[place]    Patient called pre-service center [facility] with red flag complaint. Brief description of triage: Pt calls in reporting runny nose and cough x 3 days. Triage indicates for patient to be seen today or tomorrow. Care advice provided, patient verbalizes understanding; denies any other questions or concerns; instructed to call back for any new or worsening symptoms. Writer provided warm transfer to amari Jimenez at [facility] for appointment scheduling. Attention Provider: Thank you for allowing me to participate in the care of your patient.  The patient was connected to triage in response to information provided to the E[facility]. Please do not respond through this encounter as the response is not directed to a shared pool.

## 2020-10-30 ENCOUNTER — TELEPHONE (OUTPATIENT)
Dept: PAIN MANAGEMENT | Age: 55
End: 2020-10-30

## 2020-11-05 ENCOUNTER — OFFICE VISIT (OUTPATIENT)
Dept: NEUROSURGERY | Age: 55
End: 2020-11-05
Payer: MEDICARE

## 2020-11-05 VITALS
HEART RATE: 91 BPM | DIASTOLIC BLOOD PRESSURE: 72 MMHG | BODY MASS INDEX: 47.09 KG/M2 | HEIGHT: 66 IN | OXYGEN SATURATION: 92 % | WEIGHT: 293 LBS | SYSTOLIC BLOOD PRESSURE: 116 MMHG

## 2020-11-05 PROCEDURE — 3017F COLORECTAL CA SCREEN DOC REV: CPT | Performed by: NURSE PRACTITIONER

## 2020-11-05 PROCEDURE — 99204 OFFICE O/P NEW MOD 45 MIN: CPT | Performed by: NURSE PRACTITIONER

## 2020-11-05 PROCEDURE — G8417 CALC BMI ABV UP PARAM F/U: HCPCS | Performed by: NURSE PRACTITIONER

## 2020-11-05 PROCEDURE — G8484 FLU IMMUNIZE NO ADMIN: HCPCS | Performed by: NURSE PRACTITIONER

## 2020-11-05 PROCEDURE — G8427 DOCREV CUR MEDS BY ELIG CLIN: HCPCS | Performed by: NURSE PRACTITIONER

## 2020-11-05 PROCEDURE — 4004F PT TOBACCO SCREEN RCVD TLK: CPT | Performed by: NURSE PRACTITIONER

## 2020-11-05 NOTE — PROGRESS NOTES
Interventions: No   Injections: No  Improvement: n/a    Injections/response: n/a    History:     Past Medical History:   Diagnosis Date    Anxiety     Asthma     Bladder incontinence     At times per pt.     Bladder prolapse, female, acquired     Cancer [facility]) 1996    cervical; treated with hysterectomy    Colon polyps 10/07/2020    tubular adenomas x4    COPD (chronic obstructive pulmonary disease) (HCC)     Depression     Epigastric pain     Hepatic steatosis     History of cataract extraction with lens replacement     bilateral    History of stress test     Hyperglycemia     Intertrigo     Lumbar degenerative disc disease     Nausea     Obesity 2017    Osteoarthritis     right knee    Osteoarthritis cervical spine     Osteoarthritis thoracic spine     Tobacco abuse 2017     Past Surgical History:   Procedure Laterality Date    CATARACT REMOVAL       SECTION      x1    COLONOSCOPY N/A 10/7/2020    tubular adenomas x4    HYSTERECTOMY, VAGINAL      secondary to cervical cancer--ovaries still present    THROAT SURGERY      throat polyps removed     Family History   Problem Relation Age of Onset    Diabetes Mother         from pancreatic resection    Heart Disease Mother     Arthritis Mother     Depression Mother     Mental Illness Mother     Diabetes Father     Heart Disease Father     Depression Brother     Cancer Brother         bladder    Lung Cancer Brother 64        cause of death    Depression Sister     Breast Cancer Maternal Grandmother     Asthma Other         grandson    Breast Cancer Other         diagnosed in her 42's    High Blood Pressure Brother     Diabetes Brother     No Known Problems Maternal Grandfather     No Known Problems Paternal Grandmother     No Known Problems Paternal Grandfather     Arthritis Sister     Pancreatic Cancer Maternal Uncle     High Cholesterol Neg Hx     Kidney Disease Neg Hx     Stroke Neg Hx  Ovarian Cancer Neg Hx     Colon Cancer Neg Hx      Current Outpatient Medications on File Prior to Visit   Medication Sig Dispense Refill    pregabalin (LYRICA) 100 MG capsule Take 1 capsule by mouth 2 times daily for 30 days. 60 capsule 1    VORTIoxetine (TRINTELLIX) 5 MG tablet Take 1 tablet by mouth daily 30 tablet 2    albuterol sulfate HFA (PROAIR HFA) 108 (90 Base) MCG/ACT inhaler Inhale 2 puffs into the lungs every 6 hours as needed for Wheezing 1 Inhaler 3    budesonide-formoterol (SYMBICORT) 160-4.5 MCG/ACT AERO INHALE TWO PUFFS BY MOUTH TWICE A DAY 1 Inhaler 2    nystatin (NYAMYC) 375747 UNIT/GM powder APPLY ONE DOSE TOPICALLY TWICE A DAY 1 Bottle 3    Elastic Bandages & Supports (JOBST KNEE HIGH COMPRESSION SM) MISC 1 each by Does not apply route daily Please measure 3 each 2    Blood Glucose Monitoring Suppl (ACURA BLOOD GLUCOSE METER) W/DEVICE KIT 1 each by Does not apply route 2 times daily 1 kit 0    Elastic Bandages & Supports (KNEE BRACE) MISC 1 Device by Does not apply route daily Right knee pain; osteoarthritis right knee 1 each 1    Nebulizers (COMPRESSOR/NEBULIZER) MISC 1 vial by Does not apply route 4 times daily as needed. Patient has albuterol prescription at home; needs aerosol machine and set up diagnosis chronic bronchitis 1 each 0    diclofenac sodium 1 % GEL Apply 2 g topically 2 times daily (Patient not taking: Reported on 10/8/2020) 1 Tube 3     No current facility-administered medications on file prior to visit.       Social History     Tobacco Use    Smoking status: Current Every Day Smoker     Packs/day: 1.00     Years: 40.00     Pack years: 40.00     Types: Cigarettes    Smokeless tobacco: Never Used    Tobacco comment: has tried cold turkey, patches, chantix   Substance Use Topics    Alcohol use: No     Alcohol/week: 0.0 standard drinks    Drug use: Yes     Frequency: 4.0 times per week     Types: Marijuana     Comment: Last used 10/6/20       Allergies Allergen Reactions    Amoxicillin-Pot Clavulanate Diarrhea and Other (See Comments)    Medrol [Methylprednisolone] Other (See Comments)     Redness of hands and itching  Redness of hands and itching    Tape [Adhesive Tape] Rash       Review of Systems  Constitutional: Negative for activity change and appetite change. HENT: Negative for ear pain and facial swelling. Eyes: Negative for discharge and itching. Respiratory: Negative for choking and chest tightness. Cardiovascular: Negative for chest pain and leg swelling. Gastrointestinal: Negative for nausea and abdominal pain. Endocrine: Negative for cold intolerance and heat intolerance. Genitourinary: Negative for frequency and flank pain. Musculoskeletal: Negative for myalgias and joint swelling. Skin: Negative for rash and wound. Allergic/Immunologic: Negative for environmental allergies and food allergies. Hematological: Negative for adenopathy. Does not bruise/bleed easily. Psychiatric/Behavioral: Negative for self-injury. The patient is not nervous/anxious. Physical Exam:      /72   Pulse 91   Ht 5' 6\" (1.676 m)   Wt (!) 301 lb (136.5 kg)   SpO2 92%   BMI 48.58 kg/m²   Estimated body mass index is 48.58 kg/m² as calculated from the following:    Height as of this encounter: 5' 6\" (1.676 m). Weight as of this encounter: 301 lb (136.5 kg). General:  Myrtle Abbott is a 54y.o. year old female who appears her stated age. HEENT: Normocephalic atraumatic. Neck supple. Chest: regular rate; pulses equal  Abdomen: Soft nontender nondistended.    Ext: DP and PT pulses 2+, good cap refill  Neuro    Mentation  Appropriate affect  Registration intact  Orientation intact  3 item recall intact  Judgement intact to situation    Cranial Nerves:   Pupils equal and reactive to light  Extraocular motion intact  Face and shrug symmetric  Tongue midline  No dysarthria  v1-3 sensation symmetric, masseter tone symmetric  Hearing symmetric and intact    Sensation: Decreased to left hand  Ring finger split: Positive left hand, decreased sensation on medial aspect    Motor  L deltoid 5/5; R deltoid 5/5  L biceps 5/5; R biceps 5/5  L triceps 5/5; R triceps 5/5  L wrist extension 5/5; R wrist extension 5/5  L intrinsics 5/5; R intrinsics 5/5     L iliopsoas 5/5 , R iliopsoas 5/5  L quadriceps 5/5; R quadriceps 5/5  L Dorsiflexion 5/5; R dorsiflexion 5/5  L Plantarflexion 5/5; R plantarflexion 5/5  L EHL 5/5; R EHL 5/5    Reflexes  L Brachioradialis 2+/4; R brachioradialis 2+/4  L Biceps 2+/4; R Biceps 2+/4  L Triceps 2+/4; R Triceps 2+/4  L Patellar 2+/4: R Patellar 2+/4  L Achilles 2+/4; R Achilles 2+/4    hoffmans L: neg  hoffmans R: neg  Clonus L: neg  Clonus R: neg  Babinski L: neg  Babinski R: neg    Spurlings: No  Lhermittes: No    Tinels at elbow: No  Tinels at wrist: Yes  Phalens: Yes  Ok sign: No  Froments: No  Benedictine Hand: No    Atrophy of thenar: No  Atrophy of hypothenar: No    Studies Review:     MRI cervical spine 9/30/2020:  FINDINGS:    BONES/ALIGNMENT: The vertebral body heights are maintained.  There is    age-appropriate bone marrow signal.  There is multilevel degenerative disc    disease with loss of disc signal.  There is mild disc space narrowing at the    C5-6 level.  There is no spondylolisthesis.         SPINAL CORD: The spinal cord is normal in caliber and signal.         SOFT TISSUES: The posterior paraspinal soft tissues are unremarkable.  The    prevertebral soft tissues are unremarkable.         C2-C3: There is no significant disc protrusion, spinal canal stenosis or    neural foraminal narrowing.         C3-C4: There is a disc osteophyte complex with uncovertebral and facet    hypertrophy.  There is no canal stenosis or left foraminal narrowing.  There    is mild right foraminal narrowing.         C4-C5: There is a disc osteophyte complex with uncovertebral and facet    hypertrophy. Eduarda Scrivener is no canal stenosis or foraminal narrowing.         C5-C6: There is a disc osteophyte complex with uncovertebral and facet    hypertrophy.  There is canal stenosis measuring 9 mm in AP dimension.  There    is moderate right foraminal narrowing and no significant left foraminal    narrowing.         C6-C7: There is a disc osteophyte complex with uncovertebral and facet    hypertrophy.  There is no canal stenosis or foraminal narrowing.         C7-T1: There is no significant disc protrusion, spinal canal stenosis or    neural foraminal narrowing. MRI lumbar spine 9/30/2020:  FINDINGS:    BONES/ALIGNMENT: No acute fracture.  No subluxation.  No suspicious bone    marrow replacing lesion.  Mild endplate degenerative changes at multiple    levels.         SPINAL CORD: Normal signal within the conus which terminates at L1-L2.         SOFT TISSUES: No paraspinal abnormality.         L1-L2: Facet degenerative changes.  No significant central canal or foraminal    stenosis.         L2-L3: Minimal broad-based disc bulge.  Facet degenerative changes.  No    significant central canal or foraminal stenosis.         L3-L4: Broad-based disc bulge and facet degenerative changes combine to    create mild central canal stenosis.  Changes combine to create mild right    foraminal stenosis and minimal left foraminal stenosis.         L4-L5: Broad-based disc bulge and facet degenerative changes combine to    create mild-moderate central canal stenosis.  Changes combine to create mild    bilateral foraminal stenosis.         L5-S1: Broad-based disc bulge and facet degenerative changes combine to    create mild-moderate central canal stenosis.  Changes combine to create    mild-moderate bilateral foraminal stenosis. Assessment and Plan:      1. Cervical spondylosis with radiculopathy    2. Left carpal tunnel syndrome    3.  Lumbar spondylosis          Plan: Patient with longstanding issues with neck and back pain, as well as generated using voice recognition Dragon dictation software. Although every effort was made to ensure the accuracy of this automated transcription, some errors in transcription may have occurred.

## 2020-11-06 ENCOUNTER — TELEPHONE (OUTPATIENT)
Dept: PODIATRY | Age: 55
End: 2020-11-06

## 2020-11-09 ENCOUNTER — ANESTHESIA EVENT (OUTPATIENT)
Dept: OPERATING ROOM | Age: 55
End: 2020-11-09
Payer: MEDICARE

## 2020-11-09 ENCOUNTER — HOSPITAL ENCOUNTER (OUTPATIENT)
Dept: PREADMISSION TESTING | Age: 55
Setting detail: SPECIMEN
Discharge: HOME | End: 2020-11-13
Payer: MEDICARE

## 2020-11-09 PROCEDURE — U0003 INFECTIOUS AGENT DETECTION BY NUCLEIC ACID (DNA OR RNA); SEVERE ACUTE RESPIRATORY SYNDROME CORONAVIRUS 2 (SARS-COV-2) (CORONAVIRUS DISEASE [COVID-19]), AMPLIFIED PROBE TECHNIQUE, MAKING USE OF HIGH THROUGHPUT TECHNOLOGIES AS DESCRIBED BY CMS-2020-01-R: HCPCS

## 2020-11-10 LAB
SARS-COV-2, RAPID: NORMAL
SARS-COV-2: NORMAL
SARS-COV-2: NOT DETECTED
SOURCE: NORMAL

## 2020-11-13 ENCOUNTER — HOSPITAL ENCOUNTER (OUTPATIENT)
Age: 55
Setting detail: OUTPATIENT SURGERY
Discharge: HOME | End: 2020-11-13
Attending: PAIN MEDICINE | Admitting: PAIN MEDICINE
Payer: MEDICARE

## 2020-11-13 ENCOUNTER — ANESTHESIA (OUTPATIENT)
Dept: OPERATING ROOM | Age: 55
End: 2020-11-13
Payer: MEDICARE

## 2020-11-13 ENCOUNTER — APPOINTMENT (OUTPATIENT)
Dept: GENERAL RADIOLOGY | Age: 55
End: 2020-11-13
Attending: PAIN MEDICINE
Payer: MEDICARE

## 2020-11-13 VITALS
OXYGEN SATURATION: 96 % | RESPIRATION RATE: 26 BRPM | DIASTOLIC BLOOD PRESSURE: 70 MMHG | SYSTOLIC BLOOD PRESSURE: 131 MMHG

## 2020-11-13 VITALS
OXYGEN SATURATION: 93 % | HEIGHT: 66 IN | WEIGHT: 293 LBS | SYSTOLIC BLOOD PRESSURE: 129 MMHG | HEART RATE: 81 BPM | DIASTOLIC BLOOD PRESSURE: 79 MMHG | TEMPERATURE: 98.8 F | RESPIRATION RATE: 22 BRPM | BODY MASS INDEX: 47.09 KG/M2

## 2020-11-13 PROCEDURE — 2500000003 HC RX 250 WO HCPCS: Performed by: PAIN MEDICINE

## 2020-11-13 PROCEDURE — 7100000010 HC PHASE II RECOVERY - FIRST 15 MIN: Performed by: PAIN MEDICINE

## 2020-11-13 PROCEDURE — 2709999900 HC NON-CHARGEABLE SUPPLY: Performed by: PAIN MEDICINE

## 2020-11-13 PROCEDURE — 3600000012 HC SURGERY LEVEL 2 ADDTL 15MIN: Performed by: PAIN MEDICINE

## 2020-11-13 PROCEDURE — 3700000001 HC ADD 15 MINUTES (ANESTHESIA): Performed by: PAIN MEDICINE

## 2020-11-13 PROCEDURE — 7100000011 HC PHASE II RECOVERY - ADDTL 15 MIN: Performed by: PAIN MEDICINE

## 2020-11-13 PROCEDURE — 64494 INJ PARAVERT F JNT L/S 2 LEV: CPT | Performed by: PAIN MEDICINE

## 2020-11-13 PROCEDURE — 3700000000 HC ANESTHESIA ATTENDED CARE: Performed by: PAIN MEDICINE

## 2020-11-13 PROCEDURE — 64493 INJ PARAVERT F JNT L/S 1 LEV: CPT | Performed by: PAIN MEDICINE

## 2020-11-13 PROCEDURE — 3600000002 HC SURGERY LEVEL 2 BASE: Performed by: PAIN MEDICINE

## 2020-11-13 PROCEDURE — 64495 INJ PARAVERT F JNT L/S 3 LEV: CPT | Performed by: PAIN MEDICINE

## 2020-11-13 PROCEDURE — 3209999900 FLUORO FOR SURGICAL PROCEDURES

## 2020-11-13 RX ORDER — MORPHINE SULFATE 1 MG/ML
1 INJECTION, SOLUTION EPIDURAL; INTRATHECAL; INTRAVENOUS EVERY 5 MIN PRN
Status: DISCONTINUED | OUTPATIENT
Start: 2020-11-13 | End: 2020-11-13 | Stop reason: HOSPADM

## 2020-11-13 RX ORDER — BUPIVACAINE HYDROCHLORIDE 2.5 MG/ML
INJECTION, SOLUTION INFILTRATION; PERINEURAL PRN
Status: DISCONTINUED | OUTPATIENT
Start: 2020-11-13 | End: 2020-11-13 | Stop reason: ALTCHOICE

## 2020-11-13 RX ORDER — MEPERIDINE HYDROCHLORIDE 50 MG/ML
12.5 INJECTION INTRAMUSCULAR; INTRAVENOUS; SUBCUTANEOUS EVERY 5 MIN PRN
Status: DISCONTINUED | OUTPATIENT
Start: 2020-11-13 | End: 2020-11-13 | Stop reason: HOSPADM

## 2020-11-13 RX ORDER — FENTANYL CITRATE 50 UG/ML
25 INJECTION, SOLUTION INTRAMUSCULAR; INTRAVENOUS EVERY 5 MIN PRN
Status: DISCONTINUED | OUTPATIENT
Start: 2020-11-13 | End: 2020-11-13 | Stop reason: HOSPADM

## 2020-11-13 RX ORDER — HYDROCODONE BITARTRATE AND ACETAMINOPHEN 5; 325 MG/1; MG/1
2 TABLET ORAL PRN
Status: DISCONTINUED | OUTPATIENT
Start: 2020-11-13 | End: 2020-11-13 | Stop reason: HOSPADM

## 2020-11-13 RX ORDER — DIPHENHYDRAMINE HYDROCHLORIDE 50 MG/ML
12.5 INJECTION INTRAMUSCULAR; INTRAVENOUS
Status: DISCONTINUED | OUTPATIENT
Start: 2020-11-13 | End: 2020-11-13 | Stop reason: HOSPADM

## 2020-11-13 RX ORDER — HYDRALAZINE HYDROCHLORIDE 20 MG/ML
5 INJECTION INTRAMUSCULAR; INTRAVENOUS EVERY 10 MIN PRN
Status: DISCONTINUED | OUTPATIENT
Start: 2020-11-13 | End: 2020-11-13 | Stop reason: HOSPADM

## 2020-11-13 RX ORDER — HYDROCODONE BITARTRATE AND ACETAMINOPHEN 5; 325 MG/1; MG/1
1 TABLET ORAL PRN
Status: DISCONTINUED | OUTPATIENT
Start: 2020-11-13 | End: 2020-11-13 | Stop reason: HOSPADM

## 2020-11-13 RX ORDER — SODIUM CHLORIDE 0.9 % (FLUSH) 0.9 %
10 SYRINGE (ML) INJECTION EVERY 12 HOURS SCHEDULED
Status: DISCONTINUED | OUTPATIENT
Start: 2020-11-13 | End: 2020-11-13 | Stop reason: HOSPADM

## 2020-11-13 RX ORDER — SODIUM CHLORIDE 0.9 % (FLUSH) 0.9 %
10 SYRINGE (ML) INJECTION PRN
Status: DISCONTINUED | OUTPATIENT
Start: 2020-11-13 | End: 2020-11-13 | Stop reason: HOSPADM

## 2020-11-13 RX ORDER — ONDANSETRON 2 MG/ML
4 INJECTION INTRAMUSCULAR; INTRAVENOUS
Status: DISCONTINUED | OUTPATIENT
Start: 2020-11-13 | End: 2020-11-13 | Stop reason: HOSPADM

## 2020-11-13 RX ORDER — PROMETHAZINE HYDROCHLORIDE 25 MG/ML
6.25 INJECTION, SOLUTION INTRAMUSCULAR; INTRAVENOUS
Status: DISCONTINUED | OUTPATIENT
Start: 2020-11-13 | End: 2020-11-13 | Stop reason: HOSPADM

## 2020-11-13 ASSESSMENT — PULMONARY FUNCTION TESTS
PIF_VALUE: 1
PIF_VALUE: 2

## 2020-11-13 ASSESSMENT — PAIN SCALES - GENERAL
PAINLEVEL_OUTOF10: 0
PAINLEVEL_OUTOF10: 2

## 2020-11-13 ASSESSMENT — COPD QUESTIONNAIRES: CAT_SEVERITY: NO INTERVAL CHANGE

## 2020-11-13 ASSESSMENT — PAIN - FUNCTIONAL ASSESSMENT: PAIN_FUNCTIONAL_ASSESSMENT: 0-10

## 2020-11-13 ASSESSMENT — LIFESTYLE VARIABLES: SMOKING_STATUS: 1

## 2020-11-13 ASSESSMENT — ENCOUNTER SYMPTOMS: SHORTNESS OF BREATH: 1

## 2020-11-13 ASSESSMENT — PAIN DESCRIPTION - DESCRIPTORS: DESCRIPTORS: STABBING;BURNING

## 2020-11-13 NOTE — ANESTHESIA PRE PROCEDURE
Current Facility-Administered Medications   Medication Dose Route Frequency Provider Last Rate Last Dose    sodium chloride flush 0.9 % injection 10 mL  10 mL Intravenous 2 times per day Pawan Jasso MD        sodium chloride flush 0.9 % injection 10 mL  10 mL Intravenous PRN Pawan Jasso MD           Allergies: Allergies   Allergen Reactions    Amoxicillin-Pot Clavulanate Diarrhea and Other (See Comments)    Medrol [Methylprednisolone] Other (See Comments)     Redness of hands and itching  Redness of hands and itching    Tape [Adhesive Tape] Rash       Problem List:    Patient Active Problem List   Diagnosis Code    Hyperglycemia R73.9    Anxiety F41.9    Bronchitis J40    Depression F32.9    Degenerative arthritis of lumbar spine M47.816    Osteoarthritis of knee M17.10    Insulin resistance E88.81    COPD (chronic obstructive pulmonary disease) (HCC) J44.9    Osteoarthritis thoracic spine M47.814    Osteoarthritis cervical spine M47.812    Prediabetes R73.03    Peripheral edema R60.9    Hepatic steatosis K76.0    Epigastric pain R10.13    Nausea R11.0    Vitamin D deficiency E55.9    Tobacco abuse Z72.0    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     Bladder prolapse, female, acquired     Cancer [facility]) 11/1996    cervical; treated with hysterectomy    Colon polyps 10/07/2020    tubular adenomas x4    COPD (chronic obstructive pulmonary disease) ([facility] [place] [address]     Depression     Epigastric pain     Hepatic steatosis     History of cataract extraction with lens replacement 2004    bilateral    History of stress test     Hyperglycemia     Intertrigo     Lumbar degenerative disc disease     Nausea     Obesity 2017    Osteoarthritis     right knee    Osteoarthritis cervical spine     Osteoarthritis thoracic spine     Tobacco abuse 2017       Past Surgical History:        Procedure Laterality Date    CATARACT REMOVAL  2008     SECTION      x1    COLONOSCOPY N/A 10/7/2020    tubular adenomas x4    HYSTERECTOMY, VAGINAL      secondary to cervical cancer--ovaries still present    THROAT SURGERY      throat polyps removed       Social History:    Social History     Tobacco Use    Smoking status: Current Every Day Smoker     Packs/day: 1.00     Years: 40.00     Pack years: 40.00     Types: Cigarettes    Smokeless tobacco: Never Used    Tobacco comment: has tried cold turkey, patches, chantix   Substance Use Topics    Alcohol use: No     Alcohol/week: 0.0 standard drinks                                Ready to quit: Not Answered  Counseling given: Not Answered  Comment: has tried cold turkey, patches, chantix      Vital Signs (Current): There were no vitals filed for this visit.                                            BP Readings from Last 3 Encounters:   20 116/72   10/15/20 116/71   10/08/20 132/87       NPO Status:                                                                                 BMI:   Wt Readings from Last 3 Encounters:   20 (!) 301 lb (136.5 kg)   10/15/20 291 lb (132 kg)   10/08/20 291 lb (132 kg)     There is no height or weight on file to calculate BMI.    CBC:   Lab Results   Component Value Date    WBC 7.3 2020    RBC 5.46 2020    HGB 16.6 2020    HCT 51.9 2020    MCV 95.1 2020    RDW 13.9 2020     2020       CMP:   Lab Results   Component Value Date     2020    K 4.6 2020     2020    CO2 25 2020    BUN 14 2020    CREATININE 0.53 2020    GFRAA >60 08/19/2020    LABGLOM >60 08/19/2020    GLUCOSE 104 09/20/2019    PROT 6.5 08/19/2020    CALCIUM 9.2 08/19/2020    BILITOT 0.45 08/19/2020    ALKPHOS 82 08/19/2020    AST 19 08/19/2020    ALT 23 08/19/2020       POC Tests: No results for input(s): POCGLU, POCNA, POCK, POCCL, POCBUN, POCHEMO, POCHCT in the last 72 hours. Coags:   Lab Results   Component Value Date    PROTIME 9.7 01/18/2017    INR 1.0 01/18/2017    APTT 26.6 01/18/2017       HCG (If Applicable): No results found for: PREGTESTUR, PREGSERUM, HCG, HCGQUANT     ABGs: No results found for: PHART, PO2ART, WHU6QML, ZLQ9QMM, BEART, L3QTXUIB     Type & Screen (If Applicable):  No results found for: LABABO, LABRH    Drug/Infectious Status (If Applicable):  Lab Results   Component Value Date    HEPCAB NONREACTIVE 12/05/2017       COVID-19 Screening (If Applicable):   Lab Results   Component Value Date    COVID19 Not Detected 11/06/2020    COVID19 Not Detected 10/03/2020         Anesthesia Evaluation  Patient summary reviewed and Nursing notes reviewed no history of anesthetic complications:   Airway: Mallampati: III  TM distance: >3 FB   Neck ROM: full  Mouth opening: > = 3 FB Dental: normal exam         Pulmonary:normal exam    (+) COPD: no interval change,  shortness of breath:  sleep apnea: on CPAP,  asthma: current smoker                           Cardiovascular:  Exercise tolerance: no interval change,   (+) pulmonary hypertension: no interval change,         Rhythm: regular  Rate: normal           Beta Blocker:  Not on Beta Blocker         Neuro/Psych:   (+) psychiatric history: stable with treatmentdepression/anxiety             GI/Hepatic/Renal:   (+) liver disease:, morbid obesity          Endo/Other:    (+) : arthritis: OA and no interval change. , malignancy/cancer. Abdominal:   (+) obese,     Abdomen: soft. Vascular: negative vascular ROS.                                        Anesthesia Plan      MAC     ASA 3 Anesthetic plan and risks discussed with patient. Plan discussed with CRNA.                   Shelbi Molina MD   11/13/2020

## 2020-11-13 NOTE — H&P
Neg Hx     Ovarian Cancer Neg Hx     Colon Cancer Neg Hx        Allergies   Allergen Reactions    Amoxicillin-Pot Clavulanate Diarrhea and Other (See Comments)    Medrol [Methylprednisolone] Other (See Comments)     Redness of hands and itching  Redness of hands and itching    Tape [Adhesive Tape] Rash         Current Facility-Administered Medications:     sodium chloride flush 0.9 % injection 10 mL, 10 mL, Intravenous, 2 times per day, Duran Smalls MD    sodium chloride flush 0.9 % injection 10 mL, 10 mL, Intravenous, PRN, Duran Smalls MD    morphine (PF) injection 1 mg, 1 mg, Intravenous, Q5 Min PRN, Duran Smalls MD    HYDROmorphone (DILAUDID) injection 0.5 mg, 0.5 mg, Intravenous, Q5 Min PRN, Duran Smalls MD    fentaNYL (SUBLIMAZE) injection 25 mcg, 25 mcg, Intravenous, Q5 Min PRN, Duran Smalls MD    HYDROcodone-acetaminophen (NORCO) 5-325 MG per tablet 1 tablet, 1 tablet, Oral, PRN **OR** HYDROcodone-acetaminophen (NORCO) 5-325 MG per tablet 2 tablet, 2 tablet, Oral, PRN, Duran Smalls MD    diphenhydrAMINE (BENADRYL) injection 12.5 mg, 12.5 mg, Intravenous, Once PRN, Duran Smalls MD    ondansetron [facility]) injection 4 mg, 4 mg, Intravenous, Once PRN, Duran Smalls MD    [facility]) injection 6.25 mg, 6.25 mg, Intramuscular, Once PRN, Duran Smalls MD    hydrALAZINE (APRESOLINE) injection 5 mg, 5 mg, Intravenous, Q10 Min PRN, Duran Smalls MD    meperidine (DEMEROL) injection 12.5 mg, 12.5 mg, Intravenous, Q5 Min PRN, Duran Smalls MD    Social History     Tobacco Use    Smoking status: Current Every Day Smoker     Packs/day: 1.00     Years: 40.00     Pack years: 40.00     Types: Cigarettes    Smokeless tobacco: Never Used    Tobacco comment: has tried cold turkey, patches, chantix   Substance Use Topics    Alcohol use: No     Alcohol/week: 0.0 standard drinks       Review of Systems:   Focused review of systems was performed, and negative as pertinent to diagnosis, except as stated in HPI.     Physical Exam:     /71 Pulse 89   Temp 98.1 °F (36.7 °C) (Temporal)   Resp 16   Ht 5' 6\" (1.676 m)   Wt 298 lb 4 oz (135.3 kg)   SpO2 93%   BMI 48.14 kg/m²     Physical Exam  Constitutional:       Appearance: Normal appearance. HENT:      Head: Normocephalic and atraumatic. Eyes:      General: Lids are normal.   Neck:      Musculoskeletal: Normal range of motion. Pulmonary:      Effort: Pulmonary effort is normal.   Skin:     Comments: Visualized skin with no rash   Neurological:      Mental Status: She is alert. Psychiatric:         Attention and Perception: Attention and perception normal.         Mood and Affect: Mood and affect normal.          Patient's current physical status, medications, medical history, and HPI have been reviewed and updated as appropriate on this date: 11/13/20    Risk/Benefit(s): The risks, benefits, alternatives, and potential complications have been discussed with the patient/family and informed consent has been obtained for the procedure/sedation.     Diagnosis:   M47.817 SPONDYLOSIS WITHOUT MYELOPATHY      Plan: lumbar MBB        [facility]

## 2020-11-13 NOTE — OP NOTE
Lumbar Facet Nerve Block Injection:  Surgeon: Byron Minor     PRE-OP DIAGNOSIS: M47.817 (lumbosacral spondylosis), M54.5 (low back pain)    POST-OP DIAGNOSIS: Same. PROCEDURE PERFORMED: Lumbar Facet Nerve Block Multiple Levels  Bilateral L3 - 4, L4 - 5 and L5 - S1. Physician confirmed and marked the surgical site. EBL: minimal      CONSENT: Patient has undergone the educational process with this procedure, is aware and fully understands the risks involved: potential damage to any and all body organs including possible bleeding, infection and nerve injury, allergic reaction and headache. Patient also understands that the procedure will be undertaken in a safe, controlled, and monitored setting. Patient recognizes that the benefits include relief from pain and reduction in the oral use of medications. Patient agreed to proceed. The patient was counseled at length about the risks of kenan Covid-19 during their perioperative period and any recovery window from their procedure. The patient was made aware that kenan Covid-19  may worsen their prognosis for recovering from their procedure  and lend to a higher morbidity and/or mortality risk. All material risks, benefits, and reasonable alternatives including postponing the procedure were discussed. The patient does wish to proceed with the procedure at this time. PREP: Timeout was performed prior to starting the procedure. The patient's back was prepped with chloroprep and draped appropriately. 5ml of 0.5% lidocaine was used to anesthetize the skin and subcutaneous tissue. PROCEDURE NOTE: A 22 gauge 3.5 inch spinal needle was advanced under  fluoroscopic guidance to the appropriate anatomic location for the medial branches corresponding to the facets at the base of the appropriate superior articular process and/or sacral ala . Aspiration was negative for blood, CSF and producing pain.  1 ml of 0.25% marcaine was then injected at each

## 2020-11-13 NOTE — ANESTHESIA POSTPROCEDURE EVALUATION
POST- ANESTHESIA EVALUATION       Pt Name: Lacie Cortez  MRN: [id number]  YOB: 1965  Date of evaluation: 11/13/2020  Time:  1:54 PM      /79   Pulse 81   Temp 98.8 °F (37.1 °C) (Temporal)   Resp 22   Ht 5' 6\" (1.676 m)   Wt 298 lb 4 oz (135.3 kg)   SpO2 93%   BMI 48.14 kg/m²      Consciousness Level  Awake  Cardiopulmonary Status  Stable  Pain Adequately Treated YES  Nausea / Vomiting  NO  Adequate Hydration  YES  Anesthesia Related Complications NONE      Electronically signed by Corin Johnson MD on 11/13/2020 at 1:54 PM       Department of Anesthesiology  Postprocedure Note    Patient: Lacie Cortez  MRN: [id number]  YOB: 1965  Date of evaluation: 11/13/2020  Time:  1:53 PM     Procedure Summary     Date:  11/13/20 Room / Location:  [address] [place] [address] 02 [address]    Anesthesia Start:  1671 Anesthesia Stop:  5030    Procedure:  NERVE BLOCK BILATERAL - MBB #1 L3-4, L4-5, L5-S1 (Bilateral ) Diagnosis:  (M47.817 SPONDYLOSIS WITHOUT MYELOPATHY)    Surgeon:  Rylie Crews MD Responsible Provider:  JACK Pacheco - CRNA    Anesthesia Type:  MAC ASA Status:  3          Anesthesia Type: MAC    Karen Phase I: Karen Score: 10    Karen Phase II: Karen Score: 10    Last vitals: Reviewed and per EMR flowsheets.        Anesthesia Post Evaluation

## 2020-11-16 ENCOUNTER — TELEPHONE (OUTPATIENT)
Dept: PAIN MANAGEMENT | Age: 55
End: 2020-11-16

## 2020-11-16 NOTE — TELEPHONE ENCOUNTER
Patient states pain was 7 out of 10 prior to the procedure with activity and 4 out of 10 after. She reports a 90% pain relief. Her activities included lifting, laundry and holding a baby. She feels the procedure was successful and would like to proceed with the next procedure.

## 2020-11-17 ENCOUNTER — PROCEDURE VISIT (OUTPATIENT)
Dept: PHYSICAL MEDICINE AND REHAB | Age: 55
End: 2020-11-17
Payer: MEDICARE

## 2020-11-17 PROCEDURE — 95910 NRV CNDJ TEST 7-8 STUDIES: CPT | Performed by: PHYSICAL MEDICINE & REHABILITATION

## 2020-11-17 PROCEDURE — 95886 MUSC TEST DONE W/N TEST COMP: CPT | Performed by: PHYSICAL MEDICINE & REHABILITATION

## 2020-11-29 ENCOUNTER — HOSPITAL ENCOUNTER (OUTPATIENT)
Facility: CLINIC | Age: 55
Discharge: HOME | End: 2020-12-01
Payer: MEDICARE

## 2020-11-29 ENCOUNTER — HOSPITAL ENCOUNTER (OUTPATIENT)
Dept: GENERAL RADIOLOGY | Facility: CLINIC | Age: 55
Discharge: HOME | End: 2020-12-01
Payer: MEDICARE

## 2020-11-29 PROCEDURE — 72040 X-RAY EXAM NECK SPINE 2-3 VW: CPT

## 2020-12-03 ENCOUNTER — OFFICE VISIT (OUTPATIENT)
Dept: NEUROSURGERY | Age: 55
End: 2020-12-03
Payer: MEDICARE

## 2020-12-03 VITALS
DIASTOLIC BLOOD PRESSURE: 80 MMHG | BODY MASS INDEX: 48.52 KG/M2 | WEIGHT: 293 LBS | HEART RATE: 78 BPM | SYSTOLIC BLOOD PRESSURE: 126 MMHG

## 2020-12-03 PROCEDURE — G8417 CALC BMI ABV UP PARAM F/U: HCPCS | Performed by: NURSE PRACTITIONER

## 2020-12-03 PROCEDURE — 3017F COLORECTAL CA SCREEN DOC REV: CPT | Performed by: NURSE PRACTITIONER

## 2020-12-03 PROCEDURE — 4004F PT TOBACCO SCREEN RCVD TLK: CPT | Performed by: NURSE PRACTITIONER

## 2020-12-03 PROCEDURE — 99213 OFFICE O/P EST LOW 20 MIN: CPT | Performed by: NURSE PRACTITIONER

## 2020-12-03 PROCEDURE — G8427 DOCREV CUR MEDS BY ELIG CLIN: HCPCS | Performed by: NURSE PRACTITIONER

## 2020-12-03 PROCEDURE — G8484 FLU IMMUNIZE NO ADMIN: HCPCS | Performed by: NURSE PRACTITIONER

## 2020-12-03 RX ORDER — PREGABALIN 50 MG/1
CAPSULE ORAL
COMMUNITY
End: 2021-02-03

## 2020-12-03 RX ORDER — ALBUTEROL SULFATE 2.5 MG/3ML
2.5 SOLUTION RESPIRATORY (INHALATION) EVERY 6 HOURS PRN
COMMUNITY
End: 2021-02-03 | Stop reason: SDUPTHER

## 2020-12-03 RX ORDER — IBUPROFEN 800 MG/1
TABLET ORAL
COMMUNITY
End: 2021-05-06

## 2020-12-03 NOTE — PROGRESS NOTES
cervical radiculopathy. Nocturnal Awakening: Yes  Reason: has to urinate, or pain    MYELOPATHY    Frequently dropping things: Yes  Difficulty with buttoning clothes, using zipper, putting on watch OR jewelry: Yes  Changes in handwriting: Yes  Numbness or tingling: Yes    LIMITATIONS    Pain significantly limiting on a daily basis   Daily pharmacologic pain control include: lyrica; ibuprofen  Neck : Arm pain: Neck 50 // Arms 50    MANAGEMENT     Prior Surgery: No  Prior to 1yr ago: PT  In the last year:    Physical Therapy: No   Chiropractic Interventions: No   Injections: No  Improvement: n/a    Injections/response: n/a    History:     Past Medical History:   Diagnosis Date    Anxiety     Asthma     Bladder incontinence     At times per pt.     Bladder prolapse, female, acquired     Cancer [facility]) 1996    cervical; treated with hysterectomy    Colon polyps 10/07/2020    tubular adenomas x4    COPD [facility]chronic obstructive pulmonary disease) ([facility] [address]     Depression     Epigastric pain     Hepatic steatosis     History of cataract extraction with lens replacement     bilateral    History of stress test     Hyperglycemia     Intertrigo     Lumbar degenerative disc disease     Nausea     Obesity 2017    Osteoarthritis     right knee    Osteoarthritis cervical spine     Osteoarthritis thoracic spine     Tobacco abuse 2017     Past Surgical History:   Procedure Laterality Date    CATARACT REMOVAL       SECTION      x1    COLONOSCOPY N/A 10/7/2020    tubular adenomas x4    HYSTERECTOMY, VAGINAL      secondary to cervical cancer--ovaries still present    NERVE BLOCK Bilateral 2020    NERVE BLOCK BILATERAL - MBB #1 L3-4, L4-5, L5-S1 (Bilateral )     PAIN MANAGEMENT PROCEDURE Bilateral 2020    NERVE BLOCK BILATERAL - MBB #1 L3-4, L4-5, L5-S1 performed by [address], MD at [address]      throat polyps removed     Family History   Problem Relation Age of Onset    Diabetes Mother         from pancreatic resection    Heart Disease Mother     Arthritis Mother     Depression Mother     Mental Illness Mother     Diabetes Father     Heart Disease Father     Depression Brother     Cancer Brother         bladder    Lung Cancer Brother 64        cause of death    Depression Sister     Breast Cancer Maternal Grandmother     Asthma Other         grandson    Breast Cancer Other         diagnosed in her 42's    High Blood Pressure Brother     Diabetes Brother     No Known Problems Maternal Grandfather     No Known Problems Paternal Grandmother     No Known Problems Paternal Grandfather     Arthritis Sister     Pancreatic Cancer Maternal Uncle     High Cholesterol Neg Hx     Kidney Disease Neg Hx     Stroke Neg Hx     Ovarian Cancer Neg Hx     Colon Cancer Neg Hx      Current Outpatient Medications on File Prior to Visit   Medication Sig Dispense Refill    ibuprofen (ADVIL;MOTRIN) 800 MG tablet 1 tablet with food or milk as needed      pregabalin (LYRICA) 50 MG capsule 1 capsule      albuterol (PROVENTIL) (2.5 MG/3ML) 0.083% nebulizer solution Take 2.5 mg by nebulization every 6 hours as needed for Wheezing      pregabalin (LYRICA) 100 MG capsule Take 1 capsule by mouth 2 times daily for 30 days.  60 capsule 1    VORTIoxetine (TRINTELLIX) 5 MG tablet Take 1 tablet by mouth daily 30 tablet 2    albuterol sulfate HFA (PROAIR HFA) 108 (90 Base) MCG/ACT inhaler Inhale 2 puffs into the lungs every 6 hours as needed for Wheezing 1 Inhaler 3    budesonide-formoterol (SYMBICORT) 160-4.5 MCG/ACT AERO INHALE TWO PUFFS BY MOUTH TWICE A DAY 1 Inhaler 2    nystatin (NYAMYC) 002740 UNIT/GM powder APPLY ONE DOSE TOPICALLY TWICE A DAY 1 Bottle 3    Elastic Bandages & Supports (JOBST KNEE HIGH COMPRESSION SM) MISC 1 each by Does not apply route daily Please measure 3 each 2    diclofenac sodium 1 % GEL Apply 2 g topically 2 times daily 1 Tube 3    Blood Glucose Monitoring Suppl (ACURA BLOOD GLUCOSE METER) W/DEVICE KIT 1 each by Does not apply route 2 times daily 1 kit 0    Elastic Bandages & Supports (KNEE BRACE) MISC 1 Device by Does not apply route daily Right knee pain; osteoarthritis right knee 1 each 1    Nebulizers (COMPRESSOR/NEBULIZER) MISC 1 vial by Does not apply route 4 times daily as needed. Patient has albuterol prescription at home; needs aerosol machine and set up diagnosis chronic bronchitis 1 each 0     No current facility-administered medications on file prior to visit. Social History     Tobacco Use    Smoking status: Current Every Day Smoker     Packs/day: 1.00     Years: 40.00     Pack years: 40.00     Types: Cigarettes    Smokeless tobacco: Never Used    Tobacco comment: has tried cold turkey, patches, chantix   Substance Use Topics    Alcohol use: No     Alcohol/week: 0.0 standard drinks    Drug use: Yes     Frequency: 4.0 times per week     Types: Marijuana     Comment: Last used 11/1/20       Allergies   Allergen Reactions    Amoxicillin-Pot Clavulanate Diarrhea and Other (See Comments)    Medrol [Methylprednisolone] Other (See Comments)     Redness of hands and itching  Redness of hands and itching    Tape [Adhesive Tape] Rash       Review of Systems  Constitutional: Negative for activity change and appetite change. HENT: Negative for ear pain and facial swelling. Eyes: Negative for discharge and itching. Respiratory: Negative for choking and chest tightness. Cardiovascular: Negative for chest pain and leg swelling. Gastrointestinal: Negative for nausea and abdominal pain. Endocrine: Negative for cold intolerance and heat intolerance. Genitourinary: Negative for frequency and flank pain. Musculoskeletal: Negative for myalgias and joint swelling. Skin: Negative for rash and wound. Allergic/Immunologic: Negative for environmental allergies and food allergies. Hematological: Negative for adenopathy. Does not bruise/bleed easily. Psychiatric/Behavioral: Negative for self-injury. The patient is not nervous/anxious. Physical Exam:      /80 (Site: Left Lower Arm, Position: Sitting, Cuff Size: Medium Adult)   Pulse 78   Wt (!) 300 lb 9.6 oz (136.4 kg)   BMI 48.52 kg/m²   Estimated body mass index is 48.52 kg/m² as calculated from the following:    Height as of 11/13/20: 5' 6\" (1.676 m). Weight as of this encounter: 300 lb 9.6 oz (136.4 kg). General:  Shiraz Sorto is a 54y.o. year old female who appears her stated age. HEENT: Normocephalic atraumatic. Neck supple. Chest: regular rate; pulses equal  Abdomen: Soft nontender nondistended.    Ext: DP and PT pulses 2+, good cap refill  Neuro    Mentation  Appropriate affect  Registration intact  Orientation intact  3 item recall intact  Judgement intact to situation    Cranial Nerves:   Pupils equal and reactive to light  Extraocular motion intact  Face and shrug symmetric  Tongue midline  No dysarthria  v1-3 sensation symmetric, masseter tone symmetric  Hearing symmetric and intact    Sensation: Decreased to left hand  Ring finger split: Positive left hand, decreased sensation on medial aspect    Motor  L deltoid 5/5; R deltoid 5/5  L biceps 5/5; R biceps 5/5  L triceps 5/5; R triceps 5/5  L wrist extension 5/5; R wrist extension 5/5  L intrinsics 5/5; R intrinsics 5/5     L iliopsoas 5/5 , R iliopsoas 5/5  L quadriceps 5/5; R quadriceps 5/5  L Dorsiflexion 5/5; R dorsiflexion 5/5  L Plantarflexion 5/5; R plantarflexion 5/5  L EHL 5/5; R EHL 5/5    Reflexes  L Brachioradialis 2+/4; R brachioradialis 2+/4  L Biceps 2+/4; R Biceps 2+/4  L Triceps 2+/4; R Triceps 2+/4  L Patellar 2+/4: R Patellar 2+/4  L Achilles 2+/4; R Achilles 2+/4    hoffmans L: neg  hoffmans R: neg  Clonus L: neg  Clonus R: neg  Babinski L: neg  Babinski R: neg    Spurlings: No  Lhermittes: No    Tinels at elbow: No  Tinels at narrowing.         C7-T1: There is no significant disc protrusion, spinal canal stenosis or    neural foraminal narrowing. MRI lumbar spine 9/30/2020:  FINDINGS:    BONES/ALIGNMENT: No acute fracture.  No subluxation.  No suspicious bone    marrow replacing lesion.  Mild endplate degenerative changes at multiple    levels.         SPINAL CORD: Normal signal within the conus which terminates at L1-L2.         SOFT TISSUES: No paraspinal abnormality.         L1-L2: Facet degenerative changes.  No significant central canal or foraminal    stenosis.         L2-L3: Minimal broad-based disc bulge.  Facet degenerative changes.  No    significant central canal or foraminal stenosis.         L3-L4: Broad-based disc bulge and facet degenerative changes combine to    create mild central canal stenosis.  Changes combine to create mild right    foraminal stenosis and minimal left foraminal stenosis.         L4-L5: Broad-based disc bulge and facet degenerative changes combine to    create mild-moderate central canal stenosis.  Changes combine to create mild    bilateral foraminal stenosis.         L5-S1: Broad-based disc bulge and facet degenerative changes combine to    create mild-moderate central canal stenosis.  Changes combine to create    mild-moderate bilateral foraminal stenosis. Assessment and Plan:      1. Cervical spondylosis with radiculopathy    2. Lumbar spondylosis          Plan: Patient with longstanding issues with neck and back pain, as well as left hand numbness and tingling. Back pain has improved significantly following pain management interventions, does have upcoming repeat injections planned with hopes for subsequent RFA. EMG unremarkable. Does appear to have some instability at C2-3, as well as C3-4 on flexion-extension imaging, though no corresponding cervical stenosis or myelomalacia at these levels on MRI.   Patient does have subjective findings of myelopathy including dexterity issues, gait instability, frequently dropping objects. Recommend continuation of pain management interventions, will have the patient follow-up with Dr Konstantin Loredo in approximately 8 weeks for further evaluation of cervical spine. Followup: Return in about 8 weeks (around 1/28/2021), or if symptoms worsen or fail to improve. Prescriptions Ordered:  No orders of the defined types were placed in this encounter. Orders Placed:  No orders of the defined types were placed in this encounter. Electronically signed by JACK Mejia CNP on 12/3/2020 at 11:16 AM    Please note that this chart was generated using voice recognition Dragon dictation software. Although every effort was made to ensure the accuracy of this automated transcription, some errors in transcription may have occurred.

## 2020-12-14 ENCOUNTER — HOSPITAL ENCOUNTER (OUTPATIENT)
Dept: LAB | Age: 55
Setting detail: SPECIMEN
Discharge: HOME | End: 2020-12-14
Payer: MEDICARE

## 2020-12-14 PROCEDURE — U0003 INFECTIOUS AGENT DETECTION BY NUCLEIC ACID (DNA OR RNA); SEVERE ACUTE RESPIRATORY SYNDROME CORONAVIRUS 2 (SARS-COV-2) (CORONAVIRUS DISEASE [COVID-19]), AMPLIFIED PROBE TECHNIQUE, MAKING USE OF HIGH THROUGHPUT TECHNOLOGIES AS DESCRIBED BY CMS-2020-01-R: HCPCS

## 2020-12-15 LAB
SARS-COV-2, RAPID: NORMAL
SARS-COV-2: NORMAL
SARS-COV-2: NOT DETECTED
SOURCE: NORMAL

## 2020-12-16 ENCOUNTER — TELEPHONE (OUTPATIENT)
Dept: GASTROENTEROLOGY | Age: 55
End: 2020-12-16

## 2020-12-17 ENCOUNTER — ANESTHESIA EVENT (OUTPATIENT)
Dept: OPERATING ROOM | Age: 55
End: 2020-12-17
Payer: MEDICARE

## 2020-12-18 ENCOUNTER — HOSPITAL ENCOUNTER (OUTPATIENT)
Age: 55
Setting detail: OUTPATIENT SURGERY
Discharge: HOME | End: 2020-12-18
Attending: PAIN MEDICINE | Admitting: PAIN MEDICINE
Payer: MEDICARE

## 2020-12-18 ENCOUNTER — ANESTHESIA (OUTPATIENT)
Dept: OPERATING ROOM | Age: 55
End: 2020-12-18
Payer: MEDICARE

## 2020-12-18 ENCOUNTER — APPOINTMENT (OUTPATIENT)
Dept: GENERAL RADIOLOGY | Age: 55
End: 2020-12-18
Attending: PAIN MEDICINE
Payer: MEDICARE

## 2020-12-18 VITALS
BODY MASS INDEX: 47.09 KG/M2 | OXYGEN SATURATION: 95 % | WEIGHT: 293 LBS | RESPIRATION RATE: 10 BRPM | DIASTOLIC BLOOD PRESSURE: 52 MMHG | TEMPERATURE: 97.5 F | HEIGHT: 66 IN | SYSTOLIC BLOOD PRESSURE: 113 MMHG | HEART RATE: 79 BPM

## 2020-12-18 VITALS
RESPIRATION RATE: 21 BRPM | OXYGEN SATURATION: 99 % | SYSTOLIC BLOOD PRESSURE: 116 MMHG | DIASTOLIC BLOOD PRESSURE: 67 MMHG

## 2020-12-18 PROCEDURE — 3600000002 HC SURGERY LEVEL 2 BASE: Performed by: PAIN MEDICINE

## 2020-12-18 PROCEDURE — 7100000010 HC PHASE II RECOVERY - FIRST 15 MIN: Performed by: PAIN MEDICINE

## 2020-12-18 PROCEDURE — 64494 INJ PARAVERT F JNT L/S 2 LEV: CPT | Performed by: PAIN MEDICINE

## 2020-12-18 PROCEDURE — 3209999900 FLUORO FOR SURGICAL PROCEDURES

## 2020-12-18 PROCEDURE — 64493 INJ PARAVERT F JNT L/S 1 LEV: CPT | Performed by: PAIN MEDICINE

## 2020-12-18 PROCEDURE — 2709999900 HC NON-CHARGEABLE SUPPLY: Performed by: PAIN MEDICINE

## 2020-12-18 PROCEDURE — 3700000000 HC ANESTHESIA ATTENDED CARE: Performed by: PAIN MEDICINE

## 2020-12-18 PROCEDURE — 6360000002 HC RX W HCPCS: Performed by: NURSE ANESTHETIST, CERTIFIED REGISTERED

## 2020-12-18 PROCEDURE — 64495 INJ PARAVERT F JNT L/S 3 LEV: CPT | Performed by: PAIN MEDICINE

## 2020-12-18 PROCEDURE — 2500000003 HC RX 250 WO HCPCS: Performed by: PAIN MEDICINE

## 2020-12-18 PROCEDURE — 7100000011 HC PHASE II RECOVERY - ADDTL 15 MIN: Performed by: PAIN MEDICINE

## 2020-12-18 RX ORDER — BUPIVACAINE HYDROCHLORIDE 2.5 MG/ML
INJECTION, SOLUTION INFILTRATION; PERINEURAL PRN
Status: DISCONTINUED | OUTPATIENT
Start: 2020-12-18 | End: 2020-12-18 | Stop reason: ALTCHOICE

## 2020-12-18 RX ORDER — MEPERIDINE HYDROCHLORIDE 50 MG/ML
12.5 INJECTION INTRAMUSCULAR; INTRAVENOUS; SUBCUTANEOUS EVERY 5 MIN PRN
Status: DISCONTINUED | OUTPATIENT
Start: 2020-12-18 | End: 2020-12-18 | Stop reason: HOSPADM

## 2020-12-18 RX ORDER — MIDAZOLAM HYDROCHLORIDE 1 MG/ML
INJECTION INTRAMUSCULAR; INTRAVENOUS PRN
Status: DISCONTINUED | OUTPATIENT
Start: 2020-12-18 | End: 2020-12-18 | Stop reason: SDUPTHER

## 2020-12-18 RX ORDER — LIDOCAINE HYDROCHLORIDE 10 MG/ML
1 INJECTION, SOLUTION EPIDURAL; INFILTRATION; INTRACAUDAL; PERINEURAL
Status: DISCONTINUED | OUTPATIENT
Start: 2020-12-18 | End: 2020-12-18 | Stop reason: HOSPADM

## 2020-12-18 RX ORDER — SODIUM CHLORIDE 0.9 % (FLUSH) 0.9 %
10 SYRINGE (ML) INJECTION PRN
Status: DISCONTINUED | OUTPATIENT
Start: 2020-12-18 | End: 2020-12-18 | Stop reason: HOSPADM

## 2020-12-18 RX ORDER — PROMETHAZINE HYDROCHLORIDE 25 MG/ML
12.5 INJECTION, SOLUTION INTRAMUSCULAR; INTRAVENOUS
Status: DISCONTINUED | OUTPATIENT
Start: 2020-12-18 | End: 2020-12-18 | Stop reason: HOSPADM

## 2020-12-18 RX ORDER — DIPHENHYDRAMINE HYDROCHLORIDE 50 MG/ML
12.5 INJECTION INTRAMUSCULAR; INTRAVENOUS
Status: DISCONTINUED | OUTPATIENT
Start: 2020-12-18 | End: 2020-12-18 | Stop reason: HOSPADM

## 2020-12-18 RX ORDER — OXYCODONE HYDROCHLORIDE AND ACETAMINOPHEN 5; 325 MG/1; MG/1
1 TABLET ORAL PRN
Status: DISCONTINUED | OUTPATIENT
Start: 2020-12-18 | End: 2020-12-18 | Stop reason: HOSPADM

## 2020-12-18 RX ORDER — LABETALOL 20 MG/4 ML (5 MG/ML) INTRAVENOUS SYRINGE
5 EVERY 10 MIN PRN
Status: DISCONTINUED | OUTPATIENT
Start: 2020-12-18 | End: 2020-12-18 | Stop reason: HOSPADM

## 2020-12-18 RX ORDER — MORPHINE SULFATE 2 MG/ML
2 INJECTION, SOLUTION INTRAMUSCULAR; INTRAVENOUS EVERY 5 MIN PRN
Status: DISCONTINUED | OUTPATIENT
Start: 2020-12-18 | End: 2020-12-18 | Stop reason: HOSPADM

## 2020-12-18 RX ORDER — OXYCODONE HYDROCHLORIDE AND ACETAMINOPHEN 5; 325 MG/1; MG/1
2 TABLET ORAL PRN
Status: DISCONTINUED | OUTPATIENT
Start: 2020-12-18 | End: 2020-12-18 | Stop reason: HOSPADM

## 2020-12-18 RX ORDER — 0.9 % SODIUM CHLORIDE 0.9 %
500 INTRAVENOUS SOLUTION INTRAVENOUS
Status: DISCONTINUED | OUTPATIENT
Start: 2020-12-18 | End: 2020-12-18 | Stop reason: HOSPADM

## 2020-12-18 RX ORDER — FENTANYL CITRATE 50 UG/ML
INJECTION, SOLUTION INTRAMUSCULAR; INTRAVENOUS PRN
Status: DISCONTINUED | OUTPATIENT
Start: 2020-12-18 | End: 2020-12-18 | Stop reason: SDUPTHER

## 2020-12-18 RX ORDER — SODIUM CHLORIDE, SODIUM LACTATE, POTASSIUM CHLORIDE, CALCIUM CHLORIDE 600; 310; 30; 20 MG/100ML; MG/100ML; MG/100ML; MG/100ML
INJECTION, SOLUTION INTRAVENOUS CONTINUOUS
Status: DISCONTINUED | OUTPATIENT
Start: 2020-12-18 | End: 2020-12-18 | Stop reason: HOSPADM

## 2020-12-18 RX ORDER — ONDANSETRON 2 MG/ML
4 INJECTION INTRAMUSCULAR; INTRAVENOUS
Status: DISCONTINUED | OUTPATIENT
Start: 2020-12-18 | End: 2020-12-18 | Stop reason: HOSPADM

## 2020-12-18 RX ORDER — SODIUM CHLORIDE 0.9 % (FLUSH) 0.9 %
10 SYRINGE (ML) INJECTION EVERY 12 HOURS SCHEDULED
Status: DISCONTINUED | OUTPATIENT
Start: 2020-12-18 | End: 2020-12-18 | Stop reason: HOSPADM

## 2020-12-18 RX ADMIN — FENTANYL CITRATE 100 MCG: 50 INJECTION, SOLUTION INTRAMUSCULAR; INTRAVENOUS at 13:00

## 2020-12-18 RX ADMIN — MIDAZOLAM HYDROCHLORIDE 2 MG: 1 INJECTION, SOLUTION INTRAMUSCULAR; INTRAVENOUS at 13:00

## 2020-12-18 ASSESSMENT — PULMONARY FUNCTION TESTS
PIF_VALUE: 1
PIF_VALUE: 2
PIF_VALUE: 1

## 2020-12-18 ASSESSMENT — LIFESTYLE VARIABLES: SMOKING_STATUS: 1

## 2020-12-18 ASSESSMENT — PAIN - FUNCTIONAL ASSESSMENT
PAIN_FUNCTIONAL_ASSESSMENT: PREVENTS OR INTERFERES SOME ACTIVE ACTIVITIES AND ADLS
PAIN_FUNCTIONAL_ASSESSMENT: 0-10

## 2020-12-18 ASSESSMENT — PAIN DESCRIPTION - DESCRIPTORS: DESCRIPTORS: ACHING;BURNING

## 2020-12-18 NOTE — ANESTHESIA POSTPROCEDURE EVALUATION
Department of Anesthesiology  Postprocedure Note    Patient: Trey Perdomo  MRN: [id number]  YOB: 1965  Date of evaluation: 12/18/2020  Time:  1:32 PM     Procedure Summary     Date: 12/18/20 Room / Location: [address] [place] [address] N / [address] [facility]    Anesthesia Start: 8789 Anesthesia Stop: 9133    Procedure: NERVE BLOCK BILATERAL - MBB #2 L3-4, L4-5, L5-S1 (Bilateral ) Diagnosis: (M47.814 SPONDYLOSIS WITHOUT MYELOPATHY)    Surgeons: Madeleine Barroso MD Responsible Provider: Lakeshia Powers MD    Anesthesia Type: MAC ASA Status: 3          Anesthesia Type: MAC    Karen Phase I: Karen Score: 10    Karen Phase II: Karen Score: 9    Last vitals: Reviewed and per EMR flowsheets.        Anesthesia Post Evaluation    Patient location during evaluation: PACU  Patient participation: complete - patient participated  Level of consciousness: awake and alert  Airway patency: patent  Nausea & Vomiting: no nausea and no vomiting  Complications: no  Cardiovascular status: hemodynamically stable  Respiratory status: nasal cannula and spontaneous ventilation  Hydration status: euvolemic

## 2020-12-18 NOTE — H&P
Pain Pre-Op H&P Note    Driss Minor    HPI: Lacie Cortez  presents with back pain. Past Medical History:   Diagnosis Date    Anxiety     Asthma     Bladder incontinence     At times per pt.     Bladder prolapse, female, acquired     Cancer [facility]) 1996    cervical; treated with hysterectomy    Colon polyps 10/07/2020    tubular adenomas x4    COPD (chronic obstructive pulmonary disease) (HCC)     Depression     Epigastric pain     Hepatic steatosis     History of cataract extraction with lens replacement     bilateral    History of stress test     Hyperglycemia     Intertrigo     Lumbar degenerative disc disease     Nausea     Obesity 2017    Osteoarthritis     right knee    Osteoarthritis cervical spine     Osteoarthritis thoracic spine     Tobacco abuse 2017       Past Surgical History:   Procedure Laterality Date    CATARACT REMOVAL       SECTION      x1    COLONOSCOPY N/A 10/7/2020    tubular adenomas x4    HYSTERECTOMY, VAGINAL      secondary to cervical cancer--ovaries still present    NERVE BLOCK Bilateral 2020    NERVE BLOCK BILATERAL - MBB #1 L3-4, L4-5, L5-S1 (Bilateral )     PAIN MANAGEMENT PROCEDURE Bilateral 2020    NERVE BLOCK BILATERAL - MBB #1 L3-4, L4-5, L5-S1 performed by Rylie Crews MD at [address]      throat polyps removed       Family History   Problem Relation Age of Onset    Diabetes Mother         from pancreatic resection    Heart Disease Mother     Arthritis Mother     Depression Mother     Mental Illness Mother     Diabetes Father     Heart Disease Father     Depression Brother     Cancer Brother         bladder    Lung Cancer Brother 64        cause of death    Depression Sister     Breast Cancer Maternal Grandmother     Asthma Other         grandson    Breast Cancer Other         diagnosed in her 42's    High Blood Pressure Brother     Diabetes Brother  No Known Problems Maternal Grandfather     No Known Problems Paternal Grandmother     No Known Problems Paternal Grandfather     Arthritis Sister     Pancreatic Cancer Maternal Uncle     High Cholesterol Neg Hx     Kidney Disease Neg Hx     Stroke Neg Hx     Ovarian Cancer Neg Hx     Colon Cancer Neg Hx        Allergies   Allergen Reactions    Amoxicillin-Pot Clavulanate Diarrhea and Other (See Comments)    Medrol [Methylprednisolone] Other (See Comments)     Redness of hands and itching  Redness of hands and itching    Tape [Adhesive Tape] Rash         Current Facility-Administered Medications:     lactated ringers infusion, , Intravenous, Continuous, Henry Cobos MD    sodium chloride flush 0.9 % injection 10 mL, 10 mL, Intravenous, 2 times per day, Nathaniel Aquino MD    sodium chloride flush 0.9 % injection 10 mL, 10 mL, Intravenous, PRN, Nathaniel Aquino MD    lidocaine PF 1 % injection 1 mL, 1 mL, Intradermal, Once PRN, Nathaniel Aquino MD    meperidine (DEMEROL) injection 12.5 mg, 12.5 mg, Intravenous, Q5 Min PRN, Nathaniel Aquino MD    morphine (PF) injection 2 mg, 2 mg, Intravenous, Q5 Min PRN, Nathaniel Aquino MD    HYDROmorphone (DILAUDID) injection 0.5 mg, 0.5 mg, Intravenous, Q5 Min PRN, Nathaniel Aquino MD    HYDROmorphone (DILAUDID) injection 0.25 mg, 0.25 mg, Intravenous, Q5 Min PRN, Nathaniel Aquino MD    HYDROmorphone (DILAUDID) injection 0.5 mg, 0.5 mg, Intravenous, Q5 Min PRN, Nathaniel Aquino MD    oxyCODONE-acetaminophen (PERCOCET) 5-325 MG per tablet 1 tablet, 1 tablet, Oral, PRN **OR** oxyCODONE-acetaminophen (PERCOCET) 5-325 MG per tablet 2 tablet, 2 tablet, Oral, PRN, Nathaniel Aquino MD    ondansetron (ZOFRAN) injection 4 mg, 4 mg, Intravenous, Once PRN, Nathaniel Aquino MD    promethazine (PHENERGAN) injection 12.5 mg, 12.5 mg, Intravenous, Q15 Min PRN, Nathaniel Aquino MD    0.9 % sodium chloride bolus, 500 mL, Intravenous, Once PRN, Harsha Dawson MD    diphenhydrAMINE (BENADRYL) injection 12.5 mg, 12.5 mg, Intravenous, Once PRN, Harsha Dawson MD    labetalol (NORMODYNE;TRANDATE) injection syringe 5 mg, 5 mg, Intravenous, Q10 Min PRN, Harsha Dawson MD    Social History     Tobacco Use    Smoking status: Current Every Day Smoker     Packs/day: 1.00     Years: 40.00     Pack years: 40.00     Types: Cigarettes    Smokeless tobacco: Never Used    Tobacco comment: has tried cold turkey, patches, chantix   Substance Use Topics    Alcohol use: No     Alcohol/week: 0.0 standard drinks       Review of Systems:   Focused review of systems was performed, and negative as pertinent to diagnosis, except as stated in HPI. Physical Exam:     /79   Pulse 88   Temp 98.5 °F (36.9 °C)   Resp 19   Ht 5' 6\" (1.676 m)   Wt 298 lb (135.2 kg)   SpO2 95%   BMI 48.10 kg/m²     Physical Exam  Constitutional:       Appearance: Normal appearance. HENT:      Head: Normocephalic and atraumatic. Eyes:      General: Lids are normal.   Neck:      Musculoskeletal: Normal range of motion. Pulmonary:      Effort: Pulmonary effort is normal.   Skin:     Comments: Visualized skin with no rash   Neurological:      Mental Status: She is alert. Psychiatric:         Attention and Perception: Attention and perception normal.         Mood and Affect: Mood and affect normal.          Patient's current physical status, medications, medical history, and HPI have been reviewed and updated as appropriate on this date: 12/18/20    Risk/Benefit(s): The risks, benefits, alternatives, and potential complications have been discussed with the patient/family and informed consent has been obtained for the procedure/sedation.     Diagnosis:   M47.814 SPONDYLOSIS WITHOUT MYELOPATHY      Plan: lumbar MBB        Benzonia Guardian

## 2020-12-18 NOTE — OP NOTE
Lumbar Facet Nerve Block Injection:  Surgeon: Nicole Minor     PRE-OP DIAGNOSIS: M47.817 (lumbosacral spondylosis), M54.5 (low back pain)    POST-OP DIAGNOSIS: Same. PROCEDURE PERFORMED: Lumbar Facet Nerve Block Multiple Levels  Bilateral L3 - 4, L4 - 5 and L5 - S1. Physician confirmed and marked the surgical site. EBL: minimal      CONSENT: Patient has undergone the educational process with this procedure, is aware and fully understands the risks involved: potential damage to any and all body organs including possible bleeding, infection and nerve injury, allergic reaction and headache. Patient also understands that the procedure will be undertaken in a safe, controlled, and monitored setting. Patient recognizes that the benefits include relief from pain and reduction in the oral use of medications. Patient agreed to proceed. The patient was counseled at length about the risks of kenan Covid-19 during their perioperative period and any recovery window from their procedure. The patient was made aware that kenan Covid-19  may worsen their prognosis for recovering from their procedure  and lend to a higher morbidity and/or mortality risk. All material risks, benefits, and reasonable alternatives including postponing the procedure were discussed. The patient does wish to proceed with the procedure at this time. PREP: Timeout was performed prior to starting the procedure. The patient's back was prepped with chloroprep and draped appropriately. 5ml of 0.5% lidocaine was used to anesthetize the skin and subcutaneous tissue. PROCEDURE NOTE: A 22 gauge 3.5 inch spinal needle was advanced under  fluoroscopic guidance to the appropriate anatomic location for the medial branches corresponding to the facets at the base of the appropriate superior articular process and/or sacral ala . Aspiration was negative for blood, CSF and producing pain.  1 ml of 0.25% marcaine was then injected at each site to block medial branch nerve innervating the  Bilateral L3 - 4, L4 - 5 and L5 - S1 facet joints. Patient tolerated the procedure well, no complications occurred. At the end of the injection the physician withdrew the needle and the nurse applied a sterile bandage to the site. Patient transferred to the recovery room in satisfactory condition. Appropriate written discharge instructions were given to the patient. If good results are obtained, Patient would be a candidate for Radiofrequency Ablation.       Chrissy Hayward

## 2020-12-18 NOTE — ANESTHESIA PRE PROCEDURE
Department of Anesthesiology  Preprocedure Note       Name:  Jose David Ku   Age:  54 y.o.  :  1965                                          MRN:  [id number]         Date:  2020      Surgeon: Monica Cabrera):  Mackenzie Johnston MD    Procedure: Procedure(s):  NERVE BLOCK BILATERAL - MBB #2 L3-4, L4-5, L5-S1    Medications prior to admission:   Prior to Admission medications    Medication Sig Start Date End Date Taking? Authorizing Provider   metFORMIN (GLUCOPHAGE) 500 MG tablet TAKE TWO TABLETS BY MOUTH TWICE A DAY WITH MEALS 20   Mariam Mir MD   ibuprofen (ADVIL;MOTRIN) 800 MG tablet 1 tablet with food or milk as needed    Historical Provider, MD   pregabalin (LYRICA) 50 MG capsule 1 capsule    Historical Provider, MD   albuterol (PROVENTIL) (2.5 MG/3ML) 0.083% nebulizer solution Take 2.5 mg by nebulization every 6 hours as needed for Wheezing    Historical Provider, MD   pregabalin (LYRICA) 100 MG capsule Take 1 capsule by mouth 2 times daily for 30 days.  10/15/20 12/3/20  Mackenzie Johnston MD   VORTIoxetine (TRINTELLIX) 5 MG tablet Take 1 tablet by mouth daily 20   Mariam Mir MD   albuterol sulfate HFA (PROAIR HFA) 108 (90 Base) MCG/ACT inhaler Inhale 2 puffs into the lungs every 6 hours as needed for Wheezing 20   Mariam Mir MD   budesonide-formoterol (SYMBICORT) 160-4.5 MCG/ACT AERO INHALE TWO PUFFS BY MOUTH TWICE A DAY 20   Mariam Mir MD   nystatin ([address]) 649675 UNIT/GM powder APPLY ONE DOSE TOPICALLY TWICE A DAY 20   Mariam Mir MD   Elastic Bandages & Supports (JOBST KNEE HIGH COMPRESSION SM) MISC 1 each by Does not apply route daily Please measure 18   Linh Silva PA-C   diclofenac sodium 1 % GEL Apply 2 g topically 2 times daily 17   Linh Silva PA-C   Blood Glucose Monitoring Suppl [facility] BLOOD GLUCOSE METER) W/DEVICE KIT 1 each by Does not apply route 2 times daily 17   Linh Silva PA-C Elastic Bandages & Supports (KNEE BRACE) MISC 1 Device by Does not apply route daily Right knee pain; osteoarthritis right knee 7/16/16   JACK Medina CNP   Nebulizers (COMPRESSOR/NEBULIZER) MISC 1 vial by Does not apply route 4 times daily as needed. Patient has albuterol prescription at home; needs aerosol machine and set up diagnosis chronic bronchitis 12/20/14   JACK Medina - CNP       Current medications:    Current Facility-Administered Medications   Medication Dose Route Frequency Provider Last Rate Last Admin    lactated ringers infusion   Intravenous Continuous Harsha Dawson MD        sodium chloride flush 0.9 % injection 10 mL  10 mL Intravenous 2 times per day Harsha Dawson MD        sodium chloride flush 0.9 % injection 10 mL  10 mL Intravenous PRN Harsha Dawson MD        lidocaine PF 1 % injection 1 mL  1 mL Intradermal Once PRN Harsha Dawson MD           Allergies:     Allergies   Allergen Reactions    Amoxicillin-Pot Clavulanate Diarrhea and Other (See Comments)    Medrol [Methylprednisolone] Other (See Comments)     Redness of hands and itching  Redness of hands and itching    Tape [Adhesive Tape] Rash       Problem List:    Patient Active Problem List   Diagnosis Code    Hyperglycemia R73.9    Anxiety F41.9    Bronchitis J40    Depression F32.9    Degenerative arthritis of lumbar spine M47.816    Osteoarthritis of knee M17.10    Insulin resistance E88.81    COPD (chronic obstructive pulmonary disease) (HCC) J44.9    Osteoarthritis thoracic spine M47.814    Osteoarthritis cervical spine M47.812    Prediabetes R73.03    Peripheral edema R60.9    Hepatic steatosis K76.0    Epigastric pain R10.13    Nausea R11.0    Vitamin D deficiency E55.9    Tobacco abuse Z72.0    Vocal cord polyp J38.1    Laryngitis J04.0    Obesity E66.9    Encounter for diabetic foot exam ([facility] E11.9    SOB (shortness of breath) R06.02  Family history of MI (myocardial infarction) Z82.49    Bilateral leg edema R60.0    Pulmonary HTN (HCC) I27.20    TAMARA on CPAP G47.33, Z99.89    Colon polyps K63.5       Past Medical History:        Diagnosis Date    Anxiety     Asthma     Bladder incontinence     At times per pt.     Bladder prolapse, female, acquired     Cancer [facility]) 1996    cervical; treated with hysterectomy    Colon polyps 10/07/2020    tubular adenomas x4    COPD (chronic obstructive pulmonary disease) ([address]     Depression     Epigastric pain     Hepatic steatosis     History of cataract extraction with lens replacement     bilateral    History of stress test     Hyperglycemia     Intertrigo     Lumbar degenerative disc disease     Nausea     Obesity 2017    Osteoarthritis     right knee    Osteoarthritis cervical spine     Osteoarthritis thoracic spine     Tobacco abuse 2017       Past Surgical History:        Procedure Laterality Date    CATARACT REMOVAL       SECTION      x1    COLONOSCOPY N/A 10/7/2020    tubular adenomas x4    HYSTERECTOMY, VAGINAL      secondary to cervical cancer--ovaries still present    NERVE BLOCK Bilateral 2020    NERVE BLOCK BILATERAL - MBB #1 L3-4, L4-5, L5-S1 (Bilateral )     PAIN MANAGEMENT PROCEDURE Bilateral 2020    NERVE BLOCK BILATERAL - MBB #1 L3-4, L4-5, L5-S1 performed by Feliciano Barrios MD at [address] [facility]  2017    throat polyps removed       Social History:    Social History     Tobacco Use    Smoking status: Current Every Day Smoker     Packs/day: 1.00     Years: 40.00     Pack years: 40.00     Types: Cigarettes    Smokeless tobacco: Never Used    Tobacco comment: has tried cold turkey, patches, chantix   Substance Use Topics    Alcohol use: No     Alcohol/week: 0.0 standard drinks                                Ready to quit: Not Answered  Counseling given: Not Answered Comment: has tried cold turkey, patches, chantix      Vital Signs (Current):   Vitals:    12/18/20 1125   BP: 123/79   Pulse: 88   Resp: 19   Temp: 98.5 °F (36.9 °C)   SpO2: 95%   Weight: 298 lb (135.2 kg)   Height: 5' 6\" (1.676 m)                                              BP Readings from Last 3 Encounters:   12/18/20 123/79   12/03/20 126/80   11/13/20 131/70       NPO Status: Time of last liquid consumption: 2200                        Time of last solid consumption: 2000                        Date of last liquid consumption: 12/17/20                        Date of last solid food consumption: 12/17/20    BMI:   Wt Readings from Last 3 Encounters:   12/18/20 298 lb (135.2 kg)   12/03/20 (!) 300 lb 9.6 oz (136.4 kg)   11/13/20 298 lb 4 oz (135.3 kg)     Body mass index is 48.1 kg/m². CBC:   Lab Results   Component Value Date    WBC 7.3 08/19/2020    RBC 5.46 08/19/2020    HGB 16.6 08/19/2020    HCT 51.9 08/19/2020    MCV 95.1 08/19/2020    RDW 13.9 08/19/2020     08/19/2020       CMP:   Lab Results   Component Value Date     08/19/2020    K 4.6 08/19/2020     08/19/2020    CO2 25 08/19/2020    BUN 14 08/19/2020    CREATININE 0.53 08/19/2020    GFRAA >60 08/19/2020    LABGLOM >60 08/19/2020    GLUCOSE 104 09/20/2019    PROT 6.5 08/19/2020    CALCIUM 9.2 08/19/2020    BILITOT 0.45 08/19/2020    ALKPHOS 82 08/19/2020    AST 19 08/19/2020    ALT 23 08/19/2020       POC Tests: No results for input(s): POCGLU, POCNA, POCK, POCCL, POCBUN, POCHEMO, POCHCT in the last 72 hours.     Coags:   Lab Results   Component Value Date    PROTIME 9.7 01/18/2017    INR 1.0 01/18/2017    APTT 26.6 01/18/2017       HCG (If Applicable): No results found for: PREGTESTUR, PREGSERUM, HCG, HCGQUANT     ABGs: No results found for: PHART, PO2ART, PGL5MAA, MNL2MTU, BEART, W8VGTDAX     Type & Screen (If Applicable):  No results found for: LABABO, LABRH    Drug/Infectious Status (If Applicable):  Lab Results Component Value Date    HEPCAB NONREACTIVE 12/05/2017       COVID-19 Screening (If Applicable):   Lab Results   Component Value Date    COVID19 Not Detected 12/14/2020    COVID19 Not Detected 10/03/2020         Anesthesia Evaluation  Patient summary reviewed and Nursing notes reviewed  Airway: Mallampati: IV  TM distance: >3 FB   Neck ROM: full  Mouth opening: > = 3 FB Dental: normal exam         Pulmonary:normal exam    (+) COPD:  asthma: current smoker                          ROS comment: -SMOKES 1 PPD FOR 41 YEARS  -ASTHMA CONTROLLED - NO RECENT ALBUTEROL USE  -PRODUCTIVE COUGH   Cardiovascular:Negative CV ROS                      Neuro/Psych:                ROS comment: -DDD GI/Hepatic/Renal:   (+) morbid obesity          Endo/Other:    (+) Diabetes, : arthritis:., malignancy/cancer. ROS comment: -CERVIX CANCER  -MARIJUANA USE  -NPO AFTER MIDNIGHT  -ALLERGIES - AMOX-CLAVULANATE, MEDROL Abdominal:           Vascular: negative vascular ROS. Anesthesia Plan      MAC     ASA 3       Induction: intravenous. MIPS: Postoperative opioids intended and Prophylactic antiemetics administered. Anesthetic plan and risks discussed with patient. Plan discussed with CRNA.     Attending anesthesiologist reviewed and agrees with Pre Eval content              Juma Saucedo MD   12/18/2020

## 2020-12-22 NOTE — TELEPHONE ENCOUNTER
On 11/06/20 Left Msg for Pt in regards to her upcoming [address] on 11/09/20 @ 10:15am [facility]

## 2020-12-23 ENCOUNTER — TELEPHONE (OUTPATIENT)
Dept: PAIN MANAGEMENT | Age: 55
End: 2020-12-23

## 2020-12-23 NOTE — TELEPHONE ENCOUNTER
Patient states that her pain before the procedure with activity was a 7/10. She states that after the procedure with activity, her pain decreased to a 3/10. States that after the procedure she did dishes and housework. Patient states 90% relief, and considers the procedure a success. OR in new year.

## 2021-01-11 ENCOUNTER — HOSPITAL ENCOUNTER (OUTPATIENT)
Dept: LAB | Age: 56
Setting detail: SPECIMEN
Discharge: HOME | End: 2021-01-11
Payer: MEDICARE

## 2021-01-11 DIAGNOSIS — Z01.818 PREOP TESTING: Primary | ICD-10-CM

## 2021-01-11 PROCEDURE — U0003 INFECTIOUS AGENT DETECTION BY NUCLEIC ACID (DNA OR RNA); SEVERE ACUTE RESPIRATORY SYNDROME CORONAVIRUS 2 (SARS-COV-2) (CORONAVIRUS DISEASE [COVID-19]), AMPLIFIED PROBE TECHNIQUE, MAKING USE OF HIGH THROUGHPUT TECHNOLOGIES AS DESCRIBED BY CMS-2020-01-R: HCPCS

## 2021-01-11 PROCEDURE — U0005 INFEC AGEN DETEC AMPLI PROBE: HCPCS

## 2021-01-12 LAB
SARS-COV-2, RAPID: NORMAL
SARS-COV-2: NORMAL
SARS-COV-2: NOT DETECTED
SOURCE: NORMAL

## 2021-01-13 ENCOUNTER — ANESTHESIA EVENT (OUTPATIENT)
Dept: OPERATING ROOM | Age: 56
End: 2021-01-13
Payer: MEDICARE

## 2021-01-13 NOTE — PROGRESS NOTES
VM left with arrival info, npo after midnight, need for /stay, location and no visitors in building.  Instructed to call if on any blood thinners or anitbxs

## 2021-01-15 ENCOUNTER — HOSPITAL ENCOUNTER (OUTPATIENT)
Age: 56
Setting detail: OUTPATIENT SURGERY
Discharge: HOME | End: 2021-01-15
Attending: PAIN MEDICINE | Admitting: PAIN MEDICINE
Payer: MEDICARE

## 2021-01-15 ENCOUNTER — ANESTHESIA (OUTPATIENT)
Dept: OPERATING ROOM | Age: 56
End: 2021-01-15
Payer: MEDICARE

## 2021-01-15 ENCOUNTER — APPOINTMENT (OUTPATIENT)
Dept: GENERAL RADIOLOGY | Age: 56
End: 2021-01-15
Attending: PAIN MEDICINE
Payer: MEDICARE

## 2021-01-15 VITALS
RESPIRATION RATE: 20 BRPM | OXYGEN SATURATION: 90 % | DIASTOLIC BLOOD PRESSURE: 75 MMHG | BODY MASS INDEX: 47.09 KG/M2 | SYSTOLIC BLOOD PRESSURE: 110 MMHG | HEART RATE: 71 BPM | HEIGHT: 66 IN | WEIGHT: 293 LBS | TEMPERATURE: 97.3 F

## 2021-01-15 VITALS
SYSTOLIC BLOOD PRESSURE: 134 MMHG | DIASTOLIC BLOOD PRESSURE: 80 MMHG | RESPIRATION RATE: 22 BRPM | OXYGEN SATURATION: 93 %

## 2021-01-15 PROCEDURE — 3209999900 FLUORO FOR SURGICAL PROCEDURES

## 2021-01-15 PROCEDURE — 6360000002 HC RX W HCPCS: Performed by: ANESTHESIOLOGY

## 2021-01-15 PROCEDURE — 3600000012 HC SURGERY LEVEL 2 ADDTL 15MIN: Performed by: PAIN MEDICINE

## 2021-01-15 PROCEDURE — 2500000003 HC RX 250 WO HCPCS: Performed by: PAIN MEDICINE

## 2021-01-15 PROCEDURE — 3600000002 HC SURGERY LEVEL 2 BASE: Performed by: PAIN MEDICINE

## 2021-01-15 PROCEDURE — 7100000011 HC PHASE II RECOVERY - ADDTL 15 MIN: Performed by: PAIN MEDICINE

## 2021-01-15 PROCEDURE — 3700000001 HC ADD 15 MINUTES (ANESTHESIA): Performed by: PAIN MEDICINE

## 2021-01-15 PROCEDURE — 3700000000 HC ANESTHESIA ATTENDED CARE: Performed by: PAIN MEDICINE

## 2021-01-15 PROCEDURE — 2709999900 HC NON-CHARGEABLE SUPPLY: Performed by: PAIN MEDICINE

## 2021-01-15 PROCEDURE — 7100000010 HC PHASE II RECOVERY - FIRST 15 MIN: Performed by: PAIN MEDICINE

## 2021-01-15 PROCEDURE — 64636 DESTROY L/S FACET JNT ADDL: CPT | Performed by: PAIN MEDICINE

## 2021-01-15 PROCEDURE — 64635 DESTROY LUMB/SAC FACET JNT: CPT | Performed by: PAIN MEDICINE

## 2021-01-15 RX ORDER — LABETALOL 20 MG/4 ML (5 MG/ML) INTRAVENOUS SYRINGE
5 EVERY 10 MIN PRN
Status: DISCONTINUED | OUTPATIENT
Start: 2021-01-15 | End: 2021-01-15 | Stop reason: HOSPADM

## 2021-01-15 RX ORDER — LIDOCAINE HYDROCHLORIDE 10 MG/ML
1 INJECTION, SOLUTION EPIDURAL; INFILTRATION; INTRACAUDAL; PERINEURAL
Status: DISCONTINUED | OUTPATIENT
Start: 2021-01-15 | End: 2021-01-15 | Stop reason: HOSPADM

## 2021-01-15 RX ORDER — BUPIVACAINE HYDROCHLORIDE 2.5 MG/ML
INJECTION, SOLUTION EPIDURAL; INFILTRATION; INTRACAUDAL PRN
Status: DISCONTINUED | OUTPATIENT
Start: 2021-01-15 | End: 2021-01-15 | Stop reason: ALTCHOICE

## 2021-01-15 RX ORDER — OXYCODONE HYDROCHLORIDE AND ACETAMINOPHEN 5; 325 MG/1; MG/1
1 TABLET ORAL PRN
Status: DISCONTINUED | OUTPATIENT
Start: 2021-01-15 | End: 2021-01-15 | Stop reason: HOSPADM

## 2021-01-15 RX ORDER — FENTANYL CITRATE 50 UG/ML
INJECTION, SOLUTION INTRAMUSCULAR; INTRAVENOUS PRN
Status: DISCONTINUED | OUTPATIENT
Start: 2021-01-15 | End: 2021-01-15 | Stop reason: SDUPTHER

## 2021-01-15 RX ORDER — MEPERIDINE HYDROCHLORIDE 50 MG/ML
12.5 INJECTION INTRAMUSCULAR; INTRAVENOUS; SUBCUTANEOUS EVERY 5 MIN PRN
Status: DISCONTINUED | OUTPATIENT
Start: 2021-01-15 | End: 2021-01-15 | Stop reason: HOSPADM

## 2021-01-15 RX ORDER — OXYCODONE HYDROCHLORIDE AND ACETAMINOPHEN 5; 325 MG/1; MG/1
2 TABLET ORAL PRN
Status: DISCONTINUED | OUTPATIENT
Start: 2021-01-15 | End: 2021-01-15 | Stop reason: HOSPADM

## 2021-01-15 RX ORDER — SODIUM CHLORIDE 0.9 % (FLUSH) 0.9 %
10 SYRINGE (ML) INJECTION PRN
Status: DISCONTINUED | OUTPATIENT
Start: 2021-01-15 | End: 2021-01-15 | Stop reason: HOSPADM

## 2021-01-15 RX ORDER — 0.9 % SODIUM CHLORIDE 0.9 %
500 INTRAVENOUS SOLUTION INTRAVENOUS
Status: DISCONTINUED | OUTPATIENT
Start: 2021-01-15 | End: 2021-01-15 | Stop reason: HOSPADM

## 2021-01-15 RX ORDER — SODIUM CHLORIDE, SODIUM LACTATE, POTASSIUM CHLORIDE, CALCIUM CHLORIDE 600; 310; 30; 20 MG/100ML; MG/100ML; MG/100ML; MG/100ML
INJECTION, SOLUTION INTRAVENOUS CONTINUOUS
Status: DISCONTINUED | OUTPATIENT
Start: 2021-01-15 | End: 2021-01-15 | Stop reason: HOSPADM

## 2021-01-15 RX ORDER — DIPHENHYDRAMINE HYDROCHLORIDE 50 MG/ML
12.5 INJECTION INTRAMUSCULAR; INTRAVENOUS
Status: DISCONTINUED | OUTPATIENT
Start: 2021-01-15 | End: 2021-01-15 | Stop reason: HOSPADM

## 2021-01-15 RX ORDER — SODIUM CHLORIDE 0.9 % (FLUSH) 0.9 %
10 SYRINGE (ML) INJECTION EVERY 12 HOURS SCHEDULED
Status: DISCONTINUED | OUTPATIENT
Start: 2021-01-15 | End: 2021-01-15 | Stop reason: HOSPADM

## 2021-01-15 RX ORDER — ONDANSETRON 2 MG/ML
4 INJECTION INTRAMUSCULAR; INTRAVENOUS
Status: DISCONTINUED | OUTPATIENT
Start: 2021-01-15 | End: 2021-01-15 | Stop reason: HOSPADM

## 2021-01-15 RX ORDER — MORPHINE SULFATE 2 MG/ML
2 INJECTION, SOLUTION INTRAMUSCULAR; INTRAVENOUS EVERY 5 MIN PRN
Status: DISCONTINUED | OUTPATIENT
Start: 2021-01-15 | End: 2021-01-15 | Stop reason: HOSPADM

## 2021-01-15 RX ORDER — PROMETHAZINE HYDROCHLORIDE 25 MG/ML
12.5 INJECTION, SOLUTION INTRAMUSCULAR; INTRAVENOUS
Status: DISCONTINUED | OUTPATIENT
Start: 2021-01-15 | End: 2021-01-15 | Stop reason: HOSPADM

## 2021-01-15 RX ORDER — MIDAZOLAM HYDROCHLORIDE 1 MG/ML
INJECTION INTRAMUSCULAR; INTRAVENOUS PRN
Status: DISCONTINUED | OUTPATIENT
Start: 2021-01-15 | End: 2021-01-15 | Stop reason: SDUPTHER

## 2021-01-15 RX ADMIN — MIDAZOLAM HYDROCHLORIDE 2 MG: 1 INJECTION, SOLUTION INTRAMUSCULAR; INTRAVENOUS at 12:29

## 2021-01-15 RX ADMIN — FENTANYL CITRATE 100 MCG: 50 INJECTION, SOLUTION INTRAMUSCULAR; INTRAVENOUS at 12:30

## 2021-01-15 ASSESSMENT — PULMONARY FUNCTION TESTS
PIF_VALUE: 0

## 2021-01-15 ASSESSMENT — PAIN SCALES - GENERAL
PAINLEVEL_OUTOF10: 0
PAINLEVEL_OUTOF10: 0

## 2021-01-15 ASSESSMENT — LIFESTYLE VARIABLES: SMOKING_STATUS: 1

## 2021-01-15 NOTE — ANESTHESIA POSTPROCEDURE EVALUATION
POST- ANESTHESIA EVALUATION       Pt Name: Dian Murphy  MRN: [id number]  YOB: 1965  Date of evaluation: 1/15/2021  Time:  1:16 PM      /75   Pulse 71   Temp 97.3 °F (36.3 °C) (Temporal)   Resp 20   Ht 5' 6\" (1.676 m)   Wt 299 lb (135.6 kg)   SpO2 90%   BMI 48.26 kg/m²      Consciousness Level  Awake  Cardiopulmonary Status  Stable  Pain Adequately Treated YES  Nausea / Vomiting  NO  Adequate Hydration  YES  Anesthesia Related Complications NONE      Electronically signed by Spring Jaquez MD on 1/15/2021 at 1:16 PM       Department of Anesthesiology  Postprocedure Note    Patient: Dian Murphy  MRN: [id number]  YOB: 1965  Date of evaluation: 1/15/2021  Time:  1:16 PM     Procedure Summary     Date: 01/15/21 Room / Location: [address] [place] [address]    Anesthesia Start: 5182 Anesthesia Stop: 8975    Procedure: NERVE RADIOFREQUENCY ABLATION L3-4, L4-5, L5-S1 (Left ) Diagnosis: (M47.817 SPONDYLOSIS WITHOUT MYELOPATHY)    Surgeons: Peterson Toscano MD Responsible Provider: Spring Jaquez MD    Anesthesia Type: MAC ASA Status: 3          Anesthesia Type: MAC    Karen Phase I: Karen Score: 10    Karen Phase II: Karen Score: 10    Last vitals: Reviewed and per EMR flowsheets.        Anesthesia Post Evaluation

## 2021-01-15 NOTE — H&P
Pain Pre-Op H&P Note    Mike Minor    HPI: Elizabeth Nation  presents with back pain. Past Medical History:   Diagnosis Date    Anxiety     Asthma     Bladder incontinence     At times per pt.     Bladder prolapse, female, acquired     Cancer ([address] 1996    cervical; treated with hysterectomy    Chronic bilateral low back pain     Colon polyps 10/07/2020    tubular adenomas x4    COPD (chronic obstructive pulmonary disease) (HCC)     Depression     Epigastric pain     Hepatic steatosis     History of cataract extraction with lens replacement     bilateral    History of stress test     Hyperglycemia     Intertrigo     Lumbar degenerative disc disease     Nausea     Obesity 2017    Osteoarthritis     right knee    Osteoarthritis cervical spine     Osteoarthritis thoracic spine     Tobacco abuse 2017       Past Surgical History:   Procedure Laterality Date    CATARACT REMOVAL       SECTION      x1    COLONOSCOPY N/A 10/7/2020    tubular adenomas x4    HYSTERECTOMY, VAGINAL      secondary to cervical cancer--ovaries still present    NERVE BLOCK Bilateral 2020    NERVE BLOCK BILATERAL - MBB #1 L3-4, L4-5, L5-S1 (Bilateral )     NERVE BLOCK Bilateral 2020    NERVE BLOCK BILATERAL - MBB #2 L3-4, L4-5, L5-S1    PAIN MANAGEMENT PROCEDURE Bilateral 2020    NERVE BLOCK BILATERAL - MBB #1 L3-4, L4-5, L5-S1 performed by Mick Platt MD at [facility] Bilateral 2020    NERVE BLOCK BILATERAL - MBB #2 L3-4, L4-5, L5-S1 performed by Mick Platt MD at [facility]      throat polyps removed       Family History   Problem Relation Age of Onset    Diabetes Mother         from pancreatic resection    Heart Disease Mother     Arthritis Mother     Depression Mother     Mental Illness Mother     Diabetes Father     Heart Disease Father     Depression Brother    [facility] Cancer Brother         bladder    Lung Cancer Brother 64        cause of death    Depression Sister     Breast Cancer Maternal Grandmother     Asthma Other         grandson    Breast Cancer Other         diagnosed in her 42's    High Blood Pressure Brother     Diabetes Brother     No Known Problems Maternal Grandfather     No Known Problems Paternal Grandmother     No Known Problems Paternal Grandfather     Arthritis Sister     Pancreatic Cancer Maternal Uncle     High Cholesterol Neg Hx     Kidney Disease Neg Hx     Stroke Neg Hx     Ovarian Cancer Neg Hx     Colon Cancer Neg Hx        Allergies   Allergen Reactions    Amoxicillin-Pot Clavulanate Diarrhea and Other (See Comments)    Medrol [Methylprednisolone] Other (See Comments)     Redness of hands and itching  Redness of hands and itching    Tape [Adhesive Tape] Rash         Current Facility-Administered Medications:     lactated ringers infusion, , Intravenous, Continuous, Henry Cobos MD    sodium chloride flush 0.9 % injection 10 mL, 10 mL, Intravenous, 2 times per day, Michael Jenkins MD    sodium chloride flush 0.9 % injection 10 mL, 10 mL, Intravenous, PRN, Michael Jenkins MD    lidocaine PF 1 % injection 1 mL, 1 mL, Intradermal, Once PRN, Michael Jenkins MD    meperidine (DEMEROL) injection 12.5 mg, 12.5 mg, Intravenous, Q5 Min PRN, Michael Jenkins MD    morphine (PF) injection 2 mg, 2 mg, Intravenous, Q5 Min PRNMichael MD    HYDROmorphone (DILAUDID) injection 0.5 mg, 0.5 mg, Intravenous, Q5 Min PRNMichael MD    HYDROmorphone (DILAUDID) injection 0.25 mg, 0.25 mg, Intravenous, Q5 Min PRNMichael MD    HYDROmorphone (DILAUDID) injection 0.5 mg, 0.5 mg, Intravenous, Q5 Min PRNMichael MD    oxyCODONE-acetaminophen (PERCOCET) 5-325 MG per tablet 1 tablet, 1 tablet, Oral, PRN **OR** oxyCODONE-acetaminophen (PERCOCET) 5-325 MG per tablet 2 tablet, 2 tablet, Oral, PRN, Francisco Javier White MD    [facility]ron [facility]) injection 4 mg, 4 mg, Intravenous, Once PRN, Francisco Javier White MD    promethazine (PHENERGAN) injection 12.5 mg, 12.5 mg, Intravenous, Q15 Min PRN, Francisco Javier White MD    0.9 % sodium chloride bolus, 500 mL, Intravenous, Once PRN, Francisco Javier White MD    diphenhydrAMINE (BENADRYL) injection 12.5 mg, 12.5 mg, Intravenous, Once PRN, Francisco Javier White MD    labetalol (NORMODYNE;TRANDATE) injection syringe 5 mg, 5 mg, Intravenous, Q10 Min PRN, Francisco Javier White MD    Social History     Tobacco Use    Smoking status: Current Every Day Smoker     Packs/day: 1.00     Years: 40.00     Pack years: 40.00     Types: Cigarettes    Smokeless tobacco: Never Used    Tobacco comment: has tried cold turkey, patches, chantix   Substance Use Topics    Alcohol use: No     Alcohol/week: 0.0 standard drinks       Review of Systems:   Focused review of systems was performed, and negative as pertinent to diagnosis, except as stated in HPI. Physical Exam:     /80   Pulse 87   Temp 98.4 °F (36.9 °C) (Temporal)   Resp 18   Ht 5' 6\" (1.676 m)   Wt 299 lb (135.6 kg)   SpO2 94%   BMI 48.26 kg/m²     Physical Exam  Constitutional:       Appearance: Normal appearance. Pulmonary:      Effort: Pulmonary effort is normal.   Neurological:      Mental Status: She is alert. Psychiatric:         Attention and Perception: Attention and perception normal.         Mood and Affect: Mood and affect normal.            Patient's current physical status, medications, medical history, and HPI have been reviewed and updated as appropriate on this date: 01/15/21    Risk/Benefit(s): The risks, benefits, alternatives, and potential complications have been discussed with the patient/family and informed consent has been obtained for the procedure/sedation.     Diagnosis:   M47.817 SPONDYLOSIS WITHOUT MYELOPATHY      Plan: lumbar RFA        Arron Lockhart

## 2021-01-15 NOTE — OP NOTE
Lumbar Radiofrequency Ablation:  SURGEON: Marcia Minor    PRE-OP DIAGNOSIS: M47.817 (lumbosacral spondylosis), M54.5 (low back pain)    POST-OP DIAGNOSIS: Same. PROCEDURE PERFORMED: Radiofrequency Ablation Medial Branch Nerve at Right L3 - 4, L4 - 5 and L5 - S1 facet joint(s). HISTORY AND INDICATIONS: Satisfactory response with previous RFA/ medial branch blocks at the indicated levels. Recurrence of painful symptoms attributed to the above diagnosis. The planned treatment is medically necessary to relieve pain, restore function and or reduce reliance on pain medication. EBL: minimal    CONSENT: Patient has undergone the educational process with this procedure, is aware and fully understands the risks involved: potential damage to any and all body organs including possible bleeding, infection, nerve injury, paralysis, allergic reaction and headache. Patient also understands that the procedure will be undertaken in a safe, controlled and monitored setting. Patient recognizes that the benefits may include relief from pain and reduction in the oral use of medications. Patient agreed to proceed. The patient was counseled at length about the risks of kenan Covid-19 during their perioperative period and any recovery window from their procedure. The patient was made aware that kenan Covid-19  may worsen their prognosis for recovering from their procedure  and lend to a higher morbidity and/or mortality risk. All material risks, benefits, and reasonable alternatives including postponing the procedure were discussed. The patient does wish to proceed with the procedure at this time. MONITORS INSTITUTED INCLUDE but not limited to:   Pulse Oximetry  Non-invasive blood pressure monitoring    PROCEDURE NOTE: The patient was taken to the procedure room and placed prone with the appropriate padding and positioning to assure patient comfort and physician access to the procedure site.  Time out was performed prior to starting the procedure. Fluoroscopic evaluation was utilized to target the appropriate treatment areas. The skin was prepped with antiseptic solution and draped sterilely. Then 0.5% lidocaine was used to anesthetize the skin and subcutaneous tissue. Under fluoroscopic guidance a 20 gauge x 10cm x 10mm active tip was advanced to the medial branch nerve at the indicated levels below to innervate the following facet joints Right L3 - 4, L4 - 5 and L5 - S1 . The needles were placed sequentially. Position confirmed radiographically with the fluoroscope. Active tip corresponding at the base of the superior articulating process and/or sacral ala for the lumbar RFA. Motor stimulation checked at 2hz up to 3V and confirmed negative for radicular stimulation. After confirmation of the needle placement the patient received 1 ml of 0.25% marcaine to provide anesthesia. Radiofrequency was delivered to the lumbar region at 80 degrees for a limit of 90 seconds in length with no ill effect. The patient tolerated the procedure well and was transported to the recovery room where the patient was monitored with no complications and with stable vital signs. Patient was discharged with appropriate written discharge instructions. Follow-up discussed.       [address] [place] [address]

## 2021-01-15 NOTE — ANESTHESIA PRE PROCEDURE
Department of Anesthesiology  Preprocedure Note       Name:  Kong Lanier   Age:  54 y.o.  :  1965                                          MRN:  [id number]         Date:  1/15/2021      Surgeon: Sammy Apodaca):  Mackenzie Johnston MD    Procedure: Procedure(s):  NERVE RADIOFREQUENCY ABLATION L3-4, L4-5, L5-S1    Medications prior to admission:   Prior to Admission medications    Medication Sig Start Date End Date Taking? Authorizing Provider   ibuprofen (ADVIL;MOTRIN) 800 MG tablet 1 tablet with food or milk as needed   Yes Historical Provider, MD   pregabalin (LYRICA) 50 MG capsule 1 capsule   Yes Historical Provider, MD   albuterol (PROVENTIL) (2.5 MG/3ML) 0.083% nebulizer solution Take 2.5 mg by nebulization every 6 hours as needed for Wheezing   Yes Historical Provider, MD   VORTIoxetine (TRINTELLIX) 5 MG tablet Take 1 tablet by mouth daily 20  Yes Gail Flores MD   albuterol sulfate HFA (PROAIR HFA) 108 (90 Base) MCG/ACT inhaler Inhale 2 puffs into the lungs every 6 hours as needed for Wheezing 20  Yes Gail Flores MD   budesonide-formoterol (SYMBICORT) 160-4.5 MCG/ACT AERO INHALE TWO PUFFS BY MOUTH TWICE A DAY 20  Yes Gail Flores MD   pregabalin (LYRICA) 100 MG capsule Take 1 capsule by mouth 2 times daily for 30 days.  10/15/20 12/3/20  Mackenzie Johnston MD   nystatin ([address]) 067357 UNIT/GM powder APPLY ONE DOSE TOPICALLY TWICE A DAY 20   Gail Flores MD   Elastic Bandages & Supports (JOBST KNEE HIGH COMPRESSION SM) MISC 1 each by Does not apply route daily Please measure 18   Paradise Watkins PA-C   diclofenac sodium 1 % GEL Apply 2 g topically 2 times daily 17   Paradise Watkins PA-C   Blood Glucose Monitoring Suppl [facility] BLOOD GLUCOSE METER) W/DEVICE KIT 1 each by Does not apply route 2 times daily 17   Paradise Watkins PA-C Elastic Bandages & Supports (KNEE BRACE) MISC 1 Device by Does not apply route daily Right knee pain; osteoarthritis right knee 7/16/16   JACK Alves CNP   Nebulizers (COMPRESSOR/NEBULIZER) MISC 1 vial by Does not apply route 4 times daily as needed. Patient has albuterol prescription at home; needs aerosol machine and set up diagnosis chronic bronchitis 12/20/14   JACK Alves CNP       Current medications:    Current Facility-Administered Medications   Medication Dose Route Frequency Provider Last Rate Last Admin    lactated ringers infusion   Intravenous Continuous Billie Ricketts MD        sodium chloride flush 0.9 % injection 10 mL  10 mL Intravenous 2 times per day Billie Ricketts MD        sodium chloride flush 0.9 % injection 10 mL  10 mL Intravenous PRN Billie Ricketts MD        lidocaine PF 1 % injection 1 mL  1 mL Intradermal Once PRN Billie Ricketts MD           Allergies:     Allergies   Allergen Reactions    Amoxicillin-Pot Clavulanate Diarrhea and Other (See Comments)    Medrol [Methylprednisolone] Other (See Comments)     Redness of hands and itching  Redness of hands and itching    Tape [Adhesive Tape] Rash       Problem List:    Patient Active Problem List   Diagnosis Code    Hyperglycemia R73.9    Anxiety F41.9    Bronchitis J40    Depression F32.9    Degenerative arthritis of lumbar spine M47.816    Osteoarthritis of knee M17.10    Insulin resistance E88.81    COPD (chronic obstructive pulmonary disease) (HCC) J44.9    Osteoarthritis thoracic spine M47.814    Osteoarthritis cervical spine M47.812    Prediabetes R73.03    Peripheral edema R60.9    Hepatic steatosis K76.0    Epigastric pain R10.13    Nausea R11.0    Vitamin D deficiency E55.9    Tobacco abuse Z72.0    Vocal cord polyp J38.1    Laryngitis J04.0    Obesity E66.9    Encounter for diabetic foot exam ([facility] [address] E11.9    SOB (shortness of breath) R06.02  Family history of MI (myocardial infarction) Z82.49    Bilateral leg edema R60.0    Pulmonary HTN (HCC) I27.20    TAMARA on CPAP G47.33, Z99.89    Colon polyps K63.5    Lumbosacral spondylosis without myelopathy M47.817    Chronic bilateral low back pain M54.5, G89.29       Past Medical History:        Diagnosis Date    Anxiety     Asthma     Bladder incontinence     At times per pt.     Bladder prolapse, female, acquired     Cancer ([facility] [address] 1996    cervical; treated with hysterectomy    Chronic bilateral low back pain     Colon polyps 10/07/2020    tubular adenomas x4    COPD (chronic obstructive pulmonary disease) ([facility] [address]     Depression     Epigastric pain     Hepatic steatosis     History of cataract extraction with lens replacement     bilateral    History of stress test     Hyperglycemia     Intertrigo     Lumbar degenerative disc disease     Nausea     Obesity 2017    Osteoarthritis     right knee    Osteoarthritis cervical spine     Osteoarthritis thoracic spine     Tobacco abuse 2017       Past Surgical History:        Procedure Laterality Date    CATARACT REMOVAL       SECTION      x1    COLONOSCOPY N/A 10/7/2020    tubular adenomas x4    HYSTERECTOMY, VAGINAL      secondary to cervical cancer--ovaries still present    NERVE BLOCK Bilateral 2020    NERVE BLOCK BILATERAL - MBB #1 L3-4, L4-5, L5-S1 (Bilateral )     NERVE BLOCK Bilateral 2020    NERVE BLOCK BILATERAL - MBB #2 L3-4, L4-5, L5-S1    PAIN MANAGEMENT PROCEDURE Bilateral 2020    NERVE BLOCK BILATERAL - MBB #1 L3-4, L4-5, L5-S1 performed by Mick Platt MD at [address] [place] [address] Bilateral 2020    NERVE BLOCK BILATERAL - MBB #2 L3-4, L4-5, L5-S1 performed by Mick Platt MD at [address] [place] [address]  2017    throat polyps removed       Social History:    Social History     Tobacco Use  Smoking status: Current Every Day Smoker     Packs/day: 1.00     Years: 40.00     Pack years: 40.00     Types: Cigarettes    Smokeless tobacco: Never Used    Tobacco comment: has tried cold turkey, patches, chantix   Substance Use Topics    Alcohol use: No     Alcohol/week: 0.0 standard drinks                                Ready to quit: Not Answered  Counseling given: Not Answered  Comment: has tried cold turkey, patches, chantix      Vital Signs (Current):   Vitals:    01/15/21 1120   BP: 119/80   Pulse: 87   Resp: 18   Temp: 98.4 °F (36.9 °C)   TempSrc: Temporal   SpO2: 94%   Weight: 299 lb (135.6 kg)   Height: 5' 6\" (1.676 m)                                              BP Readings from Last 3 Encounters:   01/15/21 119/80   12/18/20 116/67   12/18/20 (!) 113/52       NPO Status: Time of last liquid consumption: 2200                        Time of last solid consumption: 2200                        Date of last liquid consumption: 01/14/21                        Date of last solid food consumption: 01/14/21    BMI:   Wt Readings from Last 3 Encounters:   01/15/21 299 lb (135.6 kg)   12/18/20 298 lb (135.2 kg)   12/03/20 (!) 300 lb 9.6 oz (136.4 kg)     Body mass index is 48.26 kg/m². CBC:   Lab Results   Component Value Date    WBC 7.3 08/19/2020    RBC 5.46 08/19/2020    HGB 16.6 08/19/2020    HCT 51.9 08/19/2020    MCV 95.1 08/19/2020    RDW 13.9 08/19/2020     08/19/2020       CMP:   Lab Results   Component Value Date     08/19/2020    K 4.6 08/19/2020     08/19/2020    CO2 25 08/19/2020    BUN 14 08/19/2020    CREATININE 0.53 08/19/2020    GFRAA >60 08/19/2020    LABGLOM >60 08/19/2020    GLUCOSE 104 09/20/2019    PROT 6.5 08/19/2020    CALCIUM 9.2 08/19/2020    BILITOT 0.45 08/19/2020    ALKPHOS 82 08/19/2020    AST 19 08/19/2020    ALT 23 08/19/2020       POC Tests: No results for input(s): POCGLU, POCNA, POCK, POCCL, POCBUN, POCHEMO, POCHCT in the last 72 hours.     Coags: Lab Results   Component Value Date    PROTIME 9.7 01/18/2017    INR 1.0 01/18/2017    APTT 26.6 01/18/2017       HCG (If Applicable): No results found for: PREGTESTUR, PREGSERUM, HCG, HCGQUANT     ABGs: No results found for: PHART, PO2ART, GWR7LCM, VEB4NQK, BEART, X5QQHITC     Type & Screen (If Applicable):  No results found for: LABABO, LABRH    Drug/Infectious Status (If Applicable):  Lab Results   Component Value Date    HEPCAB NONREACTIVE 12/05/2017       COVID-19 Screening (If Applicable):   Lab Results   Component Value Date    COVID19 Not Detected 01/11/2021    COVID19 Not Detected 10/03/2020         Anesthesia Evaluation  Patient summary reviewed and Nursing notes reviewed  Airway: Mallampati: IV  TM distance: >3 FB   Neck ROM: full  Mouth opening: > = 3 FB Dental: normal exam         Pulmonary:normal exam    (+) COPD:  sleep apnea: on CPAP,  asthma: current smoker                          ROS comment: -SMOKES 1 PPD FOR 41 YEARS  -PRODUCTIVE COUGH   Cardiovascular:    (+) MARY:,                ROS comment: -PULM HTN  -PERIPHERAL VASCULAR DISEASE     Neuro/Psych:                ROS comment: -DDD GI/Hepatic/Renal:   (+) morbid obesity          Endo/Other:    (+) Diabetes, : arthritis:., malignancy/cancer. ROS comment: -CERVIX CANCER  -MARIJUANA USE  -NPO AFTER MIDNIGHT  -ALLERGIES - AUGMENTIN, MEDROL Abdominal:           Vascular: negative vascular ROS. Anesthesia Plan      MAC     ASA 3       Induction: intravenous. MIPS: Postoperative opioids intended and Prophylactic antiemetics administered. Anesthetic plan and risks discussed with patient. Plan discussed with CRNA.     Attending anesthesiologist reviewed and agrees with Pre Eval content              Mercedes Polanco MD   1/15/2021

## 2021-01-18 ENCOUNTER — HOSPITAL ENCOUNTER (OUTPATIENT)
Dept: LAB | Age: 56
Setting detail: SPECIMEN
Discharge: HOME | End: 2021-01-18
Payer: MEDICARE

## 2021-01-18 DIAGNOSIS — Z01.818 PREOP TESTING: Primary | ICD-10-CM

## 2021-01-18 LAB
SARS-COV-2, RAPID: NORMAL
SARS-COV-2: NORMAL
SARS-COV-2: NOT DETECTED
SOURCE: NORMAL

## 2021-01-18 PROCEDURE — U0005 INFEC AGEN DETEC AMPLI PROBE: HCPCS

## 2021-01-18 PROCEDURE — U0003 INFECTIOUS AGENT DETECTION BY NUCLEIC ACID (DNA OR RNA); SEVERE ACUTE RESPIRATORY SYNDROME CORONAVIRUS 2 (SARS-COV-2) (CORONAVIRUS DISEASE [COVID-19]), AMPLIFIED PROBE TECHNIQUE, MAKING USE OF HIGH THROUGHPUT TECHNOLOGIES AS DESCRIBED BY CMS-2020-01-R: HCPCS

## 2021-01-19 ENCOUNTER — TELEPHONE (OUTPATIENT)
Dept: PRIMARY CARE CLINIC | Age: 56
End: 2021-01-19

## 2021-01-20 ENCOUNTER — ANESTHESIA EVENT (OUTPATIENT)
Dept: OPERATING ROOM | Age: 56
End: 2021-01-20
Payer: MEDICARE

## 2021-01-20 RX ORDER — SODIUM CHLORIDE 0.9 % (FLUSH) 0.9 %
10 SYRINGE (ML) INJECTION PRN
Status: CANCELLED | OUTPATIENT
Start: 2021-01-20

## 2021-01-20 RX ORDER — SODIUM CHLORIDE 0.9 % (FLUSH) 0.9 %
10 SYRINGE (ML) INJECTION EVERY 12 HOURS SCHEDULED
Status: CANCELLED | OUTPATIENT
Start: 2021-01-20

## 2021-01-22 ENCOUNTER — HOSPITAL ENCOUNTER (OUTPATIENT)
Age: 56
Setting detail: OUTPATIENT SURGERY
Discharge: HOME | End: 2021-01-22
Attending: PAIN MEDICINE | Admitting: PAIN MEDICINE
Payer: MEDICARE

## 2021-01-22 ENCOUNTER — ANESTHESIA (OUTPATIENT)
Dept: OPERATING ROOM | Age: 56
End: 2021-01-22
Payer: MEDICARE

## 2021-01-22 ENCOUNTER — APPOINTMENT (OUTPATIENT)
Dept: GENERAL RADIOLOGY | Age: 56
End: 2021-01-22
Attending: PAIN MEDICINE
Payer: MEDICARE

## 2021-01-22 VITALS
DIASTOLIC BLOOD PRESSURE: 76 MMHG | HEIGHT: 66 IN | TEMPERATURE: 97.4 F | BODY MASS INDEX: 47.09 KG/M2 | OXYGEN SATURATION: 94 % | RESPIRATION RATE: 16 BRPM | SYSTOLIC BLOOD PRESSURE: 119 MMHG | HEART RATE: 78 BPM | WEIGHT: 293 LBS

## 2021-01-22 VITALS
DIASTOLIC BLOOD PRESSURE: 62 MMHG | SYSTOLIC BLOOD PRESSURE: 117 MMHG | RESPIRATION RATE: 23 BRPM | OXYGEN SATURATION: 98 %

## 2021-01-22 PROCEDURE — 6360000002 HC RX W HCPCS: Performed by: ANESTHESIOLOGY

## 2021-01-22 PROCEDURE — 64636 DESTROY L/S FACET JNT ADDL: CPT | Performed by: PAIN MEDICINE

## 2021-01-22 PROCEDURE — 7100000011 HC PHASE II RECOVERY - ADDTL 15 MIN: Performed by: PAIN MEDICINE

## 2021-01-22 PROCEDURE — 2500000003 HC RX 250 WO HCPCS: Performed by: PAIN MEDICINE

## 2021-01-22 PROCEDURE — 7100000010 HC PHASE II RECOVERY - FIRST 15 MIN: Performed by: PAIN MEDICINE

## 2021-01-22 PROCEDURE — 3600000002 HC SURGERY LEVEL 2 BASE: Performed by: PAIN MEDICINE

## 2021-01-22 PROCEDURE — 3700000001 HC ADD 15 MINUTES (ANESTHESIA): Performed by: PAIN MEDICINE

## 2021-01-22 PROCEDURE — 3600000012 HC SURGERY LEVEL 2 ADDTL 15MIN: Performed by: PAIN MEDICINE

## 2021-01-22 PROCEDURE — 64635 DESTROY LUMB/SAC FACET JNT: CPT | Performed by: PAIN MEDICINE

## 2021-01-22 PROCEDURE — 2709999900 HC NON-CHARGEABLE SUPPLY: Performed by: PAIN MEDICINE

## 2021-01-22 PROCEDURE — 3209999900 FLUORO FOR SURGICAL PROCEDURES

## 2021-01-22 PROCEDURE — 3700000000 HC ANESTHESIA ATTENDED CARE: Performed by: PAIN MEDICINE

## 2021-01-22 RX ORDER — MEPERIDINE HYDROCHLORIDE 50 MG/ML
12.5 INJECTION INTRAMUSCULAR; INTRAVENOUS; SUBCUTANEOUS EVERY 5 MIN PRN
Status: DISCONTINUED | OUTPATIENT
Start: 2021-01-22 | End: 2021-01-22 | Stop reason: HOSPADM

## 2021-01-22 RX ORDER — MORPHINE SULFATE 1 MG/ML
1 INJECTION, SOLUTION EPIDURAL; INTRATHECAL; INTRAVENOUS EVERY 5 MIN PRN
Status: DISCONTINUED | OUTPATIENT
Start: 2021-01-22 | End: 2021-01-22 | Stop reason: HOSPADM

## 2021-01-22 RX ORDER — DIPHENHYDRAMINE HYDROCHLORIDE 50 MG/ML
12.5 INJECTION INTRAMUSCULAR; INTRAVENOUS
Status: DISCONTINUED | OUTPATIENT
Start: 2021-01-22 | End: 2021-01-22 | Stop reason: HOSPADM

## 2021-01-22 RX ORDER — HYDROCODONE BITARTRATE AND ACETAMINOPHEN 5; 325 MG/1; MG/1
1 TABLET ORAL PRN
Status: DISCONTINUED | OUTPATIENT
Start: 2021-01-22 | End: 2021-01-22 | Stop reason: HOSPADM

## 2021-01-22 RX ORDER — MIDAZOLAM HYDROCHLORIDE 1 MG/ML
INJECTION INTRAMUSCULAR; INTRAVENOUS PRN
Status: DISCONTINUED | OUTPATIENT
Start: 2021-01-22 | End: 2021-01-22 | Stop reason: SDUPTHER

## 2021-01-22 RX ORDER — PROMETHAZINE HYDROCHLORIDE 25 MG/ML
6.25 INJECTION, SOLUTION INTRAMUSCULAR; INTRAVENOUS
Status: DISCONTINUED | OUTPATIENT
Start: 2021-01-22 | End: 2021-01-22 | Stop reason: HOSPADM

## 2021-01-22 RX ORDER — BUPIVACAINE HYDROCHLORIDE 2.5 MG/ML
INJECTION, SOLUTION EPIDURAL; INFILTRATION; INTRACAUDAL PRN
Status: DISCONTINUED | OUTPATIENT
Start: 2021-01-22 | End: 2021-01-22 | Stop reason: ALTCHOICE

## 2021-01-22 RX ORDER — ONDANSETRON 2 MG/ML
4 INJECTION INTRAMUSCULAR; INTRAVENOUS
Status: DISCONTINUED | OUTPATIENT
Start: 2021-01-22 | End: 2021-01-22 | Stop reason: HOSPADM

## 2021-01-22 RX ORDER — FENTANYL CITRATE 50 UG/ML
25 INJECTION, SOLUTION INTRAMUSCULAR; INTRAVENOUS EVERY 5 MIN PRN
Status: DISCONTINUED | OUTPATIENT
Start: 2021-01-22 | End: 2021-01-22 | Stop reason: HOSPADM

## 2021-01-22 RX ORDER — HYDRALAZINE HYDROCHLORIDE 20 MG/ML
5 INJECTION INTRAMUSCULAR; INTRAVENOUS EVERY 10 MIN PRN
Status: DISCONTINUED | OUTPATIENT
Start: 2021-01-22 | End: 2021-01-22 | Stop reason: HOSPADM

## 2021-01-22 RX ORDER — HYDROCODONE BITARTRATE AND ACETAMINOPHEN 5; 325 MG/1; MG/1
2 TABLET ORAL PRN
Status: DISCONTINUED | OUTPATIENT
Start: 2021-01-22 | End: 2021-01-22 | Stop reason: HOSPADM

## 2021-01-22 RX ORDER — FENTANYL CITRATE 50 UG/ML
INJECTION, SOLUTION INTRAMUSCULAR; INTRAVENOUS PRN
Status: DISCONTINUED | OUTPATIENT
Start: 2021-01-22 | End: 2021-01-22 | Stop reason: SDUPTHER

## 2021-01-22 RX ADMIN — MIDAZOLAM HYDROCHLORIDE 2 MG: 1 INJECTION, SOLUTION INTRAMUSCULAR; INTRAVENOUS at 12:06

## 2021-01-22 RX ADMIN — FENTANYL CITRATE 100 MCG: 50 INJECTION, SOLUTION INTRAMUSCULAR; INTRAVENOUS at 12:06

## 2021-01-22 ASSESSMENT — PULMONARY FUNCTION TESTS
PIF_VALUE: 0
PIF_VALUE: 1
PIF_VALUE: 0

## 2021-01-22 ASSESSMENT — LIFESTYLE VARIABLES: SMOKING_STATUS: 1

## 2021-01-22 ASSESSMENT — COPD QUESTIONNAIRES: CAT_SEVERITY: NO INTERVAL CHANGE

## 2021-01-22 ASSESSMENT — ENCOUNTER SYMPTOMS: SHORTNESS OF BREATH: 1

## 2021-01-22 ASSESSMENT — PAIN - FUNCTIONAL ASSESSMENT: PAIN_FUNCTIONAL_ASSESSMENT: 0-10

## 2021-01-22 NOTE — ANESTHESIA PRE PROCEDURE
Department of Anesthesiology  Preprocedure Note       Name:  Dionna Yadav   Age:  54 y.o.  :  1965                                          MRN:  [id number]         Date:  2021      Surgeon: Howard Good):  Adan Morrow MD    Procedure: Procedure(s):  NERVE RADIOFREQUENCY ABLATION -L3-4, L4-5, L5-S1    Medications prior to admission:   Prior to Admission medications    Medication Sig Start Date End Date Taking? Authorizing Provider   ibuprofen (ADVIL;MOTRIN) 800 MG tablet 1 tablet with food or milk as needed    Historical Provider, MD   pregabalin (LYRICA) 50 MG capsule 1 capsule    Historical Provider, MD   albuterol (PROVENTIL) (2.5 MG/3ML) 0.083% nebulizer solution Take 2.5 mg by nebulization every 6 hours as needed for Wheezing    Historical Provider, MD   pregabalin (LYRICA) 100 MG capsule Take 1 capsule by mouth 2 times daily for 30 days.  10/15/20 12/3/20  Adan Morrow MD   VORTIoxetine (TRINTELLIX) 5 MG tablet Take 1 tablet by mouth daily 20   Pam Reyes MD   albuterol sulfate HFA (PROAIR HFA) 108 (90 Base) MCG/ACT inhaler Inhale 2 puffs into the lungs every 6 hours as needed for Wheezing 20   Pam Reyes MD   budesonide-formoterol (SYMBICORT) 160-4.5 MCG/ACT AERO INHALE TWO PUFFS BY MOUTH TWICE A DAY 20   Pam Reyes MD   nystatin ([facility]) [id number] UNIT/GM powder APPLY ONE DOSE TOPICALLY TWICE A DAY 20   Pam Reyes MD   Elastic Bandages & Supports (JOBST KNEE HIGH COMPRESSION SM) MISC 1 each by Does not apply route daily Please measure 18   Annmarie Chen PA-C   diclofenac sodium 1 % GEL Apply 2 g topically 2 times daily 17   Tariq Garcia PA-C   Blood Glucose Monitoring Suppl [facility] BLOOD GLUCOSE METER) W/DEVICE KIT 1 each by Does not apply route 2 times daily 17   Annmarie Chen PA-C   Elastic Bandages & Supports (KNEE BRACE) MISC 1 Device by Does not apply route daily Right knee pain; osteoarthritis right knee 16   Lucas Lepe JACK Sofia CNP   Nebulizers (COMPRESSOR/NEBULIZER) MISC 1 vial by Does not apply route 4 times daily as needed. Patient has albuterol prescription at home; needs aerosol machine and set up diagnosis chronic bronchitis 12/20/14   JACK Marin CNP       Current medications:    No current facility-administered medications for this encounter. Allergies: Allergies   Allergen Reactions    Amoxicillin-Pot Clavulanate Diarrhea and Other (See Comments)    Medrol [Methylprednisolone] Other (See Comments)     Redness of hands and itching  Redness of hands and itching    Tape [Adhesive Tape] Rash       Problem List:    Patient Active Problem List   Diagnosis Code    Hyperglycemia R73.9    Anxiety F41.9    Bronchitis J40    Depression F32.9    Degenerative arthritis of lumbar spine M47.816    Osteoarthritis of knee M17.10    Insulin resistance E88.81    COPD (chronic obstructive pulmonary disease) ([facility]) J44.9    Osteoarthritis thoracic spine M47.814    Osteoarthritis cervical spine M47.812    Prediabetes R73.03    Peripheral edema R60.9    Hepatic steatosis K76.0    Epigastric pain R10.13    Nausea R11.0    Vitamin D deficiency E55.9    Tobacco abuse Z72.0    Vocal cord polyp J38.1    Laryngitis J04.0    Obesity E66.9    [facility] E11.9    SOB (shortness of breath) R06.02    Family history of MI (myocardial infarction) Z82.49    Bilateral leg edema R60.0    Pulmonary HTN (HCC) I27.20    TAMARA on CPAP G47.33, Z99.89    Colon polyps K63.5    Lumbosacral spondylosis without myelopathy M47.817    Chronic bilateral low back pain M54.5, G89.29       Past Medical History:        Diagnosis Date    Anxiety     Asthma     Bladder incontinence     At times per pt.     Bladder prolapse, female, acquired     Cancer ([facility] [address] 11/1996    cervical; treated with hysterectomy    Chronic bilateral low back pain     Colon polyps 10/07/2020    tubular adenomas x4  COPD (chronic obstructive pulmonary disease) (HCC)     Depression     Epigastric pain     Hepatic steatosis     History of cataract extraction with lens replacement 2004    bilateral    History of stress test     Hyperglycemia     Intertrigo     Lumbar degenerative disc disease     Nausea     Obesity 2017    Osteoarthritis     right knee    Osteoarthritis cervical spine     Osteoarthritis thoracic spine     Tobacco abuse 2017       Past Surgical History:        Procedure Laterality Date    CATARACT REMOVAL       SECTION      x1    COLONOSCOPY N/A 10/7/2020    tubular adenomas x4    HYSTERECTOMY, VAGINAL  1996    secondary to cervical cancer--ovaries still present    NERVE BLOCK Bilateral 2020    NERVE BLOCK BILATERAL - MBB #1 L3-4, L4-5, L5-S1 (Bilateral )     NERVE BLOCK Bilateral 2020    NERVE BLOCK BILATERAL - MBB #2 L3-4, L4-5, L5-S1    NERVE BLOCK  01/15/2021    : NERVE RADIOFREQUENCY ABLATION L3-4, L4-5, L5-S1 (Left )    PAIN MANAGEMENT PROCEDURE Bilateral 2020    NERVE BLOCK BILATERAL - MBB #1 L3-4, L4-5, L5-S1 performed by Qiana Mccurdy MD at [address] [place] [address] Bilateral 2020    NERVE BLOCK BILATERAL - MBB #2 L3-4, L4-5, L5-S1 performed by Qiana Mccurdy MD at [address] [place] [address] Left 1/15/2021    NERVE RADIOFREQUENCY ABLATION L3-4, L4-5, L5-S1 performed by Qiana Mccurdy MD at [address] [place] [address]  2017    throat polyps removed       Social History:    Social History     Tobacco Use    Smoking status: Current Every Day Smoker     Packs/day: 1.00     Years: 40.00     Pack years: 40.00     Types: Cigarettes    Smokeless tobacco: Never Used    Tobacco comment: has tried cold turkey, patches, chantix   Substance Use Topics    Alcohol use: No     Alcohol/week: 0.0 standard drinks                                Ready to quit: Not Answered  Counseling given: summary reviewed and Nursing notes reviewed no history of anesthetic complications:   Airway: Mallampati: IV  TM distance: >3 FB   Neck ROM: full  Mouth opening: > = 3 FB Dental: normal exam         Pulmonary:normal exam  breath sounds clear to auscultation  (+) COPD: no interval change,  shortness of breath: no interval change,  sleep apnea: on CPAP,  asthma: current smoker                           Cardiovascular:    (+) pulmonary hypertension: no interval change,         Rhythm: regular  Rate: normal           Beta Blocker:  Not on Beta Blocker         Neuro/Psych:   (+) psychiatric history: stable with treatmentdepression/anxiety             GI/Hepatic/Renal:   (+) liver disease:, morbid obesity          Endo/Other:    (+) : arthritis: OA and no interval change. , malignancy/cancer. Abdominal:       Abdomen: soft. Vascular: negative vascular ROS. Anesthesia Plan      MAC     ASA 3             Anesthetic plan and risks discussed with patient. Plan discussed with CRNA.                   Linda Anaya MD   1/22/2021

## 2021-01-22 NOTE — ANESTHESIA POSTPROCEDURE EVALUATION
Department of Anesthesiology  Postprocedure Note    Patient: Janessa Galarza  MRN: [id number]  YOB: 1965  Date of evaluation: 1/22/2021  Time:  12:23 PM     Procedure Summary     Date: 01/22/21 Room / Location: [address] [place] [address] 02 / [address] [facility]    Anesthesia Start: 1726 Anesthesia Stop:     Procedure: NERVE RADIOFREQUENCY ABLATION -L3-4, L4-5, L5-S1 (Left ) Diagnosis: (M47.817 SPONDYLOSIS WITHOUT MYELOPATHY)    Surgeons: Katheleen Councilman, MD Responsible Provider: Jose A Gonzalez MD    Anesthesia Type: MAC ASA Status: 3          Anesthesia Type: MAC    Karen Phase I: Karen Score: 10    Karen Phase II:      Last vitals: Reviewed and per EMR flowsheets.        Anesthesia Post Evaluation    Patient location during evaluation: PACU  Patient participation: complete - patient participated  Level of consciousness: awake and alert  Airway patency: patent  Nausea & Vomiting: no nausea and no vomiting  Complications: no  Cardiovascular status: hemodynamically stable  Respiratory status: nasal cannula and spontaneous ventilation  Hydration status: euvolemic

## 2021-01-22 NOTE — H&P
Pain Pre-Op H&P Note    Prince Delon Minor    HPI: Madhuri Judge  presents with back pain. Past Medical History:   Diagnosis Date    Anxiety     Asthma     Bladder incontinence     At times per pt.     Bladder prolapse, female, acquired     Cancer ([facility] [address] 1996    cervical; treated with hysterectomy    Chronic bilateral low back pain     Colon polyps 10/07/2020    tubular adenomas x4    COPD (chronic obstructive pulmonary disease) ([facility] [address]     Depression     Epigastric pain     Hepatic steatosis     History of cataract extraction with lens replacement     bilateral    History of stress test     Hyperglycemia     Intertrigo     Lumbar degenerative disc disease     Nausea     Obesity 2017    Osteoarthritis     right knee    Osteoarthritis cervical spine     Osteoarthritis thoracic spine     Tobacco abuse 2017       Past Surgical History:   Procedure Laterality Date    CATARACT REMOVAL       SECTION      x1    COLONOSCOPY N/A 10/7/2020    tubular adenomas x4    HYSTERECTOMY, VAGINAL      secondary to cervical cancer--ovaries still present    NERVE BLOCK Bilateral 2020    NERVE BLOCK BILATERAL - MBB #1 L3-4, L4-5, L5-S1 (Bilateral )     NERVE BLOCK Bilateral 2020    NERVE BLOCK BILATERAL - MBB #2 L3-4, L4-5, L5-S1    NERVE BLOCK  01/15/2021    : NERVE RADIOFREQUENCY ABLATION L3-4, L4-5, L5-S1 (Left )    PAIN MANAGEMENT PROCEDURE Bilateral 2020    NERVE BLOCK BILATERAL - MBB #1 L3-4, L4-5, L5-S1 performed by Feliciano Young MD at [address] [place] [address] Bilateral 2020    NERVE BLOCK BILATERAL - MBB #2 L3-4, L4-5, L5-S1 performed by Feliciano Young MD at [address] [place] [address] Left 1/15/2021    NERVE RADIOFREQUENCY ABLATION L3-4, L4-5, L5-S1 performed by Feliciano Young MD at [facility] [address]      throat polyps removed       Family History   Problem Relation Age of Onset    Diabetes Mother         from pancreatic resection    Heart Disease Mother     Arthritis Mother     Depression Mother     Mental Illness Mother     Diabetes Father     Heart Disease Father     Depression Brother     Cancer Brother         bladder    Lung Cancer Brother 64        cause of death    Depression Sister     Breast Cancer Maternal Grandmother     Asthma Other         grandson    Breast Cancer Other         diagnosed in her 42's    High Blood Pressure Brother     Diabetes Brother     No Known Problems Maternal Grandfather     No Known Problems Paternal Grandmother     No Known Problems Paternal Grandfather     Arthritis Sister     Pancreatic Cancer Maternal Uncle     High Cholesterol Neg Hx     Kidney Disease Neg Hx     Stroke Neg Hx     Ovarian Cancer Neg Hx     Colon Cancer Neg Hx        Allergies   Allergen Reactions    Amoxicillin-Pot Clavulanate Diarrhea and Other (See Comments)    Medrol [Methylprednisolone] Other (See Comments)     Redness of hands and itching  Redness of hands and itching    Tape [Adhesive Tape] Rash         Current Facility-Administered Medications:     meperidine (DEMEROL) injection 12.5 mg, 12.5 mg, Intravenous, Q5 Min PRN, Thu Sherwood MD    morphine (PF) injection 1 mg, 1 mg, Intravenous, Q5 Min PRN, Thu Sherwood MD    HYDROmorphone (DILAUDID) injection 0.5 mg, 0.5 mg, Intravenous, Q5 Min PRN, Thu Sherwood MD    fentaNYL (SUBLIMAZE) injection 25 mcg, 25 mcg, Intravenous, Q5 Min PRN, Thu Sherwood MD    HYDROcodone-acetaminophen (NORCO) 5-325 MG per tablet 1 tablet, 1 tablet, Oral, PRN **OR** HYDROcodone-acetaminophen (NORCO) 5-325 MG per tablet 2 tablet, 2 tablet, Oral, PRN, Thu Sherwood MD    ondansetron [facility]) injection 4 mg, 4 mg, Intravenous, Once PRN, Thu Sherwood MD    promethazine (PHENERGAN) injection 6.25 mg, 6.25 mg, Intramuscular, Once PRN, Thu Sherwood MD    diphenhydrAMINE (BENADRYL) injection 12.5 mg, 12.5 mg, Intravenous, Once PRN, Isaura Alf Zuleta MD    hydrALAZINE (APRESOLINE) injection 5 mg, 5 mg, Intravenous, Q10 Min PRN, Phill Velasco MD    Social History     Tobacco Use    Smoking status: Current Every Day Smoker     Packs/day: 1.00     Years: 40.00     Pack years: 40.00     Types: Cigarettes    Smokeless tobacco: Never Used    Tobacco comment: has tried cold turkey, patches, chantix   Substance Use Topics    Alcohol use: No     Alcohol/week: 0.0 standard drinks       Review of Systems:   Focused review of systems was performed, and negative as pertinent to diagnosis, except as stated in HPI. Physical Exam  Constitutional:       Appearance: Normal appearance. Pulmonary:      Effort: Pulmonary effort is normal.   Neurological:      Mental Status: He is alert. Psychiatric:         Attention and Perception: Attention and perception normal.         Mood and Affect: Mood and affect normal.         Patient's current physical status, medications, medical history, and HPI have been reviewed and updated as appropriate on this date: 01/22/21    Risk/Benefit(s): The risks, benefits, alternatives, and potential complications have been discussed with the patient/family and informed consent has been obtained for the procedure/sedation.     Diagnosis:   M47.817 SPONDYLOSIS WITHOUT MYELOPATHY      Plan: lumbar RFA        Arron Lockhart

## 2021-01-22 NOTE — OP NOTE
Lumbar Radiofrequency Ablation:  SURGEON: Daina Minor    PRE-OP DIAGNOSIS: M47.817 (lumbosacral spondylosis), M54.5 (low back pain)    POST-OP DIAGNOSIS: Same. PROCEDURE PERFORMED: Radiofrequency Ablation Medial Branch Nerve at Left L3 - 4, L4 - 5 and L5 - S1 facet joint(s). HISTORY AND INDICATIONS: Satisfactory response with previous RFA/ medial branch blocks at the indicated levels. Recurrence of painful symptoms attributed to the above diagnosis. The planned treatment is medically necessary to relieve pain, restore function and or reduce reliance on pain medication. EBL: minimal    CONSENT: Patient has undergone the educational process with this procedure, is aware and fully understands the risks involved: potential damage to any and all body organs including possible bleeding, infection, nerve injury, paralysis, allergic reaction and headache. Patient also understands that the procedure will be undertaken in a safe, controlled and monitored setting. Patient recognizes that the benefits may include relief from pain and reduction in the oral use of medications. Patient agreed to proceed. The patient was counseled at length about the risks of kenan Covid-19 during their perioperative period and any recovery window from their procedure. The patient was made aware that kenan Covid-19  may worsen their prognosis for recovering from their procedure  and lend to a higher morbidity and/or mortality risk. All material risks, benefits, and reasonable alternatives including postponing the procedure were discussed. The patient does wish to proceed with the procedure at this time. MONITORS INSTITUTED INCLUDE but not limited to:   Pulse Oximetry  Non-invasive blood pressure monitoring    PROCEDURE NOTE: The patient was taken to the procedure room and placed prone with the appropriate padding and positioning to assure patient comfort and physician access to the procedure site.  Time out

## 2021-02-03 ENCOUNTER — OFFICE VISIT (OUTPATIENT)
Dept: FAMILY MEDICINE CLINIC | Age: 56
End: 2021-02-03
Payer: MEDICARE

## 2021-02-03 VITALS
WEIGHT: 293 LBS | OXYGEN SATURATION: 95 % | BODY MASS INDEX: 47.09 KG/M2 | HEART RATE: 96 BPM | DIASTOLIC BLOOD PRESSURE: 83 MMHG | TEMPERATURE: 97 F | HEIGHT: 66 IN | SYSTOLIC BLOOD PRESSURE: 118 MMHG

## 2021-02-03 DIAGNOSIS — K76.0 HEPATIC STEATOSIS: ICD-10-CM

## 2021-02-03 DIAGNOSIS — R73.03 PREDIABETES: ICD-10-CM

## 2021-02-03 DIAGNOSIS — N81.10 FEMALE BLADDER PROLAPSE: ICD-10-CM

## 2021-02-03 DIAGNOSIS — F32.A DEPRESSION, UNSPECIFIED DEPRESSION TYPE: Primary | ICD-10-CM

## 2021-02-03 DIAGNOSIS — N32.81 OVERACTIVE BLADDER: ICD-10-CM

## 2021-02-03 DIAGNOSIS — E66.01 CLASS 3 SEVERE OBESITY WITH BODY MASS INDEX (BMI) OF 45.0 TO 49.9 IN ADULT, UNSPECIFIED OBESITY TYPE, UNSPECIFIED WHETHER SERIOUS COMORBIDITY PRESENT (HCC): ICD-10-CM

## 2021-02-03 DIAGNOSIS — J44.9 CHRONIC OBSTRUCTIVE PULMONARY DISEASE, UNSPECIFIED COPD TYPE (HCC): ICD-10-CM

## 2021-02-03 DIAGNOSIS — E55.9 VITAMIN D DEFICIENCY: ICD-10-CM

## 2021-02-03 DIAGNOSIS — R23.8 SKIN IRRITATION: ICD-10-CM

## 2021-02-03 DIAGNOSIS — F41.9 ANXIETY: ICD-10-CM

## 2021-02-03 DIAGNOSIS — D58.2 ELEVATED HEMOGLOBIN (HCC): ICD-10-CM

## 2021-02-03 DIAGNOSIS — Z23 NEED FOR INFLUENZA VACCINATION: ICD-10-CM

## 2021-02-03 DIAGNOSIS — E88.81 INSULIN RESISTANCE: ICD-10-CM

## 2021-02-03 DIAGNOSIS — E66.01 MORBID (SEVERE) OBESITY DUE TO EXCESS CALORIES (HCC): ICD-10-CM

## 2021-02-03 PROCEDURE — 3017F COLORECTAL CA SCREEN DOC REV: CPT | Performed by: FAMILY MEDICINE

## 2021-02-03 PROCEDURE — G8482 FLU IMMUNIZE ORDER/ADMIN: HCPCS | Performed by: FAMILY MEDICINE

## 2021-02-03 PROCEDURE — G8926 SPIRO NO PERF OR DOC: HCPCS | Performed by: FAMILY MEDICINE

## 2021-02-03 PROCEDURE — 90686 IIV4 VACC NO PRSV 0.5 ML IM: CPT | Performed by: FAMILY MEDICINE

## 2021-02-03 PROCEDURE — 99214 OFFICE O/P EST MOD 30 MIN: CPT | Performed by: FAMILY MEDICINE

## 2021-02-03 PROCEDURE — 4004F PT TOBACCO SCREEN RCVD TLK: CPT | Performed by: FAMILY MEDICINE

## 2021-02-03 PROCEDURE — G0008 ADMIN INFLUENZA VIRUS VAC: HCPCS | Performed by: FAMILY MEDICINE

## 2021-02-03 PROCEDURE — G8427 DOCREV CUR MEDS BY ELIG CLIN: HCPCS | Performed by: FAMILY MEDICINE

## 2021-02-03 PROCEDURE — 3023F SPIROM DOC REV: CPT | Performed by: FAMILY MEDICINE

## 2021-02-03 PROCEDURE — G8417 CALC BMI ABV UP PARAM F/U: HCPCS | Performed by: FAMILY MEDICINE

## 2021-02-03 RX ORDER — ALBUTEROL SULFATE 90 UG/1
2 AEROSOL, METERED RESPIRATORY (INHALATION) EVERY 6 HOURS PRN
Qty: 1 INHALER | Refills: 3 | Status: SHIPPED | OUTPATIENT
Start: 2021-02-03 | End: 2021-05-06 | Stop reason: SDUPTHER

## 2021-02-03 RX ORDER — BUDESONIDE AND FORMOTEROL FUMARATE DIHYDRATE 160; 4.5 UG/1; UG/1
AEROSOL RESPIRATORY (INHALATION)
Qty: 1 INHALER | Refills: 2 | Status: SHIPPED | OUTPATIENT
Start: 2021-02-03 | End: 2021-05-06 | Stop reason: SDUPTHER

## 2021-02-03 RX ORDER — NYSTATIN 100000 [USP'U]/G
POWDER TOPICAL
Qty: 1 BOTTLE | Refills: 3 | Status: SHIPPED | OUTPATIENT
Start: 2021-02-03 | End: 2022-06-16 | Stop reason: SDUPTHER

## 2021-02-03 RX ORDER — BUSPIRONE HYDROCHLORIDE 5 MG/1
5 TABLET ORAL 2 TIMES DAILY
Qty: 60 TABLET | Refills: 0 | Status: SHIPPED | OUTPATIENT
Start: 2021-02-03 | End: 2021-02-22 | Stop reason: SDUPTHER

## 2021-02-03 RX ORDER — SOLIFENACIN SUCCINATE 5 MG/1
5 TABLET, FILM COATED ORAL DAILY
Qty: 30 TABLET | Refills: 0 | Status: SHIPPED | OUTPATIENT
Start: 2021-02-03 | End: 2021-08-18 | Stop reason: ALTCHOICE

## 2021-02-03 RX ORDER — ALBUTEROL SULFATE 2.5 MG/3ML
2.5 SOLUTION RESPIRATORY (INHALATION) EVERY 6 HOURS PRN
Qty: 120 EACH | Refills: 1 | Status: SHIPPED | OUTPATIENT
Start: 2021-02-03 | End: 2022-06-16

## 2021-02-03 NOTE — PROGRESS NOTES
[address] PRIMARY CARE  [address] [place] [address] [address] [place] [address]  Dept: [phone]  Dept Fax: [phone]    Vic Moore is a 54 y.o. female who is a Established patient, who presents today for her medical conditions/complaints as noted below:  Chief Complaint   Patient presents with    Other     bladder prolapse         HPI:     The patient has right elbow pain intermittently. She is right hand dominant and has had similar pain in the past.  She believes she may have had injections into the right elbow in the past as well. She notes pain with certain movements such as screwing things on bending the elbow or twisting motions. She denies any blunt trauma to the elbow. She also denies any fever chills, redness on the elbow, swelling on the elbow, or rashes. The patient also complains of urinary incontinence. The aptient states she will urinate if she bends over, coughs, sneezes. She is a  2 vaginal 1 c/s. She also had a [facility] BSO. The patient has a history of COPD. She continues to smoke and is not currently interested in smoking cessation. She is using her rescue inhaler frequently. She has not been using her Symbicort as she ran out of it recently. The patient has a lot of stressors that are prohibitive to her getting her medications. She is requesting refills. Her anxiety and depression had been well controlled on Trintellix as well as BuSpar in the past but she has run out as of the medications and is requesting a refill at this time. .  She is requesting refills. She request a refill of the antifungal powder due to history of skin irritation in the folds of her skin. She denies any signs of other infections at this time. Other  Associated symptoms include arthralgias (right elbow) and coughing.        Hemoglobin A1C (%)   Date Value   2020 5.9   2019 5.5   10/03/2018 5.3             ( goal A1Cis < 7)   No results found for: LABMICR  LDL Cholesterol (mg/dL)   Date Value   2020 64   2019 69   2017 94     LDL Calculated (mg/dL)   Date Value   2015 81       (goal LDL is <100)   AST (U/L)   Date Value   2020 19     ALT (U/L)   Date Value   2020 23     BUN (mg/dL)   Date Value   2020 14     BP Readings from Last 3 Encounters:   21 118/83   21 117/62   21 119/76          (goal 120/80)    Past Medical History:   Diagnosis Date    Anxiety     Asthma     Bladder incontinence     At times per pt.     Bladder prolapse, female, acquired     Cancer ([facility] [address] 1996    cervical; treated with hysterectomy    Chronic bilateral low back pain     Colon polyps 10/07/2020    tubular adenomas x4    COPD (chronic obstructive pulmonary disease) ([facility] [address]     Depression     Epigastric pain     Hepatic steatosis     History of cataract extraction with lens replacement     bilateral    History of stress test     Hyperglycemia     Intertrigo     Lumbar degenerative disc disease     Nausea     Obesity 2017    Osteoarthritis     right knee    Osteoarthritis cervical spine     Osteoarthritis thoracic spine     Tobacco abuse 2017      Past Surgical History:   Procedure Laterality Date    CATARACT REMOVAL       SECTION      x1    COLONOSCOPY N/A 10/7/2020    tubular adenomas x4    HYSTERECTOMY, VAGINAL      secondary to cervical cancer--ovaries still present    NERVE BLOCK Bilateral 2020    NERVE BLOCK BILATERAL - MBB #1 L3-4, L4-5, L5-S1 (Bilateral )     NERVE BLOCK Bilateral 2020    NERVE BLOCK BILATERAL - MBB #2 L3-4, L4-5, L5-S1    NERVE BLOCK  01/15/2021    : NERVE RADIOFREQUENCY ABLATION L3-4, L4-5, L5-S1 (Left )    PAIN MANAGEMENT PROCEDURE Bilateral 2020    NERVE BLOCK BILATERAL - MBB #1 L3-4, L4-5, L5-S1 performed by Nidia Winkler MD at [address] [place] [address] Bilateral 2020    NERVE BLOCK BILATERAL - MBB #2 L3-4, L4-5, L5-S1 performed by Catherine Valenzuela MD at [address] [place] [address] Left 1/15/2021    NERVE RADIOFREQUENCY ABLATION L3-4, L4-5, L5-S1 performed by Catherine Valenzuela MD at [address] [place] [address] Left 01/22/2021    NERVE RADIOFREQUENCY ABLATION -L3-4, L4-5, L5-S1     PAIN MANAGEMENT PROCEDURE Left 1/22/2021    NERVE RADIOFREQUENCY ABLATION -L3-4, L4-5, L5-S1 performed by Catherine Valenzuela MD at [address] [place] [address]  2017    throat polyps removed       Family History   Problem Relation Age of Onset    Diabetes Mother         from pancreatic resection    Heart Disease Mother     Arthritis Mother     Depression Mother     Mental Illness Mother     Diabetes Father     Heart Disease Father     Depression Brother     Cancer Brother         bladder    Lung Cancer Brother 64        cause of death    Depression Sister     Breast Cancer Maternal Grandmother     Asthma Other         grandson    Breast Cancer Other         diagnosed in her 42's    High Blood Pressure Brother     Diabetes Brother     No Known Problems Maternal Grandfather     No Known Problems Paternal Grandmother     No Known Problems Paternal Grandfather     Arthritis Sister     Pancreatic Cancer Maternal Uncle     High Cholesterol Neg Hx     Kidney Disease Neg Hx     Stroke Neg Hx     Ovarian Cancer Neg Hx     Colon Cancer Neg Hx        Social History     Tobacco Use    Smoking status: Current Every Day Smoker     Packs/day: 1.00     Years: 40.00     Pack years: 40.00     Types: Cigarettes    Smokeless tobacco: Never Used    Tobacco comment: has tried cold turkey, patches, chantix   Substance Use Topics    Alcohol use: No     Alcohol/week: 0.0 standard drinks      Current Outpatient Medications   Medication Sig Dispense Refill    albuterol (PROVENTIL) (2.5 MG/3ML) 0.083% nebulizer solution Take 3 mLs by nebulization every 6 hours as needed for Hepatitis C screen  Completed    HIV screen  Completed    Hepatitis A vaccine  Aged Out    Hepatitis B vaccine  Aged Out    Hib vaccine  Aged Out    Meningococcal (ACWY) vaccine  Aged Out       Subjective:     Review of Systems   Constitutional: Negative. HENT: Negative. Eyes: Negative. Respiratory: Positive for cough, chest tightness and wheezing. Negative for apnea, choking, shortness of breath and stridor. Cardiovascular: Negative. Gastrointestinal: Negative. Genitourinary: Positive for urgency. Negative for decreased urine volume, vaginal bleeding, vaginal discharge and vaginal pain. Incontinence   Musculoskeletal: Positive for arthralgias (right elbow). Skin: Negative. Allergic/Immunologic: Negative for environmental allergies, food allergies and immunocompromised state. Neurological: Negative. Psychiatric/Behavioral: Negative. Objective:     Physical Exam  Vitals signs and nursing note reviewed. Constitutional:       General: She is awake. She is not in acute distress. Appearance: Normal appearance. She is obese. She is not ill-appearing, toxic-appearing or diaphoretic. HENT:      Head: Normocephalic and atraumatic. Right Ear: External ear normal.      Left Ear: External ear normal.      Nose: Nose normal.      Mouth/Throat:      Mouth: Mucous membranes are moist.   Eyes:      Extraocular Movements: Extraocular movements intact. Conjunctiva/sclera: Conjunctivae normal.      Pupils: Pupils are equal, round, and reactive to light. Neck:      Musculoskeletal: Normal range of motion. Cardiovascular:      Rate and Rhythm: Normal rate and regular rhythm. Pulses: Normal pulses. Heart sounds: Normal heart sounds. No murmur. No friction rub. No gallop. Pulmonary:      Effort: Pulmonary effort is normal. No respiratory distress. Breath sounds: No stridor. Wheezing (scattered wheezes) and rhonchi present. No rales.    Abdominal: obesity type, unspecified whether serious comorbidity present [facility])  Comprehensive Metabolic Panel, Fasting   10. Insulin resistance  Comprehensive Metabolic Panel, Fasting    Lipid, Fasting   11. Hepatic steatosis  Comprehensive Metabolic Panel, Fasting   12. Elevated hemoglobin (HCC)  CBC Auto Differential   13. Morbid (severe) obesity due to excess calories (HCC)   Lipid, Fasting   14. Need for influenza vaccination  INFLUENZA, QUADV, 3 YRS AND OLDER, IM PF, PREFILL SYR OR SDV, 0.5ML (AFLURIA QUADV, PF)             Plan:      COPD - PFT's ordered to get baseline idea of copd severity. Continue current medications as discussed. Encouraged smoking cessation. Advised the patient of the importance of compliance as well as smoking cessation to prevent worsening of the patient's lung function. Obesity - d/w patient the importance of weight loss to prevent diabetes, CVD, and other morbidities    Laryngitis/seasonal allergies/allergic conjunctivitis - patient to take allegra daily. Optivar also prescribed to help with eye symptoms    Anxiety/depression - patient restart on trintellix. Encouraged her to be compliant with medication and to return if depression/anxiety worsens. The patient declines returning to psychiatry at this time. Patient also is on buspar. Pre diabetes/insulin resistance - patient not on medication. Encouraged weight loss to prevent diabetes and encouraged her to make sure she is compliant with treatment plan. Hepatic steatosis - patient encouraged to get fasting labs to done and if elevated LFT's will reassess liver status    Vitamin D def - check labs, replace as needed    Tobacco use - patient encouraged to quit smoking    Overactive bladder - patient on ditropan    Skin irritation underneath the breast - patient to use nystatin powder on the area as needed and encouraged her to keep the area clean and dry. No follow-ups on file.     Orders Placed This Encounter   Procedures    INFLUENZA, QUADV, 3 YRS AND OLDER, IM PF, PREFILL SYR OR SDV, 0.5ML (AFLURIA QUADV, PF)    Comprehensive Metabolic Panel, Fasting     Standing Status:   Future     Standing Expiration Date:   2/3/2022    Lipid, Fasting     Standing Status:   Future     Standing Expiration Date:   2/3/2022    Hemoglobin A1C     Standing Status:   Future     Standing Expiration Date:   2/3/2022    CBC Auto Differential     Standing Status:   Future     Standing Expiration Date:   2/3/2022   Pedro Luis Burris DO, Urogynecology, [place]     Referral Priority:   Routine     Referral Type:   Eval and Treat     Referral Reason:   Specialty Services Required     Referred to Provider:   Cam Mcgowan DO     Requested Specialty:   Urogynecology     Number of Visits Requested:   1     Orders Placed This Encounter   Medications    albuterol (PROVENTIL) (2.5 MG/3ML) 0.083% nebulizer solution     Sig: Take 3 mLs by nebulization every 6 hours as needed for Wheezing     Dispense:  120 each     Refill:  1    VORTIoxetine (TRINTELLIX) 10 MG TABS tablet     Sig: Take 1 tablet by mouth daily     Dispense:  30 tablet     Refill:  2    busPIRone (BUSPAR) 5 MG tablet     Sig: Take 1 tablet by mouth 2 times daily     Dispense:  60 tablet     Refill:  0    solifenacin (VESICARE) 5 MG tablet     Sig: Take 1 tablet by mouth daily     Dispense:  30 tablet     Refill:  0    albuterol sulfate HFA (PROAIR HFA) 108 (90 Base) MCG/ACT inhaler     Sig: Inhale 2 puffs into the lungs every 6 hours as needed for Wheezing     Dispense:  1 Inhaler     Refill:  3    budesonide-formoterol (SYMBICORT) 160-4.5 MCG/ACT AERO     Sig: INHALE TWO PUFFS BY MOUTH TWICE A DAY     Dispense:  1 Inhaler     Refill:  2    nystatin (NYAMYC) 966975 UNIT/GM powder     Sig: APPLY ONE DOSE TOPICALLY TWICE A DAY     Dispense:  1 Bottle     Refill:  3       Patient given educational materials - see patient instructions. Discussed use, benefit, and side effects of prescribed

## 2021-02-03 NOTE — PATIENT INSTRUCTIONS
Patient Education        Tennis Elbow: Exercises  Introduction  Here are some examples of exercises for you to try. The exercises may be suggested for a condition or for rehabilitation. Start each exercise slowly. Ease off the exercises if you start to have pain. You will be told when to start these exercises and which ones will work best for you. How to do the exercises  Wrist flexor stretch   1. Extend your arm in front of you with your palm up. 2. Bend your wrist, pointing your hand toward the floor. 3. With your other hand, gently bend your wrist farther until you feel a mild to moderate stretch in your forearm. 4. Hold for at least 15 to 30 seconds. Repeat 2 to 4 times. Wrist extensor stretch   1. Repeat steps 1 to 4 of the stretch above but begin with your extended hand palm down. Ball or sock squeeze   1. Hold a tennis ball (or a rolled-up sock) in your hand. 2. Make a fist around the ball (or sock) and squeeze. 3. Hold for about 6 seconds, and then relax for up to 10 seconds. 4. Repeat 8 to 12 times. 5. Switch the ball (or sock) to your other hand and do 8 to 12 times. Wrist deviation   1. Sit so that your arm is supported but your hand hangs off the edge of a flat surface, such as a table. 2. Hold your hand out like you are shaking hands with someone. 3. Move your hand up and down. 4. Repeat this motion 8 to 12 times. 5. Switch arms. 6. Try to do this exercise twice with each hand. Wrist curls   1. Place your forearm on a table with your hand hanging over the edge of the table, palm up. 2. Place a 1- to 2-pound weight in your hand. This may be a dumbbell, a can of food, or a filled water bottle. 3. Slowly raise and lower the weight while keeping your forearm on the table and your palm facing up. 4. Repeat this motion 8 to 12 times. 5. Switch arms, and do steps 1 through 4.  6. Repeat with your hand facing down toward the floor. Switch arms.     Biceps curls 1. Sit leaning forward with your legs slightly spread and your left hand on your left thigh. 2. Place your right elbow on your right thigh, and hold the weight with your forearm horizontal.  3. Slowly curl the weight up and toward your chest.  4. Repeat this motion 8 to 12 times. 5. Switch arms, and do steps 1 through 4. Follow-up care is a key part of your treatment and safety. Be sure to make and go to all appointments, and call your doctor if you are having problems. It's also a good idea to know your test results and keep a list of the medicines you take. Where can you learn more? Go to https://[employer]. org and sign in to your [employer] account. Enter L547 in the [employer] box to learn more about \"Tennis Elbow: Exercises. \"     If you do not have an account, please click on the \"Sign Up Now\" link. Current as of: March 2, 2020               Content Version: 12.6  © 2006-2020 [employer]. Care instructions adapted under license by [facility] ([facility]). If you have questions about a medical condition or this instruction, always ask your healthcare professional. [place] [address] any warranty or liability for your use of this information. Patient Education        Golfer's Elbow: Exercises  Introduction  Here are some examples of exercises for you to try. The exercises may be suggested for a condition or for rehabilitation. Start each exercise slowly. Ease off the exercises if you start to have pain. You will be told when to start these exercises and which ones will work best for you. How to do the exercises  Wrist extensor stretch   1. Extend your affected arm in front of you and make a fist with your palm facing down. 2. Bend your wrist so that your fist points toward the floor. 3. With your other hand, gently bend your wrist farther until you feel a mild to moderate stretch in your forearm. 4. Hold for at least 15 to 30 seconds. 5. Repeat 2 to 4 times. 6. Repeat steps 1 through 5 with your fingers pointing toward the floor. Forearm extensor stretch   1. Place your affected elbow down at your side, bent at about 90 degrees. Then make a fist with your palm facing down. 2. Keeping your wrist bent, slowly straighten your elbow so your arm is down at your side. Then twist your fist out so your palm is facing out to the side and you feel a stretch. 3. Hold for at least 15 to 30 seconds. 4. Repeat 2 to 4 times. Wrist flexor stretch   1. Extend your affected arm in front of you with your palm facing away from your body. 2. Bend back your wrist, pointing your hand up toward the ceiling. 3. With your other hand, gently bend your wrist farther until you feel a mild to moderate stretch in your forearm. 4. Hold for at least 15 to 30 seconds. 5. Repeat 2 to 4 times. 6. Repeat steps 1 through 5, but this time extend your affected arm in front of you with your palm facing up. Then bend back your wrist, pointing your hand toward the floor. Wrist curls   1. Place your forearm on a table with your hand hanging over the edge of the table, palm up. 2. Place a 1- to 2-pound weight in your hand. This may be a dumbbell, a can of food, or a filled water bottle. 3. Slowly raise and lower the weight while keeping your forearm on the table and your palm facing up. 4. Repeat this motion 8 to 12 times. 5. Switch arms, and do steps 1 through 4.  6. Repeat with your hand facing down toward the floor. Switch arms. Resisted wrist extension   1. Sit leaning forward with your legs slightly spread. Then place your affected forearm on your thigh with your hand and wrist in front of your knee. 2. Grasp one end of an exercise band with your palm down, and step on the other end.  3. Slowly bend your wrist upward for a count of 2, then lower your wrist slowly to a count of 5.  4. Repeat 8 to 12 times.     Resisted wrist flexion

## 2021-02-07 ASSESSMENT — ENCOUNTER SYMPTOMS
CHEST TIGHTNESS: 1
CHOKING: 0
APNEA: 0
GASTROINTESTINAL NEGATIVE: 1
EYES NEGATIVE: 1
STRIDOR: 0
SHORTNESS OF BREATH: 0
COUGH: 1
WHEEZING: 1

## 2021-02-19 ENCOUNTER — HOSPITAL ENCOUNTER (OUTPATIENT)
Facility: CLINIC | Age: 56
Discharge: HOME | End: 2021-02-19
Payer: MEDICARE

## 2021-02-19 DIAGNOSIS — R73.03 PREDIABETES: ICD-10-CM

## 2021-02-19 DIAGNOSIS — E88.81 INSULIN RESISTANCE: ICD-10-CM

## 2021-02-19 DIAGNOSIS — E66.01 MORBID (SEVERE) OBESITY DUE TO EXCESS CALORIES (HCC): ICD-10-CM

## 2021-02-19 DIAGNOSIS — K76.0 HEPATIC STEATOSIS: ICD-10-CM

## 2021-02-19 DIAGNOSIS — D58.2 ELEVATED HEMOGLOBIN (HCC): ICD-10-CM

## 2021-02-19 DIAGNOSIS — E66.01 CLASS 3 SEVERE OBESITY WITH BODY MASS INDEX (BMI) OF 45.0 TO 49.9 IN ADULT, UNSPECIFIED OBESITY TYPE, UNSPECIFIED WHETHER SERIOUS COMORBIDITY PRESENT (HCC): ICD-10-CM

## 2021-02-19 LAB
ABSOLUTE EOS #: 0.1 K/UL (ref 0–0.44)
ABSOLUTE IMMATURE GRANULOCYTE: 0.03 K/UL (ref 0–0.3)
ABSOLUTE LYMPH #: 1.57 K/UL (ref 1.1–3.7)
ABSOLUTE MONO #: 0.64 K/UL (ref 0.1–1.2)
ALBUMIN SERPL-MCNC: 4.1 G/DL (ref 3.5–5.2)
ALBUMIN/GLOBULIN RATIO: 1.5 (ref 1–2.5)
ALP BLD-CCNC: 82 U/L (ref 35–104)
ALT SERPL-CCNC: 20 U/L (ref 5–33)
ANION GAP SERPL CALCULATED.3IONS-SCNC: 11 MMOL/L (ref 9–17)
AST SERPL-CCNC: 19 U/L
BASOPHILS # BLD: 1 % (ref 0–2)
BASOPHILS ABSOLUTE: 0.05 K/UL (ref 0–0.2)
BILIRUB SERPL-MCNC: 0.51 MG/DL (ref 0.3–1.2)
BUN BLDV-MCNC: 13 MG/DL (ref 6–20)
BUN/CREAT BLD: ABNORMAL (ref 9–20)
CALCIUM SERPL-MCNC: 8.9 MG/DL (ref 8.6–10.4)
CHLORIDE BLD-SCNC: 104 MMOL/L (ref 98–107)
CHOLESTEROL, FASTING: 145 MG/DL
CHOLESTEROL/HDL RATIO: 3.2
CO2: 26 MMOL/L (ref 20–31)
CREAT SERPL-MCNC: 0.5 MG/DL (ref 0.5–0.9)
DIFFERENTIAL TYPE: ABNORMAL
EOSINOPHILS RELATIVE PERCENT: 1 % (ref 1–4)
GFR AFRICAN AMERICAN: >60 ML/MIN
GFR NON-AFRICAN AMERICAN: >60 ML/MIN
GFR SERPL CREATININE-BSD FRML MDRD: ABNORMAL ML/MIN/{1.73_M2}
GFR SERPL CREATININE-BSD FRML MDRD: ABNORMAL ML/MIN/{1.73_M2}
GLUCOSE FASTING: 106 MG/DL (ref 70–99)
HCT VFR BLD CALC: 54.1 % (ref 36.3–47.1)
HDLC SERPL-MCNC: 45 MG/DL
HEMOGLOBIN: 16.9 G/DL (ref 11.9–15.1)
IMMATURE GRANULOCYTES: 0 %
LDL CHOLESTEROL: 72 MG/DL (ref 0–130)
LYMPHOCYTES # BLD: 18 % (ref 24–43)
MCH RBC QN AUTO: 29 PG (ref 25.2–33.5)
MCHC RBC AUTO-ENTMCNC: 31.2 G/DL (ref 28.4–34.8)
MCV RBC AUTO: 92.8 FL (ref 82.6–102.9)
MONOCYTES # BLD: 7 % (ref 3–12)
NRBC AUTOMATED: 0 PER 100 WBC
PDW BLD-RTO: 14.6 % (ref 11.8–14.4)
PLATELET # BLD: 255 K/UL (ref 138–453)
PLATELET ESTIMATE: ABNORMAL
PMV BLD AUTO: 12 FL (ref 8.1–13.5)
POTASSIUM SERPL-SCNC: 4.2 MMOL/L (ref 3.7–5.3)
RBC # BLD: 5.83 M/UL (ref 3.95–5.11)
RBC # BLD: ABNORMAL 10*6/UL
SEG NEUTROPHILS: 73 % (ref 36–65)
SEGMENTED NEUTROPHILS ABSOLUTE COUNT: 6.33 K/UL (ref 1.5–8.1)
SODIUM BLD-SCNC: 141 MMOL/L (ref 135–144)
TOTAL PROTEIN: 6.9 G/DL (ref 6.4–8.3)
TRIGLYCERIDE, FASTING: 141 MG/DL
VLDLC SERPL CALC-MCNC: NORMAL MG/DL (ref 1–30)
WBC # BLD: 8.7 K/UL (ref 3.5–11.3)
WBC # BLD: ABNORMAL 10*3/UL

## 2021-02-19 PROCEDURE — 85025 COMPLETE CBC W/AUTO DIFF WBC: CPT

## 2021-02-19 PROCEDURE — 83036 HEMOGLOBIN GLYCOSYLATED A1C: CPT

## 2021-02-19 PROCEDURE — 80053 COMPREHEN METABOLIC PANEL: CPT

## 2021-02-19 PROCEDURE — 36415 COLL VENOUS BLD VENIPUNCTURE: CPT

## 2021-02-19 PROCEDURE — 80061 LIPID PANEL: CPT

## 2021-02-20 LAB
ESTIMATED AVERAGE GLUCOSE: 128 MG/DL
HBA1C MFR BLD: 6.1 % (ref 4–6)

## 2021-02-22 ENCOUNTER — OFFICE VISIT (OUTPATIENT)
Dept: FAMILY MEDICINE CLINIC | Age: 56
End: 2021-02-22
Payer: MEDICARE

## 2021-02-22 VITALS
DIASTOLIC BLOOD PRESSURE: 76 MMHG | TEMPERATURE: 97 F | BODY MASS INDEX: 47.09 KG/M2 | SYSTOLIC BLOOD PRESSURE: 112 MMHG | WEIGHT: 293 LBS | HEIGHT: 66 IN

## 2021-02-22 DIAGNOSIS — F32.A DEPRESSION, UNSPECIFIED DEPRESSION TYPE: ICD-10-CM

## 2021-02-22 DIAGNOSIS — Z00.00 ROUTINE GENERAL MEDICAL EXAMINATION AT A HEALTH CARE FACILITY: Primary | ICD-10-CM

## 2021-02-22 DIAGNOSIS — R73.03 PREDIABETES: ICD-10-CM

## 2021-02-22 DIAGNOSIS — G47.33 OSA ON CPAP: ICD-10-CM

## 2021-02-22 DIAGNOSIS — Z99.89 OSA ON CPAP: ICD-10-CM

## 2021-02-22 PROBLEM — R07.9 CHEST PAIN: Status: ACTIVE | Noted: 2017-01-18

## 2021-02-22 PROBLEM — Z82.49 FAMILY HISTORY OF ISCHEMIC HEART DISEASE AND OTHER DISEASES OF THE CIRCULATORY SYSTEM: Status: ACTIVE | Noted: 2018-05-18

## 2021-02-22 PROCEDURE — G8482 FLU IMMUNIZE ORDER/ADMIN: HCPCS | Performed by: FAMILY MEDICINE

## 2021-02-22 PROCEDURE — 3017F COLORECTAL CA SCREEN DOC REV: CPT | Performed by: FAMILY MEDICINE

## 2021-02-22 PROCEDURE — G0438 PPPS, INITIAL VISIT: HCPCS | Performed by: FAMILY MEDICINE

## 2021-02-22 RX ORDER — BUSPIRONE HYDROCHLORIDE 5 MG/1
5 TABLET ORAL 3 TIMES DAILY
Qty: 90 TABLET | Refills: 0 | Status: SHIPPED | OUTPATIENT
Start: 2021-02-22 | End: 2021-03-24

## 2021-02-22 ASSESSMENT — PATIENT HEALTH QUESTIONNAIRE - PHQ9
7. TROUBLE CONCENTRATING ON THINGS, SUCH AS READING THE NEWSPAPER OR WATCHING TELEVISION: 3
1. LITTLE INTEREST OR PLEASURE IN DOING THINGS: 3
SUM OF ALL RESPONSES TO PHQ9 QUESTIONS 1 & 2: 6
SUM OF ALL RESPONSES TO PHQ QUESTIONS 1-9: 21
10. IF YOU CHECKED OFF ANY PROBLEMS, HOW DIFFICULT HAVE THESE PROBLEMS MADE IT FOR YOU TO DO YOUR WORK, TAKE CARE OF THINGS AT HOME, OR GET ALONG WITH OTHER PEOPLE: 3
SUM OF ALL RESPONSES TO PHQ QUESTIONS 1-9: 21
4. FEELING TIRED OR HAVING LITTLE ENERGY: 3
9. THOUGHTS THAT YOU WOULD BE BETTER OFF DEAD, OR OF HURTING YOURSELF: 0

## 2021-02-22 NOTE — PATIENT INSTRUCTIONS
Advance Directives: Care Instructions  Overview  An advance directive is a legal way to state your wishes at the end of your life. It tells your family and your doctor what to do if you can't say what you want. There are two main types of advance directives. You can change them any time your wishes change. Living will. This form tells your family and your doctor your wishes about life support and other treatment. The form is also called a declaration. Medical power of . This form lets you name a person to make treatment decisions for you when you can't speak for yourself. This person is called a health care agent (health care proxy, health care surrogate). The form is also called a durable power of  for health care. If you do not have an advance directive, decisions about your medical care may be made by a family member, or by a doctor or a  who doesn't know you. It may help to think of an advance directive as a gift to the people who care for you. If you have one, they won't have to make tough decisions by themselves. Follow-up care is a key part of your treatment and safety. Be sure to make and go to all appointments, and call your doctor if you are having problems. It's also a good idea to know your test results and keep a list of the medicines you take. What should you include in an advance directive? Many states have a unique advance directive form. (It may ask you to address specific issues.) Or you might use a universal form that's approved by many states. If your form doesn't tell you what to address, it may be hard to know what to include in your advance directive. Use the questions below to help you get started. · Who do you want to make decisions about your medical care if you are not able to? · What life-support measures do you want if you have a serious illness that gets worse over time or can't be cured? · What are you most afraid of that might happen? (Maybe you're afraid of having pain, losing your independence, or being kept alive by machines.)  · Where would you prefer to die? (Your home? A hospital? A nursing home?)  · Do you want to donate your organs when you die? · Do you want certain Jewish practices performed before you die? When should you call for help? Be sure to contact your doctor if you have any questions. Where can you learn more? Go to https://Elasticsearchhamilton.Nortal [email or web address] org and sign in to your Skim.it account. Enter R264 in the [employer] box to learn more about \"Advance Directives: Care Instructions. \"     If you do not have an account, please click on the \"Sign Up Now\" link. Current as of: December 9, 2019               Content Version: 12.6  © 0511-6415 Appiterate. Care instructions adapted under license by [facility] ([facility]). If you have questions about a medical condition or this instruction, always ask your healthcare professional. [place] [address] any warranty or liability for your use of this information. Learning About Medical Power of   What is a medical power of ? A medical power of , also called a durable power of  for health care, is one type of the legal forms called advance directives. It lets you name the person you want to make treatment decisions for you if you can't speak or decide for yourself. The person you choose is called your health care agent. This person is also called a health care proxy or health care surrogate. A medical power of  may be called something else in your state. How do you choose a health care agent? Choose your health care agent carefully. This person may or may not be a family member. Talk to the person before you make your final decision. Make sure he or she is comfortable with this responsibility.   It's a good idea to choose someone who: · Is at least 25years old. · Knows you well and understands what makes life meaningful for you. · Understands your Scientology and moral values. · Will do what you want, not what he or she wants. · Will be able to make difficult choices at a stressful time. · Will be able to refuse or stop treatment, if that is what you would want, even if you could die. · Will be firm and confident with health professionals if needed. · Will ask questions to get needed information. · Lives near you or agrees to travel to you if needed. Your family may help you make medical decisions while you can still be part of that process. But it's important to choose one person to be your health care agent in case you aren't able to make decisions for yourself. If you don't fill out the legal form and name a health care agent, the decisions your family can make may be limited. A health care agent may be called something else in your state. Who will make decisions for you if you don't have a health care agent? If you don't have a health care agent or a living will, you may not get the care you want. Decisions may be made by family members who disagree about your medical care. Or decisions may be made by a medical professional who doesn't know you well. In some cases, a  makes the decisions. When you name a health care agent, it is very clear who has the power to make health decisions for you. How do you name a health care agent? You name your health care agent on a legal form. This form is usually called a medical power of . Ask your hospital, state bar association, or office on aging where to find these forms. You must sign the form to make it legal. Some states require you to get the form notarized. This means that a person called a  watches you sign the form and then he or she signs the form. Some states also require that two or more witnesses sign the form. Be sure to tell your family members and doctors who your health care agent is. Where can you learn more? Go to https://chpepiceweb.[email or web address] [email or web address] org and sign in to your Infinite.ly account. Enter [id number] in the [facility] box to learn more about \"Learning About [address]. \"     If you do not have an account, please click on the \"Sign Up Now\" link. Current as of: December 9, 2019               Content Version: 12.6  © 2318-6368 Ardelyx, CareerStarter. Care instructions adapted under license by [facility] ([facility]). If you have questions about a medical condition or this instruction, always ask your healthcare professional. [address] any warranty or liability for your use of this information. Learning About Living Afshan Sousa  What is a living will? A living will, also called a declaration, is a legal form. It tells your family and your doctor your wishes when you can't speak for yourself. It's used by the health professionals who will treat you as you near the end of your life or if you get seriously hurt or ill. If you put your wishes in writing, your loved ones and others will know what kind of care you want. They won't need to guess. This can ease your mind and be helpful to others. And you can change or cancel your living will at any time. A living will is not the same as an estate or property will. An estate will explains what you want to happen with your money and property after you die. How do you use it? A living will is used to describe the kinds of treatment or life support you want as you near the end of your life or if you get seriously hurt or ill. Keep these facts in mind about living cruz. · Your living will is used only if you can't speak or make decisions for yourself. Most often, one or more doctors must certify that you can't speak or decide for yourself before your living will takes effect. · If you get better and can speak for yourself again, you can accept or refuse any treatment. It doesn't matter what you said in your living will. · Some states may limit your right to refuse treatment in certain cases. For example, you may need to clearly state in your living will that you don't want artificial hydration and nutrition, such as being fed through a tube. Is a living will a legal document? A living will is a legal document. Each state has its own laws about living cruz. And a living will may be called something else in your state. Here are some things to know about living cruz. · You don't need an  to complete a living will. But legal advice can be helpful if your state's laws are unclear. It can also help if your health history is complicated or your family can't agree on what should be in your living will. · You can change your living will at any time. Some people find that their wishes about end-of-life care change as their health changes. If you make big changes to your living will, complete a new form. · If you move to another state, make sure that your living will is legal in the state where you now live. In most cases, doctors will respect your wishes even if you have a form from a different state. · You might use a universal form that has been approved by many states. This kind of form can sometimes be filled out and stored online. Your digital copy will then be available wherever you have a connection to the internet. The doctors and nurses who need to treat you can find it right away. · Your state may offer an online registry. This is another place where you can store your living will online. · It's a good idea to get your living will notarized. This means using a person called a  to watch two people sign, or witness, your living will. What should you know when you create a living will?   Here are some questions to ask yourself as you make your living will: · Do you know enough about life support methods that might be used? If not, talk to your doctor so you know what might be done if you can't breathe on your own, your heart stops, or you can't swallow. · What things would you still want to be able to do after you receive life-support methods? Would you want to be able to walk? To speak? To eat on your own? To live without the help of machines? · Do you want certain Methodist practices performed if you become very ill? · If you have a choice, where do you want to be cared for? In your home? At a hospital or nursing home? · If you have a choice at the end of your life, where would you prefer to die? At home? In a hospital or nursing home? Somewhere else? · Would you prefer to be buried or cremated? · Do you want your organs to be donated after you die? What should you do with your living will? · Make sure that your family members and your health care agent have copies of your living will (also called a declaration). · Give your doctor a copy of your living will. Ask him or her to keep it as part of your medical record. If you have more than one doctor, make sure that each one has a copy. · Put a copy of your living will where it can be easily found. For example, some people may put a copy on their refrigerator door. If you are using a digital copy, be sure your doctor, family members, and health care agent know how to find and access it. Where can you learn more? Go to https://Entelliumhamilton.HappyBox. org and sign in to your Familybuilder account. Enter R865 in the PinMyPet box to learn more about \"Learning About Living [place]. \"     If you do not have an account, please click on the \"Sign Up Now\" link. Current as of: December 9, 2019               Content Version: 12.6  © 1795-1135 Accumetrics, Incorporated. Care instructions adapted under license by [facility] ([facility]). If you have questions about a medical condition or this instruction, always ask your healthcare professional. [address] any warranty or liability for your use of this information. Learning About Low-Carbohydrate Diets  What is a low-carbohydrate diet? A low-carbohydrate (or \"low-carb\") diet limits foods and drinks that have carbohydrates. This includes grains, fruits, milk and yogurt, and starchy vegetables like potatoes, beans, and corn. It also avoids foods and drinks that have added sugar. Instead, low-carb diets include foods that are high in protein and fat. Why might you follow a low-carb diet? Low-carb diets may be used for a variety of reasons, such as for weight loss. People who have diabetes may use a low-carb diet to help manage their blood sugar levels. What should you do before you start the diet? Talk to your doctor before you try any diet. This is even more important if you have health problems like kidney disease, heart disease, or diabetes. Your doctor may suggest that you meet with a registered dietitian. A dietitian can help you make an eating plan that works for you. What foods do you eat on a low-carb diet? On a low-carb diet, you choose foods that are high in protein and fat. Examples of these are:  · Meat, poultry, and fish. · Eggs. · Nuts, such as walnuts, pecans, almonds, and peanuts. · Peanut butter and other nut butters. · Tofu. · Avocado. · Pleas Silva. · Non-starchy vegetables like broccoli, cauliflower, green beans, mushrooms, peppers, lettuce, and spinach. · Unsweetened non-dairy milks like almond milk and coconut milk. · Cheese, cottage cheese, and cream cheese. Current as of: August 22, 2019               Content Version: 12.6  © 9037-9176 Sensicore, Incorporated. Care instructions adapted under license by [facility] ([facility]). If you have questions about a medical condition or this instruction, always ask your healthcare professional. [address] any warranty or liability for your use of this information. Eating Healthy Foods: Care Instructions  Your Care Instructions     Eating healthy foods can help lower your risk for disease. Healthy food gives you energy and keeps your heart strong, your brain active, your muscles working, and your bones strong. A healthy diet includes a variety of foods from the basic food groups: grains, vegetables, fruits, milk and milk products, and meat and beans. Some people may eat more of their favorite foods from only one food group and, as a result, miss getting the nutrients they need. So, it is important to pay attention not only to what you eat but also to what you are missing from your diet. You can eat a healthy, balanced diet by making a few small changes. Follow-up care is a key part of your treatment and safety. Be sure to make and go to all appointments, and call your doctor if you are having problems. It's also a good idea to know your test results and keep a list of the medicines you take. How can you care for yourself at home? Look at what you eat  · Keep a food diary for a week or two and record everything you eat or drink. Track the number of servings you eat from each food group. · For a balanced diet every day, eat a variety of:  ? 6 or more ounce-equivalents of grains, such as cereals, breads, crackers, rice, or pasta, every day. An ounce-equivalent is 1 slice of bread, 1 cup of ready-to-eat cereal, or ½ cup of cooked rice, cooked pasta, or cooked cereal.  ? 2½ cups of vegetables, especially:  § Dark-green vegetables such as broccoli and spinach. § Orange vegetables such as carrots and sweet potatoes. § Dry beans (such as ashford and kidney beans) and peas (such as lentils). ? 2 cups of fresh, frozen, or canned fruit. A small apple or 1 banana or orange equals 1 cup. ? 3 cups of nonfat or low-fat milk, yogurt, or other milk products. ? 5½ ounces of meat and beans, such as chicken, fish, lean meat, beans, nuts, and seeds. One egg, 1 tablespoon of peanut butter, ½ ounce nuts or seeds, or ¼ cup of cooked beans equals 1 ounce of meat. · Learn how to read food labels for serving sizes and ingredients. Fast-food and convenience-food meals often contain few or no fruits or vegetables. Make sure you eat some fruits and vegetables to make the meal more nutritious. · Look at your food diary. For each food group, add up what you have eaten and then divide the total by the number of days. This will give you an idea of how much you are eating from each food group. See if you can find some ways to change your diet to make it more healthy. Start small  · Do not try to make dramatic changes to your diet all at once. You might feel that you are missing out on your favorite foods and then be more likely to fail. · Start slowly, and gradually change your habits. Try some of the following:  ? Use whole wheat bread instead of white bread. ? Use nonfat or low-fat milk instead of whole milk. ? Eat brown rice instead of white rice, and eat whole wheat pasta instead of white-flour pasta. ? Try low-fat cheeses and low-fat yogurt. ? Add more fruits and vegetables to meals and have them for snacks. ? Add lettuce, tomato, cucumber, and onion to sandwiches. ? Add fruit to yogurt and cereal.  Enjoy food  · You can still eat your favorite foods. You just may need to eat less of them. If your favorite foods are high in fat, salt, and sugar, limit how often you eat them, but do not cut them out entirely. · Eat a wide variety of foods.   Make healthy choices when eating out · The type of restaurant you choose can help you make healthy choices. Even fast-food chains are now offering more low-fat or healthier choices on the menu. · Choose smaller portions, or take half of your meal home. · When eating out, try:  ? A veggie pizza with a whole wheat crust or grilled chicken (instead of sausage or pepperoni). ? Pasta with roasted vegetables, grilled chicken, or marinara sauce instead of cream sauce. ? A vegetable wrap or grilled chicken wrap. ? Broiled or poached food instead of fried or breaded items. Make healthy choices easy  · Buy packaged, prewashed, ready-to-eat fresh vegetables and fruits, such as baby carrots, salad mixes, and chopped or shredded broccoli and cauliflower. · Buy packaged, presliced fruits, such as melon or pineapple. · Choose 100% fruit or vegetable juice instead of soda. Limit juice intake to 4 to 6 oz (½ to ¾ cup) a day. · Blend low-fat yogurt, fruit juice, and canned or frozen fruit to make a smoothie for breakfast or a snack. Where can you learn more? Go to https://Ob Hospital[facility].Playtika. org and sign in to your Chukong Technologies account. Enter V880 in the [facility] box to learn more about \"Eating Healthy Foods: Care Instructions. \"     If you do not have an account, please click on the \"Sign Up Now\" link. Current as of: August 22, 2019               Content Version: 12.6  © 0821-4112 Kadang.com, Incorporated. Care instructions adapted under license by [facility] ([facility]). If you have questions about a medical condition or this instruction, always ask your healthcare professional. [place] [address] any warranty or liability for your use of this information.            Stopping Smoking: Care Instructions  Your Care Instructions Cigarette smokers crave the nicotine in cigarettes. Giving it up is much harder than simply changing a habit. Your body has to stop craving the nicotine. It is hard to quit, but you can do it. There are many tools that people use to quit smoking. You may find that combining tools works best for you. There are several steps to quitting. First you get ready to quit. Then you get support to help you. After that, you learn new skills and behaviors to become a nonsmoker. For many people, a necessary step is getting and using medicine. Your doctor will help you set up the plan that best meets your needs. You may want to attend a smoking cessation program to help you quit smoking. When you choose a program, look for one that has proven success. Ask your doctor for ideas. You will greatly increase your chances of success if you take medicine as well as get counseling or join a cessation program.  Some of the changes you feel when you first quit tobacco are uncomfortable. Your body will miss the nicotine at first, and you may feel short-tempered and grumpy. You may have trouble sleeping or concentrating. Medicine can help you deal with these symptoms. You may struggle with changing your smoking habits and rituals. The last step is the tricky one: Be prepared for the smoking urge to continue for a time. This is a lot to deal with, but keep at it. You will feel better. Follow-up care is a key part of your treatment and safety. Be sure to make and go to all appointments, and call your doctor if you are having problems. It's also a good idea to know your test results and keep a list of the medicines you take. How can you care for yourself at home? · Ask your family, friends, and coworkers for support. You have a better chance of quitting if you have help and support. · Join a support group, such as Nicotine Anonymous, for people who are trying to quit smoking. · Consider signing up for a smoking cessation program, such as the American Lung Association's Freedom from Smoking program.  · Get text messaging support. Go to the website at www.smokefree. gov to sign up for the [facility] program.  · Set a quit date. Pick your date carefully so that it is not right in the middle of a big deadline or stressful time. Once you quit, do not even take a puff. Get rid of all ashtrays and lighters after your last cigarette. Clean your house and your clothes so that they do not smell of smoke. · Learn how to be a nonsmoker. Think about ways you can avoid those things that make you reach for a cigarette. ? Avoid situations that put you at greatest risk for smoking. For some people, it is hard to have a drink with friends without smoking. For others, they might skip a coffee break with coworkers who smoke. ? Change your daily routine. Take a different route to work or eat a meal in a different place. · Cut down on stress. Calm yourself or release tension by doing an activity you enjoy, such as reading a book, taking a hot bath, or gardening. · Talk to your doctor or pharmacist about nicotine replacement therapy, which replaces the nicotine in your body. You still get nicotine but you do not use tobacco. Nicotine replacement products help you slowly reduce the amount of nicotine you need. These products come in several forms, many of them available over-the-counter:  ? Nicotine patches  ? Nicotine gum and lozenges  ? Nicotine inhaler  · Ask your doctor about bupropion (Wellbutrin) or varenicline (Chantix), which are prescription medicines. They do not contain nicotine. They help you by reducing withdrawal symptoms, such as stress and anxiety. · Some people find hypnosis, acupuncture, and massage helpful for ending the smoking habit. · Eat a healthy diet and get regular exercise. Having healthy habits will help your body move past its craving for nicotine. · Be prepared to keep trying. Most people are not successful the first few times they try to quit. Do not get mad at yourself if you smoke again. Make a list of things you learned and think about when you want to try again, such as next week, next month, or next year. Where can you learn more? Go to https://chpekrishnaewphylicia.Reg Technologies. org and sign in to your VetCloud account. Enter P159 in the Shipster box to learn more about \"Stopping Smoking: Care Instructions. \"     If you do not have an account, please click on the \"Sign Up Now\" link. Current as of: March 12, 2020               Content Version: 12.6  © 4284-0244 Short Fuze, Incorporated. Care instructions adapted under license by [facility] ([facility]). If you have questions about a medical condition or this instruction, always ask your healthcare professional. [place] [address] any warranty or liability for your use of this information. Learning About Benefits From Quitting Smoking  How does quitting smoking make you healthier? If you're thinking about quitting smoking, you may have a few reasons to be smoke-free. Your health may be one of them. · When you quit smoking, you lower your risks for cancer, lung disease, heart attack, stroke, blood vessel disease, and blindness from macular degeneration. · When you're smoke-free, you get sick less often, and you heal faster. You are less likely to get colds, flu, bronchitis, and pneumonia. · As a nonsmoker, you may find that your mood is better and you are less stressed. When and how will you feel healthier? Quitting has real health benefits that start from day 1 of being smoke-free. And the longer you stay smoke-free, the healthier you get and the better you feel. The first hours  · After just 20 minutes, your blood pressure and heart rate go down. That means there's less stress on your heart and blood vessels. · Within 12 hours, the level of carbon monoxide in your blood drops back to normal. That makes room for more oxygen. With more oxygen in your body, you may notice that you have more energy than when you smoked. After 2 weeks  · Your lungs start to work better. · Your risk of heart attack starts to drop. After 1 month  · When your lungs are clear, you cough less and breathe deeper, so it's easier to be active. · Your sense of taste and smell return. That means you can enjoy food more than you have since you started smoking. Over the years  · Over the years, your risks of heart disease, heart attack, and stroke are lower. · After 10 years, your risk of dying from lung cancer is cut by about half. And your risk for many other types of cancer is lower too. How would quitting help others in your life? When you quit smoking, you improve the health of everyone who now breathes in your smoke. · Their heart, lung, and cancer risks drop, much like yours. · They are sick less. For babies and small children, living smoke-free means they're less likely to have ear infections, pneumonia, and bronchitis. · If you're a woman who is or will be pregnant someday, quitting smoking means a healthier . · Children who are close to you are less likely to become adult smokers. Where can you learn more? Go to https://gibran.[employer]gian [employer]. org and sign in to your SwipeToSpin account. Enter [id number] in the [facility] box to learn more about \"Learning About Benefits From Quitting Smoking. \"     If you do not have an account, please click on the \"Sign Up Now\" link. Current as of: 2020               Content Version: 12.6  © 9382-0181 [employer]. Care instructions adapted under license by [facility] ([facility]). If you have questions about a medical condition or this instruction, always ask your healthcare professional. [address] any warranty or liability for your use of this information. Deciding About Using Medicines To Quit Smoking  How can you decide about using medicines to quit smoking? What are the medicines you can use? Your doctor may prescribe varenicline (Chantix) or bupropion (Zyban). These medicines can help you cope with cravings for tobacco. They are pills that don't contain nicotine. You also can use nicotine replacement products. These do contain nicotine. There are many types. · Gum and lozenges slowly release nicotine into your mouth. · Patches stick to your skin. They slowly release nicotine into your bloodstream.  · An inhaler has a carrillo that contains nicotine. You breathe in a puff of nicotine vapor through your mouth and throat. · Nasal spray releases a mist that contains nicotine. What are key points about this decision? · Using medicines can double your chances of quitting smoking. They can ease cravings and withdrawal symptoms. · Getting counseling along with using medicine can raise your chances of quitting even more. · If you smoke fewer than 5 cigarettes a day, you may not need medicines to help you quit smoking. · These medicines have less nicotine than cigarettes. And by itself, nicotine is not nearly as harmful as smoking. The tars, carbon monoxide, and other toxic chemicals in tobacco cause the harmful effects. · The side effects of nicotine replacement products depend on the type of product. For example, a patch can make your skin red and itchy. Medicines in pill form can make you sick to your stomach. They can also cause dry mouth and trouble sleeping. For most people, the side effects are not bad enough to make them stop using the products. Why might you choose to use medicines to quit smoking? · You have tried on your own to stop smoking, but you were not able to stop. · You smoke more than 5 cigarettes a day. · You want to increase your chances of quitting smoking. · You want to reduce your cravings and withdrawal symptoms. · You feel the benefits of medicine outweigh the side effects. Why might you choose not to use medicine? · You want to try quitting on your own by stopping all at once (\"cold turkey\"). · You want to cut back slowly on the number of cigarettes you smoke. · You smoke fewer than 5 cigarettes a day. · You do not like using medicine. · You feel the side effects of medicines outweigh the benefits. · You are worried about the cost of medicines. Your decision  Thinking about the facts and your feelings can help you make a decision that is right for you. Be sure you understand the benefits and risks of your options, and think about what else you need to do before you make the decision. Where can you learn more? Go to https://AppLayernicolasGalera [employer].Vanilla For[employer]. org and sign in to your Audience account. Enter K795 in the [employer] box to learn more about \"Deciding About Using Medicines To Quit Smoking. \"     If you do not have an account, please click on the \"Sign Up Now\" link. Current as of: March 12, 2020               Content Version: 12.6  © 1587-0047 [employer]. Care instructions adapted under license by [facility] ([facility]). If you have questions about a medical condition or this instruction, always ask your healthcare professional. [place] [address] any warranty or liability for your use of this information. Personalized Preventive Plan for Nila Fleischer - 2/22/2021  Medicare offers a range of preventive health benefits. Some of the tests and screenings are paid in full while other may be subject to a deductible, co-insurance, and/or copay. Some of these benefits include a comprehensive review of your medical history including lifestyle, illnesses that may run in your family, and various assessments and screenings as appropriate. After reviewing your medical record and screening and assessments performed today your provider may have ordered immunizations, labs, imaging, and/or referrals for you. A list of these orders (if applicable) as well as your Preventive Care list are included within your After Visit Summary for your review. Other Preventive Recommendations:    · A preventive eye exam performed by an eye specialist is recommended every 1-2 years to screen for glaucoma; cataracts, macular degeneration, and other eye disorders. · A preventive dental visit is recommended every 6 months. · Try to get at least 150 minutes of exercise per week or 10,000 steps per day on a pedometer . · Order or download the FREE \"Exercise & Physical Activity: Your Everyday Guide\" from [employer]. Call 8-542.562.4668 or search [employer] online. · You need 6028-9373 mg of calcium and 9279-7766 IU of vitamin D per day. It is possible to meet your calcium requirement with diet alone, but a vitamin D supplement is usually necessary to meet this goal.  · When exposed to the sun, use a sunscreen that protects against both UVA and UVB radiation with an SPF of 30 or greater. Reapply every 2 to 3 hours or after sweating, drying off with a towel, or swimming. · Always wear a seat belt when traveling in a car. Always wear a helmet when riding a bicycle or motorcycle.

## 2021-02-22 NOTE — PROGRESS NOTES
Medicare Annual Wellness Visit  Name: Neal Ngo Date: 2021   MRN: [id number] Sex: Female   Age: 54 y.o. Ethnicity: Non-/Non    : 1965 Race: Edgard Alamo is here for Medicare AWV    Screenings for behavioral, psychosocial and functional/safety risks, and cognitive dysfunction are all negative except as indicated below. These results, as well as other patient data from the [address] form, are documented in Flowsheets linked to this Encounter. Allergies   Allergen Reactions    Amoxicillin-Pot Clavulanate Diarrhea and Other (See Comments)    Medrol [Methylprednisolone] Other (See Comments)     Redness of hands and itching  Redness of hands and itching    Tape [Adhesive Tape] Rash       Prior to Visit Medications    Medication Sig Taking? Authorizing Provider   albuterol (PROVENTIL) (2.5 MG/3ML) 0.083% nebulizer solution Take 3 mLs by nebulization every 6 hours as needed for Wheezing Yes Juwan Perkins MD   VORTIoxetine (TRINTELLIX) 10 MG TABS tablet Take 1 tablet by mouth daily Yes Juwan Perkins MD   busPIRone (BUSPAR) 5 MG tablet Take 1 tablet by mouth 2 times daily Yes Juwan Perkins MD   solifenacin (VESICARE) 5 MG tablet Take 1 tablet by mouth daily Yes Juwan Perkins MD   albuterol sulfate HFA (PROAIR HFA) 108 (90 Base) MCG/ACT inhaler Inhale 2 puffs into the lungs every 6 hours as needed for Wheezing Yes Juwan Perkins MD   budesonide-formoterol (SYMBICORT) 160-4.5 MCG/ACT AERO INHALE TWO PUFFS BY MOUTH TWICE A DAY Yes Juwan Perkins MD   nystatin ([address]) 291599 UNIT/GM powder APPLY ONE DOSE TOPICALLY TWICE A DAY Yes Juwan Perkins MD   ibuprofen (ADVIL;MOTRIN) 800 MG tablet 1 tablet with food or milk as needed Yes Historical Provider, MD   pregabalin (LYRICA) 100 MG capsule Take 1 capsule by mouth 2 times daily for 30 days.  Yes [address]en Johnston MD   Blood Glucose Monitoring Suppl (ACURA BLOOD GLUCOSE METER) W/DEVICE KIT 1 each by Does not apply route 2 times daily Yes Lovenia Husbands SONIDO Garcia   Nebulizers (COMPRESSOR/NEBULIZER) MISC 1 vial by Does not apply route 4 times daily as needed. Patient has albuterol prescription at home; needs aerosol machine and set up diagnosis chronic bronchitis Yes Kaity Cruz, APRN - CNP       Past Medical History:   Diagnosis Date    Anxiety     Asthma     Bladder incontinence     At times per pt.     Bladder prolapse, female, acquired     Cancer ([facility] [address] 1996    cervical; treated with hysterectomy    Carpal tunnel syndrome 12/15/2010    Chronic bilateral low back pain     Colon polyps 10/07/2020    tubular adenomas x4    COPD (chronic obstructive pulmonary disease) ([facility]     Depression     Epigastric pain     Hepatic steatosis     History of cataract extraction with lens replacement     bilateral    History of stress test     Hyperglycemia     Intertrigo     Lumbar degenerative disc disease     Nausea     Obesity 2017    Osteoarthritis     right knee    Osteoarthritis cervical spine     Osteoarthritis thoracic spine     Tobacco abuse 2017       Past Surgical History:   Procedure Laterality Date    CATARACT REMOVAL       SECTION      x1    COLONOSCOPY N/A 10/7/2020    tubular adenomas x4    HYSTERECTOMY, VAGINAL      secondary to cervical cancer--ovaries still present    NERVE BLOCK Bilateral 2020    NERVE BLOCK BILATERAL - MBB #1 L3-4, L4-5, L5-S1 (Bilateral )     NERVE BLOCK Bilateral 2020    NERVE BLOCK BILATERAL - MBB #2 L3-4, L4-5, L5-S1    NERVE BLOCK  01/15/2021    : NERVE RADIOFREQUENCY ABLATION L3-4, L4-5, L5-S1 (Left )    PAIN MANAGEMENT PROCEDURE Bilateral 2020    NERVE BLOCK BILATERAL - MBB #1 L3-4, L4-5, L5-S1 performed by Collette Joseph MD at [address] [place] [address] Bilateral 2020    NERVE BLOCK BILATERAL - MBB #2 L3-4, L4-5, L5-S1 performed by Collette Joseph MD at [id number] [place] [address] PAIN MANAGEMENT PROCEDURE Left 1/15/2021    NERVE RADIOFREQUENCY ABLATION L3-4, L4-5, L5-S1 performed by Sd Carrion MD at [address] [facility] Left 01/22/2021    NERVE RADIOFREQUENCY ABLATION -L3-4, L4-5, L5-S1     PAIN MANAGEMENT PROCEDURE Left 1/22/2021    NERVE RADIOFREQUENCY ABLATION -L3-4, L4-5, L5-S1 performed by Sd Carrion MD at [address]  2017    throat polyps removed       Family History   Problem Relation Age of Onset    Diabetes Mother         from pancreatic resection    Heart Disease Mother     Arthritis Mother     Depression Mother     Mental Illness Mother     Diabetes Father     Heart Disease Father     Depression Brother     Cancer Brother         bladder    Lung Cancer Brother 64        cause of death    Depression Sister     Breast Cancer Maternal Grandmother     Asthma Other         grandson    Breast Cancer Other         diagnosed in her 42's    High Blood Pressure Brother     Diabetes Brother     No Known Problems Maternal Grandfather     No Known Problems Paternal Grandmother     No Known Problems Paternal Grandfather     Arthritis Sister     Pancreatic Cancer Maternal Uncle     High Cholesterol Neg Hx     Kidney Disease Neg Hx     Stroke Neg Hx     Ovarian Cancer Neg Hx     Colon Cancer Neg Hx        CareTeam (Including outside providers/suppliers regularly involved in providing care):   Patient Care Team:  Bisi Acharya MD as PCP - General (Family Medicine)  Bisi Acharya MD as PCP - [facility] Empaneled Provider  Cherie Douglass MD as Consulting Physician (Gastroenterology)  Reena Harper RN as Nurse Navigator    Wt Readings from Last 3 Encounters:   02/22/21 296 lb 4.8 oz (134.4 kg)   02/03/21 296 lb 6.4 oz (134.4 kg)   01/22/21 299 lb (135.6 kg)     Vitals:    02/22/21 0929   BP: 112/76   Site: Right Upper Arm   Position: Sitting   Cuff Size: Large Adult   Temp: 97 °F (36.1 °C)   TempSrc: Temporal Weight: 296 lb 4.8 oz (134.4 kg)   Height: 5' 6\" (1.676 m)     Body mass index is 47.82 kg/m². Based upon direct observation of the patient, evaluation of cognition reveals recent and remote memory intact. General Appearance: alert and oriented to person, place and time, well developed and well- nourished, in no acute distress  Skin: warm and dry, no rash or erythema  Head: normocephalic and atraumatic  Neck: supple and non-tender without mass, no thyromegaly or thyroid nodules, no cervical lymphadenopathy  Pulmonary/Chest: clear to auscultation bilaterally- no wheezes, rales or rhonchi, normal air movement, no respiratory distress  Cardiovascular: normal rate, regular rhythm, normal S1 and S2, no murmurs, rubs, clicks, or gallops, distal pulses intact, no carotid bruits  Abdomen: soft, non-tender, non-distended, normal bowel sounds, no masses or organomegaly  Extremities: no cyanosis, clubbing or edema    Patient's complete Health Risk Assessment and screening values have been reviewed and are found in Flowsheets. The following problems were reviewed today and where indicated follow up appointments were made and/or referrals ordered. Positive Risk Factor Screenings with Interventions:     Fall Risk:  Timed Up and Go Test > 12 seconds?  (Complete if either Fall Risk answers are Yes): (!) yes(felt  a little dizzy)  2 or more falls in past year?: no  Fall with injury in past year?: no  Fall Risk Interventions:    · Home safety tips provided     Depression:  PHQ-2 Score: 6  PHQ-9 Total Score: 21    Severity:1-4 = minimal depression, 5-9 = mild depression, 10-14 = moderate depression, 15-19 = moderately severe depression, 20-27 = severe depression  Depression Interventions:  · Patient advised to follow-up in this office for further evaluation and treatment within 1 week  · Patient advised to follow-up in this office for further evaluation and treatment within 4 week(s)     Substance History:  Social History Tobacco History     Smoking Status  Current Every Day Smoker Smoking Frequency  1 pack/day for 40 years (40 pk yrs) Smoking Tobacco Type  Cigarettes    Smokeless Tobacco Use  Never Used    Tobacco Comment  has tried cold turkey, patches, chantix          Alcohol History     Alcohol Use Status  No          Drug Use     Drug Use Status  Yes Types  Marijuana Frequency   4 times/week Comment  Last used 11/1/20          Sexual Activity     Sexually Active  Not Currently Partners  Male Birth Control/Protection  Surgical               Alcohol Screening:       A score of 8 or more is associated with harmful or hazardous drinking. A score of 13 or more in women, and 15 or more in men, is likely to indicate alcohol dependence. Substance Abuse Interventions:  · Tobacco abuse:  tobacco cessation tips and resources provided    General Health and ACP:  General  In general, how would you say your health is?: Fair  In the past 7 days, have you experienced any of the following?  New or Increased Pain, New or Increased Fatigue, Loneliness, Social Isolation, Stress or Anger?: (!) Social Isolation  Do you get the social and emotional support that you need?: (!) No  Do you have a Living Will?: (!) No  Advance Directives     Power of [address] [place] [address] Will ACP-Advance Directive ACP-Power of     Not on File Not on File Not on File Not on File      General Health Risk Interventions:  · Social isolation: patient declines any further intervention for this issue  · Inadequate social/emotional support: patient declines any further intervention for this issue  · No Living Will: Advance Care Planning addressed with patient today    Health Habits/Nutrition:  Health Habits/Nutrition  Do you exercise for at least 20 minutes 2-3 times per week?: (!) No  Have you lost any weight without trying in the past 3 months?: No  Do you eat only one meal per day?: No  Have you seen the dentist within the past year?: (!) No  Body mass index: (!) 47.82  Health Habits/Nutrition Interventions:  · Inadequate physical activity:  educational materials provided to promote increased physical activity  · Dental exam overdue:  patient encouraged to make appointment with his/her dentist     Safety:  Safety  Do you have working smoke detectors?: Yes  Have all throw rugs been removed or fastened?: Yes  Do you have non-slip mats or surfaces in all bathtubs/showers?: (!) No  Do all of your stairways have a railing or banister?: Yes  Are your doorways, halls and stairs free of clutter?: Yes  Do you always fasten your seatbelt when you are in a car?: Yes  Safety Interventions:  · Home safety tips provided    ADL:  ADLs  In the past 7 days, did you need help from others to perform any of the following everyday activities? Eating, dressing, grooming, bathing, toileting, or walking/balance?: None  In the past 7 days, did you need help from others to take care of any of the following?  Laundry, housekeeping, banking/finances, shopping, telephone use, food preparation, transportation, or taking medications?: (!) Laundry, Housekeeping, Banking/Finances, Shopping, Telephone Use, Food Preparation, Transportation, Taking Medications  ADL Interventions:  · Patient declines any further evaluation/treatment for this issue    Personalized Preventive Plan   Current Health Maintenance Status  Immunization History   Administered Date(s) Administered    Influenza Vaccine, unspecified formulation 10/21/2010, 11/01/2016    Influenza Virus Vaccine 10/21/2010, 01/18/2014, 11/01/2016    Influenza, Intradermal, Preservative free 01/18/2014, 11/01/2016    Influenza, Quadv, IM, (6 mo and older Fluzone, Flulaval, Fluarix and 3 yrs and older Afluria) 11/01/2016    Influenza, Quadv, IM, PF (6 mo and older Fluzone, Flulaval, Fluarix, and 3 yrs and older Afluria) 09/01/2017, 10/04/2019, 02/03/2021    Pneumococcal Conjugate 13-valent ([id number]) 08/24/2016    Pneumococcal Polysaccharide ([id number]) 10/21/2010, 09/01/2017    Tdap (Boostrix, Adacel) 08/24/2016        Health Maintenance   Topic Date Due    Annual Wellness Visit (AWV)  06/21/2019    Breast cancer screen  10/09/2020    Shingles Vaccine (1 of 2) 02/03/2022 (Originally 3/30/2015)    Low dose CT lung screening  05/27/2021    A1C test (Diabetic or Prediabetic)  02/19/2022    Lipid screen  02/19/2026    DTaP/Tdap/Td vaccine (2 - Td) 08/24/2026    Colon cancer screen colonoscopy  10/07/2030    Flu vaccine  Completed    Pneumococcal 0-64 years Vaccine  Completed    Hepatitis C screen  Completed    HIV screen  Completed    Hepatitis A vaccine  Aged Out    Hepatitis B vaccine  Aged Out    Hib vaccine  Aged Out    Meningococcal (ACWY) vaccine  Aged Out     Recommendations for [employer] Due: see orders and patient instructions/AVS.  . Recommended screening schedule for the next 5-10 years is provided to the patient in written form: see Patient Instructions/AVS.    Stone Hernandez was seen today for medicare awv. Diagnoses and all orders for this visit:    Routine general medical examination at a health care facility                   Advance Care Planning   Advanced Care Planning: Discussed the patients choices for care and treatment in case of a health event that adversely affects decision-making abilities. Also discussed the patients long-term treatment options. Reviewed with the patient the [address] [place] [address] of [address] [place] [address] Declaration forms  Reviewed the process of designating a competent adult as an Agent (or -in-fact) that could take make health care decisions for the patient if incompetent. Patient was asked to complete the declaration forms, either acknowledge the forms by a public notary or an eligible witness and provide a signed copy to the practice office.   Time spent (minutes): 3 minutes     Obesity Counseling: Assessed behavioral health risks and factors affecting choice of behavior. Suggested weight control approaches, including dietary changes behavioral modification and follow up plan. Provided educational and support documentation. Time spent (minutes): 2 minutes      Cardiovascular Disease Risk Counseling: Assessed the patient's risk to develop cardiovascular disease and reviewed main risk factors. Reviewed steps to reduce disease risk including:   · Quitting tobacco use, reducing amount smoked, or not starting the habit  · Making healthy food choices  · Being physically active and gradualy increasing activity levels   · Reduce weight and determine a healthy BMI goal  · Monitor blood pressure and treat if higher than 140/90 mmHg  · Maintain blood total cholesterol levels under 5 mmol/l or 190 mg/dl  · Maintain LDL cholesterol levels under 3.0 mmol/l or 115 mg/dl   · Control blood glucose levels  · Consider taking aspirin (75 mg daily), once blood pressure is controlled   Provided a follow up plan. Time spent (minutes): 1 minute    Tobacco Cessation Counseling: Patient advised about behavior change, including information about personal health harms, usage of appropriate cessation measures and benefits of cessation. Time spent (minutes): 3 minutes      The patient's anxiety has recently increased. She was recently restarted on Trintellix by myself at the last office appointment. She also has been on BuSpar but is only taking it twice a day. She does suffer from significant issues with insomnia. She will go to sleep at least an hour but wakes up in the middle of the night frequently. She denies any triggers such as bladder emptying. She does have a CPAP machine but has not been using it due to needing new tubing. When she does use her CPAP machine and improves her quality of sleeping improves her quality of life. Tubing ordered today. Patient is not checking her blood sugars at this time. We will give her a slip to do so.   The patient also did request nystatin powder to use under her folds as she does get some frequent yeast infections in her skin. The patient was advised to increase BuSpar, continue Trintellix and we will follow up with her in the next month. She can call and give us an update. If she does not improve we will have her see psychiatry again.

## 2021-03-19 ENCOUNTER — HOSPITAL ENCOUNTER (OUTPATIENT)
Dept: MAMMOGRAPHY | Age: 56
Discharge: HOME | End: 2021-03-21
Payer: MEDICARE

## 2021-03-19 DIAGNOSIS — Z12.31 ENCOUNTER FOR SCREENING MAMMOGRAM FOR MALIGNANT NEOPLASM OF BREAST: ICD-10-CM

## 2021-03-19 DIAGNOSIS — Z12.39 ENCOUNTER FOR SCREENING FOR MALIGNANT NEOPLASM OF BREAST, UNSPECIFIED SCREENING MODALITY: ICD-10-CM

## 2021-03-19 PROCEDURE — 77063 BREAST TOMOSYNTHESIS BI: CPT

## 2021-04-26 ENCOUNTER — APPOINTMENT (OUTPATIENT)
Dept: CT IMAGING | Facility: CLINIC | Age: 56
End: 2021-04-26
Payer: MEDICARE

## 2021-04-26 ENCOUNTER — APPOINTMENT (OUTPATIENT)
Dept: GENERAL RADIOLOGY | Facility: CLINIC | Age: 56
End: 2021-04-26
Payer: MEDICARE

## 2021-04-26 ENCOUNTER — HOSPITAL ENCOUNTER (EMERGENCY)
Facility: CLINIC | Age: 56
Discharge: LEFT BEFORE DISCHARGE | End: 2021-04-26
Attending: EMERGENCY MEDICINE
Payer: MEDICARE

## 2021-04-26 VITALS
TEMPERATURE: 98.3 F | HEART RATE: 85 BPM | WEIGHT: 293 LBS | DIASTOLIC BLOOD PRESSURE: 55 MMHG | HEIGHT: 67 IN | BODY MASS INDEX: 45.99 KG/M2 | OXYGEN SATURATION: 94 % | RESPIRATION RATE: 23 BRPM | SYSTOLIC BLOOD PRESSURE: 118 MMHG

## 2021-04-26 DIAGNOSIS — R07.9 CHEST PAIN, UNSPECIFIED TYPE: Primary | ICD-10-CM

## 2021-04-26 DIAGNOSIS — K85.90 ACUTE PANCREATITIS WITHOUT INFECTION OR NECROSIS, UNSPECIFIED PANCREATITIS TYPE: ICD-10-CM

## 2021-04-26 LAB
ABSOLUTE EOS #: 0.3 K/UL (ref 0–0.4)
ABSOLUTE IMMATURE GRANULOCYTE: ABNORMAL K/UL (ref 0–0.3)
ABSOLUTE LYMPH #: 2.2 K/UL (ref 1–4.8)
ABSOLUTE MONO #: 0.6 K/UL (ref 0.1–1.2)
ALBUMIN SERPL-MCNC: 3.9 G/DL (ref 3.5–5.2)
ALBUMIN/GLOBULIN RATIO: 1.6 (ref 1–2.5)
ALP BLD-CCNC: 85 U/L (ref 35–104)
ALT SERPL-CCNC: 24 U/L (ref 5–33)
ANION GAP SERPL CALCULATED.3IONS-SCNC: 8 MMOL/L (ref 9–17)
AST SERPL-CCNC: 19 U/L
BASOPHILS # BLD: 2 % (ref 0–2)
BASOPHILS ABSOLUTE: 0.2 K/UL (ref 0–0.2)
BILIRUB SERPL-MCNC: 0.3 MG/DL (ref 0.3–1.2)
BILIRUBIN DIRECT: 0.09 MG/DL
BILIRUBIN, INDIRECT: 0.21 MG/DL (ref 0–1)
BUN BLDV-MCNC: 14 MG/DL (ref 6–20)
BUN/CREAT BLD: ABNORMAL (ref 9–20)
CALCIUM SERPL-MCNC: 9.2 MG/DL (ref 8.6–10.4)
CHLORIDE BLD-SCNC: 105 MMOL/L (ref 98–107)
CO2: 28 MMOL/L (ref 20–31)
CREAT SERPL-MCNC: 0.6 MG/DL (ref 0.5–0.9)
D-DIMER QUANTITATIVE: 0.46 MG/L FEU
DIFFERENTIAL TYPE: ABNORMAL
EOSINOPHILS RELATIVE PERCENT: 3 % (ref 1–4)
GFR AFRICAN AMERICAN: >60 ML/MIN
GFR NON-AFRICAN AMERICAN: >60 ML/MIN
GFR SERPL CREATININE-BSD FRML MDRD: ABNORMAL ML/MIN/{1.73_M2}
GFR SERPL CREATININE-BSD FRML MDRD: ABNORMAL ML/MIN/{1.73_M2}
GLOBULIN: ABNORMAL G/DL (ref 1.5–3.8)
GLUCOSE BLD-MCNC: 134 MG/DL (ref 70–99)
HCT VFR BLD CALC: 49.1 % (ref 36–46)
HEMOGLOBIN: 16.3 G/DL (ref 12–16)
IMMATURE GRANULOCYTES: ABNORMAL %
LIPASE: 155 U/L (ref 13–60)
LYMPHOCYTES # BLD: 20 % (ref 24–44)
MCH RBC QN AUTO: 30.1 PG (ref 26–34)
MCHC RBC AUTO-ENTMCNC: 33.1 G/DL (ref 31–37)
MCV RBC AUTO: 90.8 FL (ref 80–100)
MONOCYTES # BLD: 6 % (ref 2–11)
NRBC AUTOMATED: ABNORMAL PER 100 WBC
PDW BLD-RTO: 15 % (ref 12.5–15.4)
PLATELET # BLD: 250 K/UL (ref 140–450)
PLATELET ESTIMATE: ABNORMAL
PMV BLD AUTO: 9.7 FL (ref 6–12)
POTASSIUM SERPL-SCNC: 3.8 MMOL/L (ref 3.7–5.3)
RBC # BLD: 5.41 M/UL (ref 4–5.2)
RBC # BLD: ABNORMAL 10*6/UL
SARS-COV-2, RAPID: NOT DETECTED
SEG NEUTROPHILS: 69 % (ref 36–66)
SEGMENTED NEUTROPHILS ABSOLUTE COUNT: 7.6 K/UL (ref 1.8–7.7)
SODIUM BLD-SCNC: 141 MMOL/L (ref 135–144)
SPECIMEN DESCRIPTION: NORMAL
TOTAL PROTEIN: 6.3 G/DL (ref 6.4–8.3)
TROPONIN INTERP: NORMAL
TROPONIN INTERP: NORMAL
TROPONIN T: NORMAL NG/ML
TROPONIN T: NORMAL NG/ML
TROPONIN, HIGH SENSITIVITY: 6 NG/L (ref 0–14)
TROPONIN, HIGH SENSITIVITY: <6 NG/L (ref 0–14)
WBC # BLD: 10.8 K/UL (ref 3.5–11)
WBC # BLD: ABNORMAL 10*3/UL

## 2021-04-26 PROCEDURE — 85379 FIBRIN DEGRADATION QUANT: CPT

## 2021-04-26 PROCEDURE — 80048 BASIC METABOLIC PNL TOTAL CA: CPT

## 2021-04-26 PROCEDURE — 93005 ELECTROCARDIOGRAM TRACING: CPT | Performed by: EMERGENCY MEDICINE

## 2021-04-26 PROCEDURE — 83690 ASSAY OF LIPASE: CPT

## 2021-04-26 PROCEDURE — 87635 SARS-COV-2 COVID-19 AMP PRB: CPT

## 2021-04-26 PROCEDURE — 2580000003 HC RX 258: Performed by: EMERGENCY MEDICINE

## 2021-04-26 PROCEDURE — 36415 COLL VENOUS BLD VENIPUNCTURE: CPT

## 2021-04-26 PROCEDURE — 84484 ASSAY OF TROPONIN QUANT: CPT

## 2021-04-26 PROCEDURE — 80076 HEPATIC FUNCTION PANEL: CPT

## 2021-04-26 PROCEDURE — 6360000004 HC RX CONTRAST MEDICATION: Performed by: EMERGENCY MEDICINE

## 2021-04-26 PROCEDURE — 71046 X-RAY EXAM CHEST 2 VIEWS: CPT

## 2021-04-26 PROCEDURE — 85025 COMPLETE CBC W/AUTO DIFF WBC: CPT

## 2021-04-26 PROCEDURE — 74177 CT ABD & PELVIS W/CONTRAST: CPT

## 2021-04-26 PROCEDURE — 99284 EMERGENCY DEPT VISIT MOD MDM: CPT

## 2021-04-26 PROCEDURE — 6370000000 HC RX 637 (ALT 250 FOR IP): Performed by: EMERGENCY MEDICINE

## 2021-04-26 RX ORDER — NITROGLYCERIN 0.4 MG/1
0.4 TABLET SUBLINGUAL EVERY 5 MIN PRN
Status: DISCONTINUED | OUTPATIENT
Start: 2021-04-26 | End: 2021-04-27 | Stop reason: HOSPADM

## 2021-04-26 RX ORDER — 0.9 % SODIUM CHLORIDE 0.9 %
70 INTRAVENOUS SOLUTION INTRAVENOUS ONCE
Status: COMPLETED | OUTPATIENT
Start: 2021-04-26 | End: 2021-04-26

## 2021-04-26 RX ORDER — 0.9 % SODIUM CHLORIDE 0.9 %
500 INTRAVENOUS SOLUTION INTRAVENOUS ONCE
Status: COMPLETED | OUTPATIENT
Start: 2021-04-26 | End: 2021-04-26

## 2021-04-26 RX ORDER — SODIUM CHLORIDE 0.9 % (FLUSH) 0.9 %
10 SYRINGE (ML) INJECTION PRN
Status: DISCONTINUED | OUTPATIENT
Start: 2021-04-26 | End: 2021-04-27 | Stop reason: HOSPADM

## 2021-04-26 RX ORDER — ASPIRIN 81 MG/1
324 TABLET, CHEWABLE ORAL ONCE
Status: COMPLETED | OUTPATIENT
Start: 2021-04-26 | End: 2021-04-26

## 2021-04-26 RX ADMIN — ASPIRIN 324 MG: 81 TABLET, CHEWABLE ORAL at 19:37

## 2021-04-26 RX ADMIN — SODIUM CHLORIDE 500 ML: 9 INJECTION, SOLUTION INTRAVENOUS at 19:30

## 2021-04-26 RX ADMIN — SODIUM CHLORIDE 70 ML: 9 INJECTION, SOLUTION INTRAVENOUS at 21:53

## 2021-04-26 RX ADMIN — Medication 10 ML: at 21:53

## 2021-04-26 RX ADMIN — IOPAMIDOL 75 ML: 755 INJECTION, SOLUTION INTRAVENOUS at 21:53

## 2021-04-26 ASSESSMENT — ENCOUNTER SYMPTOMS
ABDOMINAL PAIN: 1
VOMITING: 1
PHOTOPHOBIA: 0
SORE THROAT: 0
CHEST TIGHTNESS: 1
COUGH: 1
DIARRHEA: 0
SHORTNESS OF BREATH: 1
RHINORRHEA: 0
CONSTIPATION: 0
NAUSEA: 1

## 2021-04-26 NOTE — ED PROVIDER NOTES
[facility]  [facility]  Phone: [phone]        Pt Name: Laureano Horowitz  MRN: [id number]  [place] 1965  Date of evaluation: 4/26/21      CHIEF COMPLAINT     Chief Complaint   Patient presents with    Headache    Chest Pain    Anxiety    Arm Pain         HISTORY OF PRESENT ILLNESS  (Location/Symptom, Timing/Onset, Context/Setting, Quality, Duration, Modifying Factors, Severity.)    Laureano Horowitz is a 64 y.o. female who presents with chest pain that has been present for 3 days. Patient also reports feeling fatigued. Her pain is left-sided, pinching and burning. She occasionally feels heaviness as well. It is intermittent. Patient states that she notices it more with exertion. She also notices it more when she becomes emotional.  She has a known history of anxiety and depression. The patient also reports shortness of breath, which is worse than usual.  She has COPD, and is always slightly short of breath. She reports nausea. She had one episode of vomiting 3 days ago. She reports palpitations and diaphoresis as well. She states that she also has noticed some upper abdominal pain. Patient is tearful, and states that she is having a great deal of anxiety. She states that she takes care of her grandchildren. She was recently started on BuSpar, but does not feel it is helping. She reports that she has been feeling lightheaded when she goes from seated to standing position as well. Risk factors for heart disease include smoking, obesity, diabetes, and family history. Patient does not have known history of coronary artery disease. No history of hypertension or hyperlipidemia. The patient has no prior history of venous thromboembolism. No recent travel. No recent surgery. No leg swelling. No hemoptysis. No estrogen containing medications. No history of malignancy. No known history of issues with her aorta.   No personal or family history of thoracic aortic dissection. No history of aortic valve problems. No history of connective tissue disease. Patient does not describe her pain as ripping or tearing. Patient does report that she has a cough. She feels that she always has a productive cough, but it is perhaps a little bit worse. She gets some help with her cough with her aerosols that she uses at home for her COPD. REVIEW OF SYSTEMS    (2-9 systems for level 4, 10 or more for level 5)     Review of Systems   Constitutional: Negative for chills and fever. HENT: Positive for congestion. Negative for rhinorrhea and sore throat. Eyes: Negative for photophobia and visual disturbance. Respiratory: Positive for cough, chest tightness and shortness of breath. Cardiovascular: Positive for chest pain and palpitations. Negative for leg swelling. Gastrointestinal: Positive for abdominal pain, nausea and vomiting. Negative for constipation and diarrhea. Genitourinary: Negative for dysuria, frequency and urgency. Musculoskeletal: Positive for arthralgias. Negative for myalgias. Chronic arthritis pain   Skin: Negative for wound. Neurological: Positive for dizziness, light-headedness and headaches. Hematological: Negative for adenopathy. Does not bruise/bleed easily. PAST MEDICAL HISTORY    has a past medical history of Anxiety, Asthma, Bladder incontinence, Bladder prolapse, female, acquired, Cancer ([address] Carpal tunnel syndrome, Chronic bilateral low back pain, Colon polyps, COPD (chronic obstructive pulmonary disease) ([address] Depression, Epigastric pain, Hepatic steatosis, History of cataract extraction with lens replacement, History of stress test, Hyperglycemia, Intertrigo, Lumbar degenerative disc disease, Nausea, Obesity, Osteoarthritis, Osteoarthritis cervical spine, Osteoarthritis thoracic spine, and Tobacco abuse.     SURGICAL HISTORY      has a past surgical history that includes  section; Cataract removal (); Throat surgery (); Hysterectomy, vaginal (); Colonoscopy (N/A, 10/7/2020); Nerve Block (Bilateral, 2020); Pain management procedure (Bilateral, 2020); Nerve Block (Bilateral, 2020); Pain management procedure (Bilateral, 2020); Nerve Block (01/15/2021); Pain management procedure (Left, 1/15/2021); Pain management procedure (Left, 2021); and Pain management procedure (Left, 2021). CURRENTMEDICATIONS       Previous Medications    ALBUTEROL (PROVENTIL) (2.5 MG/3ML) 0.083% NEBULIZER SOLUTION    Take 3 mLs by nebulization every 6 hours as needed for Wheezing    ALBUTEROL SULFATE HFA (PROAIR HFA) 108 (90 BASE) MCG/ACT INHALER    Inhale 2 puffs into the lungs every 6 hours as needed for Wheezing    BLOOD GLUCOSE MONITORING SUPPL (ACURA BLOOD GLUCOSE METER) W/DEVICE KIT    1 each by Does not apply route 2 times daily    BUDESONIDE-FORMOTEROL (SYMBICORT) 160-4.5 MCG/ACT AERO    INHALE TWO PUFFS BY MOUTH TWICE A DAY    IBUPROFEN (ADVIL;MOTRIN) 800 MG TABLET    1 tablet with food or milk as needed    METFORMIN (GLUCOPHAGE) 500 MG TABLET    One tablet by mouth with breakfast    NEBULIZERS (COMPRESSOR/NEBULIZER) MISC    1 vial by Does not apply route 4 times daily as needed. Patient has albuterol prescription at home; needs aerosol machine and set up diagnosis chronic bronchitis    NYSTATIN (NYAMYC) 513592 UNIT/GM POWDER    APPLY ONE DOSE TOPICALLY TWICE A DAY    SOLIFENACIN (VESICARE) 5 MG TABLET    Take 1 tablet by mouth daily    VORTIOXETINE (TRINTELLIX) 10 MG TABS TABLET    Take 1 tablet by mouth daily       ALLERGIES     is allergic to amoxicillin-pot clavulanate; medrol [methylprednisolone]; and tape [adhesive tape]. FAMILY HISTORY     She indicated that her mother is . She indicated that her father is . She indicated that all of her five sisters are alive. She indicated that only one of her two brothers is alive.  She indicated that her maternal grandmother is . She indicated that her maternal grandfather is . She indicated that her paternal grandmother is . She indicated that her paternal grandfather is . She indicated that the status of her maternal uncle is unknown. She indicated that both of her others are alive. She indicated that the status of her neg hx is unknown.     family history includes Arthritis in her mother and sister; Asthma in an other family member; Breast Cancer in her maternal grandmother and another family member; Cancer in her brother; Depression in her brother, mother, and sister; Diabetes in her brother, father, and mother; Heart Disease in her father and mother; High Blood Pressure in her brother; Lung Cancer (age of onset: 64) in her brother; Mental Illness in her mother; No Known Problems in her maternal grandfather, paternal grandfather, and paternal grandmother; Pancreatic Cancer in her maternal uncle. SOCIAL HISTORY      reports that she has been smoking cigarettes. She has a 40.00 pack-year smoking history. She has never used smokeless tobacco. She reports current drug use. Frequency: 4.00 times per week. Drug: Marijuana. She reports that she does not drink alcohol. PHYSICAL EXAM    (up to 7 for level 4, 8 or more for level 5)   INITIAL VITALS:  height is 5' 6.5\" (1.689 m) and weight is 133.4 kg (294 lb). Her temperature is 98.5 °F (36.9 °C). Her blood pressure is 115/60 and her pulse is 90. Her respiration is 16 and oxygen saturation is 95%. Physical Exam  Constitutional:       Appearance: Normal appearance. She is obese. She is not ill-appearing or diaphoretic. HENT:      Head: Normocephalic and atraumatic. Eyes:      Extraocular Movements: Extraocular movements intact. Conjunctiva/sclera: Conjunctivae normal.      Pupils: Pupils are equal, round, and reactive to light. Neck:      Musculoskeletal: Normal range of motion and neck supple. No muscular tenderness. Cardiovascular:      Rate and Rhythm: Normal rate and regular rhythm. Pulses: Normal pulses. Heart sounds: Normal heart sounds. No murmur. No friction rub. No gallop. Pulmonary:      Effort: Pulmonary effort is normal.      Breath sounds: Normal breath sounds. No wheezing, rhonchi or rales. Abdominal:      General: Abdomen is flat. Bowel sounds are normal.      Palpations: Abdomen is soft. Tenderness: There is abdominal tenderness. There is no guarding. Comments: Mild epigastric tenderness to palpation without rebound or guarding. Musculoskeletal: Normal range of motion. General: No tenderness. Right lower leg: Edema present. Left lower leg: Edema present. Comments: Trace bilateral lower extremity edema without calf tenderness. Homans negative. Skin:     General: Skin is warm and dry. Capillary Refill: Capillary refill takes less than 2 seconds. Neurological:      Mental Status: She is alert and oriented to person, place, and time. Mental status is at baseline. Psychiatric:         Mood and Affect: Mood normal.         Behavior: Behavior normal.         Thought Content: Thought content normal.         DIFFERENTIAL DIAGNOSIS/ MDM:     HEART Risk Score for Chest Pain Patients                       Patient Score  History   Highly suspicious2            Moderately suspicious. ..1     = 1    Slightly or non suspicious. 0      ECG   Significant STD. ...2        Nonspecific repolarization1      = 1    Normal (no change from previous). .0      Age   >642      > 45 - <65. 1     = 1   < 46. ..0      Risk Factors  >2 risk factors.2     I - 2 risk factors. .1     = 2  No risk factors. ...0     Troponin   >3times normal limit. ..2      >1 time - <3 times normal limit. 1   = 0    Normal trop. Delmon Green 0 -----------------------------------------------------------------------------------------      TOTAL RISK SCORE =  5          RISK % =  20.3%        Risk Factors: DM, smoker (current or recent < mo), HTN, HLP, FHx CAD, obesity,   dsv add-ons   SLE, CKDz, HIV, cocaine abuse    Score 0 - 3 =  2.5% MACE over next 6 wks = Discharge home  Score 4 - 6 =  20.3% MACE over next 6 wks = Obs admit  Score 7 - 10 = 72.7% MACE over next 6 wks = Early invasive Rx        DIAGNOSTIC RESULTS     EKG: All EKG's are interpreted by the Emergency Department Physician who either signs or Co-signs this chart in the absence of a cardiologist.    EKG Interpretation    Interpreted by emergency department physician    Rhythm: normal sinus   Rate: normal  Axis: normal  Ectopy: none  Conduction: normal  ST Segments: nonspecific changes  T Waves: non specific changes  Q Waves: III    Clinical Impression: non-specific EKG    Danna Vela      RADIOLOGY:        Interpretation per the Radiologist below, if available at the time of this note:    Xr Chest (2 Vw)    Result Date: 4/26/2021  EXAMINATION: TWO XRAY VIEWS OF THE CHEST 4/26/2021 3:18 pm COMPARISON: 10/09/2018 HISTORY: ORDERING SYSTEM PROVIDED HISTORY: chest pain TECHNOLOGIST PROVIDED HISTORY: chest pain Reason for Exam: Pt. C/o chest pain with pain extending down left arm x 3 days. Also c/o anxiety and headache. Acuity: Acute Type of Exam: Initial FINDINGS: The cardiac size is normal.  Mild left basal scarring. No acute infiltrates or pleural effusions are seen. Pulmonary vascularity appears normal. There are degenerative changes in the spine . No acute bony abnormalities.  The hilar structures are normal.     No acute cardiopulmonary disease       LABS:  Results for orders placed or performed during the hospital encounter of 55/54/79   Basic Metabolic Panel   Result Value Ref Range    Glucose 134 (H) 70 - 99 mg/dL    BUN 14 6 - 20 mg/dL    CREATININE 0.60 0.50 - 0.90 mg/dL    Bun/Cre Ratio NOT REPORTED 9 - 20    Calcium 9.2 8.6 - 10.4 mg/dL    Sodium 141 135 - 144 mmol/L    Potassium 3.8 3.7 - 5.3 mmol/L    Chloride 105 98 - 107 mmol/L    CO2 28 20 - 31 mmol/L    Anion Gap 8 (L) 9 - 17 mmol/L    GFR Non-African American >60 >60 mL/min    GFR African American >60 >60 mL/min    GFR Comment          GFR Staging NOT REPORTED    CBC Auto Differential   Result Value Ref Range    WBC 10.8 3.5 - 11.0 k/uL    RBC 5.41 (H) 4.0 - 5.2 m/uL    Hemoglobin 16.3 (H) 12.0 - 16.0 g/dL    Hematocrit 49.1 (H) 36 - 46 %    MCV 90.8 80 - 100 fL    MCH 30.1 26 - 34 pg    MCHC 33.1 31 - 37 g/dL    RDW 15.0 12.5 - 15.4 %    Platelets 663 182 - 681 k/uL    MPV 9.7 6.0 - 12.0 fL    NRBC Automated NOT REPORTED per 100 WBC    Differential Type NOT REPORTED     Seg Neutrophils 69 (H) 36 - 66 %    Lymphocytes 20 (L) 24 - 44 %    Monocytes 6 2 - 11 %    Eosinophils % 3 1 - 4 %    Basophils 2 0 - 2 %    Immature Granulocytes NOT REPORTED 0 %    Segs Absolute 7.60 1.8 - 7.7 k/uL    Absolute Lymph # 2.20 1.0 - 4.8 k/uL    Absolute Mono # 0.60 0.1 - 1.2 k/uL    Absolute Eos # 0.30 0.0 - 0.4 k/uL    Basophils Absolute 0.20 0.0 - 0.2 k/uL    Absolute Immature Granulocyte NOT REPORTED 0.00 - 0.30 k/uL    WBC Morphology NOT REPORTED     RBC Morphology NOT REPORTED     Platelet Estimate NOT REPORTED    Troponin   Result Value Ref Range    Troponin, High Sensitivity 6 0 - 14 ng/L    Troponin T NOT REPORTED <0.03 ng/mL    Troponin Interp NOT REPORTED    D-Dimer, Quantitative   Result Value Ref Range    D-Dimer, Quant 0.46 mg/L FEU       Normal troponin. Negative d-dimer. EMERGENCY DEPARTMENT COURSE:   Vitals:    Vitals:    04/26/21 1809 04/26/21 1811   BP: 93/79 115/60   Pulse: 90    Resp: 16    Temp: 98.5 °F (36.9 °C)    SpO2: 95%    Weight: 133.4 kg (294 lb)    Height: 5' 6.5\" (1.689 m)      -------------------------  BP: 115/60, Temp: 98.5 °F (36.9 °C), Pulse: 90, Resp: 16      RE-EVALUATION:  Patient having ongoing pain.  Aspirin and nitroglycerin ordered. Agreeable to admission. 19:43 The patient states that she does not wish to be admitted to the hospital for further evaluation of her chest pain. She states she understands that I cannot completely rule out a problem with her heart based on the evaluation I am able to do in the ED. he understands that if we cannot do further testing of her heart, that we could miss a potential cardiac problem that could result in permanent disability or death. She states she knows that she may return to the ED at any time for further evaluation. She will follow-up with her primary care provider. She is agreeable to wait for second troponin and repeat EKG. CONSULTS:  none    PROCEDURES:  None    FINAL IMPRESSION    No diagnosis found. DISPOSITION/PLAN   DISPOSITION        CONDITION ON DISPOSITION:   Stable     PATIENT REFERRED TO:  No follow-up provider specified.     DISCHARGE MEDICATIONS:  New Prescriptions    No medications on file       (Please note that portions of this note were completed with a voicerecognition program.  Efforts were made to edit the dictations but occasionally words are mis-transcribed.)    Katheryn Alex MD  Attending Emergency Medicine Physician       Katheryn Alex MD  04/26/21 [id number]

## 2021-04-26 NOTE — Clinical Note
The patient has decided to leave against medical advice, because she does not wish to be admitted. She has normal mental status and adequate capacity to make medical decisions. The patient refuses hospital admission and wants to be discharged. The risks have been explained to the patient, including worsening illness, chronic pain, permanent disability, and death. The benefits of admission have also been explained, including the availability and proximity of nurses, physicians, monito ring, diagnostic testing, and treatment. The patient was able to understand and state the risks and benefits of hospital admission. This was witnessed by nurse and me. She had the opportunity to ask questions about her medical condition. Th e patient was treated to the extent that she would allow, and knows that she may return for care at any time. Silke Mix

## 2021-04-27 LAB
EKG ATRIAL RATE: 89 BPM
EKG ATRIAL RATE: 92 BPM
EKG P AXIS: 72 DEGREES
EKG P AXIS: 74 DEGREES
EKG P-R INTERVAL: 158 MS
EKG P-R INTERVAL: 158 MS
EKG Q-T INTERVAL: 370 MS
EKG Q-T INTERVAL: 380 MS
EKG QRS DURATION: 94 MS
EKG QRS DURATION: 96 MS
EKG QTC CALCULATION (BAZETT): 457 MS
EKG QTC CALCULATION (BAZETT): 462 MS
EKG R AXIS: 47 DEGREES
EKG R AXIS: 49 DEGREES
EKG T AXIS: 58 DEGREES
EKG T AXIS: 62 DEGREES
EKG VENTRICULAR RATE: 89 BPM
EKG VENTRICULAR RATE: 92 BPM

## 2021-04-27 NOTE — ED PROVIDER NOTES
mother and sister; Asthma in an other family member; Breast Cancer in her maternal grandmother and another family member; Cancer in her brother; Depression in her brother, mother, and sister; Diabetes in her brother, father, and mother; Heart Disease in her father and mother; High Blood Pressure in her brother; Lung Cancer (age of onset: 64) in her brother; Mental Illness in her mother; No Known Problems in her maternal grandfather, paternal grandfather, and paternal grandmother; Pancreatic Cancer in her maternal uncle. SOCIAL HISTORY      reports that she has been smoking cigarettes. She has a 40.00 pack-year smoking history. She has never used smokeless tobacco. She reports current drug use. Frequency: 4.00 times per week. Drug: Marijuana. She reports that she does not drink alcohol. DIAGNOSTIC RESULTS     EKG: All EKG's are interpreted by the Emergency Department Physician who either signs or Co-signs this chart in the absence of a cardiologist.  Interpreted by No att. providers found     Rhythm: normal sinus   Rate: 89  Axis: Normal  Ectopy: None  Conduction: Sinus arrhythmia. Normal sinus rhythm. ST Segments: No elevation or depression  T Waves: No acute findings    Clinical Impression: Nonspecific ECG. Sinus rhythm. No acute infarction/ischemia noted. RADIOLOGY:   Non-plain film images such as CT, Ultrasound and MRI are read by the radiologist. Plain radiographic images are visualized and the radiologist interpretations are reviewed as follows:   CT ABDOMEN PELVIS W IV CONTRAST Additional Contrast? None   Final Result   Moderate thickening identified along the gastric antrum with question area of   ulceration noted. This could represent underlying gastritis. Underlying   peptic ulcer may also be considered appropriate clinical setting. Fatty infiltration liver.       Colonic diverticulosis with questionable area of pericolonic inflammatory   changes left lower quadrant could suggest underlying acute diverticulitis. Please correlate with exam findings. Small fat containing umbilical hernia. Degenerate changes lumbar spine. XR CHEST (2 VW)   Final Result   No acute cardiopulmonary disease             Xr Chest (2 Vw)    Result Date: 4/26/2021  EXAMINATION: TWO XRAY VIEWS OF THE CHEST 4/26/2021 3:18 pm COMPARISON: 10/09/2018 HISTORY: ORDERING SYSTEM PROVIDED HISTORY: chest pain TECHNOLOGIST PROVIDED HISTORY: chest pain Reason for Exam: Pt. C/o chest pain with pain extending down left arm x 3 days. Also c/o anxiety and headache. Acuity: Acute Type of Exam: Initial FINDINGS: The cardiac size is normal.  Mild left basal scarring. No acute infiltrates or pleural effusions are seen. Pulmonary vascularity appears normal. There are degenerative changes in the spine . No acute bony abnormalities. The hilar structures are normal.     No acute cardiopulmonary disease     Ct Abdomen Pelvis W Iv Contrast Additional Contrast? None    Result Date: 4/26/2021  EXAMINATION: CT OF THE ABDOMEN AND PELVIS WITH CONTRAST 4/26/2021 9:43 pm TECHNIQUE: CT of the abdomen and pelvis was performed with the administration of intravenous contrast. Multiplanar reformatted images are provided for review. Dose modulation, iterative reconstruction, and/or weight based adjustment of the mA/kV was utilized to reduce the radiation dose to as low as reasonably achievable. COMPARISON: None. HISTORY: ORDERING SYSTEM PROVIDED HISTORY: elevated lipase. upper abd pain. TECHNOLOGIST PROVIDED HISTORY: elevated lipase. upper abd pain. Decision Support Exception->Emergency Medical Condition (MA) Reason for Exam: Pt. C/o upper abdominal and chest pain. Elevated Lipase. Acuity: Acute Type of Exam: Initial FINDINGS: Lower Chest: Lung bases are grossly clear Organs: Hypoattenuation liver suggesting fatty infiltration. Probable focal fatty sparing along the left hepatic lobe posteriorly within the hilar region.   Otherwise, the spleen, gallbladder, adrenal glands, kidneys, and pancreas are grossly unremarkable GI/Bowel: There is wall thickening identified along the gastric antrum. Question area of hypoattenuation identified along anterior could represent little area of ulceration as well. No definite surrounding inflammatory changes. No bowel obstruction. Mild retained stool. Posterior changes identified right lower quadrant. Appendix is unremarkable. No pericecal or periappendiceal colonic diverticulosis noted. There is suggestion of mild pericolonic fat stranding involving the junction of the descending and sigmoid colon left lower quadrant this could suggest underlying diverticulitis in the appropriate setting Pelvis: Bladder unremarkable. Previous hysterectomy. No suspicious adnexal mass. Peritoneum/Retroperitoneum: Small fat containing umbilical hernia. No free air or free fluid. Vascular calcifications. Postsurgical clips identified along the retroperitoneum along the course of the aorta. Bones/Soft Tissues: Degenerate changes notably at L5-S1. Multilevel facet arthropathy identified. Moderate thickening identified along the gastric antrum with question area of ulceration noted. This could represent underlying gastritis. Underlying peptic ulcer may also be considered appropriate clinical setting. Fatty infiltration liver. Colonic diverticulosis with questionable area of pericolonic inflammatory changes left lower quadrant could suggest underlying acute diverticulitis. Please correlate with exam findings. Small fat containing umbilical hernia. Degenerate changes lumbar spine. LABS:  Results for orders placed or performed during the hospital encounter of 04/26/21   COVID-19, Rapid    Specimen: Nasopharyngeal Swab   Result Value Ref Range    Specimen Description . NASOPHARYNGEAL SWAB     SARS-CoV-2, Rapid Not Detected Not Detected   Basic Metabolic Panel   Result Value Ref Range    Glucose 134 (H) 70 - 99 mg/dL    BUN 14 6 - 20 mg/dL    CREATININE 0.60 0.50 - 0.90 mg/dL    Bun/Cre Ratio NOT REPORTED 9 - 20    Calcium 9.2 8.6 - 10.4 mg/dL    Sodium 141 135 - 144 mmol/L    Potassium 3.8 3.7 - 5.3 mmol/L    Chloride 105 98 - 107 mmol/L    CO2 28 20 - 31 mmol/L    Anion Gap 8 (L) 9 - 17 mmol/L    GFR Non-African American >60 >60 mL/min    GFR African American >60 >60 mL/min    GFR Comment          GFR Staging NOT REPORTED    CBC Auto Differential   Result Value Ref Range    WBC 10.8 3.5 - 11.0 k/uL    RBC 5.41 (H) 4.0 - 5.2 m/uL    Hemoglobin 16.3 (H) 12.0 - 16.0 g/dL    Hematocrit 49.1 (H) 36 - 46 %    MCV 90.8 80 - 100 fL    MCH 30.1 26 - 34 pg    MCHC 33.1 31 - 37 g/dL    RDW 15.0 12.5 - 15.4 %    Platelets 539 209 - 180 k/uL    MPV 9.7 6.0 - 12.0 fL    NRBC Automated NOT REPORTED per 100 WBC    Differential Type NOT REPORTED     Seg Neutrophils 69 (H) 36 - 66 %    Lymphocytes 20 (L) 24 - 44 %    Monocytes 6 2 - 11 %    Eosinophils % 3 1 - 4 %    Basophils 2 0 - 2 %    Immature Granulocytes NOT REPORTED 0 %    Segs Absolute 7.60 1.8 - 7.7 k/uL    Absolute Lymph # 2.20 1.0 - 4.8 k/uL    Absolute Mono # 0.60 0.1 - 1.2 k/uL    Absolute Eos # 0.30 0.0 - 0.4 k/uL    Basophils Absolute 0.20 0.0 - 0.2 k/uL    Absolute Immature Granulocyte NOT REPORTED 0.00 - 0.30 k/uL    WBC Morphology NOT REPORTED     RBC Morphology NOT REPORTED     Platelet Estimate NOT REPORTED    Troponin   Result Value Ref Range    Troponin, High Sensitivity 6 0 - 14 ng/L    Troponin T NOT REPORTED <0.03 ng/mL    Troponin Interp NOT REPORTED    D-Dimer, Quantitative   Result Value Ref Range    D-Dimer, Quant 0.46 mg/L FEU   Hepatic Function Panel   Result Value Ref Range    Albumin 3.9 3.5 - 5.2 g/dL    Alkaline Phosphatase 85 35 - 104 U/L    ALT 24 5 - 33 U/L    AST 19 <32 U/L    Total Bilirubin 0.30 0.3 - 1.2 mg/dL    Bilirubin, Direct 0.09 <0.31 mg/dL    Bilirubin, Indirect 0.21 0.00 - 1.00 mg/dL    Total Protein 6.3 (L) 6.4 - 8.3 g/dL    Globulin NOT REPORTED 1.5 - 3.8 g/dL    Albumin/Globulin Ratio 1.6 1.0 - 2.5   Lipase   Result Value Ref Range    Lipase 155 (H) 13 - 60 U/L   Troponin   Result Value Ref Range    Troponin, High Sensitivity <6 0 - 14 ng/L    Troponin T NOT REPORTED <0.03 ng/mL    Troponin Interp NOT REPORTED    EKG 12 Lead   Result Value Ref Range    Ventricular Rate 89 BPM    Atrial Rate 89 BPM    P-R Interval 158 ms    QRS Duration 94 ms    Q-T Interval 380 ms    QTc Calculation (Bazett) 462 ms    P Axis 74 degrees    R Axis 49 degrees    T Axis 58 degrees         EMERGENCY DEPARTMENT COURSE:   Recent Vitals:    Vitals:    04/26/21 1809 04/26/21 1811 04/26/21 1936   BP: 93/79 115/60 (!) 118/55   Pulse: 90  85   Resp: 16  23   Temp: 98.5 °F (36.9 °C)  98.3 °F (36.8 °C)   TempSrc:   Oral   SpO2: 95%  94%   Weight: 133.4 kg (294 lb)     Height: 5' 6.5\" (1.689 m)       -------------------------  BP: (!) 118/55, Temp: 98.3 °F (36.8 °C), Pulse: 85, Resp: 23      The patient was given the following medications:  Orders Placed This Encounter   Medications    0.9 % sodium chloride bolus    nitroGLYCERIN (NITROSTAT) SL tablet 0.4 mg    aspirin chewable tablet 324 mg    iopamidol (ISOVUE-370) 76 % injection 75 mL    0.9 % sodium chloride bolus    sodium chloride flush 0.9 % injection 10 mL           MEDICAL DECISION MAKING:     Patient presents with some chest discomfort and also she reported to me some epigastric abdominal pain. Her lipase is elevated and CT scan shows no obvious pancreatitis. I did discuss with her the incidental findings and advised that she follow-up with her PCP regarding these findings. Abdomen is benign on my evaluation other than some mild epigastric tenderness. There is no left lower quadrant tenderness and I do not feel she has diverticulitis clinically. She is feeling much better. She is still declining admission due to her cardiac symptoms. No chest pain at this time. Second troponin negative.   She does understand the risks of leaving [address] including risk of unknown or undiagnosed coronary artery disease that could result in MI if not further pursued as an inpatient. She does know to finish pursuing this as an outpatient. I do feel she has decisional capacity and does understand the risks to make this decision herself. She was advised to return right away if worse or if she changes her mind or for any new or concerning symptoms. She is comfortable with the plan. The patient presents with chest pain that is not suggesting in nature of pulmonary embolus, aortic dissection, cardiac ischemia, or other serious etiology. I considered an aortic dissection, but this is unlikely as patient is not complaining of tearing or ripping chest pain that is radiating to the back, the patient has no new neurological abnormalities and pulses are equal to all extremities. Mediastinum is within normal limits. Patient appears comfortable on physical exam and is not in distress. I also thought about a cardiac tamponade, but this is unlikely as patient is hemodynamically stable. Heart sounds are not distant, EKG does not show signs of electrical alternans and there is no JVD. I also thought about a tension pneumothorax, but this is unlikely given bilateral breath sounds and no signs of hemodynamic instability. I do not feel the patient has a PE. No clinical evidence of DVT. I thought about an esophageal perforation, but history and physical exam does not suggest vomiting, followed by chest pain. No signs of Hamman's crunch on physical exam; again, patient appears comfortable and is well appearing and non toxic. The patient has been instructed to return if the symptpoms change or worsen in any way. Given the extremely low risk of these diagnoses further testing and evaluation for these possibilites are not indicated at this time.  The patient appears stable for discharge and has been instructed to return immediately if the symptoms worsen in any way, or in 8-12 hr if not improved for re-evaluation. We also discussed returning to the Emergency Department immediately if new or worsening symptoms occur. We have discussed the symptoms which are most concerning (e.g., worsening pain, shortness of breath, a feeling of passing out, fever, any neurologic symptoms, abdominal pain or vomiting) that necessitate immediate return. I estimate there is LOW risk for ACUTE APPENDICITIS, BOWEL OBSTRUCTION, CHOLECYSTITIS, DIVERTICULITIS, INCARCERATED HERNIA, PANCREATITIS, or PERFORATED BOWEL or ULCER, thus I consider the discharge disposition reasonable. Re-evaluation of the abdomen is benign. No guarding, peritoneal signs or significant tenderness noted. Also, there is no evidence or peritonitis, sepsis, or toxicity. The patient and/or family and I have discussed the diagnosis and risks, and we agree with discharging home to follow-up with their primary doctor. The patient presents with abdominal pain without signs of peritonitis or other life-threatening or serious etiology. The patient appears stable for discharge and has been instructed to return immediately if the symptoms worsen in any way, or in 8-12 hr if not improved for re-evaluation. We also discussed returning to the Emergency Department immediately if new or worsening symptoms occur. We have discussed the symptoms which are most concerning (e.g., bloody stool, fever, changing or worsening pain, persistent vomiting, chest pain shortness of breath, numbness or weakness to the arms or legs, coolness or color change to the arms or legs) that necessitate immediate return. The patient understands that at this time there is no evidence for a more malignant underlying process, but the patient also understands that early in the process of an illness or injury, an emergency department workup can be falsely reassuring.   Routine discharge counseling was given, and the patient understands that worsening, changing or persistent symptoms should prompt an immediate call or follow up with their primary physician or return to the emergency department. The importance of appropriate follow up was also discussed. I have reviewed the disposition diagnosis with the patient and or their family/guardian. I have answered their questions and given discharge instructions. They voiced understanding of these instructions and did not have any further questions or complaints. CONSULTS:    None    CRITICAL CARE:     None    PROCEDURES:    None    FINAL IMPRESSION      1. Chest pain, unspecified type    2. Acute pancreatitis without infection or necrosis, unspecified pancreatitis type          DISPOSITION/PLAN   DISPOSITION Barton 04/26/2021 10:48:01 PM      Condition on Disposition    Improved    PATIENT REFERRED TO:  Rosamaria Espana MD  [facility] [address] [place] [address]  [phone]    Schedule an appointment as soon as possible for a visit in 2 days        DISCHARGE MEDICATIONS:  Discharge Medication List as of 4/26/2021 10:54 PM          (Please note that portions of this note were completed with a voice recognition program.  Efforts were made to edit the dictations but occasionally words are mis-transcribed.)        Amaris Pulido D.O.   Emergency Medicine Attending        Duarte Kingsley, DO  04/26/21 [address] [place] [address], DO  04/26/21 [id number]

## 2021-05-06 ENCOUNTER — HOSPITAL ENCOUNTER (OUTPATIENT)
Age: 56
Setting detail: SPECIMEN
Discharge: HOME | End: 2021-05-06
Payer: MEDICARE

## 2021-05-06 ENCOUNTER — OFFICE VISIT (OUTPATIENT)
Dept: FAMILY MEDICINE CLINIC | Age: 56
End: 2021-05-06
Payer: MEDICARE

## 2021-05-06 VITALS
SYSTOLIC BLOOD PRESSURE: 120 MMHG | WEIGHT: 290 LBS | DIASTOLIC BLOOD PRESSURE: 72 MMHG | TEMPERATURE: 97.2 F | BODY MASS INDEX: 46.61 KG/M2 | OXYGEN SATURATION: 99 % | HEART RATE: 95 BPM | HEIGHT: 66 IN

## 2021-05-06 DIAGNOSIS — I27.20 PULMONARY HTN (HCC): Primary | ICD-10-CM

## 2021-05-06 DIAGNOSIS — R07.89 CHEST PRESSURE: ICD-10-CM

## 2021-05-06 DIAGNOSIS — K85.80 OTHER ACUTE PANCREATITIS WITHOUT INFECTION OR NECROSIS: ICD-10-CM

## 2021-05-06 DIAGNOSIS — G89.29 CHRONIC BILATERAL LOW BACK PAIN WITHOUT SCIATICA: ICD-10-CM

## 2021-05-06 DIAGNOSIS — J44.9 CHRONIC OBSTRUCTIVE PULMONARY DISEASE, UNSPECIFIED COPD TYPE (HCC): ICD-10-CM

## 2021-05-06 DIAGNOSIS — M54.50 CHRONIC BILATERAL LOW BACK PAIN WITHOUT SCIATICA: ICD-10-CM

## 2021-05-06 DIAGNOSIS — R10.13 EPIGASTRIC PAIN: ICD-10-CM

## 2021-05-06 DIAGNOSIS — R73.03 PREDIABETES: ICD-10-CM

## 2021-05-06 LAB
ABSOLUTE EOS #: 0.2 K/UL (ref 0–0.44)
ABSOLUTE IMMATURE GRANULOCYTE: <0.03 K/UL (ref 0–0.3)
ABSOLUTE LYMPH #: 1.36 K/UL (ref 1.1–3.7)
ABSOLUTE MONO #: 0.62 K/UL (ref 0.1–1.2)
ALBUMIN SERPL-MCNC: 4.5 G/DL (ref 3.5–5.2)
ALBUMIN/GLOBULIN RATIO: 1.6 (ref 1–2.5)
ALP BLD-CCNC: 81 U/L (ref 35–104)
ALT SERPL-CCNC: 25 U/L (ref 5–33)
AMYLASE: 118 U/L (ref 28–100)
ANION GAP SERPL CALCULATED.3IONS-SCNC: 18 MMOL/L (ref 9–17)
AST SERPL-CCNC: 23 U/L
BASOPHILS # BLD: 1 % (ref 0–2)
BASOPHILS ABSOLUTE: 0.04 K/UL (ref 0–0.2)
BILIRUB SERPL-MCNC: 0.45 MG/DL (ref 0.3–1.2)
BUN BLDV-MCNC: 12 MG/DL (ref 6–20)
BUN/CREAT BLD: ABNORMAL (ref 9–20)
CALCIUM SERPL-MCNC: 9.6 MG/DL (ref 8.6–10.4)
CHLORIDE BLD-SCNC: 100 MMOL/L (ref 98–107)
CO2: 25 MMOL/L (ref 20–31)
CREAT SERPL-MCNC: 0.53 MG/DL (ref 0.5–0.9)
DIFFERENTIAL TYPE: ABNORMAL
EOSINOPHILS RELATIVE PERCENT: 3 % (ref 1–4)
GFR AFRICAN AMERICAN: >60 ML/MIN
GFR NON-AFRICAN AMERICAN: >60 ML/MIN
GFR SERPL CREATININE-BSD FRML MDRD: ABNORMAL ML/MIN/{1.73_M2}
GFR SERPL CREATININE-BSD FRML MDRD: ABNORMAL ML/MIN/{1.73_M2}
GLUCOSE FASTING: 74 MG/DL (ref 70–99)
HCT VFR BLD CALC: 55.6 % (ref 36.3–47.1)
HEMOGLOBIN: 17 G/DL (ref 11.9–15.1)
IMMATURE GRANULOCYTES: 0 %
LIPASE: 169 U/L (ref 13–60)
LYMPHOCYTES # BLD: 17 % (ref 24–43)
MCH RBC QN AUTO: 29.6 PG (ref 25.2–33.5)
MCHC RBC AUTO-ENTMCNC: 30.6 G/DL (ref 28.4–34.8)
MCV RBC AUTO: 96.9 FL (ref 82.6–102.9)
MONOCYTES # BLD: 8 % (ref 3–12)
NRBC AUTOMATED: 0 PER 100 WBC
PDW BLD-RTO: 14.6 % (ref 11.8–14.4)
PLATELET # BLD: 282 K/UL (ref 138–453)
PLATELET ESTIMATE: ABNORMAL
PMV BLD AUTO: 11.3 FL (ref 8.1–13.5)
POTASSIUM SERPL-SCNC: 4.3 MMOL/L (ref 3.7–5.3)
RBC # BLD: 5.74 M/UL (ref 3.95–5.11)
RBC # BLD: ABNORMAL 10*6/UL
SEG NEUTROPHILS: 71 % (ref 36–65)
SEGMENTED NEUTROPHILS ABSOLUTE COUNT: 5.91 K/UL (ref 1.5–8.1)
SODIUM BLD-SCNC: 143 MMOL/L (ref 135–144)
TOTAL PROTEIN: 7.4 G/DL (ref 6.4–8.3)
TROPONIN INTERP: NORMAL
TROPONIN T: NORMAL NG/ML
TROPONIN, HIGH SENSITIVITY: <6 NG/L (ref 0–14)
WBC # BLD: 8.2 K/UL (ref 3.5–11.3)
WBC # BLD: ABNORMAL 10*3/UL

## 2021-05-06 PROCEDURE — 3023F SPIROM DOC REV: CPT | Performed by: FAMILY MEDICINE

## 2021-05-06 PROCEDURE — 4004F PT TOBACCO SCREEN RCVD TLK: CPT | Performed by: FAMILY MEDICINE

## 2021-05-06 PROCEDURE — 3017F COLORECTAL CA SCREEN DOC REV: CPT | Performed by: FAMILY MEDICINE

## 2021-05-06 PROCEDURE — G8926 SPIRO NO PERF OR DOC: HCPCS | Performed by: FAMILY MEDICINE

## 2021-05-06 PROCEDURE — G8417 CALC BMI ABV UP PARAM F/U: HCPCS | Performed by: FAMILY MEDICINE

## 2021-05-06 PROCEDURE — 99214 OFFICE O/P EST MOD 30 MIN: CPT | Performed by: FAMILY MEDICINE

## 2021-05-06 PROCEDURE — G8427 DOCREV CUR MEDS BY ELIG CLIN: HCPCS | Performed by: FAMILY MEDICINE

## 2021-05-06 RX ORDER — SUCRALFATE 1 G/1
1 TABLET ORAL 4 TIMES DAILY
Qty: 120 TABLET | Refills: 3 | Status: SHIPPED | OUTPATIENT
Start: 2021-05-06 | End: 2022-05-05

## 2021-05-06 RX ORDER — BUSPIRONE HYDROCHLORIDE 5 MG/1
TABLET ORAL
COMMUNITY
Start: 2021-03-08 | End: 2021-06-10

## 2021-05-06 RX ORDER — ALBUTEROL SULFATE 90 UG/1
2 AEROSOL, METERED RESPIRATORY (INHALATION) EVERY 6 HOURS PRN
Qty: 1 INHALER | Refills: 3 | Status: SHIPPED | OUTPATIENT
Start: 2021-05-06 | End: 2021-06-21 | Stop reason: SDUPTHER

## 2021-05-06 RX ORDER — ALBUTEROL SULFATE 2.5 MG/3ML
SOLUTION RESPIRATORY (INHALATION)
COMMUNITY
Start: 2021-02-03

## 2021-05-06 RX ORDER — BUDESONIDE AND FORMOTEROL FUMARATE DIHYDRATE 160; 4.5 UG/1; UG/1
AEROSOL RESPIRATORY (INHALATION)
Qty: 1 INHALER | Refills: 2 | Status: CANCELLED | OUTPATIENT
Start: 2021-05-06

## 2021-05-06 RX ORDER — BUDESONIDE AND FORMOTEROL FUMARATE DIHYDRATE 160; 4.5 UG/1; UG/1
AEROSOL RESPIRATORY (INHALATION)
Qty: 1 INHALER | Refills: 2 | Status: SHIPPED | OUTPATIENT
Start: 2021-05-06 | End: 2021-05-06

## 2021-05-06 RX ORDER — BUDESONIDE AND FORMOTEROL FUMARATE DIHYDRATE 160; 4.5 UG/1; UG/1
AEROSOL RESPIRATORY (INHALATION)
Qty: 1 INHALER | Refills: 2 | Status: SHIPPED | OUTPATIENT
Start: 2021-05-06 | End: 2022-06-16

## 2021-05-06 RX ORDER — ALBUTEROL SULFATE 90 UG/1
2 AEROSOL, METERED RESPIRATORY (INHALATION) EVERY 6 HOURS PRN
Qty: 1 INHALER | Refills: 3 | Status: SHIPPED | OUTPATIENT
Start: 2021-05-06 | End: 2021-05-06

## 2021-05-06 RX ORDER — GABAPENTIN 100 MG/1
100 CAPSULE ORAL 3 TIMES DAILY
Qty: 90 CAPSULE | Refills: 0 | Status: SHIPPED | OUTPATIENT
Start: 2021-05-06 | End: 2021-06-10

## 2021-05-06 RX ORDER — PANTOPRAZOLE SODIUM 20 MG/1
20 TABLET, DELAYED RELEASE ORAL
Qty: 30 TABLET | Refills: 5 | Status: SHIPPED
Start: 2021-05-06 | End: 2021-05-08 | Stop reason: ALTCHOICE

## 2021-05-06 RX ORDER — ASPIRIN 81 MG/1
81 TABLET ORAL DAILY
Qty: 90 TABLET | Refills: 1 | Status: SHIPPED | OUTPATIENT
Start: 2021-05-06 | End: 2022-05-05

## 2021-05-06 RX ORDER — BUDESONIDE AND FORMOTEROL FUMARATE DIHYDRATE 160; 4.5 UG/1; UG/1
AEROSOL RESPIRATORY (INHALATION)
Qty: 1 INHALER | Refills: 2 | Status: SHIPPED | OUTPATIENT
Start: 2021-05-06 | End: 2021-05-06 | Stop reason: SDUPTHER

## 2021-05-06 ASSESSMENT — ENCOUNTER SYMPTOMS
ABDOMINAL PAIN: 1
CONSTIPATION: 0
DIARRHEA: 0
NAUSEA: 0
HEMOPTYSIS: 0
HEMATOCHEZIA: 0
VOMITING: 0
BELCHING: 0
BACK PAIN: 1
EYES NEGATIVE: 1
SHORTNESS OF BREATH: 1
COUGH: 1
FLATUS: 0
ORTHOPNEA: 0
SPUTUM PRODUCTION: 0

## 2021-05-06 ASSESSMENT — COPD QUESTIONNAIRES: COPD: 1

## 2021-05-06 ASSESSMENT — CROHNS DISEASE ACTIVITY INDEX (CDAI): CDAI SCORE: 0

## 2021-05-06 NOTE — PROGRESS NOTES
Pain   This is a new problem. The current episode started 1 to 4 weeks ago. The onset quality is gradual. The problem occurs intermittently. The problem has been gradually improving. The pain is present in the substernal region and epigastric region. The pain is mild. The quality of the pain is described as pressure. The pain does not radiate. Associated symptoms include abdominal pain, back pain, a cough (This is chronic in nature and usually occurs first in the morning.), exertional chest pressure and shortness of breath (chronic in nature). Pertinent negatives include no claudication, diaphoresis, dizziness, fever, headaches, hemoptysis, irregular heartbeat, leg pain, lower extremity edema, malaise/fatigue, nausea, near-syncope, numbness, orthopnea, palpitations, PND, sputum production, syncope, vomiting or weakness. The cough's precipitants include smoke. The cough is productive. The cough is worsened by smoke exposure. The treatment provided mild relief. Her past medical history is significant for COPD and sleep apnea. Her family medical history is significant for CAD, heart disease and early MI. Prior diagnostic workup includes chest x-ray. Abdominal Pain  This is a new problem. The current episode started 1 to 4 weeks ago. The onset quality is sudden. The problem occurs daily. The problem has been unchanged. The pain is located in the epigastric region. The pain is moderate. The quality of the pain is aching and sharp. The abdominal pain radiates to the back. Associated symptoms include arthralgias. Pertinent negatives include no anorexia, belching, constipation, diarrhea, dysuria, fever, flatus, frequency, headaches, hematochezia, hematuria, melena, myalgias, nausea, vomiting or weight loss. The pain is aggravated by eating. The pain is relieved by nothing. The treatment provided no relief. Prior diagnostic workup includes CT scan. Her past medical history is significant for pancreatitis.  There is no history of abdominal surgery, colon cancer, Crohn's disease, gallstones, PUD or ulcerative colitis. Hemoglobin A1C (%)   Date Value   2021 6.1 (H)   2020 5.9   2019 5.5             ( goal A1Cis < 7)   No results found for: LABMICR  LDL Cholesterol (mg/dL)   Date Value   2021 72   2020 64   2019 69     LDL Calculated (mg/dL)   Date Value   2015 81       (goal LDL is <100)   AST (U/L)   Date Value   2021 19     ALT (U/L)   Date Value   2021 24     BUN (mg/dL)   Date Value   2021 14     BP Readings from Last 3 Encounters:   21 120/72   21 (!) 118/55   21 112/76          (goal 120/80)    Past Medical History:   Diagnosis Date    Anxiety     Asthma     Bladder incontinence     At times per pt.     Bladder prolapse, female, acquired     Cancer ([address] 1996    cervical; treated with hysterectomy    Carpal tunnel syndrome 12/15/2010    Chronic bilateral low back pain     Colon polyps 10/07/2020    tubular adenomas x4    COPD (chronic obstructive pulmonary disease) ([address]     Depression     Epigastric pain     Hepatic steatosis     History of cataract extraction with lens replacement     bilateral    History of stress test     Hyperglycemia     Intertrigo     Lumbar degenerative disc disease     Nausea     Obesity 2017    Osteoarthritis     right knee    Osteoarthritis cervical spine     Osteoarthritis thoracic spine     Tobacco abuse 2017      Past Surgical History:   Procedure Laterality Date    CATARACT REMOVAL       SECTION      x1    COLONOSCOPY N/A 10/7/2020    tubular adenomas x4    HYSTERECTOMY, VAGINAL      secondary to cervical cancer--ovaries still present    NERVE BLOCK Bilateral 2020    NERVE BLOCK BILATERAL - MBB #1 L3-4, L4-5, L5-S1 (Bilateral )     NERVE BLOCK Bilateral 2020    NERVE BLOCK BILATERAL - MBB #2 L3-4, L4-5, L5-S1    NERVE BLOCK  01/15/2021    : turkey, patches, chantix   Substance Use Topics    Alcohol use: No     Alcohol/week: 0.0 standard drinks      Current Outpatient Medications   Medication Sig Dispense Refill    busPIRone (BUSPAR) 5 MG tablet       aspirin EC 81 MG EC tablet Take 1 tablet by mouth daily 90 tablet 1    sucralfate (CARAFATE) 1 GM tablet Take 1 tablet by mouth 4 times daily 120 tablet 3    pantoprazole (PROTONIX) 20 MG tablet Take 1 tablet by mouth every morning (before breakfast) 30 tablet 5    gabapentin (NEURONTIN) 100 MG capsule Take 1 capsule by mouth 3 times daily for 30 days. Intended supply: 30 days 90 capsule 0    budesonide-formoterol (SYMBICORT) 160-4.5 MCG/ACT AERO INHALE TWO PUFFS BY MOUTH TWICE A DAY 1 Inhaler 2    albuterol sulfate HFA (PROAIR HFA) 108 (90 Base) MCG/ACT inhaler Inhale 2 puffs into the lungs every 6 hours as needed for Wheezing 1 Inhaler 3    metFORMIN (GLUCOPHAGE) 500 MG tablet One tablet by mouth with breakfast 90 tablet 0    albuterol (PROVENTIL) (2.5 MG/3ML) 0.083% nebulizer solution Take 3 mLs by nebulization every 6 hours as needed for Wheezing 120 each 1    VORTIoxetine (TRINTELLIX) 10 MG TABS tablet Take 1 tablet by mouth daily 30 tablet 2    solifenacin (VESICARE) 5 MG tablet Take 1 tablet by mouth daily 30 tablet 0    nystatin (NYAMYC) 447164 UNIT/GM powder APPLY ONE DOSE TOPICALLY TWICE A DAY 1 Bottle 3    Blood Glucose Monitoring Suppl (ACURA BLOOD GLUCOSE METER) W/DEVICE KIT 1 each by Does not apply route 2 times daily 1 kit 0    Nebulizers (COMPRESSOR/NEBULIZER) MISC 1 vial by Does not apply route 4 times daily as needed. Patient has albuterol prescription at home; needs aerosol machine and set up diagnosis chronic bronchitis 1 each 0    albuterol (PROVENTIL) (2.5 MG/3ML) 0.083% nebulizer solution        No current facility-administered medications for this visit.       Allergies   Allergen Reactions    Amoxicillin-Pot Clavulanate Diarrhea and Other (See Comments)    Medrol [Methylprednisolone] Other (See Comments)     Redness of hands and itching  Redness of hands and itching    Tape [Adhesive Tape] Rash       Health Maintenance   Topic Date Due    COVID-19 Vaccine (1) Never done    Shingles Vaccine (1 of 2) 02/03/2022 (Originally 3/30/2015)    Low dose CT lung screening  05/27/2021    A1C test (Diabetic or Prediabetic)  02/19/2022    Annual Wellness Visit (AWV)  02/23/2022    Breast cancer screen  03/19/2023    Lipid screen  02/19/2026    DTaP/Tdap/Td vaccine (2 - Td) 08/24/2026    Colon cancer screen colonoscopy  10/07/2030    Flu vaccine  Completed    Pneumococcal 0-64 years Vaccine  Completed    Hepatitis C screen  Completed    HIV screen  Completed    Hepatitis A vaccine  Aged Out    Hepatitis B vaccine  Aged Out    Hib vaccine  Aged Out    Meningococcal (ACWY) vaccine  Aged Out       Subjective:     Review of Systems   Constitutional: Negative. Negative for diaphoresis, fever, malaise/fatigue and weight loss. HENT: Negative. Eyes: Negative. Respiratory: Positive for cough (This is chronic in nature and usually occurs first in the morning.) and shortness of breath (chronic in nature). Negative for hemoptysis and sputum production. Cardiovascular: Positive for chest pain. Negative for palpitations, orthopnea, claudication, syncope, PND and near-syncope. Gastrointestinal: Positive for abdominal pain. Negative for anorexia, constipation, diarrhea, flatus, hematochezia, melena, nausea and vomiting. Genitourinary: Negative. Negative for dysuria, frequency and hematuria. Musculoskeletal: Positive for arthralgias and back pain. Negative for myalgias. Skin: Negative. Allergic/Immunologic: Negative for environmental allergies, food allergies and immunocompromised state. Neurological: Negative. Negative for dizziness, weakness, numbness and headaches. Objective:     Physical Exam  Vitals signs and nursing note reviewed.    Constitutional: General: She is awake. She is not in acute distress. Appearance: Normal appearance. She is obese. She is not ill-appearing, toxic-appearing or diaphoretic. HENT:      Head: Normocephalic and atraumatic. Right Ear: External ear normal.      Left Ear: External ear normal.      Nose: Nose normal.      Mouth/Throat:      Mouth: Mucous membranes are moist.   Eyes:      Extraocular Movements: Extraocular movements intact. Conjunctiva/sclera: Conjunctivae normal.      Pupils: Pupils are equal, round, and reactive to light. Neck:      Musculoskeletal: Normal range of motion. Cardiovascular:      Rate and Rhythm: Normal rate and regular rhythm. Pulses: Normal pulses. Heart sounds: Normal heart sounds. No murmur. No friction rub. No gallop. Pulmonary:      Effort: Pulmonary effort is normal. No respiratory distress. Breath sounds: Normal breath sounds. No stridor. No wheezing, rhonchi or rales. Abdominal:      General: Abdomen is flat. Bowel sounds are normal. There is no distension. Palpations: Abdomen is soft. There is no mass. Tenderness: There is abdominal tenderness (epigastric). There is no guarding or rebound. Musculoskeletal: Normal range of motion. Skin:     General: Skin is warm and dry. Capillary Refill: Capillary refill takes less than 2 seconds. Neurological:      General: No focal deficit present. Mental Status: She is alert and oriented to person, place, and time. Mental status is at baseline. Psychiatric:         Attention and Perception: Attention normal.         Mood and Affect: Mood and affect normal.         Speech: Speech normal.         Behavior: Behavior normal.         Thought Content:  Thought content normal.         Judgment: Judgment normal.       /72 (Site: Left Upper Arm, Position: Sitting, Cuff Size: Large Adult)   Pulse 95   Temp 97.2 °F (36.2 °C)   Ht 5' 6\" (1.676 m)   Wt 290 lb (131.5 kg)   SpO2 99%   BMI 46.81 kg/m²     Assessment:       Diagnosis Orders   1. Pulmonary HTN ([facility]     2. Chronic obstructive pulmonary disease, unspecified COPD type (HCC)  budesonide-formoterol (SYMBICORT) 160-4.5 MCG/ACT AERO    albuterol sulfate HFA (PROAIR HFA) 108 (90 Base) MCG/ACT inhaler    DISCONTINUED: albuterol sulfate HFA (PROAIR HFA) 108 (90 Base) MCG/ACT inhaler    DISCONTINUED: budesonide-formoterol (SYMBICORT) 160-4.5 MCG/ACT AERO   3. Chest pressure  NM MYOCARDIAL SPECT REST EXERCISE OR RX    aspirin EC 81 MG EC tablet    Troponin   4. Chronic bilateral low back pain without sciatica  gabapentin (NEURONTIN) 100 MG capsule   5. Epigastric pain  sucralfate (CARAFATE) 1 GM tablet    pantoprazole (PROTONIX) 20 MG tablet    H. Pylori Breath Test    External Referral To Gastroenterology    US GALLBLADDER RUQ    CBC Auto Differential    Comprehensive Metabolic Panel, Fasting   6. Other acute pancreatitis without infection or necrosis  Lipase    Amylase    US GALLBLADDER RUQ   7. Prediabetes               Plan:    Chest pressure with a family history of CADI did discuss with the patient the symptoms are consistent with angina. The patient was advised to stay in the hospital when she went but declined. Advised patient if she has the chest pressure return or develop shortness of breath she is to call 911 and go to the emergency room. I would like her to start on aspirin but my biggest concern is that the patient has an ulcer as well. I advised her to start the medications for her stomach and wait a few days and then start the aspirin. If after starting the aspirin she develops any blood in the stool the patient is to stop the aspirin and call immediately. Stress test was ordered. Of note the patient states the pain in the chest has gone away and has not returned since being in the emergency room. I still advised her the importance of going back to the emergency room if the pain returns.     Chronic back painpatient advised to go off of ibuprofen due to the ulcer found on CT scan. Start gabapentin as discussed at visit. Patient may need pain management in the future. Epigastric pain with elevated lipase as well as signs of ulcer on CT scanpatient to start on sucralfate and Protonix after getting her H. pylori breath test.  I also am ordering an ultrasound of the gallbladder due to the patient's elevated lipase in the emergency room. I have given her a referral to GI. The [facility] clinic was chosen due to the patient's preference. We will fax over emergency room visit information as well as my office note. Patient encouraged to do a liquid diet and avoid fatty, heavy, spicy, acidic food. If pain increases in the abdomen the patient is to go to the emergency room immediately. COPDpatient's lung sounds coarse today. I am avoiding steroids due to the patient's ulcer. Patient has not been using her rescue inhaler or Symbicort lately. Refills provided and patient encouraged to use these and advised of the importance of compliance. Patient encouraged to quit smoking. Pancreatitisdiet for pancreatitis. Offer the patient. Advised her to go to clear liquids and advance diet as tolerated. History of pulmonary hypertensionpatient will be encouraged to follow-up with cardiology after all the testing is done pending the results. No follow-ups on file. Orders Placed This Encounter   Procedures    NM MYOCARDIAL SPECT REST EXERCISE OR RX     Standing Status:   Future     Standing Expiration Date:   5/6/2022     Order Specific Question:   Reason for Exam?     Answer:   Chest pain     Order Specific Question:   Procedure Type     Answer:   Rx     Order Specific Question:   Reason for exam:     Answer:   chest pressure, ER 4/26/2021, pain relieved with nitro    US GALLBLADDER RUQ     This procedure can be scheduled via [facility] [employer]. Access your [facility] [employer] account by visiting [email or web address].      Standing Status:   Future     Standing Expiration Date:   5/6/2022     Order Specific Question:   Reason for exam:     Answer:   epigatric pain, pancreatitis    H. Pylori Breath Test     Standing Status:   Future     Standing Expiration Date:   5/6/2022    Lipase     Standing Status:   Future     Standing Expiration Date:   5/6/2022    Amylase     Standing Status:   Future     Standing Expiration Date:   5/6/2022    CBC Auto Differential     Standing Status:   Future     Standing Expiration Date:   5/6/2022    Comprehensive Metabolic Panel, Fasting     Standing Status:   Future     Standing Expiration Date:   5/6/2022    Troponin     Standing Status:   Future     Standing Expiration Date:   5/6/2022    External Referral To Gastroenterology     Referral Priority:   Routine     Referral Type:   Consult for Advice and Opinion     Referral Reason:   Specialty Services Required     Referred to Provider:   Na Osborn MD     Requested Specialty:   Gastroenterology     Number of Visits Requested:   1     Orders Placed This Encounter   Medications    aspirin EC 81 MG EC tablet     Sig: Take 1 tablet by mouth daily     Dispense:  90 tablet     Refill:  1    sucralfate (CARAFATE) 1 GM tablet     Sig: Take 1 tablet by mouth 4 times daily     Dispense:  120 tablet     Refill:  3    pantoprazole (PROTONIX) 20 MG tablet     Sig: Take 1 tablet by mouth every morning (before breakfast)     Dispense:  30 tablet     Refill:  5    gabapentin (NEURONTIN) 100 MG capsule     Sig: Take 1 capsule by mouth 3 times daily for 30 days.  Intended supply: 30 days     Dispense:  90 capsule     Refill:  0    DISCONTD: albuterol sulfate HFA (PROAIR HFA) 108 (90 Base) MCG/ACT inhaler     Sig: Inhale 2 puffs into the lungs every 6 hours as needed for Wheezing     Dispense:  1 Inhaler     Refill:  3    DISCONTD: budesonide-formoterol (SYMBICORT) 160-4.5 MCG/ACT AERO     Sig: INHALE TWO PUFFS BY MOUTH TWICE A DAY     Dispense:  1 Inhaler     Refill:  2    budesonide-formoterol (SYMBICORT) 160-4.5 MCG/ACT AERO     Sig: INHALE TWO PUFFS BY MOUTH TWICE A DAY     Dispense:  1 Inhaler     Refill:  2    albuterol sulfate HFA (PROAIR HFA) 108 (90 Base) MCG/ACT inhaler     Sig: Inhale 2 puffs into the lungs every 6 hours as needed for Wheezing     Dispense:  1 Inhaler     Refill:  3       Patient given educational materials - see patient instructions. Discussed use, benefit, and side effects of prescribed medications. All patientquestions answered. Pt voiced understanding. Reviewed health maintenance. Instructedto continue current medications, diet and exercise. Patient agreed with treatmentplan. Follow up as directed.      Electronically signed by Evelyn Bourgeois MD on 5/6/2021 at 9:54 AM

## 2021-05-06 NOTE — PATIENT INSTRUCTIONS
yogurt, cheese, or other milk products each day. ? Read the labels on cheeses, and choose those with less than 5 grams of fat an ounce. ? Try fat-free sour cream, cream cheese, or yogurt. ? Avoid cream soups and cream sauces on pasta. ? Eat low-fat ice cream, frozen yogurt, or sorbet. Avoid regular ice cream.  · Eat whole-grain cereals, breads, crackers, rice, or pasta. Avoid high-fat foods such as croissants, scones, biscuits, waffles, doughnuts, muffins, granola, and high-fat breads. · Flavor your foods with herbs and spices (such as basil, tarragon, or mint), fat-free sauces, or lemon juice instead of butter. You can also use butter substitutes, fat-free mayonnaise, or fat-free dressing. · Try applesauce, prune puree, or mashed bananas to replace some or all of the fat when you bake. · Limit fats and oils, such as butter, margarine, mayonnaise, and salad dressing, to no more than 1 tablespoon a meal.  · Avoid high-fat foods, such as:  ? Chocolate, whole milk, ice cream, processed cheese, and egg yolks. ? Fried or buttered foods. ? Sausage, salami, and johnson. ? Cinnamon rolls, cakes, pies, cookies, and other pastries. ? Prepared snack foods, such as potato chips, nut and granola bars, and mixed nuts. ? Coconut and avocado. · Learn how to read food labels for serving sizes and ingredients. Fast-food and convenience-food meals often have lots of fat. Where can you learn more? Go to https://[email or web address]. [email or web address] and sign in to your [employer] account. Enter U693 in the Bandwagon box to learn more about \"Diet for Chronic Pancreatitis: Care Instructions. \"     If you do not have an account, please click on the \"Sign Up Now\" link. Current as of: December 17, 2020               Content Version: 12.8  © 8758-7691 Healthwise, Incorporated. Care instructions adapted under license by [facility] ([facility]).  If you have questions about a medical condition or this instruction, always ask your healthcare professional. [address] any warranty or liability for your use of this information.

## 2021-05-07 LAB — H PYLORI BREATH TEST: POSITIVE

## 2021-05-08 ENCOUNTER — TELEPHONE (OUTPATIENT)
Dept: FAMILY MEDICINE CLINIC | Age: 56
End: 2021-05-08

## 2021-05-08 DIAGNOSIS — K85.90 ACUTE PANCREATITIS WITHOUT INFECTION OR NECROSIS, UNSPECIFIED PANCREATITIS TYPE: ICD-10-CM

## 2021-05-08 DIAGNOSIS — R42 DIZZINESS: ICD-10-CM

## 2021-05-08 DIAGNOSIS — A04.8 BACTERIAL INFECTION DUE TO H. PYLORI: Primary | ICD-10-CM

## 2021-05-08 RX ORDER — AMOXICILLIN 500 MG/1
1000 CAPSULE ORAL 2 TIMES DAILY
Qty: 56 CAPSULE | Refills: 0 | Status: SHIPPED | OUTPATIENT
Start: 2021-05-08 | End: 2021-05-22

## 2021-05-08 RX ORDER — LANSOPRAZOLE 30 MG/1
30 CAPSULE, DELAYED RELEASE ORAL 2 TIMES DAILY
Qty: 28 CAPSULE | Refills: 0 | Status: SHIPPED | OUTPATIENT
Start: 2021-05-08 | End: 2021-06-05

## 2021-05-08 RX ORDER — LANSOPRAZOLE, AMOXICILLIN, CLARITHROMYCIN 30-500-500
KIT ORAL
Qty: 1 EACH | Refills: 0 | Status: SHIPPED
Start: 2021-05-08 | End: 2021-05-08 | Stop reason: CLARIF

## 2021-05-08 RX ORDER — CLARITHROMYCIN 500 MG/1
500 TABLET, COATED ORAL 2 TIMES DAILY
Qty: 28 TABLET | Refills: 0 | Status: SHIPPED | OUTPATIENT
Start: 2021-05-08 | End: 2021-05-22

## 2021-05-09 NOTE — TELEPHONE ENCOUNTER
I spoke with patient and notified her of the positive h pylori. Recommended treatment. Prevpac initially sent in but insurance didn't cover. Individual medications in prevpac sent in. Patient advised to discontinue protonix due to prevacid in the medications. Patient encouraged to make follow up with gi, go to ER if pain increases or if she develops worsening dizziness. Labs ordered for Monday as well.

## 2021-05-10 ENCOUNTER — HOSPITAL ENCOUNTER (OUTPATIENT)
Dept: ULTRASOUND IMAGING | Facility: CLINIC | Age: 56
Discharge: HOME | End: 2021-05-12
Payer: MEDICARE

## 2021-05-10 DIAGNOSIS — R10.13 EPIGASTRIC PAIN: ICD-10-CM

## 2021-05-10 DIAGNOSIS — K85.80 OTHER ACUTE PANCREATITIS WITHOUT INFECTION OR NECROSIS: ICD-10-CM

## 2021-05-10 PROCEDURE — 76705 ECHO EXAM OF ABDOMEN: CPT

## 2021-05-14 DIAGNOSIS — F32.A DEPRESSION, UNSPECIFIED DEPRESSION TYPE: ICD-10-CM

## 2021-05-14 DIAGNOSIS — F41.9 ANXIETY: ICD-10-CM

## 2021-05-15 DIAGNOSIS — N89.8 VAGINAL ITCHING: Primary | ICD-10-CM

## 2021-05-15 RX ORDER — FLUCONAZOLE 150 MG/1
150 TABLET ORAL
Qty: 2 TABLET | Refills: 0 | Status: SHIPPED | OUTPATIENT
Start: 2021-05-15 | End: 2021-05-21

## 2021-05-15 RX ORDER — VORTIOXETINE 10 MG/1
TABLET, FILM COATED ORAL
Qty: 30 TABLET | Refills: 1 | Status: SHIPPED | OUTPATIENT
Start: 2021-05-15 | End: 2021-07-15 | Stop reason: SDUPTHER

## 2021-05-20 ENCOUNTER — HOSPITAL ENCOUNTER (OUTPATIENT)
Dept: NON INVASIVE DIAGNOSTICS | Age: 56
Discharge: HOME | End: 2021-05-20
Payer: MEDICARE

## 2021-05-20 ENCOUNTER — HOSPITAL ENCOUNTER (OUTPATIENT)
Dept: NUCLEAR MEDICINE | Age: 56
Discharge: HOME | End: 2021-05-22
Payer: MEDICARE

## 2021-05-20 DIAGNOSIS — R07.89 CHEST PRESSURE: ICD-10-CM

## 2021-05-20 LAB
LV EF: 68 %
LVEF MODALITY: NORMAL

## 2021-05-20 PROCEDURE — 93017 CV STRESS TEST TRACING ONLY: CPT

## 2021-05-20 PROCEDURE — 2580000003 HC RX 258: Performed by: FAMILY MEDICINE

## 2021-05-20 PROCEDURE — 78452 HT MUSCLE IMAGE SPECT MULT: CPT

## 2021-05-20 PROCEDURE — 3430000000 HC RX DIAGNOSTIC RADIOPHARMACEUTICAL: Performed by: FAMILY MEDICINE

## 2021-05-20 PROCEDURE — 6360000002 HC RX W HCPCS: Performed by: FAMILY MEDICINE

## 2021-05-20 PROCEDURE — A9500 TC99M SESTAMIBI: HCPCS | Performed by: FAMILY MEDICINE

## 2021-05-20 RX ORDER — METOPROLOL TARTRATE 5 MG/5ML
5 INJECTION INTRAVENOUS EVERY 5 MIN PRN
Status: DISCONTINUED | OUTPATIENT
Start: 2021-05-20 | End: 2021-05-20 | Stop reason: ALTCHOICE

## 2021-05-20 RX ORDER — SODIUM CHLORIDE 0.9 % (FLUSH) 0.9 %
5-40 SYRINGE (ML) INJECTION PRN
Status: DISCONTINUED | OUTPATIENT
Start: 2021-05-20 | End: 2021-05-20 | Stop reason: ALTCHOICE

## 2021-05-20 RX ORDER — ALBUTEROL SULFATE 90 UG/1
2 AEROSOL, METERED RESPIRATORY (INHALATION) PRN
Status: DISCONTINUED | OUTPATIENT
Start: 2021-05-20 | End: 2021-05-20 | Stop reason: ALTCHOICE

## 2021-05-20 RX ORDER — NITROGLYCERIN 0.4 MG/1
0.4 TABLET SUBLINGUAL EVERY 5 MIN PRN
Status: DISCONTINUED | OUTPATIENT
Start: 2021-05-20 | End: 2021-05-20 | Stop reason: ALTCHOICE

## 2021-05-20 RX ORDER — ATROPINE SULFATE 0.1 MG/ML
0.5 INJECTION INTRAVENOUS EVERY 5 MIN PRN
Status: DISCONTINUED | OUTPATIENT
Start: 2021-05-20 | End: 2021-05-20 | Stop reason: ALTCHOICE

## 2021-05-20 RX ORDER — AMINOPHYLLINE DIHYDRATE 25 MG/ML
50 INJECTION, SOLUTION INTRAVENOUS PRN
Status: DISCONTINUED | OUTPATIENT
Start: 2021-05-20 | End: 2021-05-20 | Stop reason: ALTCHOICE

## 2021-05-20 RX ORDER — SODIUM CHLORIDE 0.9 % (FLUSH) 0.9 %
10 SYRINGE (ML) INJECTION PRN
Status: DISCONTINUED | OUTPATIENT
Start: 2021-05-20 | End: 2021-05-23 | Stop reason: HOSPADM

## 2021-05-20 RX ORDER — SODIUM CHLORIDE 9 MG/ML
500 INJECTION, SOLUTION INTRAVENOUS CONTINUOUS PRN
Status: DISCONTINUED | OUTPATIENT
Start: 2021-05-20 | End: 2021-05-20 | Stop reason: ALTCHOICE

## 2021-05-20 RX ADMIN — SODIUM CHLORIDE, PRESERVATIVE FREE 10 ML: 5 INJECTION INTRAVENOUS at 09:40

## 2021-05-20 RX ADMIN — SODIUM CHLORIDE, PRESERVATIVE FREE 10 ML: 5 INJECTION INTRAVENOUS at 09:55

## 2021-05-20 RX ADMIN — TETRAKIS(2-METHOXYISOBUTYLISOCYANIDE)COPPER(I) TETRAFLUOROBORATE 47 MILLICURIE: 1 INJECTION, POWDER, LYOPHILIZED, FOR SOLUTION INTRAVENOUS at 09:55

## 2021-05-20 RX ADMIN — SODIUM CHLORIDE, PRESERVATIVE FREE 10 ML: 5 INJECTION INTRAVENOUS at 08:05

## 2021-05-20 RX ADMIN — TETRAKIS(2-METHOXYISOBUTYLISOCYANIDE)COPPER(I) TETRAFLUOROBORATE 19.5 MILLICURIE: 1 INJECTION, POWDER, LYOPHILIZED, FOR SOLUTION INTRAVENOUS at 08:05

## 2021-05-20 RX ADMIN — REGADENOSON 0.4 MG: 0.08 INJECTION, SOLUTION INTRAVENOUS at 09:54

## 2021-05-20 NOTE — PROCEDURES
[address] [facility] [address]                              CARDIAC STRESS TEST    PATIENT NAME: Judy Alfaro                :        1965  MED REC NO:   [id number]                             ROOM:  ACCOUNT NO:   [de-identified]                           ADMIT DATE: 2021  PROVIDER:     Juana Nichols    DATE OF STUDY:  2021    ORDERING PROVIDER:  Kadi Cottrell MD  INTERPRETING PHYSICIAN:  Juana Nichols MD    LEXISCAN STRESS STUDY:  Indication:  chest pain  Procedure explained and consent signed    Medications:  Lexiscan, 0.4 mg  Resting heart rate:  84 bpm  Resting blood pressure:  138/81 mm/Hg  Infusion heart rate:  108 bpm  Infusion blood pressure:  112/80 mm/Hg  Resting EKG:  Normal (normal sinus rhythm/low voltage)  Stress heart response:  Normal response  Stress BP response:  Appropriate  Stress EKG(S):  Normal  Chest discomfort:  None  Ischemic EKG changes:  None    IMPRESSION:  Electrocardiographically negative Lexiscan stress study. Radio-isotope  results to follow from the department of Nuclear Medicine.           Love Cortes    D: 2021 13:21:17       T: 2021 13:22:38     JENNY/AJITH  Job#: [id number]     Doc#: Unknown    CC:    ()

## 2021-05-21 ENCOUNTER — TELEPHONE (OUTPATIENT)
Dept: FAMILY MEDICINE CLINIC | Age: 56
End: 2021-05-21

## 2021-05-21 DIAGNOSIS — B96.81 H PYLORI ULCER: Primary | ICD-10-CM

## 2021-05-21 DIAGNOSIS — K27.9 H PYLORI ULCER: Primary | ICD-10-CM

## 2021-05-21 NOTE — TELEPHONE ENCOUNTER
We will order a retest to check for cure. Here are the guidelines for test of cure:  Tests to confirm eradication should be performed at least four weeks after completion of antibiotic treatment. PPIs (protonix) should be held for one to two weeks prior to testing to reduce false-negative results.   So we will retest in about 1 month and have her hold the protonix for 2 weeks prior to going to get the test rechecked

## 2021-06-05 ENCOUNTER — APPOINTMENT (OUTPATIENT)
Dept: GENERAL RADIOLOGY | Facility: CLINIC | Age: 56
End: 2021-06-05
Payer: MEDICARE

## 2021-06-05 ENCOUNTER — HOSPITAL ENCOUNTER (EMERGENCY)
Facility: CLINIC | Age: 56
Discharge: HOME | End: 2021-06-05
Attending: EMERGENCY MEDICINE
Payer: MEDICARE

## 2021-06-05 VITALS
WEIGHT: 289 LBS | BODY MASS INDEX: 46.45 KG/M2 | DIASTOLIC BLOOD PRESSURE: 81 MMHG | TEMPERATURE: 98 F | RESPIRATION RATE: 20 BRPM | OXYGEN SATURATION: 95 % | HEART RATE: 80 BPM | SYSTOLIC BLOOD PRESSURE: 136 MMHG | HEIGHT: 66 IN

## 2021-06-05 DIAGNOSIS — M77.8 TENDINITIS OF LEFT WRIST: Primary | ICD-10-CM

## 2021-06-05 PROCEDURE — 99283 EMERGENCY DEPT VISIT LOW MDM: CPT

## 2021-06-05 PROCEDURE — 73110 X-RAY EXAM OF WRIST: CPT

## 2021-06-05 RX ORDER — NAPROXEN 250 MG/1
500 TABLET ORAL 2 TIMES DAILY WITH MEALS
Qty: 60 TABLET | Refills: 3 | Status: SHIPPED | OUTPATIENT
Start: 2021-06-05 | End: 2021-08-18 | Stop reason: ALTCHOICE

## 2021-06-05 ASSESSMENT — PAIN SCALES - GENERAL: PAINLEVEL_OUTOF10: 6

## 2021-06-05 NOTE — ED NOTES
Pt c/o left wrist pain, increases with movement, radial pulse palp, cap refill brisk, ice pack to wrist     Mayco Hay RN  06/05/21 [id number]

## 2021-06-05 NOTE — ED PROVIDER NOTES
[facility]  [facility]  Phone: [phone]      Pt Name: Phyllis Zuniga  MRN: [id number]  [place] 1965  Date of evaluation: 2021    CHIEF COMPLAINT       Chief Complaint   Patient presents with    Wrist Pain     pt states left wrist pain \"was mowing grass\"  yesterday and pain started today       HISTORY OF PRESENT ILLNESS    Phyllis Zuniga is a 64 y.o. female who presents left wrist pain. Patient states that she was mowing the yard with a push mower that requires her to clamp down to engage the motor. He states that the push mower activity had been going on for at least an hour. Subsequent to this she has noticed that her left wrist hurt her and she had some tingling in the ring and little finger of that hand. REVIEW OF SYSTEMS     Constitutional: No fevers or chills   HEENT: No sore throat, rhinorrhea, or earache   Eyes: No blurry vision or double vision no drainage   Cardiovascular: No chest pain or tachycardia   Respiratory: No wheezing or shortness of breath no cough   Gastrointestinal: No nausea, vomiting, diarrhea, constipation, or abdominal pain   : No hematuria or dysuria   Musculoskeletal: See above  Skin: No rash   Neurological: See above  PAST MEDICAL HISTORY    has a past medical history of Anxiety, Asthma, Bladder incontinence, Bladder prolapse, female, acquired, Cancer ([address] Carpal tunnel syndrome, Chronic bilateral low back pain, Colon polyps, COPD (chronic obstructive pulmonary disease) (HCC), Depression, Epigastric pain, Hepatic steatosis, History of cataract extraction with lens replacement, History of stress test, Hyperglycemia, Intertrigo, Lumbar degenerative disc disease, Nausea, Obesity, Osteoarthritis, Osteoarthritis cervical spine, Osteoarthritis thoracic spine, and Tobacco abuse. SURGICAL HISTORY      has a past surgical history that includes  section; Cataract removal ();  Throat surgery (2017); Hysterectomy, vaginal (1996); Colonoscopy (N/A, 10/7/2020); Nerve Block (Bilateral, 11/13/2020); Pain management procedure (Bilateral, 11/13/2020); Nerve Block (Bilateral, 12/18/2020); Pain management procedure (Bilateral, 12/18/2020); Nerve Block (01/15/2021); Pain management procedure (Left, 1/15/2021); Pain management procedure (Left, 01/22/2021); and Pain management procedure (Left, 1/22/2021). CURRENT MEDICATIONS       Previous Medications    ALBUTEROL (PROVENTIL) (2.5 MG/3ML) 0.083% NEBULIZER SOLUTION    Take 3 mLs by nebulization every 6 hours as needed for Wheezing    ALBUTEROL (PROVENTIL) (2.5 MG/3ML) 0.083% NEBULIZER SOLUTION        ALBUTEROL SULFATE HFA (PROAIR HFA) 108 (90 BASE) MCG/ACT INHALER    Inhale 2 puffs into the lungs every 6 hours as needed for Wheezing    ASPIRIN EC 81 MG EC TABLET    Take 1 tablet by mouth daily    BLOOD GLUCOSE MONITORING SUPPL (ACURA BLOOD GLUCOSE METER) W/DEVICE KIT    1 each by Does not apply route 2 times daily    BUDESONIDE-FORMOTEROL (SYMBICORT) 160-4.5 MCG/ACT AERO    INHALE TWO PUFFS BY MOUTH TWICE A DAY    BUSPIRONE (BUSPAR) 5 MG TABLET        GABAPENTIN (NEURONTIN) 100 MG CAPSULE    Take 1 capsule by mouth 3 times daily for 30 days. Intended supply: 30 days    METFORMIN (GLUCOPHAGE) 500 MG TABLET    One tablet by mouth with breakfast    NEBULIZERS (COMPRESSOR/NEBULIZER) MISC    1 vial by Does not apply route 4 times daily as needed.  Patient has albuterol prescription at home; needs aerosol machine and set up diagnosis chronic bronchitis    NYSTATIN (NYAMYC) 595234 UNIT/GM POWDER    APPLY ONE DOSE TOPICALLY TWICE A DAY    SOLIFENACIN (VESICARE) 5 MG TABLET    Take 1 tablet by mouth daily    SUCRALFATE (CARAFATE) 1 GM TABLET    Take 1 tablet by mouth 4 times daily    TRINTELLIX 10 MG TABS TABLET    TAKE ONE TABLET BY MOUTH DAILY       ALLERGIES     is allergic to amoxicillin-pot clavulanate, medrol [methylprednisolone], and tape [place] tape].    FAMILY HISTORY     She indicated that her mother is . She indicated that her father is . She indicated that all of her five sisters are alive. She indicated that only one of her two brothers is alive. She indicated that her maternal grandmother is . She indicated that her maternal grandfather is . She indicated that her paternal grandmother is . She indicated that her paternal grandfather is . She indicated that the status of her maternal uncle is unknown. She indicated that both of her others are alive. She indicated that the status of her neg hx is unknown.     family history includes Arthritis in her mother and sister; Asthma in an other family member; Breast Cancer in her maternal grandmother and another family member; Cancer in her brother; Depression in her brother, mother, and sister; Diabetes in her brother, father, and mother; Heart Disease in her father and mother; High Blood Pressure in her brother; Lung Cancer (age of onset: 64) in her brother; Mental Illness in her mother; No Known Problems in her maternal grandfather, paternal grandfather, and paternal grandmother; Pancreatic Cancer in her maternal uncle. SOCIAL HISTORY      reports that she has been smoking cigarettes. She has a 40.00 pack-year smoking history. She has never used smokeless tobacco. She reports current drug use. Frequency: 4.00 times per week. Drug: Marijuana. She reports that she does not drink alcohol. PHYSICAL EXAM       ED Triage Vitals   BP Temp Temp src Pulse Resp SpO2 Height Weight   -- -- -- -- -- -- -- --       Constitutional: Alert, oriented x3, nontoxic, answering questions appropriately, acting properly for age, in no acute distress   HEENT: Extraocular muscles intact, mucous membranes moist. Pupils equal, round, reactive to light, TMs clear bilaterally, no posterior pharyngeal erythema or exudates.    Neck: Trachea midline, supple without lymphadenopathy, no instructions. They voiced understanding of these instructions and did not have any furtherquestions or complaints. CRITICAL CARE:    None    CONSULTS:    None    PROCEDURES:    None      OARRS Report if indicated             FINAL IMPRESSION      1.  Tendinitis of left wrist          DISPOSITION/PLAN   DISPOSITION Decision To Discharge 06/05/2021 01:07:31 PM        CONDITION ON DISPOSITION: STABLE       PATIENT REFERRED TO:  Leanna Gonzalez MD  [facility] [facility]  [phone]    In 1 week  If symptoms worsen      DISCHARGE MEDICATIONS:  New Prescriptions    NAPROXEN (NAPROSYN) 250 MG TABLET    Take 2 tablets by mouth 2 times daily (with meals)       (Please note that portions of thisnote were completed with a voice recognition program.  Efforts were made to edit the dictations but occasionally words are mis-transcribed.)    Paulo Jean MD,, MD  Attending Emergency Physician        Paulo Jean MD  06/05/21 [id number]

## 2021-06-08 ENCOUNTER — TELEPHONE (OUTPATIENT)
Dept: FAMILY MEDICINE CLINIC | Age: 56
End: 2021-06-08

## 2021-06-08 NOTE — TELEPHONE ENCOUNTER
[facility]) ED Follow up Call    Reason for ED visit:  Wrist pain hand pain      6/8/2021     Dalton Daniel , this is Emily from Dr. Rob Mancini office, just calling to see how you are doing after your recent ED visit. Did you receive discharge instructions? Yes  Do you understand the discharge instructions? Yes  Did the ED give you any new prescriptions? No:   Were you able to fill your prescriptions? No:       Do you have one of our red, yellow and green  Zone sheets that help you to determine when you should go to the ED? Not Applicable      Do you need or want to make a follow up appt with your PCP? Yes    Do you have any further needs in the home, e.g. equipment?   Not Applicable        FU appts/Provider:    Future Appointments   Date Time Provider Hollis Jacobo   8/23/2021  9:30 AM MD Mark Guevara

## 2021-06-10 DIAGNOSIS — G89.29 CHRONIC BILATERAL LOW BACK PAIN WITHOUT SCIATICA: ICD-10-CM

## 2021-06-10 DIAGNOSIS — M54.50 CHRONIC BILATERAL LOW BACK PAIN WITHOUT SCIATICA: ICD-10-CM

## 2021-06-10 RX ORDER — BUSPIRONE HYDROCHLORIDE 5 MG/1
TABLET ORAL
Qty: 90 TABLET | Refills: 0 | Status: SHIPPED | OUTPATIENT
Start: 2021-06-10 | End: 2021-07-15 | Stop reason: SDUPTHER

## 2021-06-10 RX ORDER — GABAPENTIN 100 MG/1
CAPSULE ORAL
Qty: 90 CAPSULE | Refills: 0 | Status: SHIPPED | OUTPATIENT
Start: 2021-06-10 | End: 2021-08-18 | Stop reason: SDUPTHER

## 2021-06-10 NOTE — TELEPHONE ENCOUNTER
Electronic medication refill request. Pharmacy on file. Please advise. Next Visit Date:  Future Appointments   Date Time Provider Hollis Jacobo   8/23/2021  9:30 AM [facility] [address] Maintenance   Topic Date Due    COVID-19 Vaccine (1) Never done    Low dose CT lung screening  05/27/2021    Shingles Vaccine (1 of 2) 02/03/2022 (Originally 3/30/2015)    A1C test (Diabetic or Prediabetic)  02/19/2022    Annual Wellness Visit (AWV)  02/23/2022    Breast cancer screen  03/19/2023    Lipid screen  02/19/2026    DTaP/Tdap/Td vaccine (2 - Td or Tdap) 08/24/2026    Pneumococcal 0-64 years Vaccine (2 of 2) 03/30/2030    Colon cancer screen colonoscopy  10/07/2030    Flu vaccine  Completed    Hepatitis C screen  Completed    HIV screen  Completed    Hepatitis A vaccine  Aged Out    Hepatitis B vaccine  Aged Out    Hib vaccine  Aged Out    Meningococcal (ACWY) vaccine  Aged Out       Hemoglobin A1C (%)   Date Value   02/19/2021 6.1 (H)   08/19/2020 5.9   09/20/2019 5.5             ( goal A1C is < 7)   No results found for: LABMICR  LDL Cholesterol (mg/dL)   Date Value   02/19/2021 72   08/19/2020 64     LDL Calculated (mg/dL)   Date Value   04/27/2015 81       (goal LDL is <100)   AST (U/L)   Date Value   05/06/2021 23     ALT (U/L)   Date Value   05/06/2021 25     BUN (mg/dL)   Date Value   05/06/2021 12     BP Readings from Last 3 Encounters:   06/05/21 136/81   05/06/21 120/72   04/26/21 (!) 118/55          (goal 120/80)    All Future Testing planned in [facility]  Lab Frequency Next Occurrence   Spirometry With Bronchodilator Once 07/01/2020   COVID-19 Once 12/13/2020   COVID-19 Once 01/10/2021   COVID-19 Ambulatory Once 01/18/2021   CBC Auto Differential Once 08/18/2021   Comprehensive Metabolic Panel Once 68/53/0960   Lipase Once 08/18/2021   Amylase Once 08/18/2021   H.  Pylori Breath Test Once 06/21/2021               Patient Active Problem List:     Hyperglycemia Anxiety     Bronchitis     Depression     Degenerative arthritis of lumbar spine     Osteoarthritis of knee     Insulin resistance     COPD (chronic obstructive pulmonary disease) (HCC)     Osteoarthritis thoracic spine     Osteoarthritis cervical spine     Prediabetes     Peripheral edema     Hepatic steatosis     Epigastric pain     Nausea     Vitamin D deficiency     Tobacco abuse     Vocal cord polyp     Laryngitis     Obesity     Encounter for diabetic foot exam ([facility] [address] [address]     SOB (shortness of breath)     Family history of MI (myocardial infarction)     Bilateral leg edema     Pulmonary HTN (HCC)     TAMARA on CPAP     Colon polyps     Lumbosacral spondylosis without myelopathy     Chronic bilateral low back pain     Family history of ischemic heart disease and other diseases of the circulatory system     Carpal tunnel syndrome     Chest pain     Chronic maxillary sinusitis     Chronic pain disorder     Disorder of bursae of shoulder region     Displacement of lumbar intervertebral disc without myelopathy     Herniation of intervertebral disc without myelopathy     Fibromyositis

## 2021-06-21 DIAGNOSIS — J44.9 CHRONIC OBSTRUCTIVE PULMONARY DISEASE, UNSPECIFIED COPD TYPE (HCC): ICD-10-CM

## 2021-06-21 RX ORDER — ALBUTEROL SULFATE 90 UG/1
2 AEROSOL, METERED RESPIRATORY (INHALATION) EVERY 6 HOURS PRN
Qty: 1 INHALER | Refills: 3 | Status: SHIPPED | OUTPATIENT
Start: 2021-06-21 | End: 2022-01-18 | Stop reason: SDUPTHER

## 2021-07-15 DIAGNOSIS — F32.A DEPRESSION, UNSPECIFIED DEPRESSION TYPE: ICD-10-CM

## 2021-07-15 DIAGNOSIS — F41.9 ANXIETY: Primary | ICD-10-CM

## 2021-07-15 RX ORDER — BUSPIRONE HYDROCHLORIDE 5 MG/1
TABLET ORAL
Qty: 90 TABLET | Refills: 2 | Status: SHIPPED | OUTPATIENT
Start: 2021-07-15 | End: 2022-01-18 | Stop reason: SDUPTHER

## 2021-07-29 ENCOUNTER — TELEPHONE (OUTPATIENT)
Dept: ONCOLOGY | Age: 56
End: 2021-07-29

## 2021-07-29 DIAGNOSIS — Z87.891 PERSONAL HISTORY OF NICOTINE DEPENDENCE: Primary | ICD-10-CM

## 2021-07-29 NOTE — TELEPHONE ENCOUNTER
Our records indicate that your patient is due for their annual lung cancer screening follow up testing. For your convenience, we have pended the order for the scan for you. If you do not agree with the need for the test, please cancel the order and let us know. Sincerely,    [address] [place] [address] Screening Program    Auto printed reminder letter sent to patient.

## 2021-08-18 ENCOUNTER — TELEMEDICINE (OUTPATIENT)
Dept: FAMILY MEDICINE CLINIC | Age: 56
End: 2021-08-18
Payer: MEDICARE

## 2021-08-18 DIAGNOSIS — M54.50 CHRONIC BILATERAL LOW BACK PAIN WITHOUT SCIATICA: ICD-10-CM

## 2021-08-18 DIAGNOSIS — K27.9 H PYLORI ULCER: ICD-10-CM

## 2021-08-18 DIAGNOSIS — M19.049 HAND ARTHRITIS: ICD-10-CM

## 2021-08-18 DIAGNOSIS — M47.22 OSTEOARTHRITIS OF SPINE WITH RADICULOPATHY, CERVICAL REGION: ICD-10-CM

## 2021-08-18 DIAGNOSIS — M47.816 SPONDYLOSIS OF LUMBAR REGION WITHOUT MYELOPATHY OR RADICULOPATHY: ICD-10-CM

## 2021-08-18 DIAGNOSIS — M17.0 PRIMARY OSTEOARTHRITIS OF BOTH KNEES: ICD-10-CM

## 2021-08-18 DIAGNOSIS — M47.817 LUMBOSACRAL SPONDYLOSIS WITHOUT MYELOPATHY: Primary | ICD-10-CM

## 2021-08-18 DIAGNOSIS — B96.81 H PYLORI ULCER: ICD-10-CM

## 2021-08-18 DIAGNOSIS — G89.29 CHRONIC BILATERAL LOW BACK PAIN WITHOUT SCIATICA: ICD-10-CM

## 2021-08-18 DIAGNOSIS — M47.814 SPONDYLOSIS OF THORACIC REGION WITHOUT MYELOPATHY OR RADICULOPATHY: ICD-10-CM

## 2021-08-18 PROCEDURE — G8427 DOCREV CUR MEDS BY ELIG CLIN: HCPCS | Performed by: FAMILY MEDICINE

## 2021-08-18 PROCEDURE — 3017F COLORECTAL CA SCREEN DOC REV: CPT | Performed by: FAMILY MEDICINE

## 2021-08-18 PROCEDURE — 99214 OFFICE O/P EST MOD 30 MIN: CPT | Performed by: FAMILY MEDICINE

## 2021-08-18 RX ORDER — GABAPENTIN 100 MG/1
CAPSULE ORAL
Qty: 90 CAPSULE | Refills: 0 | Status: CANCELLED | OUTPATIENT
Start: 2021-08-18 | End: 2021-09-18

## 2021-08-18 RX ORDER — TIZANIDINE 4 MG/1
4 TABLET ORAL NIGHTLY PRN
Qty: 20 TABLET | Refills: 0 | Status: SHIPPED
Start: 2021-08-18 | End: 2021-09-14

## 2021-08-18 RX ORDER — GABAPENTIN 300 MG/1
CAPSULE ORAL
Qty: 90 CAPSULE | Refills: 0 | Status: SHIPPED | OUTPATIENT
Start: 2021-08-18 | End: 2021-09-24

## 2021-08-18 RX ORDER — PREDNISONE 20 MG/1
20 TABLET ORAL 2 TIMES DAILY
Qty: 10 TABLET | Refills: 0 | Status: SHIPPED | OUTPATIENT
Start: 2021-08-18 | End: 2021-08-23

## 2021-08-18 SDOH — ECONOMIC STABILITY: TRANSPORTATION INSECURITY
IN THE PAST 12 MONTHS, HAS THE LACK OF TRANSPORTATION KEPT YOU FROM MEDICAL APPOINTMENTS OR FROM GETTING MEDICATIONS?: NO

## 2021-08-18 SDOH — ECONOMIC STABILITY: TRANSPORTATION INSECURITY
IN THE PAST 12 MONTHS, HAS LACK OF TRANSPORTATION KEPT YOU FROM MEETINGS, WORK, OR FROM GETTING THINGS NEEDED FOR DAILY LIVING?: NO

## 2021-08-18 SDOH — ECONOMIC STABILITY: FOOD INSECURITY: WITHIN THE PAST 12 MONTHS, YOU WORRIED THAT YOUR FOOD WOULD RUN OUT BEFORE YOU GOT MONEY TO BUY MORE.: NEVER TRUE

## 2021-08-18 SDOH — ECONOMIC STABILITY: FOOD INSECURITY: WITHIN THE PAST 12 MONTHS, THE FOOD YOU BOUGHT JUST DIDN'T LAST AND YOU DIDN'T HAVE MONEY TO GET MORE.: NEVER TRUE

## 2021-08-18 ASSESSMENT — ENCOUNTER SYMPTOMS
BACK PAIN: 1
RECTAL PAIN: 0
ABDOMINAL DISTENTION: 0
VOMITING: 0
ANAL BLEEDING: 0
ABDOMINAL PAIN: 1
CONSTIPATION: 0
RESPIRATORY NEGATIVE: 1
NAUSEA: 1
BLOOD IN STOOL: 0
DIARRHEA: 0

## 2021-08-18 ASSESSMENT — SOCIAL DETERMINANTS OF HEALTH (SDOH): HOW HARD IS IT FOR YOU TO PAY FOR THE VERY BASICS LIKE FOOD, HOUSING, MEDICAL CARE, AND HEATING?: NOT VERY HARD

## 2021-08-18 NOTE — Clinical Note
Please call patient for 3 month follow up.   She would like to be seen in person so please schedule her with Dr. Byron Jay, this is for back pain, arthritis and copd

## 2021-08-18 NOTE — PROGRESS NOTES
2021    TELEHEALTH EVALUATION -- Audio/Visual (During EIDNK-14 public health emergency)    HPI:    Lalo Gupta (:  1965) has requested an audio/video evaluation for the following concern(s):    The patient is a 64year old female with a past medical significant for recent h pylori infection, osteoarthritis of multiple joints, copd who presents via virtual visit for increasing arthritis pain. The patient complains of significant pain from arthritis. She has been taking the gabapentin consistently and it isn't working very well. She has been having more difficulty with completing ADL's. Her hands hurt to the point where she can't open jars or vacuum. Joints affected include hands fingers wrist elbows shoulders knees hips back. The patient has had this for a long time but over the last couple months the pain has been increasing and she is having difficulty with ADLs as mentioned before. She has been on different medications in the past for this and has seen pain management. Medication she has tried include Cymbalta, Lyrica and gabapentin. We currently have her on gabapentin and she has taken 3 a day with no relief. The patient has not done physical therapy recently. The patient has been smoking more so her shortness of breath from her COPD seems to have increased recently. The patient did have a history of H. pylori and was on triple therapy. She completed it and noted improvement initially but over the last few weeks she has noticed increase in her nausea again. Review of Systems   Constitutional: Negative. Respiratory: Negative. Cardiovascular: Negative. Gastrointestinal: Positive for abdominal pain and nausea. Negative for abdominal distention, anal bleeding, blood in stool, constipation, diarrhea, rectal pain and vomiting. Musculoskeletal: Positive for arthralgias, back pain, gait problem, joint swelling, myalgias, neck pain and neck stiffness. Allergic/Immunologic: Negative for environmental allergies, food allergies and immunocompromised state. Psychiatric/Behavioral: Positive for dysphoric mood (Due to her pain). Prior to Visit Medications    Medication Sig Taking? Authorizing Provider   predniSONE (DELTASONE) 20 MG tablet Take 1 tablet by mouth 2 times daily for 5 days Yes Glenna Sarmiento MD   gabapentin (NEURONTIN) 300 MG capsule TAKE ONE CAPSULE BY MOUTH THREE TIMES A DAY Yes Glenna Sarmiento MD   busPIRone (BUSPAR) 5 MG tablet TAKE ONE TABLET BY MOUTH THREE TIMES A DAY Yes Glenna Sarmiento MD   VORTIoxetine (TRINTELLIX) 10 MG TABS tablet TAKE ONE TABLET BY MOUTH DAILY Yes Glenna Sarmiento MD   albuterol sulfate HFA (PROAIR HFA) 108 (90 Base) MCG/ACT inhaler Inhale 2 puffs into the lungs every 6 hours as needed for Wheezing Yes Glenna Sarmiento MD   albuterol (PROVENTIL) (2.5 MG/3ML) 0.083% nebulizer solution  Yes Historical Provider, MD   aspirin EC 81 MG EC tablet Take 1 tablet by mouth daily Yes Glenna Sarmiento MD   sucralfate (CARAFATE) 1 GM tablet Take 1 tablet by mouth 4 times daily Yes Glenna Sarmiento MD   budesonide-formoterol (SYMBICORT) 160-4.5 MCG/ACT AERO INHALE TWO PUFFS BY MOUTH TWICE A DAY Yes Glenna Sarmiento MD   metFORMIN (GLUCOPHAGE) 500 MG tablet One tablet by mouth with breakfast Yes Glenna Sarmiento MD   albuterol (PROVENTIL) (2.5 MG/3ML) 0.083% nebulizer solution Take 3 mLs by nebulization every 6 hours as needed for Wheezing Yes Glenna Sarmiento MD   nystatin ([address]) 948574 UNIT/GM powder APPLY ONE DOSE TOPICALLY TWICE A DAY Yes Glenna Sarmiento MD   Blood Glucose Monitoring Suppl (ACURA BLOOD GLUCOSE METER) W/DEVICE KIT 1 each by Does not apply route 2 times daily Yes Jerrica Ayala PA-C   Nebulizers (COMPRESSOR/NEBULIZER) MISC 1 vial by Does not apply route 4 times daily as needed.  Patient has albuterol prescription at home; needs aerosol machine and set up diagnosis chronic bronchitis Yes Jennifer La APRN - CNP       Social History Tobacco Use    Smoking status: Current Every Day Smoker     Packs/day: 1.00     Years: 40.00     Pack years: 40.00     Types: Cigarettes    Smokeless tobacco: Never Used    Tobacco comment: has tried cold turkey, patches, chantix   Vaping Use    Vaping Use: Some days   Substance Use Topics    Alcohol use: No     Alcohol/week: 0.0 standard drinks    Drug use: Yes     Frequency: 4.0 times per week     Types: Marijuana     Comment: Last used 11/1/20        Allergies   Allergen Reactions    Amoxicillin-Pot Clavulanate Diarrhea and Other (See Comments)     augmentin      Medrol [Methylprednisolone] Other (See Comments)     Redness of hands and itching  Redness of hands and itching    Tape [Adhesive Tape] Rash   ,   Past Medical History:   Diagnosis Date    Anxiety     Asthma     Bladder incontinence     At times per pt.     Bladder prolapse, female, acquired     Cancer [facility]) 11/1996    cervical; treated with hysterectomy    Carpal tunnel syndrome 12/15/2010    Chronic bilateral low back pain     Colon polyps 10/07/2020    tubular adenomas x4    COPD (chronic obstructive pulmonary disease) (HCC)     Depression     Epigastric pain     Hepatic steatosis     History of cataract extraction with lens replacement 2004    bilateral    History of stress test     Hyperglycemia     Intertrigo     Lumbar degenerative disc disease     Nausea     Obesity 9/1/2017    Osteoarthritis     right knee    Osteoarthritis cervical spine     Osteoarthritis thoracic spine     Tobacco abuse 2/6/2017       PHYSICAL EXAMINATION:  [ INSTRUCTIONS:  \"[x]\" Indicates a positive item  \"[]\" Indicates a negative item  -- DELETE ALL ITEMS NOT EXAMINED]  Vital Signs: (As obtained by patient/caregiver or practitioner observation)    Blood pressure-  Heart rate-    Respiratory rate-    Temperature-  Pulse oximetry-     Constitutional: [x] Appears well-developed and well-nourished [x] No apparent distress      [] Abnormal- Mental status  [x] Alert and awake  [x] Oriented to person/place/time [x]Able to follow commands      Eyes:  EOM    [x]  Normal  [] Abnormal-  Sclera  [x]  Normal  [] Abnormal -         Discharge [x]  None visible  [] Abnormal -    HENT:   [x] Normocephalic, atraumatic. [] Abnormal   [x] Mouth/Throat: Mucous membranes are moist.     External Ears [x] Normal  [] Abnormal-     Neck: [x] No visualized mass     Pulmonary/Chest: [x] Respiratory effort normal.  [x] No visualized signs of difficulty breathing or respiratory distress        [] Abnormal-      Musculoskeletal:   [] Normal gait with no signs of ataxia         [] Normal range of motion of neck        [] Abnormal-       Neurological:        [x] No Facial Asymmetry (Cranial nerve 7 motor function) (limited exam to video visit)          [x] No gaze palsy        [] Abnormal-         Skin:        [x] No significant exanthematous lesions or discoloration noted on facial skin         [] Abnormal-            Psychiatric:       [x] Normal Affect [x] No Hallucinations        [] Abnormal-     Other pertinent observable physical exam findings-     ASSESSMENT/PLAN:  1. Chronic bilateral low back pain without sciatica  Advised patient that physical therapy can help especially with chronic pain. Referral placed. Patient put on steroid burst to see if this will help with breakthrough pain especially because the weather is changed recently and this does increase the patient's symptoms with the pain. I would like to repeat the autoimmune titers. We will increase her gabapentin to 300 mg 3 times a day and also the patient take a muscle relaxer as needed as she notes spasms in her back as well as her legs. - [facility] [place]  - predniSONE (DELTASONE) 20 MG tablet; Take 1 tablet by mouth 2 times daily for 5 days  Dispense: 10 tablet; Refill: 0  - Rheumatoid Factor; Future  - Cyclic Citrul Peptide Antibody, IgG; Future  - JANNA Screen With Reflex;  Future  - gabapentin (NEURONTIN) 300 MG capsule; TAKE ONE CAPSULE BY MOUTH THREE TIMES A DAY  Dispense: 90 capsule; Refill: 0    2. Lumbosacral spondylosis without myelopathy    - [facility] [place]  - predniSONE (DELTASONE) 20 MG tablet; Take 1 tablet by mouth 2 times daily for 5 days  Dispense: 10 tablet; Refill: 0  - Rheumatoid Factor; Future  - Cyclic Citrul Peptide Antibody, IgG; Future  - JANNA Screen With Reflex; Future    3. Spondylosis of lumbar region without myelopathy or radiculopathy    - [facility] [place]  - predniSONE (DELTASONE) 20 MG tablet; Take 1 tablet by mouth 2 times daily for 5 days  Dispense: 10 tablet; Refill: 0  - Rheumatoid Factor; Future  - Cyclic Citrul Peptide Antibody, IgG; Future  - JANNA Screen With Reflex; Future    4. Osteoarthritis of spine with radiculopathy, cervical region    - [facility]  - predniSONE (DELTASONE) 20 MG tablet; Take 1 tablet by mouth 2 times daily for 5 days  Dispense: 10 tablet; Refill: 0  - Rheumatoid Factor; Future  - Cyclic Citrul Peptide Antibody, IgG; Future  - JANNA Screen With Reflex; Future    5. Spondylosis of thoracic region without myelopathy or radiculopathy    - [facility]  - predniSONE (DELTASONE) 20 MG tablet; Take 1 tablet by mouth 2 times daily for 5 days  Dispense: 10 tablet; Refill: 0  - Rheumatoid Factor; Future  - Cyclic Citrul Peptide Antibody, IgG; Future  - JANNA Screen With Reflex; Future    6. Primary osteoarthritis of both knees    - [facility] [place]  - predniSONE (DELTASONE) 20 MG tablet; Take 1 tablet by mouth 2 times daily for 5 days  Dispense: 10 tablet; Refill: 0  - Rheumatoid Factor; Future  - Cyclic Citrul Peptide Antibody, IgG; Future  - JANNA Screen With Reflex; Future    7. Hand arthritis    - [facility] [place]  - predniSONE (DELTASONE) 20 MG tablet; Take 1 tablet by mouth 2 times daily for 5 days  Dispense: 10 tablet;  Refill: 0  - Rheumatoid Factor; Future  - Cyclic Citrul Peptide Antibody, IgG; Future  - JANNA Screen With Reflex; Future    8. H pylori ulcer  Repeat H. pylori breath test.  - H. Pylori Breath Test; Future      No follow-ups on file. Makenzie Lieberman, was evaluated through a synchronous (real-time) audio-video encounter. The patient (or guardian if applicable) is aware that this is a billable service. Verbal consent to proceed has been obtained within the past 12 months. The visit was conducted pursuant to the emergency declaration under the [address] [place] [address] authority and the NPR and [employer] General Act. Patient identification was verified, and a caregiver was present when appropriate. The patient was located in a state where the provider was credentialed to provide care. Total time spent on this encounter: 20 minutes    --Jessenia Smith MD on 8/18/2021 at 11:48 AM    An electronic signature was used to authenticate this note.

## 2021-08-19 ENCOUNTER — TELEPHONE (OUTPATIENT)
Dept: FAMILY MEDICINE CLINIC | Age: 56
End: 2021-08-19

## 2021-08-19 DIAGNOSIS — M47.817 LUMBOSACRAL SPONDYLOSIS WITHOUT MYELOPATHY: ICD-10-CM

## 2021-08-19 DIAGNOSIS — M47.816 SPONDYLOSIS OF LUMBAR REGION WITHOUT MYELOPATHY OR RADICULOPATHY: ICD-10-CM

## 2021-08-19 DIAGNOSIS — D58.2 ELEVATED HEMOGLOBIN (HCC): Primary | ICD-10-CM

## 2021-08-19 DIAGNOSIS — R73.03 PREDIABETES: ICD-10-CM

## 2021-08-19 DIAGNOSIS — G89.29 CHRONIC BILATERAL LOW BACK PAIN WITHOUT SCIATICA: Primary | ICD-10-CM

## 2021-08-19 DIAGNOSIS — E55.9 VITAMIN D DEFICIENCY: ICD-10-CM

## 2021-08-19 DIAGNOSIS — M54.50 CHRONIC BILATERAL LOW BACK PAIN WITHOUT SCIATICA: Primary | ICD-10-CM

## 2021-08-19 DIAGNOSIS — M17.0 PRIMARY OSTEOARTHRITIS OF BOTH KNEES: ICD-10-CM

## 2021-08-20 ENCOUNTER — TELEPHONE (OUTPATIENT)
Dept: FAMILY MEDICINE CLINIC | Age: 56
End: 2021-08-20

## 2021-08-20 NOTE — TELEPHONE ENCOUNTER
Lm another message because [facility] does not take her insurance so she needs to check with her insurance and see where she can go for this and then call us back to let us know

## 2021-08-20 NOTE — TELEPHONE ENCOUNTER
----- Message from Elkin Tinajero sent at 8/20/2021 10:50 AM EDT -----  Subject: Appointment Request    Reason for Call: Urgent Joint Pain    QUESTIONS  Type of Appointment? Established Patient  Reason for appointment request? No appointments available during search  Additional Information for Provider? Patient stated she is wanting to   schedule 3 month follow appt for joint paint and arthritis pain, she can   hardly move with out pain, no availability in system, screen green for   covid.  ---------------------------------------------------------------------------  --------------  CALL BACK INFO  What is the best way for the office to contact you? OK to leave message on   voicemail  Preferred Call Back Phone Number? [phone]  ---------------------------------------------------------------------------  --------------  SCRIPT ANSWERS  Relationship to Patient? Self  (Is the patient requesting to be seen urgently for their symptoms?)? No  Is this follow up request related to routine Diabetes Management? No  Are you having any new concerns about your existing condition? Yes  Did you have an injury or trauma within the past 3 days? No  Is your joint red or swollen? Yes  Have you been diagnosed with, awaiting test results for, or told that you   are suspected of having COVID-19 (Coronavirus)? (If patient has tested   negative or was tested as a requirement for work, school, or travel and   not based on symptoms, answer no)? No  Do you currently have flu-like symptoms including fever or chills, cough,   shortness of breath, difficulty breathing, or new loss of taste or smell? No  Have you had close contact with someone with COVID-19 in the last 14 days? No  (Service Expert  click yes below to proceed with [employer] As Usual   Scheduling)?  Yes

## 2021-09-01 ENCOUNTER — HOSPITAL ENCOUNTER (OUTPATIENT)
Dept: PHYSICAL THERAPY | Facility: CLINIC | Age: 56
Setting detail: THERAPIES SERIES
Discharge: HOME | End: 2021-09-01
Payer: MEDICARE

## 2021-09-01 ENCOUNTER — HOSPITAL ENCOUNTER (OUTPATIENT)
Age: 56
Setting detail: SPECIMEN
Discharge: HOME | End: 2021-09-01
Payer: MEDICARE

## 2021-09-01 DIAGNOSIS — M17.0 PRIMARY OSTEOARTHRITIS OF BOTH KNEES: ICD-10-CM

## 2021-09-01 DIAGNOSIS — K27.9 H PYLORI ULCER: ICD-10-CM

## 2021-09-01 DIAGNOSIS — M54.50 CHRONIC BILATERAL LOW BACK PAIN WITHOUT SCIATICA: ICD-10-CM

## 2021-09-01 DIAGNOSIS — B96.81 H PYLORI ULCER: ICD-10-CM

## 2021-09-01 DIAGNOSIS — M47.816 SPONDYLOSIS OF LUMBAR REGION WITHOUT MYELOPATHY OR RADICULOPATHY: ICD-10-CM

## 2021-09-01 DIAGNOSIS — G89.29 CHRONIC BILATERAL LOW BACK PAIN WITHOUT SCIATICA: ICD-10-CM

## 2021-09-01 DIAGNOSIS — M47.22 OSTEOARTHRITIS OF SPINE WITH RADICULOPATHY, CERVICAL REGION: ICD-10-CM

## 2021-09-01 DIAGNOSIS — M47.817 LUMBOSACRAL SPONDYLOSIS WITHOUT MYELOPATHY: ICD-10-CM

## 2021-09-01 DIAGNOSIS — M47.814 SPONDYLOSIS OF THORACIC REGION WITHOUT MYELOPATHY OR RADICULOPATHY: ICD-10-CM

## 2021-09-01 DIAGNOSIS — M19.049 HAND ARTHRITIS: ICD-10-CM

## 2021-09-01 LAB — RHEUMATOID FACTOR: <10 IU/ML

## 2021-09-01 PROCEDURE — 97530 THERAPEUTIC ACTIVITIES: CPT

## 2021-09-01 PROCEDURE — 97110 THERAPEUTIC EXERCISES: CPT

## 2021-09-01 PROCEDURE — 97162 PT EVAL MOD COMPLEX 30 MIN: CPT

## 2021-09-01 NOTE — CONSULTS
[] [address]  [address]  [phone]  F: [phone] [] [address]  [place] [address]   [address]  P: [phone]  F: [phone] [] [address]  Therapy  [address]  P: [phone]  F: [phone] [x] [address]  P: [phone]  F: [phone] [] [address]  [facility]   [address]   [place]: [phone]  F: [phone]      Physical Therapy Spine Evaluation    Date:  2021  Patient: Janell Paulson  : 1965  MRN: [id number]  Physician: Cindy Wright MD     Insurance: [facility] Medicare  Medical Diagnosis: Chronic LBP, Lumbosacral Spondylosis without myelopathy, Osteoarthritis of Spine  Rehab Codes:   M54.5, G89.29 (ICD-10-CM) - Chronic bilateral low back pain without sciatica   M47.817 (ICD-10-CM) - Lumbosacral spondylosis without myelopathy   M47.816 (ICD-10-CM) - Spondylosis of lumbar region without myelopathy or radiculopathy   M47.22 (ICD-10-CM) - Osteoarthritis of spine with radiculopathy, cervical region   M47.814 (ICD-10-CM) - Spondylosis of thoracic region without myelopathy or radiculopathy   M17.0 (ICD-10-CM) - Primary osteoarthritis of both knees   M19.049 (ICD-10-CM) - Hand arthritis     Onset Date: 2021  Next 's appt.: Pending    Subjective:   CC:LBP  HPI: Patient has had progressive LBP for the past 20 years. Her pain has been worse the last few months. She reports pain all the time but it is worst with standing and walking. She also has complaints of bilateral thumb pain that is arthritis related. She describes her pain as constant, sharp, dull, shooting, burning, aching, stabbing and numb.     PMHx: [] Unremarkable [] Diabetes [] HTN  [] Pacemaker   [] MI/Heart Problems [] Cancer [x] Arthritis [x] Other:Left shoulder pan and weakness, hand/thumb pain              [x] Refer to full medical chart  In EPIC       Comorbidities:   [x] Obesity [] Dialysis  [] N/A   [x] Asthma/COPD [] Dementia Depression, Anxiety   [] Stroke [] Sleep apnea [] Other:   [] Vascular disease [] Rheumatic disease [] Other:     Tests: [] X-Ray: [] MRI:  [] Other:    Medications: [x] Refer to full medical record [] None [] Other:  Allergies:      [x] Refer to full medical record [] None [] Other:    Function:  Hand Dominance  [x] Right  [] Left  Patient lives with:    In what type of home [x]  One story   [] Two story   [] Split level   Number of stairs to enter    With handrail on the []  Right to enter   [] Left to enter   Bathroom has a []  Tub only  [] Tub/shower combo   [] Walk in shower    []  Grab bars   Washing machine is on []  Main level   [] Second level   [] Basement   Employer    Job Status []  Normal duty   [] Light duty   [] Off due to condition    []  Retired   [x] Not employed   [] Disability  [] Other:  []  Return to work:    Work activities/duties        ADL/IADL Previous level of function Current level of function Who currently assists the patient with task   Bathing  [] Independent  [] Assist [x] Independent  [] Assist    Dress/grooming [] Independent  [] Assist [x] Independent  [] Assist    Transfer/mobility [] Independent  [] Assist [x] Independent  [] Assist    Feeding [] Independent  [] Assist [x] Independent  [] Assist    Toileting [] Independent  [] Assist [x] Independent  [] Assist    Driving [] Independent  [] Assist [x] Independent  [] Assist    Housekeeping [] Independent  [] Assist [] Independent  [x] Assist , children   Grocery shop/meal prep [] Independent  [] Assist [x] Independent  [] Assist      Gait Prior level of function Current level of function    [] Independent  [] Assist [x] Independent  [] Assist   Device: [] Independent [x] Independent    [] [] [] []    Lateral Shift [] [] []    Scoliosis [] [] []    Iliac Crest [] [] []    PSIS [] [] []    ASIS [] [] []    Genu Valgus [] [] []    Genu Varus [] [] []    Genu Recurvatum [] [] []    Pronation [] [] []    Supination [] [] []    Leg Length Discrp [] [] []    Slumped Sitting [] [] []    Palpation [] [x] [] TTP lumbar spine   Sensation [x] [] []    Edema [x] [] []    Neurological [x] [] []        Functional Test: Oswestry 28/50, NDI 22/50 Score: 56%, 44% functionally impaired     Comments: Flexion directional preference    Assessment:  The patient is a 64year old female with a chief complaint of low back pain and signs and symptoms consistent with a diagnosis of Chronic back pain with arthritis. .    She presents with pain, core weakness, decreased ROM, a flexion directional prefeence and functional limitations. She is a good PT candidate to address above mentioned deficits. Will consider aquatic therapy if she doesn't tolerate land based program.    Problems:    [x] ? Pain:  [x] ? ROM:  [x] ? Strength:  [x] ? Function:  [] Other:      STG: (to be met in 6 treatments)  1. ? Pain: 4/10  2. Patient to be independent with home exercise program as demonstrated by performance with correct form without cues. LTG: (to be met in 16 treatments)  1. Patient will perform moderate intensity housework such as sweeping and vacuuming. 2. Patient will be able ot lift and care for her grandson. 3. Patient will improve Oswestry by >15%      Patient goals: Reduce pain and increase activity    Rehab Potential:  [] Good  [x] Fair  [] Poor   Suggested Professional Referral:  [x] No  [] Yes:  Barriers to Goal Achievement:  [] No  [x] Yes: Comorbidities, obesity, COPD  Domestic Concerns:  [x] No  [] Yes:    Pt. Education:  [x] Plans/Goals, Risks/Benefits discussed  [x] Home exercise program  Method of Education: [x] Verbal  [x] Demo  [x] Written  Comprehension of Education:  [x] Verbalizes understanding.   [] Demonstrates understanding. [x] Needs Review. [] Demonstrates/verbalizes understanding of HEP/Ed previously given. Treatment Plan:  [x] Therapeutic Exercise   57804  [] Iontophoresis: 4 mg/mL Dexamethasone Sodium Phosphate  mAmin  29400   [x] Therapeutic Activity  98719 [] Vasopneumatic cold with compression  17126    [] Gait Training   80452 [] Ultrasound   64883   [x] Neuromuscular Re-education  61798 [] Electrical Stimulation Unattended  78987   [x] Manual Therapy  20477 [] Electrical Stimulation Attended  39681   [x] Instruction in HEP  [] Lumbar/Cervical Traction  08028   [x] Aquatic Therapy   50310 [x] Cold/hotpack    [] Massage   91800      [x] Dry Needling, 1 or 2 muscles  65550   [] Biofeedback, first 15 minutes   82073  [] Biofeedback, additional 15 minutes   44348 [x] Dry Needling, 3 or more muscles  59439   . Frequency:  2 x/week for 16 visits    Todays Treatment:  Modalities: Thermal modalities PRN  Precautions: Flexion directional preference  Exercises:  Exercise Reps/ Time Weight/ Level Comments   LTR 10  Pain to the right   PPT 2x10  HEP   Single knee to chest 2x5 ea  HEP-Limited by abdominal mass   Supine march 2x5  HEP   Seated flexion stretch 2x30\"  HEP   Other: SONALI: Patient education on diagnosis, prognosis and plan of care.     Specific Instructions for next treatment: Flexion based lumbar program, light progressive cardio/    Evaluation Complexity:  History (Personal factors, comorbidities) [] 0 [] 1-2 [x] 3+   Exam (limitations, restrictions) [] 1-2 [] 3 [x] 4+   Clinical presentation (progression) [] Stable [x] Evolving  [] Unstable   Decision Making [] Low [x] Moderate [] High    [] Low Complexity [x] Moderate Complexity [] High Complexity       Treatment Charges: Mins Units   [] Evaluation       []  Low       [x]  Moderate       []  High  1   []  Modalities     X  Ther Exercise 15 1   []  Manual Therapy     [x]  Ther Activities 15 1   []  Aquatics     []  Vasocompression     []  Other TOTAL TREATMENT TIME: 20    Time in: 56      Time out: 1125    Electronically signed by: Leatha Reed PT        Physician Signature:________________________________Date:__________________  By signing above or cosigning this note, I have reviewed this plan of care and certify a need for medically necessary rehabilitation services.      *PLEASE SIGN ABOVE AND FAX BACK ALL PAGES*

## 2021-09-02 DIAGNOSIS — K14.6 BURNING TONGUE: Primary | ICD-10-CM

## 2021-09-02 LAB
ANTI DNA DOUBLE STRANDED: 0.5 IU/ML
ANTI-NUCLEAR ANTIBODY (ANA): NEGATIVE
CCP IGG ANTIBODIES: <0.4 U/ML (ref 0–7)
ENA ANTIBODIES SCREEN: <0.1 U/ML

## 2021-09-02 RX ORDER — DIPHENHYDRAMINE HYDROCHLORIDE AND LIDOCAINE HYDROCHLORIDE AND ALUMINUM HYDROXIDE AND MAGNESIUM HYDRO
5 KIT 4 TIMES DAILY PRN
Qty: 1 EACH | Refills: 0 | Status: SHIPPED | OUTPATIENT
Start: 2021-09-02 | End: 2022-05-05

## 2021-09-02 NOTE — PRE-CERTIFICATION NOTE
Medicare Cap   [] Physical Therapy  [] Speech Therapy  [] Occupational therapy  *PT and Speech caps combine      $2100 Limit for PT and Speech combined  $2100 Limit for OT individually  At the beginning of the month where you expect to go over $2100, please add the [address] [place] [address] modifier      Patient Name: Bailey Landeros  YOB: 1965    Note:  This is an estimate of charges billed.      Date of [address] Name # units/ charge $$$ charge Daily Total Charge Ongoing Total $$$   9/01/21 EVALx1,TEx1,THAx1 1+1+1 98.01+26.79+22.84 147.64 147.64

## 2021-09-03 LAB — H PYLORI BREATH TEST: NEGATIVE

## 2021-09-08 ENCOUNTER — HOSPITAL ENCOUNTER (OUTPATIENT)
Dept: PHYSICAL THERAPY | Facility: CLINIC | Age: 56
Setting detail: THERAPIES SERIES
Discharge: HOME | End: 2021-09-08
Payer: MEDICARE

## 2021-09-08 NOTE — FLOWSHEET NOTE
[] [address]  [address]    [phone]  F: [phone]   [] [address]  [facility]   [address]  P: [phone]  F: [phone]  [x] [address]  Therapy  [address] [facility]  P: [phone]  F: [phone] [] [address]  P: [phone]  F: [phone]  [] [address]  [address] [facility] [address]   [address]   [place]: [phone]  F: [phone]   [] [facility]  [address] [facility] [address]  [place]: [phone]   F: [phone]     Physical Therapy Cancel/No Show note    Date: 2021  Patient: Yon Foley  : 1965  MRN: [id number]    Cancels/No Shows to date: 1 cx    For today's appointment patient:    [x]  Cancelled    [] Rescheduled appointment    [] No-show     Reason given by patient:    [x]  Patient ill    []  Conflicting appointment    [] No transportation      [] Conflict with work    [] No reason given    [] Weather related    [] UROPW-01    [] Other:      Comments:  Patient not feeling well.       [x] Next appointment was confirmed    Electronically signed by: Chloé Norwood

## 2021-09-13 ENCOUNTER — APPOINTMENT (OUTPATIENT)
Dept: PHYSICAL THERAPY | Facility: CLINIC | Age: 56
End: 2021-09-13
Payer: MEDICARE

## 2021-09-15 ENCOUNTER — HOSPITAL ENCOUNTER (OUTPATIENT)
Age: 56
Discharge: HOME | End: 2021-09-15
Payer: MEDICARE

## 2021-09-15 ENCOUNTER — HOSPITAL ENCOUNTER (OUTPATIENT)
Dept: VASCULAR LAB | Age: 56
Discharge: HOME | End: 2021-09-15
Payer: MEDICARE

## 2021-09-15 ENCOUNTER — HOSPITAL ENCOUNTER (OUTPATIENT)
Dept: PHYSICAL THERAPY | Facility: CLINIC | Age: 56
Setting detail: THERAPIES SERIES
Discharge: HOME | End: 2021-09-15
Payer: MEDICARE

## 2021-09-15 ENCOUNTER — TELEPHONE (OUTPATIENT)
Dept: FAMILY MEDICINE CLINIC | Age: 56
End: 2021-09-15

## 2021-09-15 DIAGNOSIS — M79.89 REDNESS AND SWELLING OF LOWER LEG: ICD-10-CM

## 2021-09-15 DIAGNOSIS — R23.8 REDNESS AND SWELLING OF LOWER LEG: ICD-10-CM

## 2021-09-15 DIAGNOSIS — R25.2 LEG CRAMPING: ICD-10-CM

## 2021-09-15 DIAGNOSIS — M79.605 PAIN IN BOTH LOWER EXTREMITIES: ICD-10-CM

## 2021-09-15 DIAGNOSIS — R25.2 LEG CRAMPING: Primary | ICD-10-CM

## 2021-09-15 DIAGNOSIS — R22.43 LOCALIZED SWELLING OF BOTH LOWER LEGS: Primary | ICD-10-CM

## 2021-09-15 DIAGNOSIS — M79.89 LEG SWELLING: Primary | ICD-10-CM

## 2021-09-15 DIAGNOSIS — M79.604 PAIN IN BOTH LOWER EXTREMITIES: ICD-10-CM

## 2021-09-15 LAB
ABSOLUTE EOS #: 0.16 K/UL (ref 0–0.44)
ABSOLUTE IMMATURE GRANULOCYTE: 0.03 K/UL (ref 0–0.3)
ABSOLUTE LYMPH #: 2.05 K/UL (ref 1.1–3.7)
ABSOLUTE MONO #: 0.82 K/UL (ref 0.1–1.2)
ALBUMIN SERPL-MCNC: 4.1 G/DL (ref 3.5–5.2)
ALBUMIN/GLOBULIN RATIO: 1.6 (ref 1–2.5)
ALP BLD-CCNC: 86 U/L (ref 35–104)
ALT SERPL-CCNC: 23 U/L (ref 5–33)
ANION GAP SERPL CALCULATED.3IONS-SCNC: 12 MMOL/L (ref 9–17)
AST SERPL-CCNC: 21 U/L
BASOPHILS # BLD: 1 % (ref 0–2)
BASOPHILS ABSOLUTE: 0.06 K/UL (ref 0–0.2)
BILIRUB SERPL-MCNC: 0.27 MG/DL (ref 0.3–1.2)
BUN BLDV-MCNC: 14 MG/DL (ref 6–20)
BUN/CREAT BLD: ABNORMAL (ref 9–20)
CALCIUM SERPL-MCNC: 8.7 MG/DL (ref 8.6–10.4)
CHLORIDE BLD-SCNC: 104 MMOL/L (ref 98–107)
CO2: 28 MMOL/L (ref 20–31)
CREAT SERPL-MCNC: 0.8 MG/DL (ref 0.5–0.9)
DIFFERENTIAL TYPE: ABNORMAL
EOSINOPHILS RELATIVE PERCENT: 2 % (ref 1–4)
GFR AFRICAN AMERICAN: >60 ML/MIN
GFR NON-AFRICAN AMERICAN: >60 ML/MIN
GFR SERPL CREATININE-BSD FRML MDRD: ABNORMAL ML/MIN/{1.73_M2}
GFR SERPL CREATININE-BSD FRML MDRD: ABNORMAL ML/MIN/{1.73_M2}
GLUCOSE BLD-MCNC: 128 MG/DL (ref 70–99)
HCT VFR BLD CALC: 49.3 % (ref 36.3–47.1)
HEMOGLOBIN: 15.9 G/DL (ref 11.9–15.1)
IMMATURE GRANULOCYTES: 0 %
LYMPHOCYTES # BLD: 22 % (ref 24–43)
MAGNESIUM: 2 MG/DL (ref 1.6–2.6)
MCH RBC QN AUTO: 29.7 PG (ref 25.2–33.5)
MCHC RBC AUTO-ENTMCNC: 32.3 G/DL (ref 28.4–34.8)
MCV RBC AUTO: 92.1 FL (ref 82.6–102.9)
MONOCYTES # BLD: 9 % (ref 3–12)
NRBC AUTOMATED: 0 PER 100 WBC
PDW BLD-RTO: 14.7 % (ref 11.8–14.4)
PLATELET # BLD: 236 K/UL (ref 138–453)
PLATELET ESTIMATE: ABNORMAL
PMV BLD AUTO: 11.5 FL (ref 8.1–13.5)
POTASSIUM SERPL-SCNC: 4.3 MMOL/L (ref 3.7–5.3)
RBC # BLD: 5.35 M/UL (ref 3.95–5.11)
RBC # BLD: ABNORMAL 10*6/UL
SEG NEUTROPHILS: 66 % (ref 36–65)
SEGMENTED NEUTROPHILS ABSOLUTE COUNT: 6.41 K/UL (ref 1.5–8.1)
SODIUM BLD-SCNC: 144 MMOL/L (ref 135–144)
TOTAL PROTEIN: 6.6 G/DL (ref 6.4–8.3)
WBC # BLD: 9.5 K/UL (ref 3.5–11.3)
WBC # BLD: ABNORMAL 10*3/UL

## 2021-09-15 PROCEDURE — 85025 COMPLETE CBC W/AUTO DIFF WBC: CPT

## 2021-09-15 PROCEDURE — 93970 EXTREMITY STUDY: CPT

## 2021-09-15 PROCEDURE — 97110 THERAPEUTIC EXERCISES: CPT

## 2021-09-15 PROCEDURE — 83735 ASSAY OF MAGNESIUM: CPT

## 2021-09-15 PROCEDURE — 36415 COLL VENOUS BLD VENIPUNCTURE: CPT

## 2021-09-15 PROCEDURE — 80053 COMPREHEN METABOLIC PANEL: CPT

## 2021-09-15 NOTE — FLOWSHEET NOTE
[facility] Fall Risk Assessment    Patient Name:  Domenic Moody  : 1965        Risk Factor Scale  Score   History of Falls [] Yes  [x] No 25  0 0   Secondary Diagnosis [] Yes  [x] No 15  0 0   Ambulatory Aid [] Furniture  [] Crutches/cane/walker  [x] None/bedrest/wheelchair/nurse 30  15  0 0   IV/Heparin Lock [] Yes  [x] No 20  0 0   Gait/Transferring [] Impaired  [] Weak  [x] Normal/bedrest/immobile 20  10  0 0   Mental Status [] Forgets limitations  [x] Oriented to own ability 15  0 0      Total:0     Based on the Assessment score: check the appropriate box.     [x]  No intervention needed   Low =   Score of 0-24    []  Use standard prevention interventions Moderate =  Score of 24-44   [] Give patient handout and discuss fall prevention strategies   [] Establish goal of education for patient/family RE: fall prevention strategies    []  Use high risk prevention interventions High = Score of 45 and higher   [] Give patient handout and discuss fall prevention strategies   [] Establish goal of education for patient/family Re: fall prevention strategies   [] Discuss lifeline / other resources    Electronically signed by:   Duane Beam, PT  Date: 9/15/2021

## 2021-09-15 NOTE — PRE-CERTIFICATION NOTE
Medicare Cap   [] Physical Therapy  [] Speech Therapy  [] Occupational therapy  *PT and Speech caps combine      $2100 Limit for PT and Speech combined  $2100 Limit for OT individually  At the beginning of the month where you expect to go over $2100, please add the [address] [place] [address] modifier      Patient Name: Kenna Youngelor  YOB: 1965    Note:  This is an estimate of charges billed.      Date of [address] Name # units/ charge $$$ charge Daily Total Charge Ongoing Total $$$   9/01/21 EVALx1,TEx1,THAx1 1+1+1 98.01+26.79+22.84 147.64 147.64   9/15/21 TEx3 3 29.21+22.84x2 74.89 222.53

## 2021-09-15 NOTE — FLOWSHEET NOTE
[] [facility] [address]  [address]  [phone]  F: [phone] [] [address]  [facility]   [address]  P: [phone]  F: [phone] [] [address]  Therapy  [address] [facility]  P: [phone]  F: [phone] [x] [email or web address]  P: [phone]  F: [phone] [] [address]  [address] [address] [facility] [address]   [address]   [place]: [phone]  F: [phone]      Physical Therapy Daily Treatment Note    Date:  9/15/2021  Patient Name:  Rob Gore    :  1965  MRN: [id number]  Physician: Hilton Suarez MD                            Insurance: [facility] Medicare  Medical Diagnosis: Chronic LBP, Lumbosacral Spondylosis without myelopathy, Osteoarthritis of Spine  Rehab Codes:   M54.5, G89.29 (ICD-10-CM) - Chronic bilateral low back pain without sciatica   M47.817 (ICD-10-CM) - Lumbosacral spondylosis without myelopathy   M47.816 (ICD-10-CM) - Spondylosis of lumbar region without myelopathy or radiculopathy   M47.22 (ICD-10-CM) - Osteoarthritis of spine with radiculopathy, cervical region   M47.814 (ICD-10-CM) - Spondylosis of thoracic region without myelopathy or radiculopathy   M17.0 (ICD-10-CM) - Primary osteoarthritis of both knees   M19.049 (ICD-10-CM) - Hand arthritis      Onset Date: 2021                      Next 's appt.: Pending    Visit# / total visits: ; Progress note for Medicare patient due at visit 10     Cancels/No Shows: 1/0    Subjective:    Pain:  [x] Yes  [] No Location: low back, bilateral legs Pain Rating: (0-10 scale) 6/10  Pain altered Tx:  [x] No  [] Yes  Action:  Comments: Patien reports that she felt a little better after her first visit but has not been consistent with her HEP/Ed previously given. Plan: [x] Continue current frequency toward long and short term goals.     [x] Specific Instructions for subsequent treatments: Specific Instructions for next treatment: Flexion based lumbar program, light progressive cardio      Time In: 10:30            Time Out: 11:15    Electronically signed by:  Gloria Rodriguez PT

## 2021-09-20 ENCOUNTER — APPOINTMENT (OUTPATIENT)
Dept: PHYSICAL THERAPY | Facility: CLINIC | Age: 56
End: 2021-09-20
Payer: MEDICARE

## 2021-09-22 ENCOUNTER — HOSPITAL ENCOUNTER (OUTPATIENT)
Dept: PHYSICAL THERAPY | Facility: CLINIC | Age: 56
Setting detail: THERAPIES SERIES
Discharge: HOME | End: 2021-09-22
Payer: MEDICARE

## 2021-09-22 NOTE — FLOWSHEET NOTE
[] [facility] &  Therapy  [address]    [phone]  F: [phone]   [] [address]  [facility] 36   [address]  P: [phone]  F: [phone]  [] [address]  [address] [facility] [address] [facility] [address]  P: [phone]  F: [phone] [x] [address] [employer]  P: [phone]  F: [phone]  [] [address]  [address] [address] [facility] [address]   [address]   [place]: [phone]  F: [phone]   [] [place] [address] [facility] [address]  [place]: [phone]   F: [phone]     Physical Therapy Cancel/No Show note    Date: 2021  Patient: Anusha Crawford  : 1965  MRN: [id number]    Cancels/No Shows to date: 2 cx    For today's appointment patient:    [x]  Cancelled    [] Rescheduled appointment    [] No-show     Reason given by patient:    []  Patient ill    []  Conflicting appointment    [] No transportation      [] Conflict with work    [] No reason given    [] Weather related    [] COVID-19    [x] Other:      Comments:  Patient stated she hurt her hand and is going to get it looked at.       [x] Next appointment was confirmed    Electronically signed by: Gigi Kerns

## 2021-09-24 DIAGNOSIS — G89.29 CHRONIC BILATERAL LOW BACK PAIN WITHOUT SCIATICA: ICD-10-CM

## 2021-09-24 DIAGNOSIS — M54.50 CHRONIC BILATERAL LOW BACK PAIN WITHOUT SCIATICA: ICD-10-CM

## 2021-09-24 RX ORDER — GABAPENTIN 300 MG/1
CAPSULE ORAL
Qty: 90 CAPSULE | Refills: 0 | Status: SHIPPED | OUTPATIENT
Start: 2021-09-24 | End: 2021-11-08 | Stop reason: SDUPTHER

## 2021-09-24 NOTE — TELEPHONE ENCOUNTER
Electronic medication refill request. Pharmacy on file. Please advise.       Next Visit Date:  Future Appointments   Date Time Provider Hollis Jacobo   9/29/2021 10:30 AM NGOZI Dawson PT   11/24/2021 10:00 AM [facility] [address] Maintenance   Topic Date Due    COVID-19 Vaccine (1) Never done    Low dose CT lung screening  05/27/2021    Flu vaccine (1) 09/01/2021    Shingles Vaccine (1 of 2) 02/03/2022 (Originally 3/30/2015)    A1C test (Diabetic or Prediabetic)  02/19/2022    Annual Wellness Visit (AWV)  02/23/2022    Breast cancer screen  03/19/2023    Lipid screen  02/19/2026    DTaP/Tdap/Td vaccine (2 - Td or Tdap) 08/24/2026    Pneumococcal 0-64 years Vaccine (2 of 2 - PPSV23) 03/30/2030    Colon cancer screen colonoscopy  10/07/2030    Hepatitis C screen  Completed    HIV screen  Completed    Hepatitis A vaccine  Aged Out    Hepatitis B vaccine  Aged Out    Hib vaccine  Aged Out    Meningococcal (ACWY) vaccine  Aged Out       Hemoglobin A1C (%)   Date Value   02/19/2021 6.1 (H)   08/19/2020 5.9   09/20/2019 5.5             ( goal A1C is < 7)   No results found for: LABMICR  LDL Cholesterol (mg/dL)   Date Value   02/19/2021 72   08/19/2020 64     LDL Calculated (mg/dL)   Date Value   04/27/2015 81       (goal LDL is <100)   AST (U/L)   Date Value   09/15/2021 21     ALT (U/L)   Date Value   09/15/2021 23     BUN (mg/dL)   Date Value   09/15/2021 14     BP Readings from Last 3 Encounters:   06/05/21 136/81   05/06/21 120/72   04/26/21 (!) 118/55          (goal 120/80)    All Future Testing planned in [facility]  Lab Frequency Next Occurrence   COVID-19 Once 12/13/2020   COVID-19 Once 01/10/2021   COVID-19 Ambulatory Once 01/18/2021   CBC Auto Differential Once 08/18/2021   Comprehensive Metabolic Panel Once 12/31/8838   Lipase Once 08/18/2021   Amylase Once 08/18/2021   CT lung screen [Initial/Annual] Once 07/29/2021   Hemoglobin A1C Once 08/19/2021   CBC Auto Differential Once 08/19/2021   Vitamin D 25 Hydroxy Once 08/19/2021   Comprehensive Metabolic Panel Once 99/43/1881   VL DUP LOWER EXTREMITY VENOUS BILATERAL Once 09/15/2021   VL DUP LOWER EXTREMITY VENOUS BILATERAL Once 09/15/2021               Patient Active Problem List:     Hyperglycemia     Anxiety     Bronchitis     Depression     Degenerative arthritis of lumbar spine     Osteoarthritis of knee     Insulin resistance     COPD (chronic obstructive pulmonary disease) (HCC)     Osteoarthritis thoracic spine     Osteoarthritis cervical spine     Prediabetes     Peripheral edema     Hepatic steatosis     Epigastric pain     Nausea     Vitamin D deficiency     Tobacco abuse     Vocal cord polyp     Laryngitis     Obesity     Encounter for diabetic foot exam ([facility] [address]     SOB (shortness of breath)     Family history of MI (myocardial infarction)     Bilateral leg edema     Pulmonary HTN (HCC)     TAMARA on CPAP     Colon polyps     Lumbosacral spondylosis without myelopathy     Chronic bilateral low back pain     Family history of ischemic heart disease and other diseases of the circulatory system     Carpal tunnel syndrome     Chest pain     Chronic maxillary sinusitis     Chronic pain disorder     Disorder of bursae of shoulder region     Displacement of lumbar intervertebral disc without myelopathy     Herniation of intervertebral disc without myelopathy     Fibromyositis

## 2021-09-27 ENCOUNTER — APPOINTMENT (OUTPATIENT)
Dept: PHYSICAL THERAPY | Facility: CLINIC | Age: 56
End: 2021-09-27
Payer: MEDICARE

## 2021-09-29 ENCOUNTER — TELEPHONE (OUTPATIENT)
Dept: FAMILY MEDICINE CLINIC | Age: 56
End: 2021-09-29

## 2021-09-29 DIAGNOSIS — M47.817 LUMBOSACRAL SPONDYLOSIS WITHOUT MYELOPATHY: Primary | ICD-10-CM

## 2021-09-29 NOTE — TELEPHONE ENCOUNTER
Patient called to request an order for a new handicap placard. Her current placard expires in December, please advise. Thank you! Patient states order can be mailed to her once completed.

## 2021-11-08 DIAGNOSIS — M54.50 CHRONIC BILATERAL LOW BACK PAIN WITHOUT SCIATICA: ICD-10-CM

## 2021-11-08 DIAGNOSIS — G89.29 CHRONIC BILATERAL LOW BACK PAIN WITHOUT SCIATICA: ICD-10-CM

## 2021-11-08 RX ORDER — GABAPENTIN 300 MG/1
CAPSULE ORAL
Qty: 90 CAPSULE | Refills: 2 | Status: SHIPPED | OUTPATIENT
Start: 2021-11-08 | End: 2022-05-05

## 2021-11-08 NOTE — TELEPHONE ENCOUNTER
Next Visit Date:  Future Appointments   Date Time Provider Hollis Fayi   11/24/2021 10:00 AM [facility] [address] Maintenance   Topic Date Due    COVID-19 Vaccine (1) Never done    Low dose CT lung screening  05/27/2021    Flu vaccine (1) 09/01/2021    Shingles Vaccine (1 of 2) 02/03/2022 (Originally 3/30/2015)    A1C test (Diabetic or Prediabetic)  02/19/2022    Annual Wellness Visit (AWV)  02/23/2022    Breast cancer screen  03/19/2023    Lipid screen  02/19/2026    DTaP/Tdap/Td vaccine (2 - Td or Tdap) 08/24/2026    Pneumococcal 0-64 years Vaccine (2 of 2 - PPSV23) 03/30/2030    Colon cancer screen colonoscopy  10/07/2030    Hepatitis C screen  Completed    HIV screen  Completed    Hepatitis A vaccine  Aged Out    Hepatitis B vaccine  Aged Out    Hib vaccine  Aged Out    Meningococcal (ACWY) vaccine  Aged Out       Hemoglobin A1C (%)   Date Value   02/19/2021 6.1 (H)   08/19/2020 5.9   09/20/2019 5.5             ( goal A1C is < 7)   No results found for: LABMICR  LDL Cholesterol (mg/dL)   Date Value   02/19/2021 72   08/19/2020 64     LDL Calculated (mg/dL)   Date Value   04/27/2015 81       (goal LDL is <100)   AST (U/L)   Date Value   09/15/2021 21     ALT (U/L)   Date Value   09/15/2021 23     BUN (mg/dL)   Date Value   09/15/2021 14     BP Readings from Last 3 Encounters:   06/05/21 136/81   05/06/21 120/72   04/26/21 (!) 118/55          (goal 120/80)    All Future Testing planned in [facility]  Lab Frequency Next Occurrence   COVID-19 Once 12/13/2020   COVID-19 Once 01/10/2021   COVID-19 Ambulatory Once 01/18/2021   CBC Auto Differential Once 08/18/2021   Comprehensive Metabolic Panel Once 52/41/3702   Lipase Once 08/18/2021   Amylase Once 08/18/2021   CT lung screen [Initial/Annual] Once 07/29/2021   Hemoglobin A1C Once 08/19/2021   CBC Auto Differential Once 08/19/2021   Vitamin D 25 Hydroxy Once 08/19/2021   Comprehensive Metabolic Panel Once 97/36/1929 VL DUP LOWER EXTREMITY VENOUS BILATERAL Once 09/15/2021   VL DUP LOWER EXTREMITY VENOUS BILATERAL Once 09/15/2021               Patient Active Problem List:     Hyperglycemia     Anxiety     Bronchitis     Depression     Degenerative arthritis of lumbar spine     Osteoarthritis of knee     Insulin resistance     COPD (chronic obstructive pulmonary disease) (HCC)     Osteoarthritis thoracic spine     Osteoarthritis cervical spine     Prediabetes     Peripheral edema     Hepatic steatosis     Epigastric pain     Nausea     Vitamin D deficiency     Tobacco abuse     Vocal cord polyp     Laryngitis     Obesity     Encounter for diabetic foot exam ([facility] [address]     SOB (shortness of breath)     Family history of MI (myocardial infarction)     Bilateral leg edema     Pulmonary HTN (HCC)     TAMARA on CPAP     Colon polyps     Lumbosacral spondylosis without myelopathy     Chronic bilateral low back pain     Family history of ischemic heart disease and other diseases of the circulatory system     Carpal tunnel syndrome     Chest pain     Chronic maxillary sinusitis     Chronic pain disorder     Disorder of bursae of shoulder region     Displacement of lumbar intervertebral disc without myelopathy     Herniation of intervertebral disc without myelopathy     Fibromyositis

## 2022-01-18 DIAGNOSIS — F41.9 ANXIETY: ICD-10-CM

## 2022-01-18 DIAGNOSIS — J44.9 CHRONIC OBSTRUCTIVE PULMONARY DISEASE, UNSPECIFIED COPD TYPE (HCC): ICD-10-CM

## 2022-01-18 RX ORDER — ALBUTEROL SULFATE 90 UG/1
2 AEROSOL, METERED RESPIRATORY (INHALATION) EVERY 6 HOURS PRN
Qty: 3 EACH | Refills: 1 | Status: SHIPPED | OUTPATIENT
Start: 2022-01-18

## 2022-01-18 RX ORDER — BUSPIRONE HYDROCHLORIDE 5 MG/1
TABLET ORAL
Qty: 90 TABLET | Refills: 2 | Status: SHIPPED | OUTPATIENT
Start: 2022-01-18 | End: 2022-05-05

## 2022-01-18 NOTE — TELEPHONE ENCOUNTER
Sheyla Birch is calling to request a refill on the following medication(s):    Medication Request:  Requested Prescriptions     Pending Prescriptions Disp Refills    albuterol sulfate HFA (PROAIR HFA) 108 (90 Base) MCG/ACT inhaler 3 each 1     Sig: Inhale 2 puffs into the lungs every 6 hours as needed for Wheezing    busPIRone (BUSPAR) 5 MG tablet 90 tablet 2     Sig: TAKE ONE TABLET BY MOUTH THREE TIMES A DAY       Last Visit Date (If Applicable):  4/95/5054    Next Visit Date:    Visit date not found

## 2022-03-14 DIAGNOSIS — F41.9 ANXIETY: ICD-10-CM

## 2022-03-14 DIAGNOSIS — F32.A DEPRESSION, UNSPECIFIED DEPRESSION TYPE: ICD-10-CM

## 2022-03-14 NOTE — TELEPHONE ENCOUNTER
Pt stated that she feels she needs to be on 20mg. Pt stated that is the mg she use to take. Also I called pt back to schedule appt and I was unable to leave message.  Thank you

## 2022-03-14 NOTE — TELEPHONE ENCOUNTER
Please Approve or Refuse.   Send to Pharmacy per Pt's Request:      Next Visit Date:  Visit date not found   Last Visit Date: 8/18/2021    Hemoglobin A1C (%)   Date Value   02/19/2021 6.1 (H)   08/19/2020 5.9   09/20/2019 5.5             ( goal A1C is < 7)   BP Readings from Last 3 Encounters:   06/05/21 136/81   05/06/21 120/72   04/26/21 (!) 118/55          (goal 120/80)  BUN   Date Value Ref Range Status   09/15/2021 14 6 - 20 mg/dL Final     CREATININE   Date Value Ref Range Status   09/15/2021 0.80 0.50 - 0.90 mg/dL Final     Potassium   Date Value Ref Range Status   09/15/2021 4.3 3.7 - 5.3 mmol/L Final

## 2022-03-23 DIAGNOSIS — F41.9 ANXIETY: ICD-10-CM

## 2022-03-23 DIAGNOSIS — F32.A DEPRESSION, UNSPECIFIED DEPRESSION TYPE: ICD-10-CM

## 2022-03-23 NOTE — TELEPHONE ENCOUNTER
Requested Prescriptions     Pending Prescriptions Disp Refills    VORTIoxetine (TRINTELLIX) 10 MG TABS tablet 30 tablet 5     Sig: TAKE ONE TABLET BY MOUTH DAILY

## 2022-05-05 ENCOUNTER — OFFICE VISIT (OUTPATIENT)
Dept: FAMILY MEDICINE CLINIC | Age: 57
End: 2022-05-05
Payer: MEDICARE

## 2022-05-05 VITALS
DIASTOLIC BLOOD PRESSURE: 82 MMHG | SYSTOLIC BLOOD PRESSURE: 126 MMHG | HEART RATE: 88 BPM | BODY MASS INDEX: 47.09 KG/M2 | HEIGHT: 66 IN | WEIGHT: 293 LBS | OXYGEN SATURATION: 93 %

## 2022-05-05 DIAGNOSIS — N39.41 URGE INCONTINENCE OF URINE: ICD-10-CM

## 2022-05-05 DIAGNOSIS — I27.20 PULMONARY HTN (HCC): ICD-10-CM

## 2022-05-05 DIAGNOSIS — M25.50 CHRONIC PAIN OF MULTIPLE JOINTS: ICD-10-CM

## 2022-05-05 DIAGNOSIS — D58.2 ELEVATED HEMOGLOBIN (HCC): ICD-10-CM

## 2022-05-05 DIAGNOSIS — R73.03 PREDIABETES: ICD-10-CM

## 2022-05-05 DIAGNOSIS — Z99.89 OSA ON CPAP: ICD-10-CM

## 2022-05-05 DIAGNOSIS — Z12.31 ENCOUNTER FOR SCREENING MAMMOGRAM FOR MALIGNANT NEOPLASM OF BREAST: ICD-10-CM

## 2022-05-05 DIAGNOSIS — G89.29 CHRONIC PAIN OF MULTIPLE JOINTS: ICD-10-CM

## 2022-05-05 DIAGNOSIS — R53.83 FATIGUE, UNSPECIFIED TYPE: ICD-10-CM

## 2022-05-05 DIAGNOSIS — E78.1 HYPERTRIGLYCERIDEMIA: ICD-10-CM

## 2022-05-05 DIAGNOSIS — G47.33 OSA ON CPAP: ICD-10-CM

## 2022-05-05 DIAGNOSIS — J44.9 CHRONIC OBSTRUCTIVE PULMONARY DISEASE, UNSPECIFIED COPD TYPE (HCC): ICD-10-CM

## 2022-05-05 DIAGNOSIS — F33.1 MODERATE EPISODE OF RECURRENT MAJOR DEPRESSIVE DISORDER (HCC): Primary | ICD-10-CM

## 2022-05-05 DIAGNOSIS — Z87.891 PERSONAL HISTORY OF TOBACCO USE: ICD-10-CM

## 2022-05-05 DIAGNOSIS — K21.9 GASTROESOPHAGEAL REFLUX DISEASE, UNSPECIFIED WHETHER ESOPHAGITIS PRESENT: ICD-10-CM

## 2022-05-05 PROBLEM — E11.9 ENCOUNTER FOR DIABETIC FOOT EXAM (HCC): Status: RESOLVED | Noted: 2017-12-05 | Resolved: 2022-05-05

## 2022-05-05 PROCEDURE — 99214 OFFICE O/P EST MOD 30 MIN: CPT | Performed by: STUDENT IN AN ORGANIZED HEALTH CARE EDUCATION/TRAINING PROGRAM

## 2022-05-05 PROCEDURE — G0296 VISIT TO DETERM LDCT ELIG: HCPCS | Performed by: STUDENT IN AN ORGANIZED HEALTH CARE EDUCATION/TRAINING PROGRAM

## 2022-05-05 PROCEDURE — 3017F COLORECTAL CA SCREEN DOC REV: CPT | Performed by: STUDENT IN AN ORGANIZED HEALTH CARE EDUCATION/TRAINING PROGRAM

## 2022-05-05 PROCEDURE — 3023F SPIROM DOC REV: CPT | Performed by: STUDENT IN AN ORGANIZED HEALTH CARE EDUCATION/TRAINING PROGRAM

## 2022-05-05 PROCEDURE — G8427 DOCREV CUR MEDS BY ELIG CLIN: HCPCS | Performed by: STUDENT IN AN ORGANIZED HEALTH CARE EDUCATION/TRAINING PROGRAM

## 2022-05-05 PROCEDURE — G8417 CALC BMI ABV UP PARAM F/U: HCPCS | Performed by: STUDENT IN AN ORGANIZED HEALTH CARE EDUCATION/TRAINING PROGRAM

## 2022-05-05 PROCEDURE — 4004F PT TOBACCO SCREEN RCVD TLK: CPT | Performed by: STUDENT IN AN ORGANIZED HEALTH CARE EDUCATION/TRAINING PROGRAM

## 2022-05-05 RX ORDER — OMEPRAZOLE 20 MG/1
20 CAPSULE, DELAYED RELEASE ORAL
Qty: 30 CAPSULE | Refills: 0 | Status: SHIPPED | OUTPATIENT
Start: 2022-05-05 | End: 2022-07-13

## 2022-05-05 RX ORDER — BUSPIRONE HYDROCHLORIDE 10 MG/1
10 TABLET ORAL 3 TIMES DAILY
Qty: 90 TABLET | Refills: 3 | Status: SHIPPED | OUTPATIENT
Start: 2022-05-05 | End: 2022-06-04

## 2022-05-05 RX ORDER — TRAMADOL HYDROCHLORIDE 50 MG/1
50 TABLET ORAL DAILY PRN
Qty: 18 TABLET | Refills: 0 | Status: SHIPPED | OUTPATIENT
Start: 2022-05-05 | End: 2022-06-04

## 2022-05-05 SDOH — ECONOMIC STABILITY: FOOD INSECURITY: WITHIN THE PAST 12 MONTHS, THE FOOD YOU BOUGHT JUST DIDN'T LAST AND YOU DIDN'T HAVE MONEY TO GET MORE.: NEVER TRUE

## 2022-05-05 SDOH — ECONOMIC STABILITY: FOOD INSECURITY: WITHIN THE PAST 12 MONTHS, YOU WORRIED THAT YOUR FOOD WOULD RUN OUT BEFORE YOU GOT MONEY TO BUY MORE.: NEVER TRUE

## 2022-05-05 ASSESSMENT — ENCOUNTER SYMPTOMS
CONSTIPATION: 0
ABDOMINAL PAIN: 0
SHORTNESS OF BREATH: 0
DIARRHEA: 0
COUGH: 0
SORE THROAT: 0
VOMITING: 0
WHEEZING: 0
EYE DISCHARGE: 0
NAUSEA: 0
CHEST TIGHTNESS: 0

## 2022-05-05 ASSESSMENT — PATIENT HEALTH QUESTIONNAIRE - PHQ9
SUM OF ALL RESPONSES TO PHQ QUESTIONS 1-9: 17
2. FEELING DOWN, DEPRESSED OR HOPELESS: 3
5. POOR APPETITE OR OVEREATING: 0
8. MOVING OR SPEAKING SO SLOWLY THAT OTHER PEOPLE COULD HAVE NOTICED. OR THE OPPOSITE, BEING SO FIGETY OR RESTLESS THAT YOU HAVE BEEN MOVING AROUND A LOT MORE THAN USUAL: 1
SUM OF ALL RESPONSES TO PHQ9 QUESTIONS 1 & 2: 6
SUM OF ALL RESPONSES TO PHQ QUESTIONS 1-9: 17
10. IF YOU CHECKED OFF ANY PROBLEMS, HOW DIFFICULT HAVE THESE PROBLEMS MADE IT FOR YOU TO DO YOUR WORK, TAKE CARE OF THINGS AT HOME, OR GET ALONG WITH OTHER PEOPLE: 2
SUM OF ALL RESPONSES TO PHQ QUESTIONS 1-9: 17
9. THOUGHTS THAT YOU WOULD BE BETTER OFF DEAD, OR OF HURTING YOURSELF: 0
7. TROUBLE CONCENTRATING ON THINGS, SUCH AS READING THE NEWSPAPER OR WATCHING TELEVISION: 3
SUM OF ALL RESPONSES TO PHQ QUESTIONS 1-9: 17
4. FEELING TIRED OR HAVING LITTLE ENERGY: 3
1. LITTLE INTEREST OR PLEASURE IN DOING THINGS: 3
6. FEELING BAD ABOUT YOURSELF - OR THAT YOU ARE A FAILURE OR HAVE LET YOURSELF OR YOUR FAMILY DOWN: 1
3. TROUBLE FALLING OR STAYING ASLEEP: 3

## 2022-05-05 ASSESSMENT — SOCIAL DETERMINANTS OF HEALTH (SDOH): HOW HARD IS IT FOR YOU TO PAY FOR THE VERY BASICS LIKE FOOD, HOUSING, MEDICAL CARE, AND HEATING?: SOMEWHAT HARD

## 2022-05-05 NOTE — PROGRESS NOTES
[address] [place] [address] CARE  [address] [place] [address] [facility]  Dept: [phone]  Dept Fax: [phone]    Olesya Simmons is a 62 y.o. female who is a Established patient, who presents today for her medical conditions/complaints as noted below:  Chief Complaint   Patient presents with    Establish Care         HPI:     She is here today to follow-up on her chronic conditions and to meet new provider. She says that her anxiety and depression have been getting worse over time. She is currently on Trintellix 10 mg daily and says that she previously used to be on a higher dose that helped her better. She is also on BuSpar but says that has not been doing much, she has been taking 5 mg once a day. She says that she feels anxious and on the edge all the time and feels that she needs something to help her calm down. She is taking care of her young grandson and the physical effort is taking a toll on her. She says that she feels tired and fatigued all the time and has absolutely no energy. She also has history of sleep apnea and has not been using her CPAP machine for the past year since she needs new supplies. She has COPD and is on Symbicort inhaler which she says has not been working as well. She also has chronic multiple joint pain and has had autoimmune labs done previously which were negative. She says that because of her acid reflux issues she tries not to take any ibuprofen but there is nothing else that has been working. She says that previously she has tried tramadol which really does work well for her. She stopped the gabapentin stating that it was doing nothing to help her pain. She also has prolapsed bladder and is requesting referral to urology. She has chronic incontinence for which she uses pads. She is also due for mammogram and lung cancer screening.          Hemoglobin A1C (%)   Date Value   02/19/2021 6.1 (H)   08/19/2020 5.9   09/20/2019 5.5 ( goal A1Cis < 7)   No results found for: LABMICR  LDL Cholesterol (mg/dL)   Date Value   2021 72   2020 64   2019 69     LDL Calculated (mg/dL)   Date Value   2015 81       (goal LDL is <100)   AST (U/L)   Date Value   09/15/2021 21     ALT (U/L)   Date Value   09/15/2021 23     BUN (mg/dL)   Date Value   09/15/2021 14     BP Readings from Last 3 Encounters:   22 126/82   21 136/81   21 120/72          (goal 120/80)    Past Medical History:   Diagnosis Date    Anxiety     Asthma     Bladder incontinence     At times per pt.     Bladder prolapse, female, acquired     Cancer ([facility] [address] 1996    cervical; treated with hysterectomy    Carpal tunnel syndrome 12/15/2010    Chronic bilateral low back pain     Colon polyps 10/07/2020    tubular adenomas x4    COPD (chronic obstructive pulmonary disease) ([address]     Depression     Epigastric pain     Hepatic steatosis     History of cataract extraction with lens replacement     bilateral    History of stress test     Hyperglycemia     Intertrigo     Lumbar degenerative disc disease     Nausea     Obesity 2017    Osteoarthritis     right knee    Osteoarthritis cervical spine     Osteoarthritis thoracic spine     Tobacco abuse 2017      Past Surgical History:   Procedure Laterality Date    CATARACT REMOVAL       SECTION      x1    COLONOSCOPY N/A 10/7/2020    tubular adenomas x4    HYSTERECTOMY, VAGINAL      secondary to cervical cancer--ovaries still present    NERVE BLOCK Bilateral 2020    NERVE BLOCK BILATERAL - MBB #1 L3-4, L4-5, L5-S1 (Bilateral )     NERVE BLOCK Bilateral 2020    NERVE BLOCK BILATERAL - MBB #2 L3-4, L4-5, L5-S1    NERVE BLOCK  01/15/2021    : NERVE RADIOFREQUENCY ABLATION L3-4, L4-5, L5-S1 (Left )    PAIN MANAGEMENT PROCEDURE Bilateral 2020    NERVE BLOCK BILATERAL - MBB #1 L3-4, L4-5, L5-S1 performed by Breanna Thacker MD at [facility]    PAIN MANAGEMENT PROCEDURE Bilateral 12/18/2020    NERVE BLOCK BILATERAL - MBB #2 L3-4, L4-5, L5-S1 performed by Bethanie Prado MD at [address] [place] [address] Left 1/15/2021    NERVE RADIOFREQUENCY ABLATION L3-4, L4-5, L5-S1 performed by Bethanie Prado MD at [address] [place] [address] Left 01/22/2021    NERVE RADIOFREQUENCY ABLATION -L3-4, L4-5, L5-S1     PAIN MANAGEMENT PROCEDURE Left 1/22/2021    NERVE RADIOFREQUENCY ABLATION -L3-4, L4-5, L5-S1 performed by Bethanie Prado MD at [address] [place] [address]  2017    throat polyps removed       Family History   Problem Relation Age of Onset    Diabetes Mother         from pancreatic resection    Heart Disease Mother     Arthritis Mother     Depression Mother     Mental Illness Mother     Diabetes Father     Heart Disease Father     Depression Brother     Cancer Brother         bladder    Lung Cancer Brother 64        cause of death    Depression Sister     Breast Cancer Maternal Grandmother     Asthma Other         grandson    Breast Cancer Other         diagnosed in her 42's    High Blood Pressure Brother     Diabetes Brother     No Known Problems Maternal Grandfather     No Known Problems Paternal Grandmother     No Known Problems Paternal Grandfather     Arthritis Sister     Pancreatic Cancer Maternal Uncle     High Cholesterol Neg Hx     Kidney Disease Neg Hx     Stroke Neg Hx     Ovarian Cancer Neg Hx     Colon Cancer Neg Hx        Social History     Tobacco Use    Smoking status: Current Every Day Smoker     Packs/day: 1.00     Years: 40.00     Pack years: 40.00     Types: Cigarettes    Smokeless tobacco: Never Used    Tobacco comment: has tried cold turkey, patches, chantix   Substance Use Topics    Alcohol use: No     Alcohol/week: 0.0 standard drinks      Current Outpatient Medications   Medication Sig Dispense Refill    omeprazole (PRILOSEC) 20 MG delayed release capsule Take 1 capsule by mouth every morning (before breakfast) 30 capsule 0    VORTIoxetine HBr (TRINTELLIX) 20 MG TABS tablet Take 1 tablet by mouth daily 30 tablet 3    busPIRone (BUSPAR) 10 MG tablet Take 1 tablet by mouth 3 times daily 90 tablet 3    traMADol (ULTRAM) 50 MG tablet Take 1 tablet by mouth daily as needed for Pain for up to 30 days. Intended supply: 3 days. Take lowest dose possible to manage pain 18 tablet 0    metFORMIN (GLUCOPHAGE) 500 MG tablet Take 1,000 mg by mouth 2 times daily (with meals)      albuterol sulfate HFA (PROAIR HFA) 108 (90 Base) MCG/ACT inhaler Inhale 2 puffs into the lungs every 6 hours as needed for Wheezing 3 each 1    Handicap Placard MISC by Does not apply route Exp 9/2026 1 each 0    albuterol (PROVENTIL) (2.5 MG/3ML) 0.083% nebulizer solution       budesonide-formoterol (SYMBICORT) 160-4.5 MCG/ACT AERO INHALE TWO PUFFS BY MOUTH TWICE A DAY 1 Inhaler 2    albuterol (PROVENTIL) (2.5 MG/3ML) 0.083% nebulizer solution Take 3 mLs by nebulization every 6 hours as needed for Wheezing 120 each 1    nystatin (NYAMYC) 719680 UNIT/GM powder APPLY ONE DOSE TOPICALLY TWICE A DAY 1 Bottle 3    Blood Glucose Monitoring Suppl (ACURA BLOOD GLUCOSE METER) W/DEVICE KIT 1 each by Does not apply route 2 times daily 1 kit 0    Nebulizers (COMPRESSOR/NEBULIZER) MISC 1 vial by Does not apply route 4 times daily as needed. Patient has albuterol prescription at home; needs aerosol machine and set up diagnosis chronic bronchitis 1 each 0     No current facility-administered medications for this visit.      Allergies   Allergen Reactions    Amoxicillin-Pot Clavulanate Diarrhea and Other (See Comments)     augmentin      Medrol [Methylprednisolone] Other (See Comments)     Redness of hands and itching  Redness of hands and itching    Tape Ressie Men Tape] Rash       Health Maintenance   Topic Date Due    COVID-19 Vaccine (1) Never done    Shingles vaccine (1 of 2) Never done    Low dose CT lung screening  05/27/2021    A1C test (Diabetic or Prediabetic)  02/19/2022    Annual Wellness Visit (AWV)  02/23/2022    Flu vaccine (Season Ended) 09/01/2022    Breast cancer screen  03/19/2023    Depression Monitoring  05/05/2023    Lipids  02/19/2026    DTaP/Tdap/Td vaccine (2 - Td or Tdap) 08/24/2026    Pneumococcal 0-64 years Vaccine (3 - PPSV23 or PCV20) 03/30/2030    Colorectal Cancer Screen  10/07/2030    Hepatitis C screen  Completed    HIV screen  Completed    Hepatitis A vaccine  Aged Out    Hepatitis B vaccine  Aged Out    Hib vaccine  Aged Out    Meningococcal (ACWY) vaccine  Aged Out       Subjective:     Review of Systems   Constitutional: Positive for fatigue. Negative for appetite change and fever. HENT: Negative for congestion, ear pain, hearing loss and sore throat. Eyes: Negative for discharge and visual disturbance. Respiratory: Negative for cough, chest tightness, shortness of breath and wheezing. Cardiovascular: Negative for chest pain, palpitations and leg swelling. Gastrointestinal: Negative for abdominal pain, constipation, diarrhea, nausea and vomiting. Genitourinary: Positive for frequency. Negative for flank pain, hematuria and urgency. Musculoskeletal: Positive for arthralgias. Negative for gait problem, joint swelling and myalgias. Skin: Negative. Neurological: Negative for dizziness, weakness, numbness and headaches. Psychiatric/Behavioral: Positive for dysphoric mood. The patient is nervous/anxious. Objective:     Physical Exam  Vitals reviewed. Constitutional:       Appearance: Normal appearance. She is normal weight. HENT:      Head: Normocephalic and atraumatic. Nose: Nose normal.      Mouth/Throat:      Mouth: Mucous membranes are moist.      Pharynx: Oropharynx is clear. Eyes:      Extraocular Movements: Extraocular movements intact.       Conjunctiva/sclera: Conjunctivae normal. Pupils: Pupils are equal, round, and reactive to light. Cardiovascular:      Rate and Rhythm: Normal rate and regular rhythm. Heart sounds: Normal heart sounds. No murmur heard. No gallop. Pulmonary:      Effort: Pulmonary effort is normal. No respiratory distress. Breath sounds: Normal breath sounds. No stridor. No wheezing. Abdominal:      General: Bowel sounds are normal. There is no distension. Palpations: Abdomen is soft. Tenderness: There is no abdominal tenderness. There is no guarding or rebound. Musculoskeletal:         General: No swelling or tenderness. Normal range of motion. Cervical back: Normal range of motion and neck supple. Skin:     General: Skin is warm and dry. Coloration: Skin is not jaundiced. Findings: No rash. Neurological:      General: No focal deficit present. Mental Status: She is alert and oriented to person, place, and time. Psychiatric:         Mood and Affect: Mood normal.         Behavior: Behavior normal.         Thought Content: Thought content normal.         Judgment: Judgment normal.       /82 (Site: Right Upper Arm, Position: Sitting, Cuff Size: Large Adult)   Pulse 88   Ht 5' 6\" (1.676 m)   Wt (!) 315 lb (142.9 kg)   SpO2 93%   BMI 50.84 kg/m²     Assessment/Plan:   1. Moderate episode of recurrent major depressive disorder (HCC)  -     TSH with Reflex; Future  -     VORTIoxetine HBr (TRINTELLIX) 20 MG TABS tablet; Take 1 tablet by mouth daily, Disp-30 tablet, R-3Normal  -     busPIRone (BUSPAR) 10 MG tablet; Take 1 tablet by mouth 3 times daily, Disp-90 tablet, R-3Normal  -     [address] ([address] [place] [address])  2. Chronic obstructive pulmonary disease, unspecified COPD type ([facility] [address]  3. Pulmonary HTN ([facility] [address]  4. Prediabetes  -     Hemoglobin A1c; Future  5. Elevated hemoglobin (HCC)  -     CBC with Auto Differential; Future  6.  TAMARA on CPAP  -     AFL - Can Parkinson MD, Pulmonology, [place]  - DME Order for CPAP as OP  7. Chronic pain of multiple joints  -     traMADol (ULTRAM) 50 MG tablet; Take 1 tablet by mouth daily as needed for Pain for up to 30 days. Intended supply: 3 days. Take lowest dose possible to manage pain, Disp-18 tablet, R-0Normal  8. Urge incontinence of urine  -     AFL - Sterling Diamond MD, Urology, [facility]  9. Hypertriglyceridemia  -     Lipid, Fasting; Future  10. Fatigue, unspecified type  -     CBC with Auto Differential; Future  -     Comprehensive Metabolic Panel, Fasting; Future  -     Vitamin B12 & Folate; Future  11. Gastroesophageal reflux disease, unspecified whether esophagitis present  -     omeprazole (PRILOSEC) 20 MG delayed release capsule; Take 1 capsule by mouth every morning (before breakfast), Disp-30 capsule, R-0Normal  12. Body mass index (BMI) 50.0-59.9, adult (HCC)  -     Vitamin D 25 Hydroxy; Future  13. Personal history of tobacco use  -     [place] VISIT TO DISCUSS LUNG CA SCREEN W LDCT  -     CT Lung Screen (Annual); Future  14. Encounter for screening mammogram for malignant neoplasm of breast  -     ZEN DIGITAL SCREEN W OR WO CAD BILATERAL; Future      Anxiety and depression-poorly controlled, increased Trintellix to 20 mg daily and BuSpar to 10 mg 2 times a day. Referral placed for psychotherapy. COPD-poorly controlled symptoms, on Symbicort and albuterol. Provided samples of Breztri today. Pulmonary hypertension-secondary to sleep apnea. Has not been using CPAP machine for past 1 year, needs new supplies. Order placed and referral placed for sleep medicine. Prediabetes-on metformin 1000 mg twice daily, last A1c 6.1    Urge incontinence-history of hysterectomy, was told she has prolapsed bladder. Referral placed for urology. Acid reflux-advised to stop using ibuprofen and started on daily omeprazole    Chronic joint pain-previous autoimmune labs negative, prescribed tramadol to be taken only when needed.        Return in about 1 month (around 2022) for medicare annual wellness visit. Orders Placed This Encounter   Procedures    CT Lung Screen (Annual)     Age: Patient is 62 y.o. Smoking History: Social History    Tobacco Use      Smoking status: Current Every Day Smoker        Packs/day: 1.00        Years: 40.00        Pack years: 40        Types: Cigarettes      Smokeless tobacco: Never Used      Tobacco comment: has tried cold turkey, patches, chantix    Vaping Use      Vaping Use: Some days    Alcohol use: No      Alcohol/week: 0.0 standard drinks    Drug use: Yes      Frequency: 4.0 times per week      Types: Marijuana (Weed)      Comment: Last used 20   Pack years: 40    Date of last lung cancer screenin2020     Standing Status:   Future     Standing Expiration Date:   2023     Order Specific Question:   Is there documentation of shared decision making? Answer:   Yes     Order Specific Question:   Is this a low dose CT or a routine CT? Answer:   Low Dose CT [1]     Order Specific Question:   Is this the first (baseline) CT or an annual exam?     Answer:   Baseline [1]     Order Specific Question:   Does the patient show any signs or symptoms of lung cancer? Answer:   No     Order Specific Question:   Smoking Status? Answer:   Current Every Day Smoker [1]     Order Specific Question:   Smoking packs per day? Answer:   1     Order Specific Question:   Years smoking?      Answer:   36    ZEN DIGITAL SCREEN W OR WO CAD BILATERAL     Standing Status:   Future     Standing Expiration Date:   2022     Order Specific Question:   Reason for exam:     Answer:   screening Z12.31    Hemoglobin A1c     Standing Status:   Future     Standing Expiration Date:   2023    CBC with Auto Differential     Standing Status:   Future     Standing Expiration Date:   2022    Comprehensive Metabolic Panel, Fasting     Standing Status:   Future     Standing Expiration Date:   2022    Lipid, Fasting Standing Status:   Future     Standing Expiration Date:   11/5/2022    TSH with Reflex     Standing Status:   Future     Standing Expiration Date:   11/5/2022    Vitamin D 25 Hydroxy     Standing Status:   Future     Standing Expiration Date:   11/5/2022    Vitamin B12 & Folate     Standing Status:   Future     Standing Expiration Date:   5/5/2023    MATT Acuña MD, Pulmonology, [facility]     Referral Priority:   Routine     Referral Type:   Eval and Treat     Referral Reason:   Specialty Services Required     Referred to Provider:   Karin Pena MD     Requested Specialty:   Sleep Medicine     Number of Visits Requested:   [address] [facility] ([facility])     Referral Priority:   Routine     Referral Type:   Behavioral Health     Referral Reason:   Specialty Services Required     Requested Specialty:   Behavioral Health     Number of Visits Requested:   1    MATT August MD, Urology, [facility]     Referral Priority:   Routine     Referral Type:   Eval and Treat     Referral Reason:   Specialty Services Required     Referred to Provider:   Michelle Buenrostro MD     Requested Specialty:   Urology     Number of Visits Requested:   1    [place] VISIT TO DISCUSS LUNG CA SCREEN W LDCT    DME Order for CPAP as OP     Nonheated Humidifier    Tubing 1 per 3 months    Nasal Pillow 1 per 3 months and Replacement Pillows 2 per month    Headgear 1 per 6 months, Chin Strap 1 per 6 months, Disposable Filters 2 per month, Non-disposable Filters 1 per 6 months, Chambers 1 per 6 months and Other Related Supplies. Replace supplies and accessories as needed. Patient may choose another interface for compliance and comfort.   Comments:   Diagnosis: TAMARA  Length of need: Lifetime     Orders Placed This Encounter   Medications    omeprazole (PRILOSEC) 20 MG delayed release capsule     Sig: Take 1 capsule by mouth every morning (before breakfast)     Dispense:  30 capsule     Refill:  0    VORTIoxetine HBr (TRINTELLIX) 20 MG TABS tablet     Sig: Take 1 tablet by mouth daily     Dispense:  30 tablet     Refill:  3    busPIRone (BUSPAR) 10 MG tablet     Sig: Take 1 tablet by mouth 3 times daily     Dispense:  90 tablet     Refill:  3    traMADol (ULTRAM) 50 MG tablet     Sig: Take 1 tablet by mouth daily as needed for Pain for up to 30 days. Intended supply: 3 days. Take lowest dose possible to manage pain     Dispense:  18 tablet     Refill:  0     Reduce doses taken as pain becomes manageable       Patient given educational materials - see patient instructions. Discussed use, benefit, and side effects of prescribed medications. All patientquestions answered. Pt voiced understanding. Reviewed health maintenance. Instructedto continue current medications, diet and exercise. Patient agreed with treatmentplan. Follow up as directed. Electronically signed by Mary Jones MD on 5/5/2022 at 2:53 PM  Low Dose CT (LDCT) Lung Screening criteria met:     Age 50-77(Medicare) or 50-80 (USPST)   Pack year smoking >20   Still smoking or less than 15 year since quit   No sign or symptoms of lung cancer   > 11 months since last LDCT     Risks and benefits of lung cancer screening with LDCT scans discussed:    Significance of positive screen - False-positive LDCT results often occur. 95% of all positive results do not lead to a diagnosis of cancer. Usually further imaging can resolve most false-positive results; however, some patients may require invasive procedures. Over diagnosis risk - 10% to 12% of screen-detected lung cancer cases are over diagnosed--that is, the cancer would not have been detected in the patient's lifetime without the screening.     Need for follow up screens annually to continue lung cancer screening effectiveness     Risks associated with radiation from annual LDCT- Radiation exposure is about the same as for a mammogram, which is about 1/3 of the annual background radiation exposure from everyday life. Starting screening at age 54 is not likely to increase cancer risk from radiation exposure. Patients with comorbidities resulting in life expectancy of < 10 years, or that would preclude treatment of an abnormality identified on CT, should not be screened due to lack of benefit.     To obtain maximal benefit from this screening, smoking cessation and long-term abstinence from smoking is critical

## 2022-06-02 ENCOUNTER — TELEPHONE (OUTPATIENT)
Dept: FAMILY MEDICINE CLINIC | Age: 57
End: 2022-06-02

## 2022-06-02 DIAGNOSIS — M79.671 RIGHT FOOT PAIN: Primary | ICD-10-CM

## 2022-06-02 NOTE — TELEPHONE ENCOUNTER
Pt called in stating she has had ongoing Right foot pain now its to the point she can barley even walk wants a referral for podiatry

## 2022-06-12 ENCOUNTER — HOSPITAL ENCOUNTER (OUTPATIENT)
Facility: CLINIC | Age: 57
Discharge: HOME | End: 2022-06-12
Payer: MEDICARE

## 2022-06-12 DIAGNOSIS — F33.1 MODERATE EPISODE OF RECURRENT MAJOR DEPRESSIVE DISORDER (HCC): ICD-10-CM

## 2022-06-12 DIAGNOSIS — E78.1 HYPERTRIGLYCERIDEMIA: ICD-10-CM

## 2022-06-12 DIAGNOSIS — R73.03 PREDIABETES: ICD-10-CM

## 2022-06-12 DIAGNOSIS — D58.2 ELEVATED HEMOGLOBIN (HCC): ICD-10-CM

## 2022-06-12 DIAGNOSIS — R53.83 FATIGUE, UNSPECIFIED TYPE: ICD-10-CM

## 2022-06-12 LAB
ABSOLUTE EOS #: 0.14 K/UL (ref 0–0.44)
ABSOLUTE IMMATURE GRANULOCYTE: <0.03 K/UL (ref 0–0.3)
ABSOLUTE LYMPH #: 1.61 K/UL (ref 1.1–3.7)
ABSOLUTE MONO #: 0.51 K/UL (ref 0.1–1.2)
ALBUMIN SERPL-MCNC: 4.3 G/DL (ref 3.5–5.2)
ALBUMIN/GLOBULIN RATIO: 1.7 (ref 1–2.5)
ALP BLD-CCNC: 79 U/L (ref 35–104)
ALT SERPL-CCNC: 24 U/L (ref 5–33)
ANION GAP SERPL CALCULATED.3IONS-SCNC: 13 MMOL/L (ref 9–17)
AST SERPL-CCNC: 20 U/L
BASOPHILS # BLD: 1 % (ref 0–2)
BASOPHILS ABSOLUTE: 0.05 K/UL (ref 0–0.2)
BILIRUB SERPL-MCNC: 0.48 MG/DL (ref 0.3–1.2)
BUN BLDV-MCNC: 12 MG/DL (ref 6–20)
C-REACTIVE PROTEIN: 7.5 MG/L (ref 0–5)
CALCIUM SERPL-MCNC: 9 MG/DL (ref 8.6–10.4)
CHLORIDE BLD-SCNC: 103 MMOL/L (ref 98–107)
CHOLESTEROL, FASTING: 147 MG/DL
CHOLESTEROL/HDL RATIO: 3.6
CO2: 27 MMOL/L (ref 20–31)
CREAT SERPL-MCNC: 0.53 MG/DL (ref 0.5–0.9)
EOSINOPHILS RELATIVE PERCENT: 2 % (ref 1–4)
FOLATE: 10 NG/ML
GFR AFRICAN AMERICAN: >60 ML/MIN
GFR NON-AFRICAN AMERICAN: >60 ML/MIN
GFR SERPL CREATININE-BSD FRML MDRD: ABNORMAL ML/MIN/{1.73_M2}
GLUCOSE FASTING: 110 MG/DL (ref 70–99)
HCT VFR BLD CALC: 54.1 % (ref 36.3–47.1)
HDLC SERPL-MCNC: 41 MG/DL
HEMOGLOBIN: 17.5 G/DL (ref 11.9–15.1)
IMMATURE GRANULOCYTES: 0 %
LDL CHOLESTEROL: 75 MG/DL (ref 0–130)
LYMPHOCYTES # BLD: 21 % (ref 24–43)
MCH RBC QN AUTO: 30.4 PG (ref 25.2–33.5)
MCHC RBC AUTO-ENTMCNC: 32.3 G/DL (ref 28.4–34.8)
MCV RBC AUTO: 94.1 FL (ref 82.6–102.9)
MONOCYTES # BLD: 7 % (ref 3–12)
NRBC AUTOMATED: 0 PER 100 WBC
PDW BLD-RTO: 14.6 % (ref 11.8–14.4)
PLATELET # BLD: 248 K/UL (ref 138–453)
PMV BLD AUTO: 11.5 FL (ref 8.1–13.5)
POTASSIUM SERPL-SCNC: 4.2 MMOL/L (ref 3.7–5.3)
RBC # BLD: 5.75 M/UL (ref 3.95–5.11)
RBC # BLD: ABNORMAL 10*6/UL
RHEUMATOID FACTOR: <10 IU/ML
SEDIMENTATION RATE, ERYTHROCYTE: 11 MM/HR (ref 0–30)
SEG NEUTROPHILS: 69 % (ref 36–65)
SEGMENTED NEUTROPHILS ABSOLUTE COUNT: 5.28 K/UL (ref 1.5–8.1)
SODIUM BLD-SCNC: 143 MMOL/L (ref 135–144)
TOTAL PROTEIN: 6.9 G/DL (ref 6.4–8.3)
TRIGLYCERIDE, FASTING: 156 MG/DL
TSH SERPL DL<=0.05 MIU/L-ACNC: 1.84 UIU/ML (ref 0.3–5)
VITAMIN B-12: 276 PG/ML (ref 232–1245)
VITAMIN D 25-HYDROXY: 17.2 NG/ML
WBC # BLD: 7.6 K/UL (ref 3.5–11.3)

## 2022-06-12 PROCEDURE — 80053 COMPREHEN METABOLIC PANEL: CPT

## 2022-06-12 PROCEDURE — 84443 ASSAY THYROID STIM HORMONE: CPT

## 2022-06-12 PROCEDURE — 86225 DNA ANTIBODY NATIVE: CPT

## 2022-06-12 PROCEDURE — 85025 COMPLETE CBC W/AUTO DIFF WBC: CPT

## 2022-06-12 PROCEDURE — 82607 VITAMIN B-12: CPT

## 2022-06-12 PROCEDURE — 86038 ANTINUCLEAR ANTIBODIES: CPT

## 2022-06-12 PROCEDURE — 86140 C-REACTIVE PROTEIN: CPT

## 2022-06-12 PROCEDURE — 86431 RHEUMATOID FACTOR QUANT: CPT

## 2022-06-12 PROCEDURE — 85652 RBC SED RATE AUTOMATED: CPT

## 2022-06-12 PROCEDURE — 82746 ASSAY OF FOLIC ACID SERUM: CPT

## 2022-06-12 PROCEDURE — 80061 LIPID PANEL: CPT

## 2022-06-12 PROCEDURE — 83036 HEMOGLOBIN GLYCOSYLATED A1C: CPT

## 2022-06-12 PROCEDURE — 82306 VITAMIN D 25 HYDROXY: CPT

## 2022-06-12 PROCEDURE — 36415 COLL VENOUS BLD VENIPUNCTURE: CPT

## 2022-06-13 LAB
ANTI DNA DOUBLE STRANDED: 0.8 IU/ML
ANTI-NUCLEAR ANTIBODY (ANA): NEGATIVE
ENA ANTIBODIES SCREEN: 0.2 U/ML
ESTIMATED AVERAGE GLUCOSE: 137 MG/DL
HBA1C MFR BLD: 6.4 % (ref 4–6)

## 2022-06-16 ENCOUNTER — OFFICE VISIT (OUTPATIENT)
Dept: FAMILY MEDICINE CLINIC | Age: 57
End: 2022-06-16
Payer: MEDICARE

## 2022-06-16 VITALS
SYSTOLIC BLOOD PRESSURE: 125 MMHG | HEIGHT: 66 IN | BODY MASS INDEX: 47.09 KG/M2 | OXYGEN SATURATION: 95 % | DIASTOLIC BLOOD PRESSURE: 75 MMHG | WEIGHT: 293 LBS | TEMPERATURE: 97.1 F | HEART RATE: 87 BPM

## 2022-06-16 DIAGNOSIS — Z72.0 TOBACCO ABUSE: ICD-10-CM

## 2022-06-16 DIAGNOSIS — Z87.891 PERSONAL HISTORY OF TOBACCO USE: ICD-10-CM

## 2022-06-16 DIAGNOSIS — E55.9 VITAMIN D DEFICIENCY: ICD-10-CM

## 2022-06-16 DIAGNOSIS — R73.03 PREDIABETES: Primary | ICD-10-CM

## 2022-06-16 DIAGNOSIS — R25.2 MUSCLE CRAMPS: ICD-10-CM

## 2022-06-16 DIAGNOSIS — R79.82 ELEVATED C-REACTIVE PROTEIN (CRP): ICD-10-CM

## 2022-06-16 DIAGNOSIS — R23.8 SKIN IRRITATION: ICD-10-CM

## 2022-06-16 DIAGNOSIS — M47.817 LUMBOSACRAL SPONDYLOSIS WITHOUT MYELOPATHY: ICD-10-CM

## 2022-06-16 PROCEDURE — 99406 BEHAV CHNG SMOKING 3-10 MIN: CPT | Performed by: STUDENT IN AN ORGANIZED HEALTH CARE EDUCATION/TRAINING PROGRAM

## 2022-06-16 PROCEDURE — 3017F COLORECTAL CA SCREEN DOC REV: CPT | Performed by: STUDENT IN AN ORGANIZED HEALTH CARE EDUCATION/TRAINING PROGRAM

## 2022-06-16 PROCEDURE — G8417 CALC BMI ABV UP PARAM F/U: HCPCS | Performed by: STUDENT IN AN ORGANIZED HEALTH CARE EDUCATION/TRAINING PROGRAM

## 2022-06-16 PROCEDURE — G8427 DOCREV CUR MEDS BY ELIG CLIN: HCPCS | Performed by: STUDENT IN AN ORGANIZED HEALTH CARE EDUCATION/TRAINING PROGRAM

## 2022-06-16 PROCEDURE — G0296 VISIT TO DETERM LDCT ELIG: HCPCS | Performed by: STUDENT IN AN ORGANIZED HEALTH CARE EDUCATION/TRAINING PROGRAM

## 2022-06-16 PROCEDURE — 99214 OFFICE O/P EST MOD 30 MIN: CPT | Performed by: STUDENT IN AN ORGANIZED HEALTH CARE EDUCATION/TRAINING PROGRAM

## 2022-06-16 PROCEDURE — 4004F PT TOBACCO SCREEN RCVD TLK: CPT | Performed by: STUDENT IN AN ORGANIZED HEALTH CARE EDUCATION/TRAINING PROGRAM

## 2022-06-16 RX ORDER — LANCETS 30 GAUGE
EACH MISCELLANEOUS
Qty: 100 EACH | Refills: 0 | Status: SHIPPED | OUTPATIENT
Start: 2022-06-16

## 2022-06-16 RX ORDER — CYCLOBENZAPRINE HCL 5 MG
5 TABLET ORAL 2 TIMES DAILY PRN
Qty: 20 TABLET | Refills: 2 | Status: SHIPPED | OUTPATIENT
Start: 2022-06-16 | End: 2022-06-26

## 2022-06-16 RX ORDER — NYSTATIN 100000 [USP'U]/G
POWDER TOPICAL
Qty: 1 EACH | Refills: 2 | Status: SHIPPED | OUTPATIENT
Start: 2022-06-16 | End: 2022-08-25 | Stop reason: SDUPTHER

## 2022-06-16 RX ORDER — GLUCOSAMINE HCL/CHONDROITIN SU 500-400 MG
CAPSULE ORAL
Qty: 100 STRIP | Refills: 1 | Status: SHIPPED | OUTPATIENT
Start: 2022-06-16

## 2022-06-16 RX ORDER — TRAMADOL HYDROCHLORIDE 50 MG/1
50 TABLET ORAL EVERY 8 HOURS PRN
Qty: 18 TABLET | Refills: 0 | Status: SHIPPED | OUTPATIENT
Start: 2022-06-16 | End: 2022-07-12

## 2022-06-16 ASSESSMENT — ENCOUNTER SYMPTOMS
SHORTNESS OF BREATH: 0
BACK PAIN: 1
ABDOMINAL PAIN: 0
CONSTIPATION: 0
CHEST TIGHTNESS: 0
DIARRHEA: 0
WHEEZING: 0
EYE DISCHARGE: 0
NAUSEA: 0
VOMITING: 0
SORE THROAT: 0
COUGH: 0

## 2022-06-16 NOTE — PROGRESS NOTES
[address] PRIMARY CARE  [address] [place] [address]  Dept: [phone]  Dept Fax: [phone]    Yon Foley is a 62 y.o. female who is a Established patient, who presents today for her medical conditions/complaints as noted below:  Chief Complaint   Patient presents with    Discuss Labs    Medication Check     needs a new glucose monitor and strips and lancets          HPI:     She is here today to follow-up on her recent labs. She has prediabetes, her recent A1c was elevated to 6.4, she says she has not been very compliant with diet lately. She also occasionally misses evening dose of metformin. She says that she has been drinking soda more frequently lately. She says the tramadol really helps with her back pain, she tries to take it only when absolutely needed. She also complains of having frequent muscle cramps and says that sometimes her hands lock up with cramping. She also complains of chronic joint pain and had labs done which show elevated CRP. She says that she had cut down on her smoking significantly but has recently has been get up again and is now back to smoking a pack a day. She also gets frequent fungal rash during summer months and is requesting refill of nystatin. Due for lung cancer screening.       Hemoglobin A1C (%)   Date Value   06/12/2022 6.4 (H)   02/19/2021 6.1 (H)   08/19/2020 5.9             ( goal A1Cis < 7)   No results found for: LABMICR  LDL Cholesterol (mg/dL)   Date Value   06/12/2022 75   02/19/2021 72   08/19/2020 64     LDL Calculated (mg/dL)   Date Value   04/27/2015 81       (goal LDL is <100)   AST (U/L)   Date Value   06/12/2022 20     ALT (U/L)   Date Value   06/12/2022 24     BUN (mg/dL)   Date Value   06/12/2022 12     BP Readings from Last 3 Encounters:   06/16/22 125/75   05/05/22 126/82   06/05/21 136/81          (goal 120/80)    Past Medical History:   Diagnosis Date    Anxiety     Asthma     Bladder incontinence     At times per pt.     Bladder prolapse, female, acquired     Cancer ([facility] [address] 1996    cervical; treated with hysterectomy    Carpal tunnel syndrome 12/15/2010    Chronic bilateral low back pain     Colon polyps 10/07/2020    tubular adenomas x4    COPD (chronic obstructive pulmonary disease) ([facility] [address]     Depression     Epigastric pain     Hepatic steatosis     History of cataract extraction with lens replacement     bilateral    History of stress test     Hyperglycemia     Intertrigo     Lumbar degenerative disc disease     Nausea     Obesity 2017    Osteoarthritis     right knee    Osteoarthritis cervical spine     Osteoarthritis thoracic spine     Tobacco abuse 2017      Past Surgical History:   Procedure Laterality Date    CATARACT REMOVAL       SECTION      x1    COLONOSCOPY N/A 10/7/2020    tubular adenomas x4    HYSTERECTOMY, VAGINAL      secondary to cervical cancer--ovaries still present    NERVE BLOCK Bilateral 2020    NERVE BLOCK BILATERAL - MBB #1 L3-4, L4-5, L5-S1 (Bilateral )     NERVE BLOCK Bilateral 2020    NERVE BLOCK BILATERAL - MBB #2 L3-4, L4-5, L5-S1    NERVE BLOCK  01/15/2021    : NERVE RADIOFREQUENCY ABLATION L3-4, L4-5, L5-S1 (Left )    PAIN MANAGEMENT PROCEDURE Bilateral 2020    NERVE BLOCK BILATERAL - MBB #1 L3-4, L4-5, L5-S1 performed by Jenelle Bauer MD at [address] [place] [address] Bilateral 2020    NERVE BLOCK BILATERAL - MBB #2 L3-4, L4-5, L5-S1 performed by Jenelle Bauer MD at [address] [place] [address] Left 1/15/2021    NERVE RADIOFREQUENCY ABLATION L3-4, L4-5, L5-S1 performed by Jenelle Bauer MD at [address] [place] [address] Left 2021    NERVE RADIOFREQUENCY ABLATION -L3-4, L4-5, L5-S1     PAIN MANAGEMENT PROCEDURE Left 2021    NERVE RADIOFREQUENCY ABLATION -L3-4, L4-5, L5-S1 performed by Jenelle Bauer MD at [address]  2017    throat polyps removed       Family History   Problem Relation Age of Onset    Diabetes Mother         from pancreatic resection    Heart Disease Mother     Arthritis Mother     Depression Mother     Mental Illness Mother     Diabetes Father     Heart Disease Father     Depression Brother     Cancer Brother         bladder    Lung Cancer Brother 64        cause of death    Depression Sister     Breast Cancer Maternal Grandmother     Asthma Other         grandson    Breast Cancer Other         diagnosed in her 42's    High Blood Pressure Brother     Diabetes Brother     No Known Problems Maternal Grandfather     No Known Problems Paternal Grandmother     No Known Problems Paternal Grandfather     Arthritis Sister     Pancreatic Cancer Maternal Uncle     High Cholesterol Neg Hx     Kidney Disease Neg Hx     Stroke Neg Hx     Ovarian Cancer Neg Hx     Colon Cancer Neg Hx        Social History     Tobacco Use    Smoking status: Current Every Day Smoker     Packs/day: 1.00     Years: 40.00     Pack years: 40.00     Types: Cigarettes    Smokeless tobacco: Never Used    Tobacco comment: has tried cold turkey, patches, chantix   Substance Use Topics    Alcohol use: No     Alcohol/week: 0.0 standard drinks      Current Outpatient Medications   Medication Sig Dispense Refill    nystatin (NYAMYC) 576936 UNIT/GM powder APPLY ONE DOSE TOPICALLY TWICE A DAY 1 each 2    vitamin D-3 (CHOLECALCIFEROL) 125 MCG (5000 UT) TABS Take 1 tablet by mouth daily 30 tablet 3    blood glucose monitor strips Test 3 times a day & as needed for symptoms of irregular blood glucose.  100 strip 1    Lancets MISC Use one Lancet per blood sugar test 100 each 0    cyclobenzaprine (FLEXERIL) 5 MG tablet Take 1 tablet by mouth 2 times daily as needed for Muscle spasms 20 tablet 2    traMADol (ULTRAM) 50 MG tablet Take 1 tablet by mouth every 8 hours as needed for Pain for up to 30 days. Intended supply: 3 days. Take lowest dose possible to manage pain 18 tablet 0    omeprazole (PRILOSEC) 20 MG delayed release capsule Take 1 capsule by mouth every morning (before breakfast) 30 capsule 0    VORTIoxetine HBr (TRINTELLIX) 20 MG TABS tablet Take 1 tablet by mouth daily 30 tablet 3    metFORMIN (GLUCOPHAGE) 500 MG tablet Take 1,000 mg by mouth 2 times daily (with meals)      albuterol sulfate HFA (PROAIR HFA) 108 (90 Base) MCG/ACT inhaler Inhale 2 puffs into the lungs every 6 hours as needed for Wheezing 3 each 1    Handicap Placard MISC by Does not apply route Exp 9/2026 1 each 0    albuterol (PROVENTIL) (2.5 MG/3ML) 0.083% nebulizer solution       Nebulizers (COMPRESSOR/NEBULIZER) MISC 1 vial by Does not apply route 4 times daily as needed. Patient has albuterol prescription at home; needs aerosol machine and set up diagnosis chronic bronchitis 1 each 0     No current facility-administered medications for this visit.      Allergies   Allergen Reactions    Amoxicillin-Pot Clavulanate Diarrhea and Other (See Comments)     augmentin      Medrol [Methylprednisolone] Other (See Comments)     Redness of hands and itching  Redness of hands and itching    Tape [Adhesive Tape] Rash       Health Maintenance   Topic Date Due    COVID-19 Vaccine (1) Never done    Shingles vaccine (1 of 2) Never done    Low dose CT lung screening  05/27/2021    Annual Wellness Visit (AWV)  02/23/2022    Flu vaccine (Season Ended) 09/01/2022    Breast cancer screen  03/19/2023    Depression Monitoring  05/05/2023    A1C test (Diabetic or Prediabetic)  06/12/2023    DTaP/Tdap/Td vaccine (2 - Td or Tdap) 08/24/2026    Lipids  06/12/2027    Pneumococcal 0-64 years Vaccine (3 - PPSV23 or PCV20) 03/30/2030    Colorectal Cancer Screen  10/07/2030    Hepatitis C screen  Completed    HIV screen  Completed    Hepatitis A vaccine  Aged Out    Hepatitis B vaccine  Aged Out    Hib vaccine  Aged Out  Meningococcal (ACWY) vaccine  Aged Out     1 ``   Subjective:     Review of Systems   Constitutional: Negative for appetite change, fatigue and fever. HENT: Negative for congestion, ear pain, hearing loss and sore throat. Eyes: Negative for discharge and visual disturbance. Respiratory: Negative for cough, chest tightness, shortness of breath and wheezing. Cardiovascular: Negative for chest pain, palpitations and leg swelling. Gastrointestinal: Negative for abdominal pain, constipation, diarrhea, nausea and vomiting. Genitourinary: Negative for flank pain, frequency, hematuria and urgency. Musculoskeletal: Positive for arthralgias and back pain. Negative for gait problem, joint swelling and myalgias. Skin: Negative. Neurological: Negative for dizziness, weakness, numbness and headaches. Psychiatric/Behavioral: Negative. Negative for dysphoric mood. The patient is not nervous/anxious. Objective:     Physical Exam  Vitals reviewed. Constitutional:       Appearance: Normal appearance. She is obese. HENT:      Head: Normocephalic and atraumatic. Nose: Nose normal.      Mouth/Throat:      Mouth: Mucous membranes are moist.      Pharynx: Oropharynx is clear. Eyes:      Extraocular Movements: Extraocular movements intact. Conjunctiva/sclera: Conjunctivae normal.      Pupils: Pupils are equal, round, and reactive to light. Cardiovascular:      Rate and Rhythm: Normal rate and regular rhythm. Heart sounds: Normal heart sounds. No murmur heard. No gallop. Pulmonary:      Effort: Pulmonary effort is normal. No respiratory distress. Breath sounds: Normal breath sounds. No stridor. No wheezing. Abdominal:      General: Bowel sounds are normal. There is no distension. Palpations: Abdomen is soft. Tenderness: There is no abdominal tenderness. There is no guarding or rebound. Musculoskeletal:         General: No swelling or tenderness.  Normal range of motion. Cervical back: Normal range of motion and neck supple. Skin:     General: Skin is warm and dry. Coloration: Skin is not jaundiced. Findings: No rash. Neurological:      General: No focal deficit present. Mental Status: She is alert and oriented to person, place, and time. Psychiatric:         Mood and Affect: Mood normal.         Behavior: Behavior normal.         Thought Content: Thought content normal.         Judgment: Judgment normal.       /75   Pulse 87   Temp 97.1 °F (36.2 °C)   Ht 5' 6\" (1.676 m)   Wt (!) 300 lb 8 oz (136.3 kg)   SpO2 95%   BMI 48.50 kg/m²     Assessment/Plan:   1. Prediabetes  -     DME Order for Glucometer as OP  -     blood glucose monitor strips; Test 3 times a day & as needed for symptoms of irregular blood glucose., Disp-100 strip, R-1, Normal  -     Lancets MISC; Disp-100 each, R-0, NormalUse one Lancet per blood sugar test  2. Elevated C-reactive protein (CRP)  -     [facility] - Guillermina Jones MD, Rheumatology, [place]  3. Lumbosacral spondylosis without myelopathy  -     traMADol (ULTRAM) 50 MG tablet; Take 1 tablet by mouth every 8 hours as needed for Pain for up to 30 days. Intended supply: 3 days. Take lowest dose possible to manage pain, Disp-18 tablet, R-0Normal  4. Muscle cramps  -     cyclobenzaprine (FLEXERIL) 5 MG tablet; Take 1 tablet by mouth 2 times daily as needed for Muscle spasms, Disp-20 tablet, R-2Normal  5. Tobacco abuse  6. Personal history of tobacco use  -     [place] VISIT TO DISCUSS LUNG CA SCREEN W LDCT  -     CT Lung Screen (Annual); Future  7. Skin irritation  -     nystatin (NYAMYC) 705096 UNIT/GM powder; APPLY ONE DOSE TOPICALLY TWICE A DAY, Disp-1 each, R-2, Normal  8. Vitamin D deficiency  -     vitamin D-3 (CHOLECALCIFEROL) 125 MCG (5000 UT) TABS; Take 1 tablet by mouth daily, Disp-30 tablet, R-3Normal    Prediabetes-recent A1c 6.4, on metformin 1000 mg twice daily.   Encouraged to stay compliant with the medications and cut down on sugars in diet. Elevated CRP, chronic multiple joint pains-referral placed to rheumatology    Chronic back pain-taking tramadol as needed and muscle relaxers for muscle cramps    Patient was counseled on tobacco cessation. Based upon patient's motivation to change her behavior, the following plan was agreed upon: patient will avoid triggers and negative influences. She was provided with a list of local tobacco cessation resources. Provider spent 3 minutes counseling patient. Return in about 3 months (around 2022) for medicare annual wellness visit. Orders Placed This Encounter   Procedures    CT Lung Screen (Annual)     Age: Patient is 62 y.o. Smoking History: Social History    Tobacco Use      Smoking status: Current Every Day Smoker        Packs/day: 1.00        Years: 40.00        Pack years: 40        Types: Cigarettes      Smokeless tobacco: Never Used      Tobacco comment: has tried cold turkey, patches, chantix    Vaping Use      Vaping Use: Some days    Alcohol use: No      Alcohol/week: 0.0 standard drinks    Drug use: Yes      Frequency: 4.0 times per week      Types: Marijuana (Weed)      Comment: Last used 20   Pack years: 40    Date of last lung cancer screenin2020     Standing Status:   Future     Standing Expiration Date:   2023     Order Specific Question:   Is there documentation of shared decision making? Answer:   Yes     Order Specific Question:   Is this a low dose CT or a routine CT? Answer:   Low Dose CT [1]     Order Specific Question:   Is this the first (baseline) CT or an annual exam?     Answer: Annual [2]     Order Specific Question:   Does the patient show any signs or symptoms of lung cancer? Answer:   No     Order Specific Question:   Smoking Status? Answer:   Current Every Day Smoker [1]     Order Specific Question:   Smoking packs per day? Answer:   1     Order Specific Question:   Years smoking?      Answer: [facility] Patrick Sheets MD, Rheumatology, [place]     Referral Priority:   Routine     Referral Type:   Eval and Treat     Referral Reason:   Specialty Services Required     Referred to Provider:   Tara Small MD     Requested Specialty:   Rheumatology     Number of Visits Requested:   1    [place] VISIT TO DISCUSS LUNG CA SCREEN W LDCT    DME Order for Glucometer as OP     You must complete the order parameters below and add the medical necessity documentation for this DME in a separate note. Home blood glucose monitor    Diagnosis: Diabetes    Testing frequency: Check blood sugars and record 3 times daily    Duration: purchase     Orders Placed This Encounter   Medications    nystatin (NYAMYC) 430550 UNIT/GM powder     Sig: APPLY ONE DOSE TOPICALLY TWICE A DAY     Dispense:  1 each     Refill:  2    vitamin D-3 (CHOLECALCIFEROL) 125 MCG (5000 UT) TABS     Sig: Take 1 tablet by mouth daily     Dispense:  30 tablet     Refill:  3    blood glucose monitor strips     Sig: Test 3 times a day & as needed for symptoms of irregular blood glucose. Dispense:  100 strip     Refill:  1    Lancets MISC     Sig: Use one Lancet per blood sugar test     Dispense:  100 each     Refill:  0    cyclobenzaprine (FLEXERIL) 5 MG tablet     Sig: Take 1 tablet by mouth 2 times daily as needed for Muscle spasms     Dispense:  20 tablet     Refill:  2    traMADol (ULTRAM) 50 MG tablet     Sig: Take 1 tablet by mouth every 8 hours as needed for Pain for up to 30 days. Intended supply: 3 days. Take lowest dose possible to manage pain     Dispense:  18 tablet     Refill:  0     Reduce doses taken as pain becomes manageable       Patient given educational materials - see patient instructions. Discussed use, benefit, and side effects of prescribed medications. All patientquestions answered. Pt voiced understanding. Reviewed health maintenance. Instructedto continue current medications, diet and exercise.   Patient agreed with treatmentplan. Follow up as directed. Electronically signed by Phyllis Chavez MD on 6/16/2022 at 12:47 PM  Low Dose CT (LDCT) Lung Screening criteria met:     Age 50-77(Medicare) or 50-80 ([facility])   Pack year smoking >20   Still smoking or less than 15 year since quit   No sign or symptoms of lung cancer   > 11 months since last LDCT     Risks and benefits of lung cancer screening with LDCT scans discussed:    Significance of positive screen - False-positive LDCT results often occur. 95% of all positive results do not lead to a diagnosis of cancer. Usually further imaging can resolve most false-positive results; however, some patients may require invasive procedures. Over diagnosis risk - 10% to 12% of screen-detected lung cancer cases are over diagnosed--that is, the cancer would not have been detected in the patient's lifetime without the screening. Need for follow up screens annually to continue lung cancer screening effectiveness     Risks associated with radiation from annual LDCT- Radiation exposure is about the same as for a mammogram, which is about 1/3 of the annual background radiation exposure from everyday life. Starting screening at age 54 is not likely to increase cancer risk from radiation exposure. Patients with comorbidities resulting in life expectancy of < 10 years, or that would preclude treatment of an abnormality identified on CT, should not be screened due to lack of benefit.     To obtain maximal benefit from this screening, smoking cessation and long-term abstinence from smoking is critical

## 2022-06-30 NOTE — TELEPHONE ENCOUNTER
Shiraz Sorto is calling to request a refill on the following medication(s):    Medication Request:  Requested Prescriptions     Pending Prescriptions Disp Refills    metFORMIN (GLUCOPHAGE) 500 MG tablet 60 tablet 1     Sig: Take 2 tablets by mouth 2 times daily (with meals)       Last Visit Date (If Applicable):  3/47/2405    Next Visit Date:    9/21/2022

## 2022-07-11 DIAGNOSIS — K21.9 GASTROESOPHAGEAL REFLUX DISEASE, UNSPECIFIED WHETHER ESOPHAGITIS PRESENT: ICD-10-CM

## 2022-07-11 DIAGNOSIS — M47.817 LUMBOSACRAL SPONDYLOSIS WITHOUT MYELOPATHY: ICD-10-CM

## 2022-07-12 NOTE — TELEPHONE ENCOUNTER
Roxanne Leach is calling to request a refill on the following medication(s):    Medication Request:  Requested Prescriptions     Pending Prescriptions Disp Refills    omeprazole (PRILOSEC) 20 MG delayed release capsule [Pharmacy Med Name: OMEPRAZOLE DR 20 MG CAPSULE] 30 capsule 0     Sig: TAKE ONE CAPSULE BY MOUTH EVERY MORNING BEFORE BREAKFAST    traMADol (ULTRAM) 50 MG tablet [Pharmacy Med Name: traMADol HCL 50MG TABLET] 18 tablet 0     Sig: TAKE ONE TABLET BY MOUTH EVERY 8 HOURS AS NEEDED FOR PAIN *REDUCE DOSES TAKEN AS PAIN BECOMES MANAGEABLE*       Last Visit Date (If Applicable):  5/72/7510    Next Visit Date:    9/21/2022

## 2022-07-13 RX ORDER — OMEPRAZOLE 20 MG/1
CAPSULE, DELAYED RELEASE ORAL
Qty: 30 CAPSULE | Refills: 5 | Status: SHIPPED | OUTPATIENT
Start: 2022-07-13 | End: 2022-09-21

## 2022-07-14 RX ORDER — TRAMADOL HYDROCHLORIDE 50 MG/1
TABLET ORAL
Qty: 18 TABLET | Refills: 0 | Status: SHIPPED | OUTPATIENT
Start: 2022-07-14 | End: 2022-08-23 | Stop reason: SDUPTHER

## 2022-07-28 DIAGNOSIS — F41.9 ANXIETY: ICD-10-CM

## 2022-07-29 RX ORDER — BUSPIRONE HYDROCHLORIDE 5 MG/1
TABLET ORAL
Qty: 270 TABLET | Refills: 1 | Status: SHIPPED | OUTPATIENT
Start: 2022-07-29

## 2022-07-29 NOTE — TELEPHONE ENCOUNTER
Rina Mcneil is calling to request a refill on the following medication(s):    Medication Request:  Requested Prescriptions     Pending Prescriptions Disp Refills    busPIRone (BUSPAR) 5 MG tablet [Pharmacy Med Name: busPIRone HCL 5 MG TABLET] 270 tablet 1     Sig: TAKE ONE TABLET BY MOUTH THREE TIMES A DAY       Last Visit Date (If Applicable):  0/36/1552    Next Visit Date:    9/21/2022

## 2022-08-03 ENCOUNTER — PATIENT MESSAGE (OUTPATIENT)
Dept: FAMILY MEDICINE CLINIC | Age: 57
End: 2022-08-03

## 2022-08-03 NOTE — TELEPHONE ENCOUNTER
From: Weston Keyes  To: Dr. Edil Alvarado: 8/3/2022 10:22 AM EDT  Subject: Need a prescription     I was given a sple of BREZTRI I HALER It seems to work however I am out and need a script for it. Is it possible to get one?

## 2022-08-04 RX ORDER — BUDESONIDE, GLYCOPYRROLATE, AND FORMOTEROL FUMARATE 160; 9; 4.8 UG/1; UG/1; UG/1
2 AEROSOL, METERED RESPIRATORY (INHALATION) 2 TIMES DAILY
Qty: 10.7 G | Refills: 1 | Status: SHIPPED | OUTPATIENT
Start: 2022-08-04

## 2022-08-25 DIAGNOSIS — R23.8 SKIN IRRITATION: ICD-10-CM

## 2022-08-25 DIAGNOSIS — F33.1 MODERATE EPISODE OF RECURRENT MAJOR DEPRESSIVE DISORDER (HCC): ICD-10-CM

## 2022-08-26 RX ORDER — NYSTATIN 100000 [USP'U]/G
POWDER TOPICAL
Qty: 1 EACH | Refills: 2 | Status: SHIPPED | OUTPATIENT
Start: 2022-08-26

## 2022-08-26 NOTE — TELEPHONE ENCOUNTER
Edilma Genao is calling to request a refill on the following medication(s):    Medication Request:  Requested Prescriptions     Pending Prescriptions Disp Refills    nystatin ([address]) 627794 UNIT/GM powder 1 each 2     Sig: APPLY ONE DOSE TOPICALLY TWICE A DAY       Last Visit Date (If Applicable):  6/78/6858    Next Visit Date:    8/25/2022

## 2022-08-26 NOTE — TELEPHONE ENCOUNTER
Kermit Romero is calling to request a refill on the following medication(s):    Medication Request:  Requested Prescriptions     Pending Prescriptions Disp Refills    VORTIoxetine HBr (TRINTELLIX) 20 MG TABS tablet 30 tablet 3     Sig: Take 1 tablet by mouth daily       Last Visit Date (If Applicable):  7/74/5919    Next Visit Date:    9/21/2022

## 2022-09-06 DIAGNOSIS — F33.1 MODERATE EPISODE OF RECURRENT MAJOR DEPRESSIVE DISORDER (HCC): ICD-10-CM

## 2022-09-07 NOTE — TELEPHONE ENCOUNTER
Rina Mcneil is calling to request a refill on the following medication(s):    Medication Request:  Requested Prescriptions     Pending Prescriptions Disp Refills    VORTIoxetine HBr (TRINTELLIX) 20 MG TABS tablet 30 tablet 3     Sig: Take 1 tablet by mouth daily       Last Visit Date (If Applicable):  6/37/6408    Next Visit Date:    9/21/2022

## 2022-09-08 SDOH — HEALTH STABILITY: PHYSICAL HEALTH: ON AVERAGE, HOW MANY MINUTES DO YOU ENGAGE IN EXERCISE AT THIS LEVEL?: 0 MIN

## 2022-09-08 SDOH — HEALTH STABILITY: PHYSICAL HEALTH: ON AVERAGE, HOW MANY DAYS PER WEEK DO YOU ENGAGE IN MODERATE TO STRENUOUS EXERCISE (LIKE A BRISK WALK)?: 0 DAYS

## 2022-09-08 ASSESSMENT — LIFESTYLE VARIABLES
HOW OFTEN DO YOU HAVE SIX OR MORE DRINKS ON ONE OCCASION: 1
HOW OFTEN DO YOU HAVE A DRINK CONTAINING ALCOHOL: 1
HOW MANY STANDARD DRINKS CONTAINING ALCOHOL DO YOU HAVE ON A TYPICAL DAY: 0

## 2022-09-21 ENCOUNTER — OFFICE VISIT (OUTPATIENT)
Dept: FAMILY MEDICINE CLINIC | Age: 57
End: 2022-09-21
Payer: MEDICARE

## 2022-09-21 VITALS
SYSTOLIC BLOOD PRESSURE: 121 MMHG | OXYGEN SATURATION: 99 % | HEART RATE: 87 BPM | TEMPERATURE: 97.3 F | HEIGHT: 66 IN | BODY MASS INDEX: 45 KG/M2 | DIASTOLIC BLOOD PRESSURE: 71 MMHG | WEIGHT: 280 LBS

## 2022-09-21 DIAGNOSIS — Z00.00 MEDICARE ANNUAL WELLNESS VISIT, SUBSEQUENT: Primary | ICD-10-CM

## 2022-09-21 DIAGNOSIS — H65.03 NON-RECURRENT ACUTE SEROUS OTITIS MEDIA OF BOTH EARS: ICD-10-CM

## 2022-09-21 DIAGNOSIS — M18.0 ARTHRITIS OF CARPOMETACARPAL (CMC) JOINT OF BOTH THUMBS: ICD-10-CM

## 2022-09-21 DIAGNOSIS — E53.8 LOW SERUM VITAMIN B12: ICD-10-CM

## 2022-09-21 PROCEDURE — 96372 THER/PROPH/DIAG INJ SC/IM: CPT | Performed by: STUDENT IN AN ORGANIZED HEALTH CARE EDUCATION/TRAINING PROGRAM

## 2022-09-21 PROCEDURE — G0439 PPPS, SUBSEQ VISIT: HCPCS | Performed by: STUDENT IN AN ORGANIZED HEALTH CARE EDUCATION/TRAINING PROGRAM

## 2022-09-21 PROCEDURE — 3017F COLORECTAL CA SCREEN DOC REV: CPT | Performed by: STUDENT IN AN ORGANIZED HEALTH CARE EDUCATION/TRAINING PROGRAM

## 2022-09-21 RX ORDER — CYANOCOBALAMIN 1000 UG/ML
1000 INJECTION INTRAMUSCULAR; SUBCUTANEOUS ONCE
Status: COMPLETED | OUTPATIENT
Start: 2022-09-21 | End: 2022-09-21

## 2022-09-21 RX ORDER — DOXYCYCLINE HYCLATE 100 MG
100 TABLET ORAL 2 TIMES DAILY
Qty: 14 TABLET | Refills: 0 | Status: SHIPPED | OUTPATIENT
Start: 2022-09-21 | End: 2022-09-28

## 2022-09-21 RX ORDER — TRAMADOL HYDROCHLORIDE 50 MG/1
50 TABLET ORAL EVERY 4 HOURS PRN
Qty: 18 TABLET | Refills: 0 | Status: SHIPPED | OUTPATIENT
Start: 2022-09-21 | End: 2022-10-09

## 2022-09-21 RX ADMIN — CYANOCOBALAMIN 1000 MCG: 1000 INJECTION INTRAMUSCULAR; SUBCUTANEOUS at 12:10

## 2022-09-21 ASSESSMENT — PATIENT HEALTH QUESTIONNAIRE - PHQ9
9. THOUGHTS THAT YOU WOULD BE BETTER OFF DEAD, OR OF HURTING YOURSELF: 0
SUM OF ALL RESPONSES TO PHQ QUESTIONS 1-9: 16
1. LITTLE INTEREST OR PLEASURE IN DOING THINGS: 2
2. FEELING DOWN, DEPRESSED OR HOPELESS: 1
4. FEELING TIRED OR HAVING LITTLE ENERGY: 3
SUM OF ALL RESPONSES TO PHQ QUESTIONS 1-9: 16
10. IF YOU CHECKED OFF ANY PROBLEMS, HOW DIFFICULT HAVE THESE PROBLEMS MADE IT FOR YOU TO DO YOUR WORK, TAKE CARE OF THINGS AT HOME, OR GET ALONG WITH OTHER PEOPLE: 2
SUM OF ALL RESPONSES TO PHQ QUESTIONS 1-9: 16
SUM OF ALL RESPONSES TO PHQ QUESTIONS 1-9: 16
6. FEELING BAD ABOUT YOURSELF - OR THAT YOU ARE A FAILURE OR HAVE LET YOURSELF OR YOUR FAMILY DOWN: 1
5. POOR APPETITE OR OVEREATING: 3
3. TROUBLE FALLING OR STAYING ASLEEP: 3
8. MOVING OR SPEAKING SO SLOWLY THAT OTHER PEOPLE COULD HAVE NOTICED. OR THE OPPOSITE, BEING SO FIGETY OR RESTLESS THAT YOU HAVE BEEN MOVING AROUND A LOT MORE THAN USUAL: 0
7. TROUBLE CONCENTRATING ON THINGS, SUCH AS READING THE NEWSPAPER OR WATCHING TELEVISION: 3
SUM OF ALL RESPONSES TO PHQ9 QUESTIONS 1 & 2: 3

## 2022-09-21 ASSESSMENT — LIFESTYLE VARIABLES: HOW MANY STANDARD DRINKS CONTAINING ALCOHOL DO YOU HAVE ON A TYPICAL DAY: PATIENT DOES NOT DRINK

## 2022-09-21 NOTE — PROGRESS NOTES
Medicare Annual Wellness Visit    [address] is here for Medicare AWV and Medication Check    Assessment & Plan   Medicare annual wellness visit, subsequent  Arthritis of carpometacarpal (CMC) joint of both thumbs  -     External Referral To Orthopedic Surgery  -     traMADol (ULTRAM) 50 MG tablet; Take 1 tablet by mouth every 4 hours as needed for Pain for up to 18 days. Intended supply: 3 days. Take lowest dose possible to manage pain, Disp-18 tablet, R-0Normal  Non-recurrent acute serous otitis media of both ears  -     doxycycline hyclate (VIBRA-TABS) 100 MG tablet; Take 1 tablet by mouth 2 times daily for 7 days, Disp-14 tablet, R-0Normal  Low serum vitamin B12  -     cyanocobalamin injection 1,000 mcg; 1,000 mcg, IntraMUSCular, ONCE, 1 dose, On Wed 9/21/22 at 1230    Recommendations for Preventive Services Due: see orders and patient instructions/AVS.  Recommended screening schedule for the next 5-10 years is provided to the patient in written form: see Patient Instructions/AVS.     Return in 6 months (on 3/21/2023) for Medicare Annual Wellness Visit in 1 year, anxiety and depression. Subjective   The following acute and/or chronic problems were also addressed today:  She is feeling very distressed due to chronic multiple joint pain, has an upcoming visit with rheumatology. She is also having issues with both her thumbs and has arthritis in her thumbs. Says that she was previously following with Dr. Titi Murphy but would like to see somebody else now. She is also been taking tramadol for her pain, says that she usually takes in the morning and then sometimes needs a repeat in the afternoon. She has trouble sleeping at night with the pain, discussed trying to take the tramadol at nighttime to see if that helps with the sleep and reduces daytime fatigue. She also complains of ear popping and fullness in the ear.   She was diagnosed with an ear infection few weeks ago and was prescribed antibiotic at the time. She can also complains of chronic fatigue and her B12 levels were low normal last time. She would like to try B12 shot to see if that helps. Patient's complete Health Risk Assessment and screening values have been reviewed and are found in Flowsheets. The following problems were reviewed today and where indicated follow up appointments were made and/or referrals ordered.     Positive Risk Factor Screenings with Interventions:    Fall Risk:  Do you feel unsteady or are you worried about falling? : (!) yes  2 or more falls in past year?: no  Fall with injury in past year?: no   Fall Risk Interventions:    Has visit scheduled with rheumatology     Depression:  PHQ-2 Score: 3  PHQ-9 Total Score: 16    Severity:1-4 = minimal depression, 5-9 = mild depression, 10-14 = moderate depression, 15-19 = moderately severe depression, 20-27 = severe depression  Depression Interventions:  Depressed mood related to chronic pain, has an upcoming visit with rheumatology to address chronic joint pain    Tobacco Use:  Tobacco Use: High Risk    Smoking Tobacco Use: Every Day    Smokeless Tobacco Use: Never     E-cigarette/Vaping       Questions Responses    E-cigarette/Vaping Use Current Some Day User    Start Date     Passive Exposure     Quit Date     Counseling Given     Comments Mini or Cigalike          Substance Use - Tobacco Interventions:  patient is not ready to work toward tobacco cessation at this time     Drug Use Screening:    DAST-10 Score Interpretation:  1-2: Low level - Monitor, re-assess at a later date; 3-5: Moderate level - Further Investigation; 6-8: Substantial level - Intensive Assessment; 9-10: Severe level - Intensive Assessment  Substance Use - Drug Use Interventions:  patient is not ready to change his/her recreational drug use behavior at this time       General Health and ACP:  General  In general, how would you say your health is?: Fair  In the past 7 days, have you experienced any of the following: New or Increased Pain, New or Increased Fatigue, Loneliness, Social Isolation, Stress or Anger?: (!) Yes  Select all that apply: (!) New or Increased Pain, New or Increased Fatigue, Stress  Do you get the social and emotional support that you need?: (!) No  Do you have a Living Will?: (!) No    Advance Directives       Power of  Living Will ACP-Advance Directive ACP-Power of     Not on File Not on File Not on File Not on File          General Health Risk Interventions:  Pain issues: medication prescribed- Tramadol 50 mg as needed    Health Habits/Nutrition:  Physical Activity: Inactive    Days of Exercise per Week: 0 days    Minutes of Exercise per Session: 0 min     Have you lost any weight without trying in the past 3 months?: No  Body mass index: (!) 45.19  Have you seen the dentist within the past year?: (!) No  Health Habits/Nutrition Interventions:  Dental exam overdue:  patient declines dental evaluation     Safety:  Do you have working smoke detectors?: Yes  Do you have any tripping hazards - loose or unsecured carpets or rugs?: No  Do you have any tripping hazards - clutter in doorways, halls, or stairs?: No  Do you have either shower bars, grab bars, non-slip mats or non-slip surfaces in your shower or bathtub?: (!) No  Do all of your stairways have a railing or banister?: Not Applicable  Do you always fasten your seatbelt when you are in a car?: Yes  Safety Interventions:  Patient declines any further evaluation/treatment for this issue    ADLs:  In the past 7 days, did you need help from others to perform any of the following everyday activities: Eating, dressing, grooming, bathing, toileting, or walking/balance?: No  In the past 7 days, did you need help from others to take care of any of the following: Laundry, housekeeping, banking/finances, shopping, telephone use, food preparation, transportation, or taking medications?: (!) Yes  Select all that apply: [employer], Housekeeping, Shopping, Food Preparation  ADL Interventions:  Patient declines any further evaluation/treatment for this issue          Objective   Vitals:    09/21/22 1142   BP: 121/71   Pulse: 87   Temp: 97.3 °F (36.3 °C)   SpO2: 99%   Weight: 280 lb (127 kg)   Height: 5' 6\" (1.676 m)      Body mass index is 45.19 kg/m². General Appearance: alert and oriented to person, place and time, well developed and well- nourished, in no acute distress  Skin: warm and dry, no rash or erythema  Head: normocephalic and atraumatic  Eyes: pupils equal, round, and reactive to light, extraocular eye movements intact, conjunctivae normal  ENT: tympanic membrane, external ear and ear canal normal bilaterally, nose without deformity, nasal mucosa and turbinates normal without polyps  Neck: supple and non-tender without mass, no thyromegaly or thyroid nodules, no cervical lymphadenopathy  Pulmonary/Chest: clear to auscultation bilaterally- no wheezes, rales or rhonchi, normal air movement, no respiratory distress  Cardiovascular: normal rate, regular rhythm, normal S1 and S2, no murmurs, rubs, clicks, or gallops, distal pulses intact, no carotid bruits  Abdomen: soft, non-tender, non-distended, normal bowel sounds, no masses or organomegaly  Extremities: no cyanosis, clubbing or edema  Musculoskeletal: normal range of motion, no joint swelling, deformity or tenderness  Neurologic: reflexes normal and symmetric, no cranial nerve deficit, gait, coordination and speech normal       Allergies   Allergen Reactions    Amoxicillin-Pot Clavulanate Diarrhea and Other (See Comments)     augmentin      Medrol [Methylprednisolone] Other (See Comments)     Redness of hands and itching  Redness of hands and itching    Tape [Adhesive Tape] Rash     Prior to Visit Medications    Medication Sig Taking? Authorizing Provider   traMADol (ULTRAM) 50 MG tablet Take 1 tablet by mouth every 4 hours as needed for Pain for up to 18 days.  Intended supply: 3 days. Take lowest dose possible to manage pain Yes Francisco Jones MD   doxycycline hyclate (VIBRA-TABS) 100 MG tablet Take 1 tablet by mouth 2 times daily for 7 days Yes Francisco Jones MD   VORTIoxetine HBr (TRINTELLIX) 20 MG TABS tablet Take 1 tablet by mouth daily Yes Francisco Jones MD   nystatin ([address]) 610584 UNIT/GM powder APPLY ONE DOSE TOPICALLY TWICE A DAY Yes Francisco Jones MD   Budeson-Glycopyrrol-Formoterol (BREZTRI AEROSPHERE) 160-9-4.8 MCG/ACT AERO Inhale 2 puffs into the lungs 2 times daily Yes Francisco Jones MD   busPIRone (BUSPAR) 5 MG tablet TAKE ONE TABLET BY MOUTH THREE TIMES A DAY Yes Francisco Jones MD   metFORMIN (GLUCOPHAGE) 500 MG tablet Take 2 tablets by mouth 2 times daily (with meals) Yes JACK Nobles CNP   vitamin D-3 (CHOLECALCIFEROL) 125 MCG (5000 UT) TABS Take 1 tablet by mouth daily Yes Francisco Jones MD   blood glucose monitor strips Test 3 times a day & as needed for symptoms of irregular blood glucose. Yes Francisco Jones MD   Lancets MISC Use one Lancet per blood sugar test Yes Francisco Jones MD   albuterol sulfate HFA (PROAIR HFA) 108 (90 Base) MCG/ACT inhaler Inhale 2 puffs into the lungs every 6 hours as needed for Wheezing Yes Francisco Jones MD   Handicap Placard MISC by Does not apply route Exp 9/2026 Yes Francisco Jones MD   Nebulizers (COMPRESSOR/NEBULIZER) MISC 1 vial by Does not apply route 4 times daily as needed.  Patient has albuterol prescription at home; needs aerosol machine and set up diagnosis chronic bronchitis Yes JACK Vegas CNP   albuterol (PROVENTIL) (2.5 MG/3ML) 0.083% nebulizer solution   Historical Provider, MD Whyte (Including outside providers/suppliers regularly involved in providing care):   Patient Care Team:  Francisco Jones MD as PCP - General (Family Medicine)  Francisco Jones MD as PCP - [facility] Empaneled Provider  Iris Shaw MD as Consulting Physician (Gastroenterology)     Reviewed and updated this visit:  Tobacco  Allergies  Meds Problems  Med Hx  Surg Hx  Soc Hx  Fam Hx

## 2022-09-21 NOTE — PATIENT INSTRUCTIONS
Personalized Preventive Plan for Derek Melendrez - 9/21/2022  Medicare offers a range of preventive health benefits. Some of the tests and screenings are paid in full while other may be subject to a deductible, co-insurance, and/or copay. Some of these benefits include a comprehensive review of your medical history including lifestyle, illnesses that may run in your family, and various assessments and screenings as appropriate. After reviewing your medical record and screening and assessments performed today your provider may have ordered immunizations, labs, imaging, and/or referrals for you. A list of these orders (if applicable) as well as your Preventive Care list are included within your After Visit Summary for your review. Other Preventive Recommendations:    A preventive eye exam performed by an eye specialist is recommended every 1-2 years to screen for glaucoma; cataracts, macular degeneration, and other eye disorders. A preventive dental visit is recommended every 6 months. Try to get at least 150 minutes of exercise per week or 10,000 steps per day on a pedometer . Order or download the FREE \"Exercise & Physical Activity: Your Everyday Guide\" from [employer]. Call 9-835.350.8730 or search [employer] online. You need 4433-8786 mg of calcium and 9636-9541 IU of vitamin D per day. It is possible to meet your calcium requirement with diet alone, but a vitamin D supplement is usually necessary to meet this goal.  When exposed to the sun, use a sunscreen that protects against both UVA and UVB radiation with an SPF of 30 or greater. Reapply every 2 to 3 hours or after sweating, drying off with a towel, or swimming. Always wear a seat belt when traveling in a car. Always wear a helmet when riding a bicycle or motorcycle.
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
## 2022-11-15 ENCOUNTER — PATIENT MESSAGE (OUTPATIENT)
Dept: FAMILY MEDICINE CLINIC | Age: 57
End: 2022-11-15

## 2022-11-15 DIAGNOSIS — M18.0 ARTHRITIS OF CARPOMETACARPAL (CMC) JOINT OF BOTH THUMBS: ICD-10-CM

## 2022-11-16 RX ORDER — TRAMADOL HYDROCHLORIDE 50 MG/1
50 TABLET ORAL EVERY 4 HOURS PRN
Qty: 18 TABLET | Refills: 0 | Status: SHIPPED | OUTPATIENT
Start: 2022-11-16 | End: 2022-12-04

## 2022-11-16 NOTE — TELEPHONE ENCOUNTER
From: Gumaro Magallanes  To: Dr. Bartholomew Manner: 11/15/2022 5:18 PM EST  Subject: Refill    Is it possible to get my tramadol refilled?

## 2022-11-16 NOTE — TELEPHONE ENCOUNTER
Lauren Hodge is calling to request a refill on the following medication(s):    Medication Request:  Requested Prescriptions     Pending Prescriptions Disp Refills    traMADol (ULTRAM) 50 MG tablet 18 tablet 0     Sig: Take 1 tablet by mouth every 4 hours as needed for Pain for up to 18 days. Intended supply: 3 days.  Take lowest dose possible to manage pain       Last Visit Date (If Applicable):  7/94/1096    Next Visit Date:    3/21/2023

## 2023-01-02 ENCOUNTER — PATIENT MESSAGE (OUTPATIENT)
Dept: FAMILY MEDICINE CLINIC | Age: 58
End: 2023-01-02

## 2023-01-02 DIAGNOSIS — M18.0 ARTHRITIS OF CARPOMETACARPAL (CMC) JOINT OF BOTH THUMBS: ICD-10-CM

## 2023-01-03 RX ORDER — TRAMADOL HYDROCHLORIDE 50 MG/1
50 TABLET ORAL EVERY 4 HOURS PRN
Qty: 18 TABLET | Refills: 0 | Status: SHIPPED | OUTPATIENT
Start: 2023-01-03 | End: 2023-01-21

## 2023-01-03 NOTE — TELEPHONE ENCOUNTER
From: Janet Clark  To: Dr. Nikki Francis: 2023 10:32 AM EST  Subject: Refill    Could I please get my tramadol refilled

## 2023-01-07 ENCOUNTER — HOSPITAL ENCOUNTER (EMERGENCY)
Facility: CLINIC | Age: 58
Discharge: HOME | End: 2023-01-07
Attending: EMERGENCY MEDICINE
Payer: MEDICARE

## 2023-01-07 ENCOUNTER — APPOINTMENT (OUTPATIENT)
Dept: GENERAL RADIOLOGY | Facility: CLINIC | Age: 58
End: 2023-01-07
Payer: MEDICARE

## 2023-01-07 VITALS
HEIGHT: 66 IN | WEIGHT: 285 LBS | RESPIRATION RATE: 18 BRPM | SYSTOLIC BLOOD PRESSURE: 134 MMHG | TEMPERATURE: 98.7 F | BODY MASS INDEX: 45.8 KG/M2 | DIASTOLIC BLOOD PRESSURE: 87 MMHG | HEART RATE: 81 BPM | OXYGEN SATURATION: 93 %

## 2023-01-07 DIAGNOSIS — M54.50 ACUTE EXACERBATION OF CHRONIC LOW BACK PAIN: Primary | ICD-10-CM

## 2023-01-07 DIAGNOSIS — G89.29 ACUTE EXACERBATION OF CHRONIC LOW BACK PAIN: Primary | ICD-10-CM

## 2023-01-07 LAB
ABSOLUTE EOS #: 0.2 K/UL (ref 0–0.4)
ABSOLUTE LYMPH #: 1.8 K/UL (ref 1–4.8)
ABSOLUTE MONO #: 0.7 K/UL (ref 0.1–1.2)
ALBUMIN SERPL-MCNC: 4.5 G/DL (ref 3.5–5.2)
ALBUMIN/GLOBULIN RATIO: 1.6 (ref 1–2.5)
ALP BLD-CCNC: 97 U/L (ref 35–104)
ALT SERPL-CCNC: 17 U/L (ref 5–33)
ANION GAP SERPL CALCULATED.3IONS-SCNC: 9 MMOL/L (ref 9–17)
AST SERPL-CCNC: 21 U/L
BASOPHILS # BLD: 1 % (ref 0–2)
BASOPHILS ABSOLUTE: 0 K/UL (ref 0–0.2)
BILIRUB SERPL-MCNC: 0.5 MG/DL (ref 0.3–1.2)
BILIRUBIN URINE: NEGATIVE
BUN BLDV-MCNC: 17 MG/DL (ref 6–20)
CALCIUM SERPL-MCNC: 9.1 MG/DL (ref 8.6–10.4)
CHLORIDE BLD-SCNC: 104 MMOL/L (ref 98–107)
CO2: 30 MMOL/L (ref 20–31)
COLOR: YELLOW
COMMENT UA: NORMAL
CREAT SERPL-MCNC: 0.6 MG/DL (ref 0.5–0.9)
EOSINOPHILS RELATIVE PERCENT: 2 % (ref 1–4)
GFR SERPL CREATININE-BSD FRML MDRD: >60 ML/MIN/1.73M2
GLUCOSE BLD-MCNC: 130 MG/DL (ref 70–99)
GLUCOSE URINE: NEGATIVE
HCT VFR BLD CALC: 50 % (ref 36–46)
HEMOGLOBIN: 16.7 G/DL (ref 12–16)
KETONES, URINE: NEGATIVE
LEUKOCYTE ESTERASE, URINE: NEGATIVE
LYMPHOCYTES # BLD: 19 % (ref 24–44)
MCH RBC QN AUTO: 30.1 PG (ref 26–34)
MCHC RBC AUTO-ENTMCNC: 33.5 G/DL (ref 31–37)
MCV RBC AUTO: 90 FL (ref 80–100)
MONOCYTES # BLD: 7 % (ref 2–11)
NITRITE, URINE: NEGATIVE
PDW BLD-RTO: 14.8 % (ref 12.5–15.4)
PH UA: 7 (ref 5–8)
PLATELET # BLD: 228 K/UL (ref 140–450)
PMV BLD AUTO: 9 FL (ref 6–12)
POTASSIUM SERPL-SCNC: 4.3 MMOL/L (ref 3.7–5.3)
PROTEIN UA: NEGATIVE
RBC # BLD: 5.55 M/UL (ref 4–5.2)
SEG NEUTROPHILS: 71 % (ref 36–66)
SEGMENTED NEUTROPHILS ABSOLUTE COUNT: 6.8 K/UL (ref 1.8–7.7)
SODIUM BLD-SCNC: 143 MMOL/L (ref 135–144)
SPECIFIC GRAVITY UA: 1.02 (ref 1–1.03)
TOTAL PROTEIN: 7.3 G/DL (ref 6.4–8.3)
TURBIDITY: CLEAR
URINE HGB: NEGATIVE
UROBILINOGEN, URINE: NORMAL
WBC # BLD: 9.6 K/UL (ref 3.5–11)

## 2023-01-07 PROCEDURE — 80053 COMPREHEN METABOLIC PANEL: CPT

## 2023-01-07 PROCEDURE — 99284 EMERGENCY DEPT VISIT MOD MDM: CPT

## 2023-01-07 PROCEDURE — 71045 X-RAY EXAM CHEST 1 VIEW: CPT

## 2023-01-07 PROCEDURE — 6360000002 HC RX W HCPCS: Performed by: EMERGENCY MEDICINE

## 2023-01-07 PROCEDURE — 81003 URINALYSIS AUTO W/O SCOPE: CPT

## 2023-01-07 PROCEDURE — 36415 COLL VENOUS BLD VENIPUNCTURE: CPT

## 2023-01-07 PROCEDURE — 85025 COMPLETE CBC W/AUTO DIFF WBC: CPT

## 2023-01-07 PROCEDURE — 96372 THER/PROPH/DIAG INJ SC/IM: CPT

## 2023-01-07 RX ORDER — KETOROLAC TROMETHAMINE 30 MG/ML
30 INJECTION, SOLUTION INTRAMUSCULAR; INTRAVENOUS ONCE
Status: COMPLETED | OUTPATIENT
Start: 2023-01-07 | End: 2023-01-07

## 2023-01-07 RX ORDER — LIDOCAINE 50 MG/G
1 PATCH TOPICAL DAILY
Qty: 30 PATCH | Refills: 0 | Status: SHIPPED | OUTPATIENT
Start: 2023-01-07 | End: 2023-02-06

## 2023-01-07 RX ORDER — HYDROCODONE BITARTRATE AND ACETAMINOPHEN 5; 325 MG/1; MG/1
1 TABLET ORAL EVERY 6 HOURS PRN
Qty: 12 TABLET | Refills: 0 | Status: SHIPPED | OUTPATIENT
Start: 2023-01-07 | End: 2023-01-10

## 2023-01-07 RX ADMIN — KETOROLAC TROMETHAMINE 30 MG: 30 INJECTION, SOLUTION INTRAMUSCULAR; INTRAVENOUS at 12:34

## 2023-01-07 ASSESSMENT — PAIN SCALES - GENERAL
PAINLEVEL_OUTOF10: 4
PAINLEVEL_OUTOF10: 5

## 2023-01-07 ASSESSMENT — LIFESTYLE VARIABLES
HOW OFTEN DO YOU HAVE A DRINK CONTAINING ALCOHOL: NEVER
HOW MANY STANDARD DRINKS CONTAINING ALCOHOL DO YOU HAVE ON A TYPICAL DAY: PATIENT DOES NOT DRINK

## 2023-01-07 ASSESSMENT — PAIN - FUNCTIONAL ASSESSMENT: PAIN_FUNCTIONAL_ASSESSMENT: 0-10

## 2023-01-07 NOTE — ED PROVIDER NOTES
[address] ENCOUNTER      Pt Name: Mirta Jain  MRN: [id number]  [place] 1965  Date of evaluation: 1/7/2023      CHIEF COMPLAINT       Chief Complaint   Patient presents with    Back Pain         HISTORY OF PRESENT ILLNESS      The patient presents with bilateral back pain for the past 4 days. She says she has noted her urine is a little dark. The patient denies history of kidney stone. She also is complaining of pain in her right upper molars. She has not had facial swelling or drainage. She takes tramadol for pain but says it has not helped this much as usual.  She denies fever however. She is not really had nausea or vomiting. She denies diarrhea. She denies weakness or numbness. She denies bowel or bladder problems and themselves. She did drive herself today. Her normal back pain is in the middle of her back. She says this pain is more in the flanks bilaterally. REVIEW OF SYSTEMS       All systems reviewed and negative unless noted in HPI. The patient denies fever or constitutional symptoms. Denies vision change. Denies any sore throat or rhinorrhea. Reports dental discomfort as noted in HPI. Denies facial swelling or difficulty breathing or swallowing. Denies any neck pain or stiffness. Denies chest pain or shortness of breath. No nausea,  vomiting or diarrhea. Bilateral flank pain with dark urine noted. Denies musculoskeletal injury. Patient reports back pain as noted in HPI. Denies any weakness, numbness or focal neurologic deficit. Denies any skin rash or edema. No recent psychiatric issues. No easy bruising or bleeding. History of diabetes.        PAST MEDICAL HISTORY    has a past medical history of Anxiety, Asthma, Bladder incontinence, Bladder prolapse, female, acquired, Cancer ([facility] [address] Carpal tunnel syndrome, Chronic bilateral low back pain, Colon polyps, COPD (chronic obstructive pulmonary disease) ([facility] [address] Depression, Epigastric pain, Hepatic steatosis, History of cataract extraction with lens replacement, History of stress test, Hyperglycemia, Intertrigo, Lumbar degenerative disc disease, Nausea, Obesity, Osteoarthritis, Osteoarthritis cervical spine, Osteoarthritis thoracic spine, and Tobacco abuse. SURGICAL HISTORY      has a past surgical history that includes  section; Cataract removal (); Throat surgery (); Hysterectomy, vaginal (); Colonoscopy (N/A, 10/7/2020); Nerve Block (Bilateral, 2020); Pain management procedure (Bilateral, 2020); Nerve Block (Bilateral, 2020); Pain management procedure (Bilateral, 2020); Nerve Block (01/15/2021); Pain management procedure (Left, 1/15/2021); Pain management procedure (Left, 2021); and Pain management procedure (Left, 2021). CURRENT MEDICATIONS       Previous Medications    ALBUTEROL (PROVENTIL) (2.5 MG/3ML) 0.083% NEBULIZER SOLUTION        ALBUTEROL SULFATE HFA (PROAIR HFA) 108 (90 BASE) MCG/ACT INHALER    Inhale 2 puffs into the lungs every 6 hours as needed for Wheezing    BLOOD GLUCOSE MONITOR STRIPS    Test 3 times a day & as needed for symptoms of irregular blood glucose. BUDESON-GLYCOPYRROL-FORMOTEROL (BREZTRI AEROSPHERE) 160-9-4.8 MCG/ACT AERO    Inhale 2 puffs into the lungs 2 times daily    BUSPIRONE (BUSPAR) 5 MG TABLET    TAKE ONE TABLET BY MOUTH THREE TIMES A DAY    HANDICAP PLACARD MISC    by Does not apply route Exp 2026    LANCETS MISC    Use one Lancet per blood sugar test    METFORMIN (GLUCOPHAGE) 500 MG TABLET    Take 2 tablets by mouth 2 times daily (with meals)    NEBULIZERS (COMPRESSOR/NEBULIZER) MISC    1 vial by Does not apply route 4 times daily as needed.  Patient has albuterol prescription at home; needs aerosol machine and set up diagnosis chronic bronchitis    NYSTATIN (NYAMYC) 430445 UNIT/GM POWDER    APPLY ONE DOSE TOPICALLY TWICE A DAY    TRAMADOL (ULTRAM) 50 MG TABLET    Take 1 tablet by mouth every 4 hours as needed for Pain for up to 18 days. Intended supply: 3 days. Take lowest dose possible to manage pain    VITAMIN D-3 (CHOLECALCIFEROL) 125 MCG (5000 UT) TABS    Take 1 tablet by mouth daily    VORTIOXETINE HBR (TRINTELLIX) 20 MG TABS TABLET    Take 1 tablet by mouth daily       ALLERGIES     is allergic to amoxicillin-pot clavulanate, medrol [methylprednisolone], and tape [adhesive tape]. FAMILY HISTORY     She indicated that her mother is . She indicated that her father is . She indicated that all of her five sisters are alive. She indicated that only one of her two brothers is alive. She indicated that her maternal grandmother is . She indicated that her maternal grandfather is . She indicated that her paternal grandmother is . She indicated that her paternal grandfather is . She indicated that the status of her maternal uncle is unknown. She indicated that both of her others are alive. She indicated that the status of her neg hx is unknown.     family history includes Arthritis in her mother and sister; Asthma in an other family member; Breast Cancer in her maternal grandmother and another family member; Cancer in her brother; Depression in her brother, mother, and sister; Diabetes in her brother, father, and mother; Heart Disease in her father and mother; High Blood Pressure in her brother; Lung Cancer (age of onset: 64) in her brother; Mental Illness in her mother; No Known Problems in her maternal grandfather, paternal grandfather, and paternal grandmother; Pancreatic Cancer in her maternal uncle. SOCIAL HISTORY      reports that she has been smoking cigarettes. She has a 30.00 pack-year smoking history. She has never used smokeless tobacco. She reports current drug use. Frequency: 4.00 times per week. Drug: Marijuana Tariq Andrews). She reports that she does not drink alcohol.     PHYSICAL EXAM     INITIAL VITALS: height is 5' 6\" (1.676 m) and weight is 129.3 kg (285 lb). Her temperature is 98.7 °F (37.1 °C). Her blood pressure is 134/87 and her pulse is 81. Her respiration is 18 and oxygen saturation is 93%. The patient is alert and oriented, in no apparent distress. HEENT is atraumatic. Pupils are PERRL at 4 mm with normal extraocular motion. Mucous membranes moist.  Dentition in reasonably good repair. No facial swelling. No broken teeth or abscess noted. Neck is supple with no lymphadenopathy. No meningismus. Heart sounds regular rate and rhythm with no gallops, murmurs, or rubs. Lungs: Rhonchorous sounds on exhalation. Abdomen: soft, nontender with no pain to palpation. No pulsatile mass. Normal bowel sounds are noted. Pain noted in the CVA region bilaterally. No CVA tenderness. Musculoskeletal exam shows no evidence of trauma. No pain in the midline of the cervical, thoracic, lumbar spine. Plus 5 out of 5 gross motor strength in all extremities. Normal distal pulses in all extremities. Skin: no rash or edema. Neurological exam reveals cranial nerves 2 through 12 grossly intact. Patient has equal  and normal deep tendon reflexes. Psychiatric: Appropriate. Lymphatics.:  No lymphadenopathy. DIFFERENTIAL DIAGNOSIS/ MDM:     Differential diagnosis considered: UTI, renal colic, dehydration, renal insufficiency, chronic back pain, cauda equina syndrome, dental abscess, dentalgia, facial cellulitis    Chronic Conditions affecting care (DM,HTN,CA, etc): Diabetes    Social Determinants of Health affecting care (unable to care for self, lives alone, unemployed, homeless,etc): None      History source(s) (patient,spouse,parent,family,friend,EMS,etc): Patient      Review of external sources (ECF,Hospital records,EMS report, radiology reports, etc): Previous hospital records      Tests considered but not ordered: CT spine, MRI spine.   The patient has no fever and no cauda equina symptoms. She has no focal deficits. My index of suspicion for abscess of the spine is low. Independent interpretation of tests (eg.  X-ray, CAT scan, Doppler studies, EKG): None     Discussion of x-ray results with radiology: No     Consults: None      Consideration for admission/observation (even if discharged): Not applicable      Prescription considerations: I will prescribe medication for her back pain. She can follow-up with her dentist.  I see no evidence of infection. The patient can see her pain specialist as well. She is discharged in good condition. Sepsis considered: No evidence of infection. Critical Care note written: Not applicable        DIAGNOSTIC RESULTS         RADIOLOGY:   I reviewed the radiologist interpretations:  XR CHEST PORTABLE   Final Result   No acute process. XR CHEST PORTABLE (Final result)  Result time 01/07/23 12:49:12  Final result by Cyndi Sifuentes DO (01/07/23 12:49:12)                Impression:    No acute process. Narrative:    EXAMINATION:   ONE XRAY VIEW OF THE CHEST     1/7/2023 12:36 pm     COMPARISON:   April 26, 2021     HISTORY:   ORDERING SYSTEM PROVIDED HISTORY: cough   TECHNOLOGIST PROVIDED HISTORY:   cough   Reason for Exam: Cough, dyspnea     FINDINGS:   The lungs are without acute focal process. There is no effusion or   pneumothorax. The cardiomediastinal silhouette is without acute process. The   osseous structures are without acute process.                    LABS:  Results for orders placed or performed during the hospital encounter of 01/07/23   CBC with Auto Differential   Result Value Ref Range    WBC 9.6 3.5 - 11.0 k/uL    RBC 5.55 (H) 4.0 - 5.2 m/uL    Hemoglobin 16.7 (H) 12.0 - 16.0 g/dL    Hematocrit 50.0 (H) 36 - 46 %    MCV 90.0 80 - 100 fL    MCH 30.1 26 - 34 pg    MCHC 33.5 31 - 37 g/dL    RDW 14.8 12.5 - 15.4 %    Platelets 965 469 - 818 k/uL    MPV 9.0 6.0 - 12.0 fL    Seg Neutrophils 71 (H) 36 - 66 % Lymphocytes 19 (L) 24 - 44 %    Monocytes 7 2 - 11 %    Eosinophils % 2 1 - 4 %    Basophils 1 0 - 2 %    Segs Absolute 6.80 1.8 - 7.7 k/uL    Absolute Lymph # 1.80 1.0 - 4.8 k/uL    Absolute Mono # 0.70 0.1 - 1.2 k/uL    Absolute Eos # 0.20 0.0 - 0.4 k/uL    Basophils Absolute 0.00 0.0 - 0.2 k/uL   Comprehensive Metabolic Panel   Result Value Ref Range    Glucose 130 (H) 70 - 99 mg/dL    BUN 17 6 - 20 mg/dL    Creatinine 0.60 0.50 - 0.90 mg/dL    Est, Glom Filt Rate >60 >60 mL/min/1.73m2    Calcium 9.1 8.6 - 10.4 mg/dL    Sodium 143 135 - 144 mmol/L    Potassium 4.3 3.7 - 5.3 mmol/L    Chloride 104 98 - 107 mmol/L    CO2 30 20 - 31 mmol/L    Anion Gap 9 9 - 17 mmol/L    Alkaline Phosphatase 97 35 - 104 U/L    ALT 17 5 - 33 U/L    AST 21 <32 U/L    Total Bilirubin 0.5 0.3 - 1.2 mg/dL    Total Protein 7.3 6.4 - 8.3 g/dL    Albumin 4.5 3.5 - 5.2 g/dL    Albumin/Globulin Ratio 1.6 1.0 - 2.5   Urinalysis with Reflex to Culture    Specimen: Urine, clean catch   Result Value Ref Range    Color, UA Yellow Yellow    Turbidity UA Clear Clear    Glucose, Ur NEGATIVE NEGATIVE    Bilirubin Urine NEGATIVE NEGATIVE    Ketones, Urine NEGATIVE NEGATIVE    Specific Gravity, UA 1.020 1.005 - 1.030    Urine Hgb NEGATIVE NEGATIVE    pH, UA 7.0 5.0 - 8.0    Protein, UA NEGATIVE NEGATIVE    Urobilinogen, Urine Normal Normal    Nitrite, Urine NEGATIVE NEGATIVE    Leukocyte Esterase, Urine NEGATIVE NEGATIVE    Urinalysis Comments       Microscopic exam not performed based on chemical results unless requested in original order. Urinalysis Comments          Urinalysis Comments       Utilizing a urinalysis as the only screening method to exclude a potential uropathogen can be unreliable in many patient populations. Rapid screening tests are less sensitive than culture and if UTI is a clinical possibility, culture should be considered despite a negative urinalysis.            EMERGENCY DEPARTMENT COURSE:   Vitals:    Vitals:    01/07/23 1219   BP: 134/87   Pulse: 81   Resp: 18   Temp: 98.7 °F (37.1 °C)   SpO2: 93%   Weight: 129.3 kg (285 lb)   Height: 5' 6\" (1.676 m)     -------------------------  BP: 134/87, Temp: 98.7 °F (37.1 °C), Heart Rate: 81, Resp: 18      Re-evaluation Notes    I will prescribe a small amount of pain medication as well as Lidoderm patch. The patient has a pain specialist within the follow-up. I see no evidence of infection in my index of suspicion for an abscess in both her mouth or her back is low. The patient is discharged in good condition. Controlled Substance Monitoring:    Acute and Chronic Pain Monitoring:   RX Monitoring 8/31/2020   Attestation -   Periodic Controlled Substance Monitoring No signs of potential drug abuse or diversion identified. FINAL IMPRESSION      1. Acute exacerbation of chronic low back pain          DISPOSITION/PLAN   DISPOSITION Decision To Discharge 01/07/2023 01:08:44 PM      Condition on Disposition    good    PATIENT REFERRED TO:  Melissa Hassan MD  [address]  [address] [place] [address]  [phone]    In 1 week      Pain specialist    In 1 week      DISCHARGE MEDICATIONS:  New Prescriptions    HYDROCODONE-ACETAMINOPHEN (NORCO) 5-325 MG PER TABLET    Take 1 tablet by mouth every 6 hours as needed for Pain for up to 3 days. Intended supply: 3 days. Take lowest dose possible to manage pain Max Daily Amount: 4 tablets    LIDOCAINE (LIDODERM) 5 %    Place 1 patch onto the skin daily 12 hours on, 12 hours off.        (Please note that portions of this note were completed with a voice recognition program.  Efforts were made to edit the dictations but occasionally words are mis-transcribed.)    Garett Suarez MD,, MD   Attending Emergency Physician       Mian Urrutia MD  01/07/23 0145

## 2023-01-07 NOTE — DISCHARGE INSTRUCTIONS
Use Norco as directed. Lidoderm patch as directed. Return for weakness, numbness, fever, bowel or bladder problems, or if worse in any way. PLEASE RETURN TO THE EMERGENCY DEPARTMENT IMMEDIATELY if your symptoms worsen in anyway or in 1-2 days if not improved for re-evaluation. You should immediately return to the ER for symptoms such as worsening pain, abdominal pain, bloody stools, numbness or weakness to the arms or legs, difficulty with urination or defecation, difficulty with ambulation, fever, coolness or color change to the extremity, chest pain, shortness of breath, a feeling that you are going to pass out, light headed, dizziness. Take your medication as indicated and prescribed. If you are given an antibiotic then, make sure you get the prescription filled and take the antibiotics until finished. Please understand that at this time there is no evidence for a more serious underlying process, but that early in the process of an illness or injury, an emergency department workup can be falsely reassuring. You should contact your family doctor within the next 48 hours for a follow up appointment    Alphonso Forman!!!    From [facility] and [facility] Emergency Services    On behalf of the Emergency Department staff at [facility]), I would like to thank you for giving us the opportunity to address your health care needs and concerns. We hope that during your visit, our service was delivered in a professional and caring manner. Please keep [facility]) in mind as we walk with you down the path to your own personal wellness. Please expect an automated text message or email from us so we can ask a few questions about your health and progress. Based on your answers, a clinician may call you back to offer help and instructions. Please understand that early in the process of an illness or injury, an emergency department workup can be falsely reassuring.   If you notice any worsening, changing or persistent symptoms please call your family doctor or return to the ER immediately. Tell us how we did during your visit at [email or web address]   and let us know about your experience

## 2023-01-09 ENCOUNTER — TELEPHONE (OUTPATIENT)
Dept: FAMILY MEDICINE CLINIC | Age: 58
End: 2023-01-09

## 2023-01-09 NOTE — TELEPHONE ENCOUNTER
[facility] ([facility]) ED Follow up Call    Reason for ED visit:  back pain and mouth pain . Issues are improving       Did you receive discharge instructions? Yes  Do you understand the discharge instructions? Yes  Did the ED give you any new prescriptions? Yes  Were you able to fill your prescriptions? Yes      Do you have one of our red, yellow and green  Zone sheets that help you to determine when you should go to the ED? Yes      Do you need or want to make a follow up appt with your PCP? No    Do you have any further needs in the home i.e. Equipment?   Not Applicable        FU appts/Provider:    Future Appointments   Date Time Provider Hollis Jacobo   3/21/2023 11:00 AM MD Mark Garza [id number]   9/21/2023 11:00 AM MD Mark Garza [facility]
negative...

## 2023-01-13 DIAGNOSIS — R23.8 SKIN IRRITATION: ICD-10-CM

## 2023-01-13 DIAGNOSIS — F33.1 MODERATE EPISODE OF RECURRENT MAJOR DEPRESSIVE DISORDER (HCC): ICD-10-CM

## 2023-01-13 DIAGNOSIS — F41.9 ANXIETY: ICD-10-CM

## 2023-01-13 DIAGNOSIS — J44.9 CHRONIC OBSTRUCTIVE PULMONARY DISEASE, UNSPECIFIED COPD TYPE (HCC): ICD-10-CM

## 2023-01-16 RX ORDER — ALBUTEROL SULFATE 90 UG/1
2 AEROSOL, METERED RESPIRATORY (INHALATION) EVERY 6 HOURS PRN
Qty: 3 EACH | Refills: 1 | Status: SHIPPED | OUTPATIENT
Start: 2023-01-16

## 2023-01-16 RX ORDER — NYSTATIN 100000 [USP'U]/G
POWDER TOPICAL
Qty: 1 EACH | Refills: 2 | Status: SHIPPED | OUTPATIENT
Start: 2023-01-16

## 2023-01-16 RX ORDER — BUSPIRONE HYDROCHLORIDE 5 MG/1
5 TABLET ORAL 3 TIMES DAILY
Qty: 270 TABLET | Refills: 1 | Status: SHIPPED | OUTPATIENT
Start: 2023-01-16

## 2023-01-16 NOTE — TELEPHONE ENCOUNTER
Ladonna Dakin is calling to request a refill on the following medication(s):    Medication Request:  Requested Prescriptions     Pending Prescriptions Disp Refills    albuterol sulfate HFA (PROAIR HFA) 108 (90 Base) MCG/ACT inhaler 3 each 1     Sig: Inhale 2 puffs into the lungs every 6 hours as needed for Wheezing    busPIRone (BUSPAR) 5 MG tablet 270 tablet 1     Sig: Take 1 tablet by mouth 3 times daily    nystatin (NYAMYC) 494068 UNIT/GM powder 1 each 2     Sig: APPLY ONE DOSE TOPICALLY TWICE A DAY    VORTIoxetine HBr (TRINTELLIX) 20 MG TABS tablet 30 tablet 3     Sig: Take 1 tablet by mouth daily       Last Visit Date (If Applicable):  8/00/6187    Next Visit Date:    3/21/2023

## 2023-01-19 ENCOUNTER — OFFICE VISIT (OUTPATIENT)
Dept: FAMILY MEDICINE CLINIC | Age: 58
End: 2023-01-19

## 2023-01-19 ENCOUNTER — TELEPHONE (OUTPATIENT)
Dept: FAMILY MEDICINE CLINIC | Age: 58
End: 2023-01-19

## 2023-01-19 VITALS
WEIGHT: 293 LBS | BODY MASS INDEX: 47.09 KG/M2 | DIASTOLIC BLOOD PRESSURE: 83 MMHG | HEIGHT: 66 IN | SYSTOLIC BLOOD PRESSURE: 126 MMHG | TEMPERATURE: 97.9 F | HEART RATE: 80 BPM | OXYGEN SATURATION: 92 %

## 2023-01-19 DIAGNOSIS — F33.1 MODERATE EPISODE OF RECURRENT MAJOR DEPRESSIVE DISORDER (HCC): ICD-10-CM

## 2023-01-19 DIAGNOSIS — R06.02 SHORTNESS OF BREATH: ICD-10-CM

## 2023-01-19 DIAGNOSIS — K21.9 GASTROESOPHAGEAL REFLUX DISEASE, UNSPECIFIED WHETHER ESOPHAGITIS PRESENT: ICD-10-CM

## 2023-01-19 DIAGNOSIS — R73.03 PREDIABETES: ICD-10-CM

## 2023-01-19 DIAGNOSIS — R07.9 CHEST PAIN, UNSPECIFIED TYPE: Primary | ICD-10-CM

## 2023-01-19 DIAGNOSIS — I27.20 PULMONARY HTN (HCC): ICD-10-CM

## 2023-01-19 DIAGNOSIS — Z72.0 TOBACCO ABUSE: ICD-10-CM

## 2023-01-19 DIAGNOSIS — J44.9 CHRONIC OBSTRUCTIVE PULMONARY DISEASE, UNSPECIFIED COPD TYPE (HCC): ICD-10-CM

## 2023-01-19 DIAGNOSIS — D58.2 ELEVATED HEMOGLOBIN (HCC): ICD-10-CM

## 2023-01-19 DIAGNOSIS — Z87.891 PERSONAL HISTORY OF TOBACCO USE: ICD-10-CM

## 2023-01-19 RX ORDER — GUAIFENESIN 600 MG/1
1200 TABLET, EXTENDED RELEASE ORAL 2 TIMES DAILY
Qty: 40 TABLET | Refills: 0 | Status: SHIPPED | OUTPATIENT
Start: 2023-01-19 | End: 2023-01-29

## 2023-01-19 RX ORDER — BUDESONIDE, GLYCOPYRROLATE, AND FORMOTEROL FUMARATE 160; 9; 4.8 UG/1; UG/1; UG/1
2 AEROSOL, METERED RESPIRATORY (INHALATION) 2 TIMES DAILY
Qty: 10.7 G | Refills: 1 | Status: SHIPPED | OUTPATIENT
Start: 2023-01-19

## 2023-01-19 RX ORDER — PREDNISONE 20 MG/1
20 TABLET ORAL 2 TIMES DAILY
Qty: 10 TABLET | Refills: 0 | Status: SHIPPED | OUTPATIENT
Start: 2023-01-19 | End: 2023-01-24

## 2023-01-19 RX ORDER — HYDROCODONE BITARTRATE AND ACETAMINOPHEN 5; 325 MG/1; MG/1
TABLET ORAL
COMMUNITY
Start: 2023-01-10

## 2023-01-19 SDOH — ECONOMIC STABILITY: FOOD INSECURITY: WITHIN THE PAST 12 MONTHS, YOU WORRIED THAT YOUR FOOD WOULD RUN OUT BEFORE YOU GOT MONEY TO BUY MORE.: NEVER TRUE

## 2023-01-19 SDOH — ECONOMIC STABILITY: FOOD INSECURITY: WITHIN THE PAST 12 MONTHS, THE FOOD YOU BOUGHT JUST DIDN'T LAST AND YOU DIDN'T HAVE MONEY TO GET MORE.: NEVER TRUE

## 2023-01-19 ASSESSMENT — PATIENT HEALTH QUESTIONNAIRE - PHQ9
5. POOR APPETITE OR OVEREATING: 1
SUM OF ALL RESPONSES TO PHQ QUESTIONS 1-9: 8
8. MOVING OR SPEAKING SO SLOWLY THAT OTHER PEOPLE COULD HAVE NOTICED. OR THE OPPOSITE, BEING SO FIGETY OR RESTLESS THAT YOU HAVE BEEN MOVING AROUND A LOT MORE THAN USUAL: 1
10. IF YOU CHECKED OFF ANY PROBLEMS, HOW DIFFICULT HAVE THESE PROBLEMS MADE IT FOR YOU TO DO YOUR WORK, TAKE CARE OF THINGS AT HOME, OR GET ALONG WITH OTHER PEOPLE: 0
SUM OF ALL RESPONSES TO PHQ QUESTIONS 1-9: 8
7. TROUBLE CONCENTRATING ON THINGS, SUCH AS READING THE NEWSPAPER OR WATCHING TELEVISION: 1
6. FEELING BAD ABOUT YOURSELF - OR THAT YOU ARE A FAILURE OR HAVE LET YOURSELF OR YOUR FAMILY DOWN: 1
SUM OF ALL RESPONSES TO PHQ QUESTIONS 1-9: 8
4. FEELING TIRED OR HAVING LITTLE ENERGY: 1
SUM OF ALL RESPONSES TO PHQ QUESTIONS 1-9: 8
2. FEELING DOWN, DEPRESSED OR HOPELESS: 1
9. THOUGHTS THAT YOU WOULD BE BETTER OFF DEAD, OR OF HURTING YOURSELF: 0
SUM OF ALL RESPONSES TO PHQ9 QUESTIONS 1 & 2: 2
3. TROUBLE FALLING OR STAYING ASLEEP: 1
1. LITTLE INTEREST OR PLEASURE IN DOING THINGS: 1

## 2023-01-19 ASSESSMENT — SOCIAL DETERMINANTS OF HEALTH (SDOH): HOW HARD IS IT FOR YOU TO PAY FOR THE VERY BASICS LIKE FOOD, HOUSING, MEDICAL CARE, AND HEATING?: NOT HARD AT ALL

## 2023-01-19 NOTE — TELEPHONE ENCOUNTER
Patient called back in regards to message she left this morning, nicanor advised to go to the ER since neither provider has any openings

## 2023-01-19 NOTE — PATIENT INSTRUCTIONS

## 2023-01-19 NOTE — TELEPHONE ENCOUNTER
Patient woke up with chest pains that lasted about 30 minutes it went away but now she has a headache. She does not want to go to the ER .   But she was advised

## 2023-01-19 NOTE — PROGRESS NOTES
[address] PRIMARY CARE  [address] [place] [address] [address] [place] [address]  Dept: [phone]  Dept Fax: [phone]    Shiraz Sorto is a 62 y.o. female who is a Established patient, who presents today for her medical conditions/complaints as noted below:  Chief Complaint   Patient presents with    Chest Pain     Her chest feels really tight. Headache         HPI:     She is here today complaining of having an episode of chest pain this morning. She says that she was sitting on the table when she felt tightness in the middle of her chest.  She says that the pain felt more like pressure and made it difficult for her to breathe. She says that the pain did not radiate anywhere and got better after she took a few deep breaths and rested for a bit. Says that since then she has been having some headache and that has not resolved. She says that lately she has been having increased cough and feels like she is not able to cough it out and it makes her short of breath. She says she was using empty albuterol inhaler for past few weeks until she realized it and got a refill recently. She is also been feeling more anxious lately. She says that her acid reflux is also gotten worse, she has never had an endoscopy. She says that the metformin has been giving her side effects and she would like to try something else for her blood sugars. She is a current every day smoker, not ready to quit yet.   She is also due for her lung cancer screening      Hemoglobin A1C (%)   Date Value   06/12/2022 6.4 (H)   02/19/2021 6.1 (H)   08/19/2020 5.9             ( goal A1Cis < 7)   No results found for: LABMICR  LDL Cholesterol (mg/dL)   Date Value   06/12/2022 75   02/19/2021 72   08/19/2020 64     LDL Calculated (mg/dL)   Date Value   04/27/2015 81       (goal LDL is <100)   AST (U/L)   Date Value   01/07/2023 21     ALT (U/L)   Date Value   01/07/2023 17     BUN (mg/dL)   Date Value   01/07/2023 17 BP Readings from Last 3 Encounters:   23 126/83   23 134/87   22 121/71          (goal 120/80)    Past Medical History:   Diagnosis Date    Anxiety     Asthma     Bladder incontinence     At times per pt.     Bladder prolapse, female, acquired     Cancer ([facility] [address] 1996    cervical; treated with hysterectomy    Carpal tunnel syndrome 12/15/2010    Chronic bilateral low back pain     Colon polyps 10/07/2020    tubular adenomas x4    COPD (chronic obstructive pulmonary disease) (HCC)     Depression     Epigastric pain     Hepatic steatosis     History of cataract extraction with lens replacement     bilateral    History of stress test     Hyperglycemia     Intertrigo     Lumbar degenerative disc disease     Nausea     Obesity 2017    Osteoarthritis     right knee    Osteoarthritis cervical spine     Osteoarthritis thoracic spine     Tobacco abuse 2017      Past Surgical History:   Procedure Laterality Date    CATARACT REMOVAL       SECTION      x1    COLONOSCOPY N/A 10/7/2020    tubular adenomas x4    HYSTERECTOMY, VAGINAL      secondary to cervical cancer--ovaries still present    NERVE BLOCK Bilateral 2020    NERVE BLOCK BILATERAL - MBB #1 L3-4, L4-5, L5-S1 (Bilateral )     NERVE BLOCK Bilateral 2020    NERVE BLOCK BILATERAL - MBB #2 L3-4, L4-5, L5-S1    NERVE BLOCK  01/15/2021    : NERVE RADIOFREQUENCY ABLATION L3-4, L4-5, L5-S1 (Left )    PAIN MANAGEMENT PROCEDURE Bilateral 2020    NERVE BLOCK BILATERAL - MBB #1 L3-4, L4-5, L5-S1 performed by Chrissy Hayward MD at [address] [place] [address] Bilateral 2020    NERVE BLOCK BILATERAL - MBB #2 L3-4, L4-5, L5-S1 performed by Chrissy Hayward MD at [address] [place] [address] Left 1/15/2021    NERVE RADIOFREQUENCY ABLATION L3-4, L4-5, L5-S1 performed by Chrissy Hayward MD at [address] [place] [address] Left 2021    NERVE RADIOFREQUENCY ABLATION -L3-4, L4-5, L5-S1     PAIN MANAGEMENT PROCEDURE Left 1/22/2021    NERVE RADIOFREQUENCY ABLATION -L3-4, L4-5, L5-S1 performed by Madeleine Barroso MD at [facility] [place] [address]    throat polyps removed       Family History   Problem Relation Age of Onset    Diabetes Mother         from pancreatic resection    Heart Disease Mother     Arthritis Mother     Depression Mother     Mental Illness Mother     Diabetes Father     Heart Disease Father     Depression Brother     Cancer Brother         bladder    Lung Cancer Brother 64        cause of death    Depression Sister     Breast Cancer Maternal Grandmother     Asthma Other         grandson    Breast Cancer Other         diagnosed in her 42's    High Blood Pressure Brother     Diabetes Brother     No Known Problems Maternal Grandfather     No Known Problems Paternal Grandmother     No Known Problems Paternal Grandfather     Arthritis Sister     Pancreatic Cancer Maternal Uncle     High Cholesterol Neg Hx     Kidney Disease Neg Hx     Stroke Neg Hx     Ovarian Cancer Neg Hx     Colon Cancer Neg Hx        Social History     Tobacco Use    Smoking status: Every Day     Packs/day: 0.75     Years: 40.00     Pack years: 30.00     Types: Cigarettes    Smokeless tobacco: Never    Tobacco comments:     has tried cold turkey, patches, chantix   Substance Use Topics    Alcohol use: No     Alcohol/week: 0.0 standard drinks      Current Outpatient Medications   Medication Sig Dispense Refill    HYDROcodone-acetaminophen (NORCO) 5-325 MG per tablet       Budeson-Glycopyrrol-Formoterol (BREZTRI AEROSPHERE) 160-9-4.8 MCG/ACT AERO Inhale 2 puffs into the lungs 2 times daily 10.7 g 1    guaiFENesin (MUCINEX) 600 MG extended release tablet Take 2 tablets by mouth 2 times daily for 10 days 40 tablet 0    predniSONE (DELTASONE) 20 MG tablet Take 1 tablet by mouth 2 times daily for 5 days 10 tablet 0    empagliflozin (JARDIANCE) 10 MG tablet Take 1 tablet by mouth daily 30 tablet 2    albuterol sulfate HFA (PROAIR HFA) 108 (90 Base) MCG/ACT inhaler Inhale 2 puffs into the lungs every 6 hours as needed for Wheezing 3 each 1    busPIRone (BUSPAR) 5 MG tablet Take 1 tablet by mouth 3 times daily 270 tablet 1    nystatin (NYAMYC) 456219 UNIT/GM powder APPLY ONE DOSE TOPICALLY TWICE A DAY 1 each 2    VORTIoxetine HBr (TRINTELLIX) 20 MG TABS tablet Take 1 tablet by mouth daily 30 tablet 3    traMADol (ULTRAM) 50 MG tablet Take 1 tablet by mouth every 4 hours as needed for Pain for up to 18 days. Intended supply: 3 days. Take lowest dose possible to manage pain 18 tablet 0    vitamin D-3 (CHOLECALCIFEROL) 125 MCG (5000 UT) TABS Take 1 tablet by mouth daily 30 tablet 3    blood glucose monitor strips Test 3 times a day & as needed for symptoms of irregular blood glucose. 100 strip 1    Lancets MISC Use one Lancet per blood sugar test 100 each 0    Handicap Placard MISC by Does not apply route Exp 9/2026 1 each 0    albuterol (PROVENTIL) (2.5 MG/3ML) 0.083% nebulizer solution       Nebulizers (COMPRESSOR/NEBULIZER) MISC 1 vial by Does not apply route 4 times daily as needed. Patient has albuterol prescription at home; needs aerosol machine and set up diagnosis chronic bronchitis 1 each 0     No current facility-administered medications for this visit.      Allergies   Allergen Reactions    Amoxicillin-Pot Clavulanate Diarrhea and Other (See Comments)     augmentin      Medrol [Methylprednisolone] Other (See Comments)     Redness of hands and itching  Redness of hands and itching    Tape [Adhesive Tape] Rash       Health Maintenance   Topic Date Due    COVID-19 Vaccine (1) Never done    Diabetic retinal exam  Never done    Shingles vaccine (1 of 2) Never done    Low dose CT lung screening  05/27/2021    Flu vaccine (1) 01/19/2024 (Originally 8/1/2022)    Breast cancer screen  03/19/2023    A1C test (Diabetic or Prediabetic)  06/12/2023    Annual Wellness Visit (AWV) 09/22/2023    Depression Monitoring  01/19/2024    DTaP/Tdap/Td vaccine (2 - Td or Tdap) 08/24/2026    Lipids  06/12/2027    Pneumococcal 0-64 years Vaccine (3 - PPSV23 if available, else PCV20) 03/30/2030    Colorectal Cancer Screen  10/07/2030    Hepatitis C screen  Completed    HIV screen  Completed    Hepatitis A vaccine  Aged Out    Hib vaccine  Aged Out    Meningococcal (ACWY) vaccine  Aged Out       Subjective:     Review of Systems   Constitutional:  Negative for appetite change, fatigue and fever. HENT:  Negative for congestion, ear pain, hearing loss and sore throat. Eyes:  Negative for discharge and visual disturbance. Respiratory:  Positive for chest tightness, shortness of breath and wheezing. Negative for cough. Cardiovascular:  Positive for chest pain. Negative for palpitations and leg swelling. Gastrointestinal:  Positive for nausea. Negative for abdominal pain, constipation, diarrhea and vomiting. Genitourinary:  Negative for flank pain, frequency, hematuria and urgency. Musculoskeletal:  Negative for arthralgias, gait problem, joint swelling and myalgias. Skin: Negative. Neurological:  Negative for dizziness, weakness, numbness and headaches. Psychiatric/Behavioral: Negative. Negative for dysphoric mood. The patient is not nervous/anxious. Objective:     Physical Exam  Vitals reviewed. Constitutional:       Appearance: Normal appearance. She is normal weight. HENT:      Head: Normocephalic and atraumatic. Nose: Nose normal.      Mouth/Throat:      Mouth: Mucous membranes are moist.      Pharynx: Oropharynx is clear. Eyes:      Extraocular Movements: Extraocular movements intact. Conjunctiva/sclera: Conjunctivae normal.      Pupils: Pupils are equal, round, and reactive to light. Cardiovascular:      Rate and Rhythm: Normal rate and regular rhythm. Heart sounds: Normal heart sounds. No murmur heard. No gallop.    Pulmonary:      Effort: Pulmonary effort is normal. No respiratory distress. Breath sounds: Normal breath sounds. No stridor. No wheezing. Abdominal:      General: Bowel sounds are normal. There is no distension. Palpations: Abdomen is soft. Tenderness: There is no abdominal tenderness. There is no guarding or rebound. Musculoskeletal:         General: No swelling or tenderness. Normal range of motion. Cervical back: Normal range of motion and neck supple. Skin:     General: Skin is warm and dry. Coloration: Skin is not jaundiced. Findings: No rash. Neurological:      General: No focal deficit present. Mental Status: She is alert and oriented to person, place, and time. Psychiatric:         Mood and Affect: Mood normal.         Behavior: Behavior normal.         Thought Content: Thought content normal.         Judgment: Judgment normal.     /83   Pulse 80   Temp 97.9 °F (36.6 °C)   Ht 5' 6\" (1.676 m)   Wt 293 lb (132.9 kg)   SpO2 92%   BMI 47.29 kg/m²     Assessment/Plan:   1. Chest pain, unspecified type  -     NM MYOCARDIAL SPECT REST EXERCISE OR RX; Future  -     EKG 12 lead  2. Chronic obstructive pulmonary disease, unspecified COPD type ([facility]  -     Budeson-Glycopyrrol-Formoterol (BREZTRI AEROSPHERE) 160-9-4.8 MCG/ACT AERO; Inhale 2 puffs into the lungs 2 times daily, Disp-10.7 g, R-1Normal  -     guaiFENesin (MUCINEX) 600 MG extended release tablet; Take 2 tablets by mouth 2 times daily for 10 days, Disp-40 tablet, R-0Normal  -     predniSONE (DELTASONE) 20 MG tablet; Take 1 tablet by mouth 2 times daily for 5 days, Disp-10 tablet, R-0Normal  3. [facility]  4. Shortness of breath  -     Echocardiogram complete; Future  5. Moderate episode of recurrent major depressive disorder ([facility]  6. Gastroesophageal reflux disease, unspecified whether esophagitis present  -     Zuleyka De Leon MD, Gastroenterology, [place]  7.  Prediabetes  -     empagliflozin (JARDIANCE) 10 MG tablet; Take 1 tablet by mouth daily, Disp-30 tablet, R-2Normal  8. Personal history of tobacco use  -     ME VISIT TO DISCUSS LUNG CA SCREEN W LDCT  -     CT Lung Screen (Annual/Baseline); Future  9. Elevated hemoglobin (HCC)  10. Tobacco abuse        Chest pain-normal exam and vitals, POCT EKG normal, stress test and echocardiogram ordered for evaluation    COPD-likely having COPD exacerbation with increased cough, started on prednisone 5-day course along with Mucinex. Advised to continue with Breztri and use albuterol as needed    Pulmonary hypertension-likely secondary to sleep apnea, on CPAP machine    Depression and anxiety-advised to increase BuSpar to 10 mg 3 times a day, continue Trintellix 20 mg daily    Acid reflux-poorly controlled, referral placed for gastroenterology    Prediabetes-last A1c 6.4, stopped metformin and started on Jardiance 10 mg daily to help with weight loss    Elevated hemoglobin-likely secondary to smoking    Patient was counseled on tobacco cessation. Based upon patient's motivation to change her behavior, the following plan was agreed upon: patient is not ready to work toward tobacco cessation at this time. She was provided with a list of local tobacco cessation resources. Provider spent 3 minutes counseling patient. Return in about 3 months (around 4/19/2023) for Follow up DM. Orders Placed This Encounter   Procedures    NM MYOCARDIAL SPECT REST EXERCISE OR RX     Standing Status:   Future     Standing Expiration Date:   1/19/2024     Order Specific Question:   Reason for Exam?     Answer:   Chest pain     Order Specific Question:   Procedure Type     Answer:   Rx    CT Lung Screen (Annual/Baseline)     Age: Patient is 62 y.o.     Smoking History: Social History    Tobacco Use      Smoking status: Every Day        Packs/day: 0.75        Years: 40.00        Pack years: 30        Types: Cigarettes      Smokeless tobacco: Never      Tobacco comments: has tried cold turkey, patches, chantix    Vaping Use      Vaping Use: Some days    Alcohol use: No      Alcohol/week: 0.0 standard drinks    Drug use: Yes      Frequency: 4.0 times per week      Types: Marijuana Janis Beat)      Comment: Last used 20   Pack years: 30    Date of last lung cancer screenin2020     Standing Status:   Future     Standing Expiration Date:   2024     Order Specific Question:   Is there documentation of shared decision making? Answer:   Yes     Order Specific Question:   Is this a low dose CT or a routine CT? Answer:   Low Dose CT [1]     Order Specific Question:   Is this the first (baseline) CT or an annual exam?     Answer:   Baseline [1]     Order Specific Question:   Does the patient show any signs or symptoms of lung cancer? Answer:   No     Order Specific Question:   Smoking Status? Answer:   Every Day [1]     Order Specific Question:   Smoking packs per day? Answer:   0.75     Order Specific Question:   Years smoking?      Answer:   [facility] Matti Flynn MD, Gastroenterology, [facility]     Referral Priority:   Routine     Referral Type:   Eval and Treat     Referral Reason:   Specialty Services Required     Referred to Provider:   David Pearson MD     Requested Specialty:   Gastroenterology     Number of Visits Requested:   1    EKG 12 lead     Order Specific Question:   Reason for Exam?     Answer:   Chest pain    Echocardiogram complete     Standing Status:   Future     Standing Expiration Date:   3/20/2023     Order Specific Question:   Reason for exam:     Answer:   shortness of breath    UT VISIT TO DISCUSS LUNG CA SCREEN W LDCT     Orders Placed This Encounter   Medications    Budeson-Glycopyrrol-Formoterol (BREZTRI AEROSPHERE) 160-9-4.8 MCG/ACT AERO     Sig: Inhale 2 puffs into the lungs 2 times daily     Dispense:  10.7 g     Refill:  1    guaiFENesin (MUCINEX) 600 MG extended release tablet     Sig: Take 2 tablets by mouth 2 times daily for 10 days Dispense:  40 tablet     Refill:  0    predniSONE (DELTASONE) 20 MG tablet     Sig: Take 1 tablet by mouth 2 times daily for 5 days     Dispense:  10 tablet     Refill:  0    empagliflozin (JARDIANCE) 10 MG tablet     Sig: Take 1 tablet by mouth daily     Dispense:  30 tablet     Refill:  2       Patient given educational materials - see patient instructions. Discussed use, benefit, and side effects of prescribed medications. All patientquestions answered. Pt voiced understanding. Reviewed health maintenance. Instructedto continue current medications, diet and exercise. Patient agreed with treatmentplan. Follow up as directed. Electronically signed by Antoinette Bautista MD on 1/20/2023 at 12:27 PMDiscussed with the patient the current USPSTF guidelines released March 9, 2021 for screening for lung cancer. For adults aged 48 to [de-identified] years who have a 20 pack-year smoking history and currently smoke or have quit within the past 15 years the grade B recommendation is to:  Screen for lung cancer with low-dose computed tomography (LDCT) every year. Stop screening once a person has not smoked for 15 years or has a health problem that limits life expectancy or the ability to have lung surgery. The patient  reports that she has been smoking cigarettes. She has a 30.00 pack-year smoking history. She has never used smokeless tobacco.. Discussed with patient the risks and benefits of screening, including over-diagnosis, false positive rate, and total radiation exposure. The patient currently exhibits no signs or symptoms suggestive of lung cancer. Discussed with patient the importance of compliance with yearly annual lung cancer screenings and willingness to undergo diagnosis and treatment if screening scan is positive. In addition, the patient was counseled regarding the importance of remaining smoke free and/or total smoking cessation.     Also reviewed the following if the patient has Medicare that as of February 10, 2022, Medicare only covers LDCT screening in patients aged 51-72 with at least a 20 pack-year smoking history who currently smoke or have quit in the last 15 years

## 2023-01-20 PROBLEM — R07.9 CHEST PAIN: Status: RESOLVED | Noted: 2017-01-18 | Resolved: 2023-01-20

## 2023-01-20 ASSESSMENT — ENCOUNTER SYMPTOMS
DIARRHEA: 0
COUGH: 0
SHORTNESS OF BREATH: 1
CHEST TIGHTNESS: 1
CONSTIPATION: 0
WHEEZING: 1
ABDOMINAL PAIN: 0
SORE THROAT: 0
EYE DISCHARGE: 0
VOMITING: 0
NAUSEA: 1

## 2023-01-27 DIAGNOSIS — N76.0 ACUTE VAGINITIS: Primary | ICD-10-CM

## 2023-01-27 RX ORDER — FLUCONAZOLE 150 MG/1
150 TABLET ORAL ONCE
Qty: 1 TABLET | Refills: 0 | Status: SHIPPED | OUTPATIENT
Start: 2023-01-27 | End: 2023-01-27

## 2023-02-02 DIAGNOSIS — J44.9 CHRONIC OBSTRUCTIVE PULMONARY DISEASE, UNSPECIFIED COPD TYPE (HCC): Primary | ICD-10-CM

## 2023-02-02 DIAGNOSIS — Z72.0 TOBACCO ABUSE: ICD-10-CM

## 2023-02-02 DIAGNOSIS — Z87.891 PERSONAL HISTORY OF TOBACCO USE: ICD-10-CM

## 2023-02-06 DIAGNOSIS — M18.0 ARTHRITIS OF CARPOMETACARPAL (CMC) JOINT OF BOTH THUMBS: ICD-10-CM

## 2023-02-06 RX ORDER — TRAMADOL HYDROCHLORIDE 50 MG/1
50 TABLET ORAL EVERY 4 HOURS PRN
Qty: 18 TABLET | Refills: 0 | Status: SHIPPED | OUTPATIENT
Start: 2023-02-06 | End: 2023-02-16 | Stop reason: SDUPTHER

## 2023-02-06 NOTE — PROGRESS NOTES
Starr Harris is calling to request a refill on the following medication(s):    Medication Request:  Requested Prescriptions     Pending Prescriptions Disp Refills    traMADol (ULTRAM) 50 MG tablet 18 tablet 0     Sig: Take 1 tablet by mouth every 4 hours as needed for Pain for up to 18 days. Intended supply: 3 days.  Take lowest dose possible to manage pain       Last Visit Date (If Applicable):  Visit date not found    Next Visit Date:    Visit date not found

## 2023-02-07 DIAGNOSIS — G56.03 BILATERAL CARPAL TUNNEL SYNDROME: Primary | ICD-10-CM

## 2023-02-08 ENCOUNTER — TELEPHONE (OUTPATIENT)
Dept: ONCOLOGY | Age: 58
End: 2023-02-08

## 2023-02-08 NOTE — LETTER
2/8/2023        [address] [place] [address] [address] [address] [facility] [address]    Dear Brittney Calvo: Your healthcare provider has ordered a low dose CT scan of the chest for lung cancer screening. Enclosed you will find information about CT lung screening and smoking cessation resources. If you are unable to keep you appointment for you CT lung screening, please call our scheduling department at [phone]. Keep in mind that CT lung screening does not take the place of smoking cessation. Please do not hesitate to contact me if you have any questions or concerns.     [address] [facility],      [facility] Lung Screening Program  [phone]

## 2023-02-13 DIAGNOSIS — N89.8 VAGINAL IRRITATION: Primary | ICD-10-CM

## 2023-02-13 RX ORDER — FLUCONAZOLE 150 MG/1
150 TABLET ORAL ONCE
Qty: 1 TABLET | Refills: 0 | Status: SHIPPED | OUTPATIENT
Start: 2023-02-13 | End: 2023-02-13

## 2023-02-16 ENCOUNTER — HOSPITAL ENCOUNTER (OUTPATIENT)
Age: 58
Setting detail: SPECIMEN
Discharge: HOME | End: 2023-02-16

## 2023-02-16 ENCOUNTER — OFFICE VISIT (OUTPATIENT)
Dept: FAMILY MEDICINE CLINIC | Age: 58
End: 2023-02-16

## 2023-02-16 VITALS
SYSTOLIC BLOOD PRESSURE: 136 MMHG | WEIGHT: 286.4 LBS | HEART RATE: 100 BPM | BODY MASS INDEX: 46.03 KG/M2 | OXYGEN SATURATION: 98 % | TEMPERATURE: 96 F | HEIGHT: 66 IN | DIASTOLIC BLOOD PRESSURE: 84 MMHG

## 2023-02-16 DIAGNOSIS — N89.8 VAGINAL IRRITATION: ICD-10-CM

## 2023-02-16 DIAGNOSIS — F33.1 MODERATE EPISODE OF RECURRENT MAJOR DEPRESSIVE DISORDER (HCC): Primary | ICD-10-CM

## 2023-02-16 DIAGNOSIS — M18.0 ARTHRITIS OF CARPOMETACARPAL (CMC) JOINT OF BOTH THUMBS: ICD-10-CM

## 2023-02-16 DIAGNOSIS — R73.03 PREDIABETES: ICD-10-CM

## 2023-02-16 DIAGNOSIS — G56.03 BILATERAL CARPAL TUNNEL SYNDROME: ICD-10-CM

## 2023-02-16 LAB
BILIRUBIN, POC: ABNORMAL
BLOOD URINE, POC: ABNORMAL
CANDIDA SPECIES, DNA PROBE: NEGATIVE
CLARITY, POC: CLEAR
COLOR, POC: ABNORMAL
GARDNERELLA VAGINALIS, DNA PROBE: NEGATIVE
GLUCOSE URINE, POC: 500
KETONES, POC: ABNORMAL
LEUKOCYTE EST, POC: ABNORMAL
NITRITE, POC: ABNORMAL
PH, POC: 6
PROTEIN, POC: ABNORMAL
SOURCE: NORMAL
SPECIFIC GRAVITY, POC: 1.02
TRICHOMONAS VAGINALIS DNA: NEGATIVE
UROBILINOGEN, POC: 0.2

## 2023-02-16 RX ORDER — ORAL SEMAGLUTIDE 3 MG/1
3 TABLET ORAL DAILY
Qty: 30 TABLET | Refills: 1 | Status: SHIPPED | OUTPATIENT
Start: 2023-02-16

## 2023-02-16 RX ORDER — ARIPIPRAZOLE 5 MG/1
5 TABLET ORAL DAILY
Qty: 30 TABLET | Refills: 3 | Status: SHIPPED | OUTPATIENT
Start: 2023-02-16

## 2023-02-16 RX ORDER — LORAZEPAM 0.5 MG/1
0.5 TABLET ORAL EVERY 8 HOURS PRN
Qty: 30 TABLET | Refills: 0 | Status: SHIPPED | OUTPATIENT
Start: 2023-02-16 | End: 2023-03-18

## 2023-02-16 RX ORDER — TRAMADOL HYDROCHLORIDE 50 MG/1
50 TABLET ORAL EVERY 4 HOURS PRN
Qty: 30 TABLET | Refills: 0 | Status: SHIPPED | OUTPATIENT
Start: 2023-02-16 | End: 2023-03-18

## 2023-02-16 SDOH — ECONOMIC STABILITY: FOOD INSECURITY: WITHIN THE PAST 12 MONTHS, THE FOOD YOU BOUGHT JUST DIDN'T LAST AND YOU DIDN'T HAVE MONEY TO GET MORE.: NEVER TRUE

## 2023-02-16 SDOH — ECONOMIC STABILITY: FOOD INSECURITY: WITHIN THE PAST 12 MONTHS, YOU WORRIED THAT YOUR FOOD WOULD RUN OUT BEFORE YOU GOT MONEY TO BUY MORE.: NEVER TRUE

## 2023-02-16 SDOH — ECONOMIC STABILITY: HOUSING INSECURITY
IN THE LAST 12 MONTHS, WAS THERE A TIME WHEN YOU DID NOT HAVE A STEADY PLACE TO SLEEP OR SLEPT IN A SHELTER (INCLUDING NOW)?: NO

## 2023-02-16 ASSESSMENT — ENCOUNTER SYMPTOMS
NAUSEA: 0
CONSTIPATION: 0
WHEEZING: 0
SHORTNESS OF BREATH: 0
ABDOMINAL PAIN: 0
VOMITING: 0
CHEST TIGHTNESS: 0
EYE DISCHARGE: 0
DIARRHEA: 0
SORE THROAT: 0
COUGH: 0

## 2023-02-16 NOTE — PROGRESS NOTES
[address] PRIMARY CARE  [address] [place] [address] [address] [place] [address]  Dept: [phone]  Dept Fax: [phone]    Derek Melendrez is a 62 y.o. female who is a Established patient, who presents today for her medical conditions/complaints as noted below:  Chief Complaint   Patient presents with    Vaginal Pain     burning         HPI:     She is here today complaining of vaginal discharge and itching. She states that symptoms have been going on for a couple of weeks. She was recently started on Jardiance last visit. She is willing to try Rybelsus instead. She also has chronic multiple joint pains and says that her carpal tunnel has been bothering her quite a bit lately. She states that she wants to follow-up with orthopedics at [facility]. She is also been dealing with increased mood issues which she feels are more related to her pain. She states that she gets angry easily and her mood fluctuates a lot. She states that the Trintellix has been helping but not enough. She also gets frequent anxiety attacks and states that she used to be on Ativan before which helped. Hemoglobin A1C (%)   Date Value   06/12/2022 6.4 (H)   02/19/2021 6.1 (H)   08/19/2020 5.9             ( goal A1Cis < 7)   No results found for: LABMICR  LDL Cholesterol (mg/dL)   Date Value   06/12/2022 75   02/19/2021 72   08/19/2020 64     LDL Calculated (mg/dL)   Date Value   04/27/2015 81       (goal LDL is <100)   AST (U/L)   Date Value   01/07/2023 21     ALT (U/L)   Date Value   01/07/2023 17     BUN (mg/dL)   Date Value   01/07/2023 17     BP Readings from Last 3 Encounters:   02/16/23 136/84   01/19/23 126/83   01/07/23 134/87          (goal 120/80)    Past Medical History:   Diagnosis Date    Anxiety     Asthma     Bladder incontinence     At times per pt.     Bladder prolapse, female, acquired     Cancer [facility]) 11/1996    cervical; treated with hysterectomy    Carpal tunnel syndrome 12/15/2010 Chest pain 2017    Chronic bilateral low back pain     Colon polyps 10/07/2020    tubular adenomas x4    COPD (chronic obstructive pulmonary disease) (HCC)     Depression     Epigastric pain     Hepatic steatosis     History of cataract extraction with lens replacement     bilateral    History of stress test     Hyperglycemia     Intertrigo     Lumbar degenerative disc disease     Nausea     Obesity 2017    Osteoarthritis     right knee    Osteoarthritis cervical spine     Osteoarthritis thoracic spine     Tobacco abuse 2017      Past Surgical History:   Procedure Laterality Date    CATARACT REMOVAL       SECTION      x1    COLONOSCOPY N/A 10/7/2020    tubular adenomas x4    HYSTERECTOMY, VAGINAL  1996    secondary to cervical cancer--ovaries still present    NERVE BLOCK Bilateral 2020    NERVE BLOCK BILATERAL - MBB #1 L3-4, L4-5, L5-S1 (Bilateral )     NERVE BLOCK Bilateral 2020    NERVE BLOCK BILATERAL - MBB #2 L3-4, L4-5, L5-S1    NERVE BLOCK  01/15/2021    : NERVE RADIOFREQUENCY ABLATION L3-4, L4-5, L5-S1 (Left )    PAIN MANAGEMENT PROCEDURE Bilateral 2020    NERVE BLOCK BILATERAL - MBB #1 L3-4, L4-5, L5-S1 performed by Kelly Villalpando MD at [address] [place] [address] Bilateral 2020    NERVE BLOCK BILATERAL - MBB #2 L3-4, L4-5, L5-S1 performed by Kelly Villalpando MD at [address] [place] [address] Left 1/15/2021    NERVE RADIOFREQUENCY ABLATION L3-4, L4-5, L5-S1 performed by Kelly Villalpando MD at [address] [place] [address] Left 2021    NERVE RADIOFREQUENCY ABLATION -L3-4, L4-5, L5-S1     PAIN MANAGEMENT PROCEDURE Left 2021    NERVE RADIOFREQUENCY ABLATION -L3-4, L4-5, L5-S1 performed by Kelly Villalpando MD at [address] [place] [address]    throat polyps removed       Family History   Problem Relation Age of Onset    Diabetes Mother         from pancreatic resection Heart Disease Mother     Arthritis Mother     Depression Mother     Mental Illness Mother     Diabetes Father     Heart Disease Father     Depression Brother     Cancer Brother         bladder    Lung Cancer Brother 64        cause of death    Depression Sister     Breast Cancer Maternal Grandmother     Asthma Other         grandson    Breast Cancer Other         diagnosed in her 42's    High Blood Pressure Brother     Diabetes Brother     No Known Problems Maternal Grandfather     No Known Problems Paternal Grandmother     No Known Problems Paternal Grandfather     Arthritis Sister     Pancreatic Cancer Maternal Uncle     High Cholesterol Neg Hx     Kidney Disease Neg Hx     Stroke Neg Hx     Ovarian Cancer Neg Hx     Colon Cancer Neg Hx        Social History     Tobacco Use    Smoking status: Every Day     Packs/day: 0.75     Years: 40.00     Pack years: 30.00     Types: Cigarettes    Smokeless tobacco: Never    Tobacco comments:     has tried cold turkey, patches, chantix   Substance Use Topics    Alcohol use: No     Alcohol/week: 0.0 standard drinks      Current Outpatient Medications   Medication Sig Dispense Refill    Semaglutide (RYBELSUS) 3 MG TABS Take 3 mg by mouth daily 30 tablet 1    ARIPiprazole (ABILIFY) 5 MG tablet Take 1 tablet by mouth daily 30 tablet 3    traMADol (ULTRAM) 50 MG tablet Take 1 tablet by mouth every 4 hours as needed for Pain for up to 30 days. Intended supply: 3 days. Take lowest dose possible to manage pain Max Daily Amount: 300 mg 30 tablet 0    LORazepam (ATIVAN) 0.5 MG tablet Take 1 tablet by mouth every 8 hours as needed for Anxiety for up to 30 days.  Max Daily Amount: 1.5 mg 30 tablet 0    Budeson-Glycopyrrol-Formoterol (BREZTRI AEROSPHERE) 160-9-4.8 MCG/ACT AERO Inhale 2 puffs into the lungs 2 times daily 10.7 g 1    albuterol sulfate HFA (PROAIR HFA) 108 (90 Base) MCG/ACT inhaler Inhale 2 puffs into the lungs every 6 hours as needed for Wheezing 3 each 1    busPIRone (BUSPAR) 5 MG tablet Take 1 tablet by mouth 3 times daily 270 tablet 1    nystatin (NYAMYC) 254951 UNIT/GM powder APPLY ONE DOSE TOPICALLY TWICE A DAY 1 each 2    VORTIoxetine HBr (TRINTELLIX) 20 MG TABS tablet Take 1 tablet by mouth daily 30 tablet 3    vitamin D-3 (CHOLECALCIFEROL) 125 MCG (5000 UT) TABS Take 1 tablet by mouth daily 30 tablet 3    blood glucose monitor strips Test 3 times a day & as needed for symptoms of irregular blood glucose. 100 strip 1    Lancets MISC Use one Lancet per blood sugar test 100 each 0    Handicap Placard MISC by Does not apply route Exp 9/2026 1 each 0    albuterol (PROVENTIL) (2.5 MG/3ML) 0.083% nebulizer solution       Nebulizers (COMPRESSOR/NEBULIZER) MISC 1 vial by Does not apply route 4 times daily as needed. Patient has albuterol prescription at home; needs aerosol machine and set up diagnosis chronic bronchitis 1 each 0    HYDROcodone-acetaminophen (NORCO) 5-325 MG per tablet  (Patient not taking: Reported on 2/16/2023)       No current facility-administered medications for this visit.      Allergies   Allergen Reactions    Amoxicillin-Pot Clavulanate Diarrhea and Other (See Comments)     augmentin      Medrol [Methylprednisolone] Other (See Comments)     Redness of hands and itching  Redness of hands and itching    Tape [Adhesive Tape] Rash       Health Maintenance   Topic Date Due    COVID-19 Vaccine (1) Never done    Diabetic retinal exam  Never done    Shingles vaccine (1 of 2) Never done    Low dose CT lung screening  05/27/2021    Flu vaccine (1) 01/19/2024 (Originally 8/1/2022)    Breast cancer screen  03/19/2023    A1C test (Diabetic or Prediabetic)  06/12/2023    Annual Wellness Visit (AWV)  09/22/2023    Depression Monitoring  01/19/2024    DTaP/Tdap/Td vaccine (2 - Td or Tdap) 08/24/2026    Lipids  06/12/2027    Pneumococcal 0-64 years Vaccine (3 - PPSV23 if available, else PCV20) 03/30/2030    Colorectal Cancer Screen  10/07/2030    Hepatitis C screen Completed    HIV screen  Completed    Hepatitis A vaccine  Aged Out    Hib vaccine  Aged Out    Meningococcal (ACWY) vaccine  Aged Out       Subjective:     Review of Systems   Constitutional:  Negative for appetite change, fatigue and fever. HENT:  Negative for congestion, ear pain, hearing loss and sore throat. Eyes:  Negative for discharge and visual disturbance. Respiratory:  Negative for cough, chest tightness, shortness of breath and wheezing. Cardiovascular:  Negative for chest pain, palpitations and leg swelling. Gastrointestinal:  Negative for abdominal pain, constipation, diarrhea, nausea and vomiting. Genitourinary:  Positive for vaginal discharge. Negative for flank pain, frequency, hematuria and urgency. Musculoskeletal:  Positive for arthralgias. Negative for gait problem, joint swelling and myalgias. Skin: Negative. Neurological:  Negative for dizziness, weakness, numbness and headaches. Psychiatric/Behavioral: Negative. Negative for dysphoric mood. The patient is not nervous/anxious. Objective:     Physical Exam  Vitals reviewed. Constitutional:       Appearance: Normal appearance. She is normal weight. HENT:      Head: Normocephalic and atraumatic. Nose: Nose normal.      Mouth/Throat:      Mouth: Mucous membranes are moist.      Pharynx: Oropharynx is clear. Eyes:      Extraocular Movements: Extraocular movements intact. Conjunctiva/sclera: Conjunctivae normal.      Pupils: Pupils are equal, round, and reactive to light. Cardiovascular:      Rate and Rhythm: Normal rate and regular rhythm. Heart sounds: Normal heart sounds. No murmur heard. No gallop. Pulmonary:      Effort: Pulmonary effort is normal. No respiratory distress. Breath sounds: Normal breath sounds. No stridor. No wheezing. Abdominal:      General: Bowel sounds are normal. There is no distension. Palpations: Abdomen is soft. Tenderness:  There is no abdominal tenderness. There is no guarding or rebound. Musculoskeletal:         General: No swelling or tenderness. Normal range of motion. Cervical back: Normal range of motion and neck supple. Skin:     General: Skin is warm and dry. Coloration: Skin is not jaundiced. Findings: No rash. Neurological:      General: No focal deficit present. Mental Status: She is alert and oriented to person, place, and time. Psychiatric:         Mood and Affect: Mood normal.         Behavior: Behavior normal.         Thought Content: Thought content normal.         Judgment: Judgment normal.     /84   Pulse 100   Temp (!) 96 °F (35.6 °C)   Ht 5' 6\" (1.676 m)   Wt 286 lb 6.4 oz (129.9 kg)   SpO2 98%   BMI 46.23 kg/m²     Assessment/Plan:   1. Moderate episode of recurrent major depressive disorder (HCC)  -     ARIPiprazole (ABILIFY) 5 MG tablet; Take 1 tablet by mouth daily, Disp-30 tablet, R-3Normal  -     LORazepam (ATIVAN) 0.5 MG tablet; Take 1 tablet by mouth every 8 hours as needed for Anxiety for up to 30 days. Max Daily Amount: 1.5 mg, Disp-30 tablet, R-0Normal  2. Prediabetes  -     Semaglutide (RYBELSUS) 3 MG TABS; Take 3 mg by mouth daily, Disp-30 tablet, R-1Normal  3. Arthritis of carpometacarpal (CMC) joint of both thumbs  -     traMADol (ULTRAM) 50 MG tablet; Take 1 tablet by mouth every 4 hours as needed for Pain for up to 30 days. Intended supply: 3 days. Take lowest dose possible to manage pain Max Daily Amount: 300 mg, Disp-30 tablet, R-0Normal  4. Bilateral carpal tunnel syndrome  -     External Referral To Orthopedic Surgery  5. Vaginal irritation  -     POCT Urinalysis No Micro (Auto)  -     Vaginitis DNA Probe; Future      Depression and anxiety-poorly controlled, started on Abilify 5 mg daily and continue Trintellix 20 mg daily, BuSpar 5 mg 3 times daily.   Takes Ativan as needed for acute anxiety    Prediabetes-last A1c 6.4, having vaginal irritation on Jardiance, started on Rybelsus 3 mg daily    Carpal tunnel syndrome-referral placed for orthopedics and refill provided for tramadol      No follow-ups on file. Orders Placed This Encounter   Procedures    Vaginitis DNA Probe     Standing Status:   Future     Standing Expiration Date:   2/16/2024    External Referral To Orthopedic Surgery     Referral Priority:   Routine     Referral Type:   Consult for Advice and Opinion     Referral Reason:   Specialty Services Required     Referred to Provider:   Maritza Palmer MD     Requested Specialty:   Orthopedic Surgery     Number of Visits Requested:   1    POCT Urinalysis No Micro (Auto)     Orders Placed This Encounter   Medications    Semaglutide (RYBELSUS) 3 MG TABS     Sig: Take 3 mg by mouth daily     Dispense:  30 tablet     Refill:  1    ARIPiprazole (ABILIFY) 5 MG tablet     Sig: Take 1 tablet by mouth daily     Dispense:  30 tablet     Refill:  3    traMADol (ULTRAM) 50 MG tablet     Sig: Take 1 tablet by mouth every 4 hours as needed for Pain for up to 30 days. Intended supply: 3 days. Take lowest dose possible to manage pain Max Daily Amount: 300 mg     Dispense:  30 tablet     Refill:  0     Reduce doses taken as pain becomes manageable    LORazepam (ATIVAN) 0.5 MG tablet     Sig: Take 1 tablet by mouth every 8 hours as needed for Anxiety for up to 30 days. Max Daily Amount: 1.5 mg     Dispense:  30 tablet     Refill:  0       Patient given educational materials - see patient instructions. Discussed use, benefit, and side effects of prescribed medications. All patientquestions answered. Pt voiced understanding. Reviewed health maintenance. Instructedto continue current medications, diet and exercise. Patient agreed with treatmentplan. Follow up as directed.      Electronically signed by Nelly Qureshi MD on 2/16/2023 at 4:23 PM

## 2023-02-18 ENCOUNTER — HOSPITAL ENCOUNTER (OUTPATIENT)
Dept: CT IMAGING | Age: 58
End: 2023-02-18
Payer: MEDICARE

## 2023-02-18 VITALS — HEIGHT: 66 IN | WEIGHT: 287 LBS | BODY MASS INDEX: 46.12 KG/M2

## 2023-02-18 DIAGNOSIS — Z72.0 TOBACCO ABUSE: ICD-10-CM

## 2023-02-18 DIAGNOSIS — J44.9 CHRONIC OBSTRUCTIVE PULMONARY DISEASE, UNSPECIFIED COPD TYPE (HCC): ICD-10-CM

## 2023-02-18 DIAGNOSIS — Z87.891 PERSONAL HISTORY OF TOBACCO USE: ICD-10-CM

## 2023-02-18 PROCEDURE — 71271 CT THORAX LUNG CANCER SCR C-: CPT

## 2023-03-15 DIAGNOSIS — M18.0 ARTHRITIS OF CARPOMETACARPAL (CMC) JOINT OF BOTH THUMBS: ICD-10-CM

## 2023-03-15 RX ORDER — TRAMADOL HYDROCHLORIDE 50 MG/1
50 TABLET ORAL EVERY 4 HOURS PRN
Qty: 30 TABLET | Refills: 0 | Status: SHIPPED | OUTPATIENT
Start: 2023-03-15 | End: 2023-04-14

## 2023-04-14 DIAGNOSIS — M18.0 ARTHRITIS OF CARPOMETACARPAL (CMC) JOINT OF BOTH THUMBS: ICD-10-CM

## 2023-04-15 DIAGNOSIS — F33.1 MODERATE EPISODE OF RECURRENT MAJOR DEPRESSIVE DISORDER (HCC): ICD-10-CM

## 2023-04-17 DIAGNOSIS — R23.8 SKIN IRRITATION: ICD-10-CM

## 2023-04-17 DIAGNOSIS — J44.9 CHRONIC OBSTRUCTIVE PULMONARY DISEASE, UNSPECIFIED COPD TYPE (HCC): ICD-10-CM

## 2023-04-17 DIAGNOSIS — R73.03 PREDIABETES: ICD-10-CM

## 2023-04-17 RX ORDER — NYSTATIN 100000 [USP'U]/G
POWDER TOPICAL
Qty: 1 EACH | Refills: 2 | Status: SHIPPED | OUTPATIENT
Start: 2023-04-17

## 2023-04-17 RX ORDER — LORAZEPAM 0.5 MG/1
TABLET ORAL
Qty: 30 TABLET | Refills: 0 | Status: SHIPPED | OUTPATIENT
Start: 2023-04-17 | End: 2023-05-17

## 2023-04-17 RX ORDER — ALBUTEROL SULFATE 90 UG/1
2 AEROSOL, METERED RESPIRATORY (INHALATION) EVERY 6 HOURS PRN
Qty: 3 EACH | Refills: 1 | Status: SHIPPED | OUTPATIENT
Start: 2023-04-17

## 2023-04-17 RX ORDER — TRAMADOL HYDROCHLORIDE 50 MG/1
50 TABLET ORAL EVERY 4 HOURS PRN
Qty: 30 TABLET | Refills: 0 | Status: SHIPPED | OUTPATIENT
Start: 2023-04-17 | End: 2023-05-17

## 2023-04-17 RX ORDER — BUDESONIDE, GLYCOPYRROLATE, AND FORMOTEROL FUMARATE 160; 9; 4.8 UG/1; UG/1; UG/1
2 AEROSOL, METERED RESPIRATORY (INHALATION) 2 TIMES DAILY
Qty: 10.7 G | Refills: 1 | Status: SHIPPED | OUTPATIENT
Start: 2023-04-17

## 2023-04-17 RX ORDER — ORAL SEMAGLUTIDE 3 MG/1
3 TABLET ORAL DAILY
Qty: 30 TABLET | Refills: 1 | Status: SHIPPED | OUTPATIENT
Start: 2023-04-17 | End: 2023-04-21

## 2023-04-17 NOTE — TELEPHONE ENCOUNTER
Loren Rodriguez is calling to request a refill on the following medication(s):    Medication Request:  Requested Prescriptions     Pending Prescriptions Disp Refills    LORazepam (ATIVAN) 0.5 MG tablet [Pharmacy Med Name: LORazepam 0.5 MG TABLET] 30 tablet 0     Sig: TAKE ONE TABLET BY MOUTH EVERY 8 HOURS AS NEEDED FOR ANXIETY FOR UP TO 30 DAYS **MAX 3 TABLETS DAILY**       Last Visit Date (If Applicable):  8/70/5688    Next Visit Date:    4/21/2023

## 2023-04-17 NOTE — TELEPHONE ENCOUNTER
Aditya Rodriguez is calling to request a refill on the following medication(s):    Medication Request:  Requested Prescriptions     Pending Prescriptions Disp Refills    albuterol sulfate HFA (PROAIR HFA) 108 (90 Base) MCG/ACT inhaler 3 each 1     Sig: Inhale 2 puffs into the lungs every 6 hours as needed for Wheezing    nystatin (NYAMYC) 776841 UNIT/GM powder 1 each 2     Sig: APPLY ONE DOSE TOPICALLY TWICE A DAY    Budeson-Glycopyrrol-Formoterol (BREZTRI AEROSPHERE) 160-9-4.8 MCG/ACT AERO 10.7 g 1     Sig: Inhale 2 puffs into the lungs in the morning and 2 puffs in the evening.     Semaglutide (RYBELSUS) 3 MG TABS 30 tablet 1     Sig: Take 3 mg by mouth daily       Last Visit Date (If Applicable):  2/68/6585    Next Visit Date:    4/21/2023

## 2023-04-21 ENCOUNTER — OFFICE VISIT (OUTPATIENT)
Dept: FAMILY MEDICINE CLINIC | Age: 58
End: 2023-04-21

## 2023-04-21 VITALS
HEART RATE: 75 BPM | HEIGHT: 66 IN | WEIGHT: 286.2 LBS | SYSTOLIC BLOOD PRESSURE: 118 MMHG | DIASTOLIC BLOOD PRESSURE: 68 MMHG | BODY MASS INDEX: 46 KG/M2 | OXYGEN SATURATION: 92 % | TEMPERATURE: 97.1 F

## 2023-04-21 DIAGNOSIS — F33.1 MODERATE EPISODE OF RECURRENT MAJOR DEPRESSIVE DISORDER (HCC): Primary | ICD-10-CM

## 2023-04-21 DIAGNOSIS — Z72.0 TOBACCO ABUSE: ICD-10-CM

## 2023-04-21 DIAGNOSIS — Z12.31 ENCOUNTER FOR SCREENING MAMMOGRAM FOR MALIGNANT NEOPLASM OF BREAST: ICD-10-CM

## 2023-04-21 DIAGNOSIS — K76.0 HEPATIC STEATOSIS: ICD-10-CM

## 2023-04-21 DIAGNOSIS — D58.2 ELEVATED HEMOGLOBIN (HCC): ICD-10-CM

## 2023-04-21 DIAGNOSIS — R09.82 POSTNASAL DRIP: ICD-10-CM

## 2023-04-21 DIAGNOSIS — E55.9 VITAMIN D DEFICIENCY: ICD-10-CM

## 2023-04-21 DIAGNOSIS — R73.03 PREDIABETES: ICD-10-CM

## 2023-04-21 RX ORDER — FLUTICASONE PROPIONATE 50 MCG
1 SPRAY, SUSPENSION (ML) NASAL DAILY
Qty: 32 G | Refills: 1 | Status: SHIPPED | OUTPATIENT
Start: 2023-04-21

## 2023-04-21 SDOH — ECONOMIC STABILITY: FOOD INSECURITY: WITHIN THE PAST 12 MONTHS, YOU WORRIED THAT YOUR FOOD WOULD RUN OUT BEFORE YOU GOT MONEY TO BUY MORE.: NEVER TRUE

## 2023-04-21 SDOH — ECONOMIC STABILITY: INCOME INSECURITY: IN THE LAST 12 MONTHS, WAS THERE A TIME WHEN YOU WERE NOT ABLE TO PAY THE MORTGAGE OR RENT ON TIME?: NO

## 2023-04-21 SDOH — ECONOMIC STABILITY: FOOD INSECURITY: WITHIN THE PAST 12 MONTHS, THE FOOD YOU BOUGHT JUST DIDN'T LAST AND YOU DIDN'T HAVE MONEY TO GET MORE.: NEVER TRUE

## 2023-04-21 ASSESSMENT — PATIENT HEALTH QUESTIONNAIRE - PHQ9
SUM OF ALL RESPONSES TO PHQ QUESTIONS 1-9: 0
2. FEELING DOWN, DEPRESSED OR HOPELESS: 0
9. THOUGHTS THAT YOU WOULD BE BETTER OFF DEAD, OR OF HURTING YOURSELF: 0
10. IF YOU CHECKED OFF ANY PROBLEMS, HOW DIFFICULT HAVE THESE PROBLEMS MADE IT FOR YOU TO DO YOUR WORK, TAKE CARE OF THINGS AT HOME, OR GET ALONG WITH OTHER PEOPLE: 0
4. FEELING TIRED OR HAVING LITTLE ENERGY: 0
3. TROUBLE FALLING OR STAYING ASLEEP: 0
1. LITTLE INTEREST OR PLEASURE IN DOING THINGS: 0
8. MOVING OR SPEAKING SO SLOWLY THAT OTHER PEOPLE COULD HAVE NOTICED. OR THE OPPOSITE, BEING SO FIGETY OR RESTLESS THAT YOU HAVE BEEN MOVING AROUND A LOT MORE THAN USUAL: 0
SUM OF ALL RESPONSES TO PHQ9 QUESTIONS 1 & 2: 0
6. FEELING BAD ABOUT YOURSELF - OR THAT YOU ARE A FAILURE OR HAVE LET YOURSELF OR YOUR FAMILY DOWN: 0
5. POOR APPETITE OR OVEREATING: 0
7. TROUBLE CONCENTRATING ON THINGS, SUCH AS READING THE NEWSPAPER OR WATCHING TELEVISION: 0
SUM OF ALL RESPONSES TO PHQ QUESTIONS 1-9: 0

## 2023-04-21 ASSESSMENT — SOCIAL DETERMINANTS OF HEALTH (SDOH): HOW HARD IS IT FOR YOU TO PAY FOR THE VERY BASICS LIKE FOOD, HOUSING, MEDICAL CARE, AND HEATING?: NOT HARD AT ALL

## 2023-04-21 NOTE — PROGRESS NOTES
[address] PRIMARY CARE  [address] [place] [address] [address] [place] [address]  Dept: [phone]  Dept Fax: [phone]    Kiki Quezada is a 62 y.o. female who is a Established patient, who presents today for her medical conditions/complaints as noted below:  Chief Complaint   Patient presents with    Anxiety    Depression         HPI:     She is here today to follow-up on anxiety and depression and prediabetes. She states that the Abilify has been working very well for her and she finds that her mood is more stable and she does not lose her temper easily. She is also been on Rybelsus 3 mg daily and states that she has noticed that it curbs her appetite. She is willing to try a higher dose to help her lose weight. She is also current smoker and plans to slowly cut down. She complains of frequent postnasal drip due to seasonal allergies, states that she used to be on Flonase previously. She is due for her routine labs and mammogram.      Hemoglobin A1C (%)   Date Value   06/12/2022 6.4 (H)   02/19/2021 6.1 (H)   08/19/2020 5.9             ( goal A1Cis < 7)   No results found for: LABMICR  LDL Cholesterol (mg/dL)   Date Value   06/12/2022 75   02/19/2021 72   08/19/2020 64     LDL Calculated (mg/dL)   Date Value   04/27/2015 81       (goal LDL is <100)   AST (U/L)   Date Value   01/07/2023 21     ALT (U/L)   Date Value   01/07/2023 17     BUN (mg/dL)   Date Value   01/07/2023 17     BP Readings from Last 3 Encounters:   04/21/23 118/68   02/16/23 136/84   01/19/23 126/83          (goal 120/80)    Past Medical History:   Diagnosis Date    Anxiety     Asthma     Bladder incontinence     At times per pt.     Bladder prolapse, female, acquired     Cancer ([facility] [address] 11/1996    cervical; treated with hysterectomy    Carpal tunnel syndrome 12/15/2010    Chest pain 1/18/2017    Chronic bilateral low back pain     Colon polyps 10/07/2020    tubular adenomas x4    COPD (chronic obstructive pulmonary

## 2023-04-23 ASSESSMENT — ENCOUNTER SYMPTOMS
CONSTIPATION: 0
ABDOMINAL PAIN: 0
SHORTNESS OF BREATH: 0
CHEST TIGHTNESS: 0
WHEEZING: 0
NAUSEA: 0
VOMITING: 0
DIARRHEA: 0
SORE THROAT: 0
EYE DISCHARGE: 0
COUGH: 0

## 2023-05-01 DIAGNOSIS — R73.03 PREDIABETES: ICD-10-CM

## 2023-05-01 NOTE — TELEPHONE ENCOUNTER
Domingo Holliday is calling to request a refill on the following medication(s):    Medication Request:  Requested Prescriptions     Pending Prescriptions Disp Refills    JARDIANCE 10 MG tablet [Pharmacy Med Name: Chantal Cov[employer] 10 MG TABLET] 90 tablet      Sig: TAKE ONE TABLET BY MOUTH DAILY       Last Visit Date (If Applicable):  1/26/6340    Next Visit Date:    7/21/2023

## 2023-05-02 RX ORDER — EMPAGLIFLOZIN 10 MG/1
TABLET, FILM COATED ORAL
Qty: 90 TABLET | Refills: 0 | Status: SHIPPED | OUTPATIENT
Start: 2023-05-02

## 2023-05-03 ENCOUNTER — TELEPHONE (OUTPATIENT)
Dept: FAMILY MEDICINE CLINIC | Age: 58
End: 2023-05-03

## 2023-05-03 NOTE — TELEPHONE ENCOUNTER
Patient states that she doesn't take the Jardiance because it makes her break out, she did inform the pharmacy to not fill it.

## 2023-05-11 DIAGNOSIS — M18.0 ARTHRITIS OF CARPOMETACARPAL (CMC) JOINT OF BOTH THUMBS: ICD-10-CM

## 2023-05-15 RX ORDER — TRAMADOL HYDROCHLORIDE 50 MG/1
50 TABLET ORAL EVERY 4 HOURS PRN
Qty: 30 TABLET | Refills: 0 | Status: SHIPPED | OUTPATIENT
Start: 2023-05-15 | End: 2023-06-14

## 2023-05-18 ENCOUNTER — HOSPITAL ENCOUNTER (OUTPATIENT)
Facility: CLINIC | Age: 58
Discharge: HOME | End: 2023-05-18
Payer: MEDICARE

## 2023-05-18 DIAGNOSIS — R73.03 PREDIABETES: ICD-10-CM

## 2023-05-18 DIAGNOSIS — D58.2 ELEVATED HEMOGLOBIN (HCC): ICD-10-CM

## 2023-05-18 DIAGNOSIS — E55.9 VITAMIN D DEFICIENCY: ICD-10-CM

## 2023-05-18 DIAGNOSIS — K76.0 HEPATIC STEATOSIS: ICD-10-CM

## 2023-05-18 DIAGNOSIS — F33.1 MODERATE EPISODE OF RECURRENT MAJOR DEPRESSIVE DISORDER (HCC): ICD-10-CM

## 2023-05-18 LAB
25(OH)D3 SERPL-MCNC: 23.4 NG/ML
ALBUMIN SERPL-MCNC: 3.9 G/DL (ref 3.5–5.2)
ALBUMIN/GLOB SERPL: 1.5 {RATIO} (ref 1–2.5)
ALP SERPL-CCNC: 78 U/L (ref 35–104)
ALT SERPL-CCNC: 19 U/L (ref 5–33)
ANION GAP SERPL CALCULATED.3IONS-SCNC: 8 MMOL/L (ref 9–17)
AST SERPL-CCNC: 16 U/L
BASOPHILS # BLD: 0.05 K/UL (ref 0–0.2)
BASOPHILS NFR BLD: 1 % (ref 0–2)
BILIRUB SERPL-MCNC: 0.6 MG/DL (ref 0.3–1.2)
BUN SERPL-MCNC: 11 MG/DL (ref 6–20)
CALCIUM SERPL-MCNC: 9.1 MG/DL (ref 8.6–10.4)
CHLORIDE SERPL-SCNC: 103 MMOL/L (ref 98–107)
CHOLEST SERPL-MCNC: 136 MG/DL
CHOLESTEROL/HDL RATIO: 3.2
CO2 SERPL-SCNC: 28 MMOL/L (ref 20–31)
CREAT SERPL-MCNC: 0.55 MG/DL (ref 0.5–0.9)
EOSINOPHIL # BLD: 0.08 K/UL (ref 0–0.44)
EOSINOPHILS RELATIVE PERCENT: 1 % (ref 1–4)
ERYTHROCYTE [DISTWIDTH] IN BLOOD BY AUTOMATED COUNT: 14.1 % (ref 11.8–14.4)
EST. AVERAGE GLUCOSE BLD GHB EST-MCNC: 117 MG/DL
GFR SERPL CREATININE-BSD FRML MDRD: >60 ML/MIN/1.73M2
GLUCOSE P FAST SERPL-MCNC: 103 MG/DL (ref 70–99)
HBA1C MFR BLD: 5.7 % (ref 4–6)
HCT VFR BLD AUTO: 52.8 % (ref 36.3–47.1)
HDLC SERPL-MCNC: 42 MG/DL
HGB BLD-MCNC: 17.1 G/DL (ref 11.9–15.1)
IMM GRANULOCYTES # BLD AUTO: 0.04 K/UL (ref 0–0.3)
IMM GRANULOCYTES NFR BLD: 0 %
LDLC SERPL CALC-MCNC: 73 MG/DL (ref 0–130)
LYMPHOCYTES # BLD: 17 % (ref 24–43)
LYMPHOCYTES NFR BLD: 1.77 K/UL (ref 1.1–3.7)
MCH RBC QN AUTO: 30.2 PG (ref 25.2–33.5)
MCHC RBC AUTO-ENTMCNC: 32.4 G/DL (ref 28.4–34.8)
MCV RBC AUTO: 93.3 FL (ref 82.6–102.9)
MONOCYTES NFR BLD: 0.61 K/UL (ref 0.1–1.2)
MONOCYTES NFR BLD: 6 % (ref 3–12)
NEUTROPHILS NFR BLD: 75 % (ref 36–65)
NEUTS SEG NFR BLD: 7.68 K/UL (ref 1.5–8.1)
NRBC AUTOMATED: 0 PER 100 WBC
PLATELET # BLD AUTO: 234 K/UL (ref 138–453)
PMV BLD AUTO: 11.5 FL (ref 8.1–13.5)
POTASSIUM SERPL-SCNC: 4.1 MMOL/L (ref 3.7–5.3)
PROT SERPL-MCNC: 6.5 G/DL (ref 6.4–8.3)
RBC # BLD AUTO: 5.66 M/UL (ref 3.95–5.11)
SODIUM SERPL-SCNC: 139 MMOL/L (ref 135–144)
TRIGL SERPL-MCNC: 106 MG/DL
TSH SERPL-MCNC: 1.21 UIU/ML (ref 0.3–5)
WBC OTHER # BLD: 10.2 K/UL (ref 3.5–11.3)

## 2023-05-18 PROCEDURE — 82306 VITAMIN D 25 HYDROXY: CPT

## 2023-05-18 PROCEDURE — 85025 COMPLETE CBC W/AUTO DIFF WBC: CPT

## 2023-05-18 PROCEDURE — 80061 LIPID PANEL: CPT

## 2023-05-18 PROCEDURE — 84443 ASSAY THYROID STIM HORMONE: CPT

## 2023-05-18 PROCEDURE — 80053 COMPREHEN METABOLIC PANEL: CPT

## 2023-05-18 PROCEDURE — 36415 COLL VENOUS BLD VENIPUNCTURE: CPT

## 2023-05-18 PROCEDURE — 83036 HEMOGLOBIN GLYCOSYLATED A1C: CPT

## 2023-06-18 DIAGNOSIS — F33.1 MODERATE EPISODE OF RECURRENT MAJOR DEPRESSIVE DISORDER (HCC): ICD-10-CM

## 2023-06-19 RX ORDER — ARIPIPRAZOLE 5 MG/1
TABLET ORAL
Qty: 30 TABLET | Refills: 3 | Status: SHIPPED | OUTPATIENT
Start: 2023-06-19

## 2023-06-19 NOTE — TELEPHONE ENCOUNTER
Nevaeh Jhaveri is calling to request a refill on the following medication(s):    Medication Request:  Requested Prescriptions     Pending Prescriptions Disp Refills    ARIPiprazole (ABILIFY) 5 MG tablet [Pharmacy Med Name: ARIPIPRAZOLE 5 MG TABLET] 30 tablet 3     Sig: TAKE ONE TABLET BY MOUTH DAILY       Last Visit Date (If Applicable):  2/76/7817    Next Visit Date:    7/21/2023

## 2023-07-12 ENCOUNTER — PATIENT MESSAGE (OUTPATIENT)
Dept: FAMILY MEDICINE CLINIC | Age: 58
End: 2023-07-12

## 2023-07-12 DIAGNOSIS — M18.0 ARTHRITIS OF CARPOMETACARPAL (CMC) JOINT OF BOTH THUMBS: ICD-10-CM

## 2023-07-12 NOTE — TELEPHONE ENCOUNTER
From: Alma Rosa Rodriguez  To: Dr. Kaylan Campos: 7/12/2023 7:43 AM EDT  Subject: Refill    Could I please get a refill on my tramadol

## 2023-07-13 RX ORDER — TRAMADOL HYDROCHLORIDE 50 MG/1
50 TABLET ORAL EVERY 4 HOURS PRN
Qty: 30 TABLET | Refills: 0 | Status: SHIPPED | OUTPATIENT
Start: 2023-07-13 | End: 2023-08-12

## 2023-07-18 ENCOUNTER — OFFICE VISIT (OUTPATIENT)
Dept: FAMILY MEDICINE CLINIC | Age: 58
End: 2023-07-18
Payer: MEDICARE

## 2023-07-18 VITALS
BODY MASS INDEX: 45.32 KG/M2 | HEART RATE: 114 BPM | DIASTOLIC BLOOD PRESSURE: 62 MMHG | HEIGHT: 66 IN | TEMPERATURE: 98.4 F | SYSTOLIC BLOOD PRESSURE: 130 MMHG | OXYGEN SATURATION: 93 % | WEIGHT: 282 LBS

## 2023-07-18 DIAGNOSIS — J30.2 SEASONAL ALLERGIES: ICD-10-CM

## 2023-07-18 DIAGNOSIS — Z72.0 TOBACCO ABUSE: ICD-10-CM

## 2023-07-18 DIAGNOSIS — J44.9 CHRONIC OBSTRUCTIVE PULMONARY DISEASE, UNSPECIFIED COPD TYPE (HCC): ICD-10-CM

## 2023-07-18 DIAGNOSIS — I70.0 ATHEROSCLEROSIS OF AORTA (HCC): ICD-10-CM

## 2023-07-18 DIAGNOSIS — F11.20 OPIOID DEPENDENCE WITH CURRENT USE (HCC): ICD-10-CM

## 2023-07-18 DIAGNOSIS — M47.816 SPONDYLOSIS OF LUMBAR REGION WITHOUT MYELOPATHY OR RADICULOPATHY: ICD-10-CM

## 2023-07-18 DIAGNOSIS — Z12.31 ENCOUNTER FOR SCREENING MAMMOGRAM FOR MALIGNANT NEOPLASM OF BREAST: ICD-10-CM

## 2023-07-18 DIAGNOSIS — F33.1 MODERATE EPISODE OF RECURRENT MAJOR DEPRESSIVE DISORDER (HCC): Primary | ICD-10-CM

## 2023-07-18 DIAGNOSIS — R73.03 PREDIABETES: ICD-10-CM

## 2023-07-18 PROCEDURE — G8417 CALC BMI ABV UP PARAM F/U: HCPCS | Performed by: STUDENT IN AN ORGANIZED HEALTH CARE EDUCATION/TRAINING PROGRAM

## 2023-07-18 PROCEDURE — 3023F SPIROM DOC REV: CPT | Performed by: STUDENT IN AN ORGANIZED HEALTH CARE EDUCATION/TRAINING PROGRAM

## 2023-07-18 PROCEDURE — 3017F COLORECTAL CA SCREEN DOC REV: CPT | Performed by: STUDENT IN AN ORGANIZED HEALTH CARE EDUCATION/TRAINING PROGRAM

## 2023-07-18 PROCEDURE — 99214 OFFICE O/P EST MOD 30 MIN: CPT | Performed by: STUDENT IN AN ORGANIZED HEALTH CARE EDUCATION/TRAINING PROGRAM

## 2023-07-18 PROCEDURE — G8427 DOCREV CUR MEDS BY ELIG CLIN: HCPCS | Performed by: STUDENT IN AN ORGANIZED HEALTH CARE EDUCATION/TRAINING PROGRAM

## 2023-07-18 PROCEDURE — 99406 BEHAV CHNG SMOKING 3-10 MIN: CPT | Performed by: STUDENT IN AN ORGANIZED HEALTH CARE EDUCATION/TRAINING PROGRAM

## 2023-07-18 PROCEDURE — 4004F PT TOBACCO SCREEN RCVD TLK: CPT | Performed by: STUDENT IN AN ORGANIZED HEALTH CARE EDUCATION/TRAINING PROGRAM

## 2023-07-18 RX ORDER — LORATADINE 10 MG/1
10 TABLET ORAL DAILY
Qty: 90 TABLET | Refills: 1 | Status: SHIPPED | OUTPATIENT
Start: 2023-07-18

## 2023-07-18 RX ORDER — ARIPIPRAZOLE 5 MG/1
5 TABLET ORAL DAILY
Qty: 30 TABLET | Refills: 3 | Status: SHIPPED | OUTPATIENT
Start: 2023-07-18

## 2023-07-18 RX ORDER — ATORVASTATIN CALCIUM 20 MG/1
20 TABLET, FILM COATED ORAL NIGHTLY
Qty: 30 TABLET | Refills: 3 | Status: SHIPPED | OUTPATIENT
Start: 2023-07-18

## 2023-07-18 RX ORDER — ALBUTEROL SULFATE 90 UG/1
2 AEROSOL, METERED RESPIRATORY (INHALATION) EVERY 6 HOURS PRN
Qty: 3 EACH | Refills: 1 | Status: SHIPPED | OUTPATIENT
Start: 2023-07-18

## 2023-07-18 RX ORDER — ASPIRIN 81 MG/1
81 TABLET ORAL DAILY
Qty: 90 TABLET | Refills: 1 | Status: SHIPPED | OUTPATIENT
Start: 2023-07-18

## 2023-07-18 RX ORDER — TIZANIDINE 2 MG/1
2 TABLET ORAL NIGHTLY PRN
Qty: 30 TABLET | Refills: 1 | Status: SHIPPED | OUTPATIENT
Start: 2023-07-18

## 2023-07-18 RX ORDER — NICOTINE 21 MG/24HR
1 PATCH, TRANSDERMAL 24 HOURS TRANSDERMAL DAILY
Qty: 42 PATCH | Refills: 0 | Status: SHIPPED | OUTPATIENT
Start: 2023-07-18 | End: 2023-08-29

## 2023-07-18 SDOH — ECONOMIC STABILITY: FOOD INSECURITY: WITHIN THE PAST 12 MONTHS, THE FOOD YOU BOUGHT JUST DIDN'T LAST AND YOU DIDN'T HAVE MONEY TO GET MORE.: NEVER TRUE

## 2023-07-18 SDOH — ECONOMIC STABILITY: FOOD INSECURITY: WITHIN THE PAST 12 MONTHS, YOU WORRIED THAT YOUR FOOD WOULD RUN OUT BEFORE YOU GOT MONEY TO BUY MORE.: NEVER TRUE

## 2023-07-18 SDOH — ECONOMIC STABILITY: INCOME INSECURITY: IN THE LAST 12 MONTHS, WAS THERE A TIME WHEN YOU WERE NOT ABLE TO PAY THE MORTGAGE OR RENT ON TIME?: NO

## 2023-07-18 ASSESSMENT — PATIENT HEALTH QUESTIONNAIRE - PHQ9
SUM OF ALL RESPONSES TO PHQ QUESTIONS 1-9: 0
6. FEELING BAD ABOUT YOURSELF - OR THAT YOU ARE A FAILURE OR HAVE LET YOURSELF OR YOUR FAMILY DOWN: 0
4. FEELING TIRED OR HAVING LITTLE ENERGY: 0
2. FEELING DOWN, DEPRESSED OR HOPELESS: 0
10. IF YOU CHECKED OFF ANY PROBLEMS, HOW DIFFICULT HAVE THESE PROBLEMS MADE IT FOR YOU TO DO YOUR WORK, TAKE CARE OF THINGS AT HOME, OR GET ALONG WITH OTHER PEOPLE: 0
SUM OF ALL RESPONSES TO PHQ QUESTIONS 1-9: 0
1. LITTLE INTEREST OR PLEASURE IN DOING THINGS: 0
5. POOR APPETITE OR OVEREATING: 0
SUM OF ALL RESPONSES TO PHQ QUESTIONS 1-9: 0
9. THOUGHTS THAT YOU WOULD BE BETTER OFF DEAD, OR OF HURTING YOURSELF: 0
SUM OF ALL RESPONSES TO PHQ QUESTIONS 1-9: 0
SUM OF ALL RESPONSES TO PHQ9 QUESTIONS 1 & 2: 0
8. MOVING OR SPEAKING SO SLOWLY THAT OTHER PEOPLE COULD HAVE NOTICED. OR THE OPPOSITE, BEING SO FIGETY OR RESTLESS THAT YOU HAVE BEEN MOVING AROUND A LOT MORE THAN USUAL: 0
3. TROUBLE FALLING OR STAYING ASLEEP: 0
7. TROUBLE CONCENTRATING ON THINGS, SUCH AS READING THE NEWSPAPER OR WATCHING TELEVISION: 0

## 2023-07-18 ASSESSMENT — ENCOUNTER SYMPTOMS
SHORTNESS OF BREATH: 0
NAUSEA: 0
CHEST TIGHTNESS: 0
ABDOMINAL PAIN: 0
VOMITING: 0
WHEEZING: 0
EYE DISCHARGE: 0
SORE THROAT: 0
DIARRHEA: 0
CONSTIPATION: 0
COUGH: 0

## 2023-07-18 ASSESSMENT — SOCIAL DETERMINANTS OF HEALTH (SDOH): HOW HARD IS IT FOR YOU TO PAY FOR THE VERY BASICS LIKE FOOD, HOUSING, MEDICAL CARE, AND HEATING?: NOT HARD AT ALL

## 2023-07-18 NOTE — PROGRESS NOTES
[facility] PRIMARY CARE  [address] [place] [address]  Dept: [phone]  Dept Fax: [phone]    Nicholas Brennan is a 62 y.o. female who is a Established patient, who presents today for her medical conditions/complaints as noted below:  Chief Complaint   Patient presents with    Depression    Anxiety         HPI:     She is here today to follow-up on depression and prediabetes. She states that she is doing well on current medications. She was having significant weight loss on Rybelsus until few months ago and states that lately she has noticed that she is not losing any more weight. She is also noticed being more hungry lately. She had CT of her lungs done recently which showed aortic atherosclerosis. She has a strong family history of heart disease and she is herself and current everyday smoker. She is on long-term opioids for her chronic back pain and is following with orthopedics for her shoulder pain. Due for mammogram.      Hemoglobin A1C (%)   Date Value   05/18/2023 5.7   06/12/2022 6.4 (H)   02/19/2021 6.1 (H)             ( goal A1Cis < 7)   No results found for: LABMICR  LDL Cholesterol (mg/dL)   Date Value   05/18/2023 73   06/12/2022 75   02/19/2021 72     LDL Calculated (mg/dL)   Date Value   04/27/2015 81       (goal LDL is <100)   AST (U/L)   Date Value   05/18/2023 16     ALT (U/L)   Date Value   05/18/2023 19     BUN (mg/dL)   Date Value   05/18/2023 11     BP Readings from Last 3 Encounters:   07/18/23 130/62   04/21/23 118/68   02/16/23 136/84          (goal 120/80)    Past Medical History:   Diagnosis Date    Anxiety     Asthma     Bladder incontinence     At times per pt.     Bladder prolapse, female, acquired     Cancer ([address]) 11/1996    cervical; treated with hysterectomy    Carpal tunnel syndrome 12/15/2010    Chest pain 1/18/2017    Chronic bilateral low back pain     Colon polyps 10/07/2020    tubular adenomas x4    COPD (chronic

## 2023-08-14 DIAGNOSIS — M18.0 ARTHRITIS OF CARPOMETACARPAL (CMC) JOINT OF BOTH THUMBS: ICD-10-CM

## 2023-08-14 RX ORDER — TRAMADOL HYDROCHLORIDE 50 MG/1
50 TABLET ORAL EVERY 4 HOURS PRN
Qty: 30 TABLET | Refills: 0 | Status: SHIPPED | OUTPATIENT
Start: 2023-08-14 | End: 2023-09-13

## 2023-08-17 ENCOUNTER — TELEPHONE (OUTPATIENT)
Dept: FAMILY MEDICINE CLINIC | Age: 58
End: 2023-08-17

## 2023-09-18 DIAGNOSIS — M18.0 ARTHRITIS OF CARPOMETACARPAL (CMC) JOINT OF BOTH THUMBS: ICD-10-CM

## 2023-09-18 NOTE — PROGRESS NOTES
Mike Reardon is calling to request a refill on the following medication(s):    Medication Request:  Requested Prescriptions     Pending Prescriptions Disp Refills    traMADol (ULTRAM) 50 MG tablet 30 tablet 0     Sig: Take 1 tablet by mouth every 4 hours as needed for Pain for up to 30 days.  Take lowest dose possible to manage pain Max Daily Amount: 300 mg       Last Visit Date (If Applicable):  Visit date not found    Next Visit Date:    Visit date not found

## 2023-09-19 RX ORDER — TRAMADOL HYDROCHLORIDE 50 MG/1
50 TABLET ORAL EVERY 4 HOURS PRN
Qty: 30 TABLET | Refills: 0 | Status: SHIPPED | OUTPATIENT
Start: 2023-09-19 | End: 2023-10-19

## 2023-09-22 SDOH — HEALTH STABILITY: PHYSICAL HEALTH: ON AVERAGE, HOW MANY MINUTES DO YOU ENGAGE IN EXERCISE AT THIS LEVEL?: 10 MIN

## 2023-09-22 SDOH — HEALTH STABILITY: PHYSICAL HEALTH
ON AVERAGE, HOW MANY DAYS PER WEEK DO YOU ENGAGE IN MODERATE TO STRENUOUS EXERCISE (LIKE A BRISK WALK)?: PATIENT DECLINED

## 2023-09-22 ASSESSMENT — LIFESTYLE VARIABLES
HOW OFTEN DO YOU HAVE A DRINK CONTAINING ALCOHOL: NEVER
HOW OFTEN DO YOU HAVE A DRINK CONTAINING ALCOHOL: 1
HOW MANY STANDARD DRINKS CONTAINING ALCOHOL DO YOU HAVE ON A TYPICAL DAY: 98
HOW MANY STANDARD DRINKS CONTAINING ALCOHOL DO YOU HAVE ON A TYPICAL DAY: PATIENT DECLINED
HOW OFTEN DO YOU HAVE SIX OR MORE DRINKS ON ONE OCCASION: 1

## 2023-09-22 ASSESSMENT — PATIENT HEALTH QUESTIONNAIRE - PHQ9
SUM OF ALL RESPONSES TO PHQ QUESTIONS 1-9: 10
6. FEELING BAD ABOUT YOURSELF - OR THAT YOU ARE A FAILURE OR HAVE LET YOURSELF OR YOUR FAMILY DOWN: 0
8. MOVING OR SPEAKING SO SLOWLY THAT OTHER PEOPLE COULD HAVE NOTICED. OR THE OPPOSITE, BEING SO FIGETY OR RESTLESS THAT YOU HAVE BEEN MOVING AROUND A LOT MORE THAN USUAL: 0
SUM OF ALL RESPONSES TO PHQ9 QUESTIONS 1 & 2: 3
7. TROUBLE CONCENTRATING ON THINGS, SUCH AS READING THE NEWSPAPER OR WATCHING TELEVISION: 1
1. LITTLE INTEREST OR PLEASURE IN DOING THINGS: 2
10. IF YOU CHECKED OFF ANY PROBLEMS, HOW DIFFICULT HAVE THESE PROBLEMS MADE IT FOR YOU TO DO YOUR WORK, TAKE CARE OF THINGS AT HOME, OR GET ALONG WITH OTHER PEOPLE: 1
SUM OF ALL RESPONSES TO PHQ QUESTIONS 1-9: 10
2. FEELING DOWN, DEPRESSED OR HOPELESS: 1
SUM OF ALL RESPONSES TO PHQ QUESTIONS 1-9: 10
9. THOUGHTS THAT YOU WOULD BE BETTER OFF DEAD, OR OF HURTING YOURSELF: 0
4. FEELING TIRED OR HAVING LITTLE ENERGY: 3
5. POOR APPETITE OR OVEREATING: 1
SUM OF ALL RESPONSES TO PHQ QUESTIONS 1-9: 10
3. TROUBLE FALLING OR STAYING ASLEEP: 2

## 2023-09-25 ENCOUNTER — OFFICE VISIT (OUTPATIENT)
Dept: FAMILY MEDICINE CLINIC | Age: 58
End: 2023-09-25
Payer: MEDICARE

## 2023-09-25 VITALS
HEART RATE: 84 BPM | WEIGHT: 274 LBS | HEIGHT: 66 IN | BODY MASS INDEX: 44.03 KG/M2 | OXYGEN SATURATION: 95 % | SYSTOLIC BLOOD PRESSURE: 124 MMHG | DIASTOLIC BLOOD PRESSURE: 82 MMHG

## 2023-09-25 DIAGNOSIS — Z72.0 TOBACCO ABUSE: ICD-10-CM

## 2023-09-25 DIAGNOSIS — R73.03 PREDIABETES: ICD-10-CM

## 2023-09-25 DIAGNOSIS — Z00.00 MEDICARE ANNUAL WELLNESS VISIT, SUBSEQUENT: Primary | ICD-10-CM

## 2023-09-25 DIAGNOSIS — Z12.31 ENCOUNTER FOR SCREENING MAMMOGRAM FOR MALIGNANT NEOPLASM OF BREAST: ICD-10-CM

## 2023-09-25 DIAGNOSIS — G89.29 CHRONIC RIGHT HIP PAIN: ICD-10-CM

## 2023-09-25 DIAGNOSIS — M47.816 SPONDYLOSIS OF LUMBAR REGION WITHOUT MYELOPATHY OR RADICULOPATHY: ICD-10-CM

## 2023-09-25 DIAGNOSIS — E66.01 OBESITY, CLASS III, BMI 40-49.9 (MORBID OBESITY) (HCC): ICD-10-CM

## 2023-09-25 DIAGNOSIS — M25.551 CHRONIC RIGHT HIP PAIN: ICD-10-CM

## 2023-09-25 PROBLEM — J04.0 LARYNGITIS: Status: RESOLVED | Noted: 2017-04-10 | Resolved: 2023-09-25

## 2023-09-25 PROBLEM — E66.9 OBESITY: Status: RESOLVED | Noted: 2017-09-01 | Resolved: 2023-09-25

## 2023-09-25 PROBLEM — R06.02 SOB (SHORTNESS OF BREATH): Status: RESOLVED | Noted: 2018-05-18 | Resolved: 2023-09-25

## 2023-09-25 PROCEDURE — 3017F COLORECTAL CA SCREEN DOC REV: CPT | Performed by: STUDENT IN AN ORGANIZED HEALTH CARE EDUCATION/TRAINING PROGRAM

## 2023-09-25 PROCEDURE — G0439 PPPS, SUBSEQ VISIT: HCPCS | Performed by: STUDENT IN AN ORGANIZED HEALTH CARE EDUCATION/TRAINING PROGRAM

## 2023-09-25 RX ORDER — NICOTINE 21 MG/24HR
1 PATCH, TRANSDERMAL 24 HOURS TRANSDERMAL DAILY
Qty: 42 PATCH | Refills: 0 | Status: SHIPPED | OUTPATIENT
Start: 2023-09-25 | End: 2023-11-06

## 2023-09-25 RX ORDER — BUDESONIDE AND FORMOTEROL FUMARATE DIHYDRATE 160; 4.5 UG/1; UG/1
AEROSOL RESPIRATORY (INHALATION)
COMMUNITY
End: 2023-09-25

## 2023-09-25 NOTE — PROGRESS NOTES
apply route Exp 9/2026 Yes Ralf Mercado MD   albuterol (PROVENTIL) (2.5 MG/3ML) 0.083% nebulizer solution  Yes Historical Provider, MD   Nebulizers (COMPRESSOR/NEBULIZER) MISC 1 vial by Does not apply route 4 times daily as needed.  Patient has albuterol prescription at home; needs aerosol machine and set up diagnosis chronic bronchitis Yes JACK Salomon - CNP       CareTeam (Including outside providers/suppliers regularly involved in providing care):   Patient Care Team:  Ralf Mercado MD as PCP - General (Family Medicine)  Ralf Mercado MD as PCP - Empaneled Provider  Misty Page MD as Consulting Physician (Gastroenterology)     Reviewed and updated this visit:  Tobacco  Allergies  Meds  Problems  Med Hx  Surg Hx  Soc Hx  Fam Hx

## 2023-09-27 DIAGNOSIS — F33.1 MODERATE EPISODE OF RECURRENT MAJOR DEPRESSIVE DISORDER (HCC): ICD-10-CM

## 2023-09-27 RX ORDER — VORTIOXETINE 20 MG/1
20 TABLET, FILM COATED ORAL DAILY
Qty: 30 TABLET | Refills: 3 | Status: SHIPPED | OUTPATIENT
Start: 2023-09-27

## 2023-09-27 NOTE — TELEPHONE ENCOUNTER
Gianni Maddox is calling to request a refill on the following medication(s):    Medication Request:  Requested Prescriptions     Pending Prescriptions Disp Refills    TRINTELLIX 20 MG TABS tablet [Pharmacy Med Name: TRINTELLIX 20 MG TABLET] 30 tablet 3     Sig: TAKE ONE TABLET BY MOUTH DAILY       Last Visit Date (If Applicable):  3/67/4991    Next Visit Date:    12/20/2023

## 2023-10-19 DIAGNOSIS — M18.0 ARTHRITIS OF CARPOMETACARPAL (CMC) JOINT OF BOTH THUMBS: ICD-10-CM

## 2023-10-19 RX ORDER — TRAMADOL HYDROCHLORIDE 50 MG/1
50 TABLET ORAL 2 TIMES DAILY PRN
Qty: 60 TABLET | Refills: 0 | Status: SHIPPED | OUTPATIENT
Start: 2023-10-19 | End: 2023-11-18

## 2023-10-20 ENCOUNTER — HOSPITAL ENCOUNTER (OUTPATIENT)
Dept: MAMMOGRAPHY | Age: 58
End: 2023-10-20
Payer: MEDICARE

## 2023-10-20 DIAGNOSIS — Z12.31 ENCOUNTER FOR SCREENING MAMMOGRAM FOR MALIGNANT NEOPLASM OF BREAST: ICD-10-CM

## 2023-10-20 PROCEDURE — 77063 BREAST TOMOSYNTHESIS BI: CPT

## 2023-11-24 DIAGNOSIS — I70.0 ATHEROSCLEROSIS OF AORTA (HCC): ICD-10-CM

## 2023-11-24 DIAGNOSIS — F33.1 MODERATE EPISODE OF RECURRENT MAJOR DEPRESSIVE DISORDER (HCC): ICD-10-CM

## 2023-11-24 RX ORDER — LORAZEPAM 0.5 MG/1
TABLET ORAL
Qty: 30 TABLET | Refills: 1 | Status: SHIPPED | OUTPATIENT
Start: 2023-11-24 | End: 2023-12-24

## 2023-11-24 RX ORDER — ATORVASTATIN CALCIUM 20 MG/1
20 TABLET, FILM COATED ORAL NIGHTLY
Qty: 90 TABLET | Refills: 1 | Status: SHIPPED | OUTPATIENT
Start: 2023-11-24

## 2023-11-24 NOTE — TELEPHONE ENCOUNTER
Ervin Tim is calling to request a refill on the following medication(s):    Medication Request:  Requested Prescriptions     Pending Prescriptions Disp Refills    LORazepam (ATIVAN) 0.5 MG tablet [Pharmacy Med Name: LORazepam 0.5 MG TABLET] 30 tablet      Sig: TAKE ONE TABLET BY MOUTH EVERY 8 HOURS AS NEEDED FOR ANXIETY FOR UP TO 30 DAYS **MAX 3 TABLETS DAILY**       Last Visit Date (If Applicable):  1/48/3635    Next Visit Date:    12/20/2023

## 2023-11-24 NOTE — TELEPHONE ENCOUNTER
Kesha Smith is calling to request a refill on the following medication(s):    Medication Request:  Requested Prescriptions     Pending Prescriptions Disp Refills    atorvastatin (LIPITOR) 20 MG tablet [Pharmacy Med Name: ATORVASTATIN 20 MG TABLET] 90 tablet      Sig: TAKE ONE TABLET BY MOUTH ONCE NIGHTLY       Last Visit Date (If Applicable):  1/66/3691    Next Visit Date:    12/20/2023

## 2023-12-11 DIAGNOSIS — M47.817 LUMBOSACRAL SPONDYLOSIS WITHOUT MYELOPATHY: Primary | ICD-10-CM

## 2023-12-11 RX ORDER — TRAMADOL HYDROCHLORIDE 50 MG/1
50 TABLET ORAL DAILY PRN
Qty: 30 TABLET | Refills: 1 | Status: SHIPPED | OUTPATIENT
Start: 2023-12-11 | End: 2024-01-10

## 2024-01-04 DIAGNOSIS — H10.33 ACUTE CONJUNCTIVITIS OF BOTH EYES, UNSPECIFIED ACUTE CONJUNCTIVITIS TYPE: Primary | ICD-10-CM

## 2024-01-04 RX ORDER — POLYMYXIN B SULFATE AND TRIMETHOPRIM 1; 10000 MG/ML; [USP'U]/ML
1 SOLUTION OPHTHALMIC EVERY 4 HOURS
Qty: 3 ML | Refills: 0 | Status: SHIPPED | OUTPATIENT
Start: 2024-01-04 | End: 2024-01-14

## 2024-01-31 ENCOUNTER — TELEPHONE (OUTPATIENT)
Dept: ONCOLOGY | Age: 59
End: 2024-01-31

## 2024-01-31 DIAGNOSIS — Z87.891 PERSONAL HISTORY OF NICOTINE DEPENDENCE: Primary | ICD-10-CM

## 2024-01-31 NOTE — TELEPHONE ENCOUNTER
Our records indicate that your patient is coming due for their annual lung cancer screening follow up testing.     For your convenience, we have pended the order for the scan for you. If you do not agree with the need for the test, please cancel the order and let us know.     Sincerely,    [facility]   Lung Cancer Screening Program    Auto printed reminder letter sent to patient.

## 2024-02-02 DIAGNOSIS — F33.1 MODERATE EPISODE OF RECURRENT MAJOR DEPRESSIVE DISORDER (HCC): ICD-10-CM

## 2024-02-02 RX ORDER — VORTIOXETINE 20 MG/1
20 TABLET, FILM COATED ORAL DAILY
Qty: 30 TABLET | Refills: 0 | Status: SHIPPED | OUTPATIENT
Start: 2024-02-02 | End: 2024-03-04

## 2024-02-02 NOTE — TELEPHONE ENCOUNTER
Janis GARCIA [place] is calling to request a refill on the following medication(s):    Medication Request:  Requested Prescriptions     Pending Prescriptions Disp Refills    TRINTELLIX 20 MG TABS tablet [Pharmacy Med Name: TRINTELLIX 20 MG TABLET] 30 tablet 3     Sig: TAKE 1 TABLET BY MOUTH DAILY       Last Visit Date (If Applicable):  9/25/2023    Next Visit Date:    Visit date not found

## 2024-02-08 ENCOUNTER — PATIENT MESSAGE (OUTPATIENT)
Dept: FAMILY MEDICINE CLINIC | Age: 59
End: 2024-02-08

## 2024-02-08 DIAGNOSIS — M47.816 SPONDYLOSIS OF LUMBAR REGION WITHOUT MYELOPATHY OR RADICULOPATHY: Primary | ICD-10-CM

## 2024-02-08 RX ORDER — TRAMADOL HYDROCHLORIDE 50 MG/1
50 TABLET ORAL DAILY PRN
Qty: 30 TABLET | Refills: 0 | Status: SHIPPED | OUTPATIENT
Start: 2024-02-08 | End: 2024-02-13

## 2024-02-08 NOTE — TELEPHONE ENCOUNTER
From: Janis GARCIA [place]  To: Dr. Yesica Syed  Sent: 2/8/2024 1:47 PM EST  Subject: Medication refill    I'm in need of my tramadol refill

## 2024-03-04 DIAGNOSIS — F33.1 MODERATE EPISODE OF RECURRENT MAJOR DEPRESSIVE DISORDER (HCC): ICD-10-CM

## 2024-03-04 RX ORDER — ARIPIPRAZOLE 5 MG/1
5 TABLET ORAL DAILY
Qty: 30 TABLET | Refills: 0 | Status: SHIPPED | OUTPATIENT
Start: 2024-03-04

## 2024-03-04 RX ORDER — VORTIOXETINE 20 MG/1
20 TABLET, FILM COATED ORAL DAILY
Qty: 30 TABLET | Refills: 0 | Status: SHIPPED | OUTPATIENT
Start: 2024-03-04

## 2024-03-04 NOTE — TELEPHONE ENCOUNTER
Janis GARCIA [place] is calling to request a refill on the following medication(s):    Medication Request:  Requested Prescriptions     Pending Prescriptions Disp Refills    ARIPiprazole (ABILIFY) 5 MG tablet [Pharmacy Med Name: ARIPIPRAZOLE 5 MG TABLET] 30 tablet 3     Sig: TAKE 1 TABLET BY MOUTH DAILY    TRINTELLIX 20 MG TABS tablet [Pharmacy Med Name: TRINTELLIX 20 MG TABLET] 30 tablet 0     Sig: TAKE 1 TABLET BY MOUTH DAILY       Last Visit Date (If Applicable):  9/25/2023    Next Visit Date:    Visit date not found

## 2024-03-10 ENCOUNTER — PATIENT MESSAGE (OUTPATIENT)
Dept: FAMILY MEDICINE CLINIC | Age: 59
End: 2024-03-10

## 2024-03-10 DIAGNOSIS — M47.817 LUMBOSACRAL SPONDYLOSIS WITHOUT MYELOPATHY: ICD-10-CM

## 2024-03-10 DIAGNOSIS — M47.816 SPONDYLOSIS OF LUMBAR REGION WITHOUT MYELOPATHY OR RADICULOPATHY: ICD-10-CM

## 2024-03-10 NOTE — TELEPHONE ENCOUNTER
Janis GARCIA [place] is calling to request a refill on the following medication(s):    Medication Request:  Requested Prescriptions     Pending Prescriptions Disp Refills    traMADol (ULTRAM) 50 MG tablet 30 tablet 1     Sig: Take 1 tablet by mouth daily as needed for Pain for up to 30 days. Intended supply: 3 days. Take lowest dose possible to manage pain Max Daily Amount: 50 mg       Last Visit Date (If Applicable):  9/25/2023    Next Visit Date:    3/28/2024

## 2024-03-10 NOTE — TELEPHONE ENCOUNTER
From: Janis GARCIA [place]  To: Dr. Yesica Syed  Sent: 3/10/2024 9:10 AM EDT  Subject: Refill     I need a refill on my tramadol.

## 2024-03-11 RX ORDER — TRAMADOL HYDROCHLORIDE 50 MG/1
50 TABLET ORAL DAILY PRN
Qty: 30 TABLET | Refills: 1 | Status: SHIPPED | OUTPATIENT
Start: 2024-03-11 | End: 2024-04-10

## 2024-03-25 ASSESSMENT — PATIENT HEALTH QUESTIONNAIRE - PHQ9
2. FEELING DOWN, DEPRESSED OR HOPELESS: NOT AT ALL
4. FEELING TIRED OR HAVING LITTLE ENERGY: NEARLY EVERY DAY
3. TROUBLE FALLING OR STAYING ASLEEP: SEVERAL DAYS
5. POOR APPETITE OR OVEREATING: NOT AT ALL
7. TROUBLE CONCENTRATING ON THINGS, SUCH AS READING THE NEWSPAPER OR WATCHING TELEVISION: SEVERAL DAYS
6. FEELING BAD ABOUT YOURSELF - OR THAT YOU ARE A FAILURE OR HAVE LET YOURSELF OR YOUR FAMILY DOWN: NOT AT ALL
8. MOVING OR SPEAKING SO SLOWLY THAT OTHER PEOPLE COULD HAVE NOTICED. OR THE OPPOSITE - BEING SO FIDGETY OR RESTLESS THAT YOU HAVE BEEN MOVING AROUND A LOT MORE THAN USUAL: NOT AT ALL
5. POOR APPETITE OR OVEREATING: NOT AT ALL
1. LITTLE INTEREST OR PLEASURE IN DOING THINGS: SEVERAL DAYS
SUM OF ALL RESPONSES TO PHQ QUESTIONS 1-9: 6
9. THOUGHTS THAT YOU WOULD BE BETTER OFF DEAD, OR OF HURTING YOURSELF: NOT AT ALL
6. FEELING BAD ABOUT YOURSELF - OR THAT YOU ARE A FAILURE OR HAVE LET YOURSELF OR YOUR FAMILY DOWN: NOT AT ALL
SUM OF ALL RESPONSES TO PHQ QUESTIONS 1-9: 6
9. THOUGHTS THAT YOU WOULD BE BETTER OFF DEAD, OR OF HURTING YOURSELF: NOT AT ALL
10. IF YOU CHECKED OFF ANY PROBLEMS, HOW DIFFICULT HAVE THESE PROBLEMS MADE IT FOR YOU TO DO YOUR WORK, TAKE CARE OF THINGS AT HOME, OR GET ALONG WITH OTHER PEOPLE: SOMEWHAT DIFFICULT
7. TROUBLE CONCENTRATING ON THINGS, SUCH AS READING THE NEWSPAPER OR WATCHING TELEVISION: SEVERAL DAYS
3. TROUBLE FALLING OR STAYING ASLEEP: SEVERAL DAYS
2. FEELING DOWN, DEPRESSED OR HOPELESS: NOT AT ALL
1. LITTLE INTEREST OR PLEASURE IN DOING THINGS: SEVERAL DAYS
SUM OF ALL RESPONSES TO PHQ QUESTIONS 1-9: 6
SUM OF ALL RESPONSES TO PHQ QUESTIONS 1-9: 6
10. IF YOU CHECKED OFF ANY PROBLEMS, HOW DIFFICULT HAVE THESE PROBLEMS MADE IT FOR YOU TO DO YOUR WORK, TAKE CARE OF THINGS AT HOME, OR GET ALONG WITH OTHER PEOPLE: SOMEWHAT DIFFICULT
SUM OF ALL RESPONSES TO PHQ QUESTIONS 1-9: 6
SUM OF ALL RESPONSES TO PHQ9 QUESTIONS 1 & 2: 1
8. MOVING OR SPEAKING SO SLOWLY THAT OTHER PEOPLE COULD HAVE NOTICED. OR THE OPPOSITE, BEING SO FIGETY OR RESTLESS THAT YOU HAVE BEEN MOVING AROUND A LOT MORE THAN USUAL: NOT AT ALL
4. FEELING TIRED OR HAVING LITTLE ENERGY: NEARLY EVERY DAY

## 2024-03-28 ENCOUNTER — OFFICE VISIT (OUTPATIENT)
Dept: FAMILY MEDICINE CLINIC | Age: 59
End: 2024-03-28
Payer: MEDICARE

## 2024-03-28 VITALS
HEIGHT: 66 IN | DIASTOLIC BLOOD PRESSURE: 84 MMHG | HEART RATE: 81 BPM | WEIGHT: 282 LBS | BODY MASS INDEX: 45.32 KG/M2 | SYSTOLIC BLOOD PRESSURE: 132 MMHG | OXYGEN SATURATION: 96 %

## 2024-03-28 DIAGNOSIS — G47.33 OSA ON CPAP: ICD-10-CM

## 2024-03-28 DIAGNOSIS — D58.2 ELEVATED HEMOGLOBIN (HCC): ICD-10-CM

## 2024-03-28 DIAGNOSIS — I27.20 PULMONARY HTN (HCC): ICD-10-CM

## 2024-03-28 DIAGNOSIS — R53.83 FATIGUE, UNSPECIFIED TYPE: ICD-10-CM

## 2024-03-28 DIAGNOSIS — F33.1 MODERATE EPISODE OF RECURRENT MAJOR DEPRESSIVE DISORDER (HCC): ICD-10-CM

## 2024-03-28 DIAGNOSIS — I70.0 ATHEROSCLEROSIS OF AORTA (HCC): ICD-10-CM

## 2024-03-28 DIAGNOSIS — Z87.891 PERSONAL HISTORY OF TOBACCO USE: ICD-10-CM

## 2024-03-28 DIAGNOSIS — E55.9 VITAMIN D DEFICIENCY: ICD-10-CM

## 2024-03-28 DIAGNOSIS — R73.03 PREDIABETES: ICD-10-CM

## 2024-03-28 DIAGNOSIS — Z00.00 MEDICARE ANNUAL WELLNESS VISIT, SUBSEQUENT: Primary | ICD-10-CM

## 2024-03-28 DIAGNOSIS — F11.20 OPIOID DEPENDENCE WITH CURRENT USE (HCC): ICD-10-CM

## 2024-03-28 DIAGNOSIS — J44.9 CHRONIC OBSTRUCTIVE PULMONARY DISEASE, UNSPECIFIED COPD TYPE (HCC): ICD-10-CM

## 2024-03-28 PROCEDURE — G0439 PPPS, SUBSEQ VISIT: HCPCS | Performed by: STUDENT IN AN ORGANIZED HEALTH CARE EDUCATION/TRAINING PROGRAM

## 2024-03-28 PROCEDURE — G8484 FLU IMMUNIZE NO ADMIN: HCPCS | Performed by: STUDENT IN AN ORGANIZED HEALTH CARE EDUCATION/TRAINING PROGRAM

## 2024-03-28 PROCEDURE — G0296 VISIT TO DETERM LDCT ELIG: HCPCS | Performed by: STUDENT IN AN ORGANIZED HEALTH CARE EDUCATION/TRAINING PROGRAM

## 2024-03-28 PROCEDURE — 3017F COLORECTAL CA SCREEN DOC REV: CPT | Performed by: STUDENT IN AN ORGANIZED HEALTH CARE EDUCATION/TRAINING PROGRAM

## 2024-03-28 RX ORDER — BUDESONIDE, GLYCOPYRROLATE, AND FORMOTEROL FUMARATE 160; 9; 4.8 UG/1; UG/1; UG/1
AEROSOL, METERED RESPIRATORY (INHALATION)
Qty: 1 EACH | Refills: 0 | Status: SHIPPED | COMMUNITY
Start: 2024-03-28

## 2024-03-28 NOTE — PROGRESS NOTES
Medicare Annual Wellness Visit    Janis Byers is here for Medicare AWV    Assessment & Plan   Medicare annual wellness visit, subsequent  Personal history of tobacco use  -     [place] VISIT TO DISCUSS LUNG CA SCREEN W LDCT  -     CT Lung Screen (Initial/Annual/Baseline); Future  Moderate episode of recurrent major depressive disorder (HCC)  -     TSH with Reflex; Future  Opioid dependence with current use (HCC)  Pulmonary HTN (HCC)  -     Comprehensive Metabolic Panel, Fasting; Future  Chronic obstructive pulmonary disease, unspecified COPD type (HCC)  -     AFL - Kevin Fournier MD, Pulmonology, [place]  TAMARA on CPAP  -     DME Order for CPAP as OP  -     AFL - Kevin Fournier MD, Pul[facility]ology[facility]  Atherosclerosis of aorta (HCC)  -     Lipid, Fasting; Future  -     CT CARDIAC CALCIUM SCORING; Future  Elevated hemoglobin (HCC)  -     CBC with Auto Differential; Future  Prediabetes  -     Hemoglobin A1C; Future  -     Microalbumin, Ur; Future  Vitamin D deficiency  -     Vitamin D 25 Hydroxy; Future  Fatigue, unspecified type  -     Vitamin B12; Future  Recommendations for Preventive Services Due: see orders and patient instructions/AVS.  Recommended screening schedule for the next 5-10 years is provided to the patient in written form: see Patient Instructions/AVS.     Return in 6 months (on 9/28/2024) for anxiety and depression.     Subjective   The following acute and/or chronic problems were also addressed today:  She states that she has been feeling extremely tired and fatigued lately.  She has not been using her CPAP machine saying that she needs new tubing and hose.  She also has COPD and has not been seen pulmonary.  She stopped taking the Rybelsus saying that it was giving her a lot of side effects like nausea.  She is a current everyday smoker smoking about half pack a day.    Patient's complete Health Risk Assessment and screening values have been reviewed and are found in Flowsheets. The following problems

## 2024-04-06 ENCOUNTER — HOSPITAL ENCOUNTER (OUTPATIENT)
Facility: CLINIC | Age: 59
Discharge: HOME | End: 2024-04-06
Payer: MEDICARE

## 2024-04-06 DIAGNOSIS — E55.9 VITAMIN D DEFICIENCY: ICD-10-CM

## 2024-04-06 DIAGNOSIS — F33.1 MODERATE EPISODE OF RECURRENT MAJOR DEPRESSIVE DISORDER (HCC): ICD-10-CM

## 2024-04-06 DIAGNOSIS — I70.0 ATHEROSCLEROSIS OF AORTA (HCC): ICD-10-CM

## 2024-04-06 DIAGNOSIS — I27.20 PULMONARY HTN (HCC): ICD-10-CM

## 2024-04-06 DIAGNOSIS — D58.2 ELEVATED HEMOGLOBIN (HCC): ICD-10-CM

## 2024-04-06 DIAGNOSIS — R73.03 PREDIABETES: ICD-10-CM

## 2024-04-06 DIAGNOSIS — R53.83 FATIGUE, UNSPECIFIED TYPE: ICD-10-CM

## 2024-04-06 LAB
25(OH)D3 SERPL-MCNC: 18.6 NG/ML (ref 30–100)
ALBUMIN SERPL-MCNC: 4 G/DL (ref 3.5–5.2)
ALBUMIN/GLOB SERPL: 2 {RATIO} (ref 1–2.5)
ALP SERPL-CCNC: 86 U/L (ref 35–104)
ALT SERPL-CCNC: 20 U/L (ref 10–35)
ANION GAP SERPL CALCULATED.3IONS-SCNC: 12 MMOL/L (ref 9–16)
AST SERPL-CCNC: 25 U/L (ref 10–35)
BASOPHILS # BLD: 0.05 K/UL (ref 0–0.2)
BASOPHILS NFR BLD: 1 % (ref 0–2)
BILIRUB SERPL-MCNC: 0.7 MG/DL (ref 0–1.2)
BUN SERPL-MCNC: 15 MG/DL (ref 6–20)
CALCIUM SERPL-MCNC: 8.8 MG/DL (ref 8.6–10.4)
CHLORIDE SERPL-SCNC: 104 MMOL/L (ref 98–107)
CHOLEST SERPL-MCNC: 97 MG/DL (ref 0–199)
CHOLESTEROL/HDL RATIO: 2
CO2 SERPL-SCNC: 25 MMOL/L (ref 20–31)
CREAT SERPL-MCNC: 0.6 MG/DL (ref 0.5–0.9)
CREAT UR-MCNC: 138 MG/DL (ref 28–217)
EOSINOPHIL # BLD: 0.13 K/UL (ref 0–0.44)
EOSINOPHILS RELATIVE PERCENT: 1 % (ref 1–4)
ERYTHROCYTE [DISTWIDTH] IN BLOOD BY AUTOMATED COUNT: 14.4 % (ref 11.8–14.4)
EST. AVERAGE GLUCOSE BLD GHB EST-MCNC: 114 MG/DL
GFR SERPL CREATININE-BSD FRML MDRD: >90 ML/MIN/1.73M2
GLUCOSE P FAST SERPL-MCNC: 114 MG/DL (ref 74–99)
HBA1C MFR BLD: 5.6 % (ref 4–6)
HCT VFR BLD AUTO: 51.9 % (ref 36.3–47.1)
HDLC SERPL-MCNC: 42 MG/DL
HGB BLD-MCNC: 16.9 G/DL (ref 11.9–15.1)
IMM GRANULOCYTES # BLD AUTO: 0.03 K/UL (ref 0–0.3)
IMM GRANULOCYTES NFR BLD: 0 %
LDLC SERPL CALC-MCNC: 39 MG/DL (ref 0–100)
LYMPHOCYTES NFR BLD: 2.01 K/UL (ref 1.1–3.7)
LYMPHOCYTES RELATIVE PERCENT: 22 % (ref 24–43)
MCH RBC QN AUTO: 30.5 PG (ref 25.2–33.5)
MCHC RBC AUTO-ENTMCNC: 32.6 G/DL (ref 28.4–34.8)
MCV RBC AUTO: 93.5 FL (ref 82.6–102.9)
MICROALBUMIN UR-MCNC: <12 MG/L (ref 0–20)
MICROALBUMIN/CREAT UR-RTO: NORMAL MCG/MG CREAT (ref 0–25)
MONOCYTES NFR BLD: 0.75 K/UL (ref 0.1–1.2)
MONOCYTES NFR BLD: 8 % (ref 3–12)
NEUTROPHILS NFR BLD: 68 % (ref 36–65)
NEUTS SEG NFR BLD: 6.4 K/UL (ref 1.5–8.1)
NRBC BLD-RTO: 0 PER 100 WBC
PLATELET # BLD AUTO: 256 K/UL (ref 138–453)
PMV BLD AUTO: 11.6 FL (ref 8.1–13.5)
POTASSIUM SERPL-SCNC: 4.4 MMOL/L (ref 3.7–5.3)
PROT SERPL-MCNC: 6.6 G/DL (ref 6.6–8.7)
RBC # BLD AUTO: 5.55 M/UL (ref 3.95–5.11)
SODIUM SERPL-SCNC: 141 MMOL/L (ref 136–145)
TRIGL SERPL-MCNC: 80 MG/DL (ref 0–149)
TSH SERPL DL<=0.05 MIU/L-ACNC: 1.51 UIU/ML (ref 0.27–4.2)
VIT B12 SERPL-MCNC: 287 PG/ML (ref 232–1245)
VLDLC SERPL CALC-MCNC: 16 MG/DL
WBC OTHER # BLD: 9.4 K/UL (ref 3.5–11.3)

## 2024-04-06 PROCEDURE — 82306 VITAMIN D 25 HYDROXY: CPT

## 2024-04-06 PROCEDURE — 80053 COMPREHEN METABOLIC PANEL: CPT

## 2024-04-06 PROCEDURE — 83036 HEMOGLOBIN GLYCOSYLATED A1C: CPT

## 2024-04-06 PROCEDURE — 80061 LIPID PANEL: CPT

## 2024-04-06 PROCEDURE — 82607 VITAMIN B-12: CPT

## 2024-04-06 PROCEDURE — 82570 ASSAY OF URINE CREATININE: CPT

## 2024-04-06 PROCEDURE — 85025 COMPLETE CBC W/AUTO DIFF WBC: CPT

## 2024-04-06 PROCEDURE — 82043 UR ALBUMIN QUANTITATIVE: CPT

## 2024-04-06 PROCEDURE — 84443 ASSAY THYROID STIM HORMONE: CPT

## 2024-04-06 PROCEDURE — 36415 COLL VENOUS BLD VENIPUNCTURE: CPT

## 2024-04-08 RX ORDER — VORTIOXETINE 20 MG/1
20 TABLET, FILM COATED ORAL DAILY
Qty: 30 TABLET | Refills: 3 | Status: SHIPPED | OUTPATIENT
Start: 2024-04-08

## 2024-04-08 RX ORDER — ARIPIPRAZOLE 5 MG/1
5 TABLET ORAL DAILY
Qty: 30 TABLET | Refills: 3 | Status: SHIPPED | OUTPATIENT
Start: 2024-04-08

## 2024-04-08 NOTE — TELEPHONE ENCOUNTER
Janis Byers is calling to request a refill on the following medication(s):    Medication Request:  Requested Prescriptions     Pending Prescriptions Disp Refills    ARIPiprazole (ABILIFY) 5 MG tablet [Pharmacy Med Name: ARIPIPRAZOLE 5 MG TABLET] 30 tablet 0     Sig: TAKE 1 TABLET BY MOUTH DAILY    TRINTELLIX 20 MG TABS tablet [Pharmacy Med Name: TRINTELLIX 20 MG TABLET] 30 tablet 0     Sig: TAKE 1 TABLET BY MOUTH DAILY       Last Visit Date (If Applicable):  3/28/2024    Next Visit Date:    9/30/2024

## 2024-04-15 ENCOUNTER — HOSPITAL ENCOUNTER (OUTPATIENT)
Dept: CT IMAGING | Age: 59
Discharge: HOME | End: 2024-04-17
Payer: MEDICARE

## 2024-04-15 ENCOUNTER — HOSPITAL ENCOUNTER (OUTPATIENT)
Age: 59
Discharge: HOME | End: 2024-04-17
Payer: MEDICARE

## 2024-04-15 DIAGNOSIS — Z87.891 PERSONAL HISTORY OF TOBACCO USE: ICD-10-CM

## 2024-04-15 DIAGNOSIS — I70.0 ATHEROSCLEROSIS OF AORTA (HCC): ICD-10-CM

## 2024-04-15 PROCEDURE — 75571 CT HRT W/O DYE W/CA TEST: CPT

## 2024-04-15 PROCEDURE — 71271 CT THORAX LUNG CANCER SCR C-: CPT

## 2024-05-16 DIAGNOSIS — M47.817 LUMBOSACRAL SPONDYLOSIS WITHOUT MYELOPATHY: ICD-10-CM

## 2024-05-16 NOTE — TELEPHONE ENCOUNTER
Janis GARCIA [place] is calling to request a refill on the following medication(s):    Medication Request:  Requested Prescriptions     Pending Prescriptions Disp Refills    traMADol (ULTRAM) 50 MG tablet [Pharmacy Med Name: traMADol HCL 50MG TABLET] 30 tablet      Sig: TAKE 1 TABLET BY MOUTH DAILY AS NEEDED FOR PAIN *REDUCE DOSES TAKEN AS PAIN BECOMES MANAGEABLE*       Last Visit Date (If Applicable):  3/28/2024    Next Visit Date:    9/30/2024

## 2024-05-17 RX ORDER — TRAMADOL HYDROCHLORIDE 50 MG/1
50 TABLET ORAL DAILY PRN
Qty: 30 TABLET | Refills: 1 | Status: SHIPPED | OUTPATIENT
Start: 2024-05-17 | End: 2024-06-16

## 2024-07-08 DIAGNOSIS — F33.1 MODERATE EPISODE OF RECURRENT MAJOR DEPRESSIVE DISORDER (HCC): ICD-10-CM

## 2024-07-08 DIAGNOSIS — F33.1 MODERATE EPISODE OF RECURRENT MAJOR DEPRESSIVE DISORDER (HCC): Primary | ICD-10-CM

## 2024-07-08 DIAGNOSIS — M47.817 LUMBOSACRAL SPONDYLOSIS WITHOUT MYELOPATHY: ICD-10-CM

## 2024-07-08 RX ORDER — LORAZEPAM 0.5 MG/1
TABLET ORAL
Qty: 90 TABLET | Refills: 0 | Status: SHIPPED | OUTPATIENT
Start: 2024-07-08 | End: 2024-10-06

## 2024-07-08 RX ORDER — TRAMADOL HYDROCHLORIDE 50 MG/1
50 TABLET ORAL EVERY 8 HOURS PRN
Qty: 30 TABLET | Refills: 0 | Status: SHIPPED | OUTPATIENT
Start: 2024-07-08 | End: 2024-08-07

## 2024-07-08 RX ORDER — ARIPIPRAZOLE 5 MG/1
5 TABLET ORAL DAILY
Qty: 90 TABLET | Refills: 0 | Status: SHIPPED | OUTPATIENT
Start: 2024-07-08

## 2024-07-08 NOTE — TELEPHONE ENCOUNTER
Janis GARCIA [place] is calling to request a refill on the following medication(s):    Medication Request:  Requested Prescriptions     Pending Prescriptions Disp Refills    ARIPiprazole (ABILIFY) 5 MG tablet [Pharmacy Med Name: ARIPiprazole TAB]  0    VORTIoxetine HBr (TRINTELLIX) 20 MG TABS tablet [Pharmacy Med Name: TRINTELLIX TAB]  0    traMADol (ULTRAM) 50 MG tablet [Pharmacy Med Name: TraMADol HCL TAB]  0       Last Visit Date (If Applicable):  3/28/2024    Next Visit Date:    9/30/2024

## 2024-07-08 NOTE — TELEPHONE ENCOUNTER
Janis GARCIA [place] is calling to request a refill on the following medication(s):    Medication Request:  Requested Prescriptions     Pending Prescriptions Disp Refills    LORazepam (ATIVAN) 0.5 MG tablet [Pharmacy Med Name: LORazepam TAB] 90 tablet 0     Sig: TAKE ONE TABLET BY MOUTH EVERY 8 HOURS AS NEEDED FOR ANXIETY       Last Visit Date (If Applicable):  3/28/2024    Next Visit Date:    9/30/2024

## 2024-08-12 DIAGNOSIS — F33.1 MODERATE EPISODE OF RECURRENT MAJOR DEPRESSIVE DISORDER (HCC): ICD-10-CM

## 2024-08-12 RX ORDER — ARIPIPRAZOLE 5 MG/1
5 TABLET ORAL DAILY
Qty: 30 TABLET | Refills: 3 | Status: SHIPPED | OUTPATIENT
Start: 2024-08-12

## 2024-08-12 RX ORDER — VORTIOXETINE 20 MG/1
20 TABLET, FILM COATED ORAL DAILY
Qty: 30 TABLET | Refills: 3 | Status: SHIPPED | OUTPATIENT
Start: 2024-08-12

## 2024-08-12 NOTE — TELEPHONE ENCOUNTER
Janis GARCIA [place] is calling to request a refill on the following medication(s):    Medication Request:  Requested Prescriptions     Pending Prescriptions Disp Refills    VORTIoxetine HBr (TRINTELLIX) 20 MG TABS tablet [Pharmacy Med Name: TRINTELLIX 20 MG TABLET] 30 tablet 3     Sig: TAKE 1 TABLET BY MOUTH DAILY    ARIPiprazole (ABILIFY) 5 MG tablet [Pharmacy Med Name: ARIPIPRAZOLE 5 MG TABLET] 30 tablet 3     Sig: TAKE 1 TABLET BY MOUTH DAILY       Last Visit Date (If Applicable):  3/28/2024    Next Visit Date:    9/30/2024

## 2024-08-17 DIAGNOSIS — F33.1 MODERATE EPISODE OF RECURRENT MAJOR DEPRESSIVE DISORDER (HCC): ICD-10-CM

## 2024-08-19 DIAGNOSIS — F33.1 MODERATE EPISODE OF RECURRENT MAJOR DEPRESSIVE DISORDER (HCC): ICD-10-CM

## 2024-08-19 RX ORDER — VORTIOXETINE 20 MG/1
20 TABLET, FILM COATED ORAL DAILY
Qty: 90 TABLET | Refills: 3 | Status: SHIPPED | OUTPATIENT
Start: 2024-08-19

## 2024-08-19 RX ORDER — ARIPIPRAZOLE 5 MG/1
5 TABLET ORAL DAILY
Qty: 90 TABLET | Refills: 3 | Status: SHIPPED | OUTPATIENT
Start: 2024-08-19

## 2024-08-19 RX ORDER — LORAZEPAM 0.5 MG/1
TABLET ORAL
Qty: 30 TABLET | Refills: 1 | Status: SHIPPED | OUTPATIENT
Start: 2024-08-19 | End: 2024-09-18

## 2024-08-19 NOTE — TELEPHONE ENCOUNTER
Janis GARCIA [place] is calling to request a refill on the following medication(s):    Medication Request:  Requested Prescriptions     Pending Prescriptions Disp Refills    LORazepam (ATIVAN) 0.5 MG tablet [Pharmacy Med Name: LORazepam 0.5 MG TABLET] 30 tablet      Sig: TAKE ONE TABLET BY MOUTH EVERY 8 HOURS AS NEEDED FOR ANXIETY FOR UP TO 30 DAYS. MAX 3 TABLETS DAILY       Last Visit Date (If Applicable):  3/28/2024    Next Visit Date:    9/30/2024

## 2024-08-19 NOTE — TELEPHONE ENCOUNTER
Janis Byers is calling to request a refill on the following medication(s):    Medication Request:  Requested Prescriptions     Pending Prescriptions Disp Refills    TRINTELLIX 20 MG TABS tablet [Pharmacy Med Name: Trintellix 20 MG Oral Tablet] 90 tablet 3     Sig: TAKE 1 TABLET BY MOUTH DAILY    ARIPiprazole (ABILIFY) 5 MG tablet [Pharmacy Med Name: ARIPiprazole 5 MG Oral Tablet] 90 tablet 3     Sig: TAKE 1 TABLET BY MOUTH DAILY       Last Visit Date (If Applicable):  3/28/2024    Next Visit Date:    9/30/2024

## 2024-09-29 SDOH — ECONOMIC STABILITY: FOOD INSECURITY: WITHIN THE PAST 12 MONTHS, YOU WORRIED THAT YOUR FOOD WOULD RUN OUT BEFORE YOU GOT MONEY TO BUY MORE.: NEVER TRUE

## 2024-09-29 SDOH — ECONOMIC STABILITY: FOOD INSECURITY: WITHIN THE PAST 12 MONTHS, THE FOOD YOU BOUGHT JUST DIDN'T LAST AND YOU DIDN'T HAVE MONEY TO GET MORE.: NEVER TRUE

## 2024-09-29 SDOH — ECONOMIC STABILITY: INCOME INSECURITY: HOW HARD IS IT FOR YOU TO PAY FOR THE VERY BASICS LIKE FOOD, HOUSING, MEDICAL CARE, AND HEATING?: SOMEWHAT HARD

## 2024-09-30 ENCOUNTER — OFFICE VISIT (OUTPATIENT)
Dept: FAMILY MEDICINE CLINIC | Age: 59
End: 2024-09-30
Payer: MEDICARE

## 2024-09-30 VITALS
SYSTOLIC BLOOD PRESSURE: 117 MMHG | HEART RATE: 81 BPM | WEIGHT: 276 LBS | BODY MASS INDEX: 44.36 KG/M2 | DIASTOLIC BLOOD PRESSURE: 82 MMHG | OXYGEN SATURATION: 95 % | HEIGHT: 66 IN

## 2024-09-30 DIAGNOSIS — E53.8 LOW SERUM VITAMIN B12: ICD-10-CM

## 2024-09-30 DIAGNOSIS — R06.02 SHORTNESS OF BREATH: ICD-10-CM

## 2024-09-30 DIAGNOSIS — J44.9 CHRONIC OBSTRUCTIVE PULMONARY DISEASE, UNSPECIFIED COPD TYPE (HCC): Primary | ICD-10-CM

## 2024-09-30 DIAGNOSIS — F33.1 MODERATE EPISODE OF RECURRENT MAJOR DEPRESSIVE DISORDER (HCC): ICD-10-CM

## 2024-09-30 DIAGNOSIS — Z72.0 TOBACCO ABUSE: ICD-10-CM

## 2024-09-30 DIAGNOSIS — I70.0 ATHEROSCLEROSIS OF AORTA (HCC): ICD-10-CM

## 2024-09-30 DIAGNOSIS — R11.0 NAUSEA: ICD-10-CM

## 2024-09-30 DIAGNOSIS — R73.03 PREDIABETES: ICD-10-CM

## 2024-09-30 DIAGNOSIS — E55.9 VITAMIN D DEFICIENCY: ICD-10-CM

## 2024-09-30 PROCEDURE — 99214 OFFICE O/P EST MOD 30 MIN: CPT | Performed by: STUDENT IN AN ORGANIZED HEALTH CARE EDUCATION/TRAINING PROGRAM

## 2024-09-30 PROCEDURE — 99406 BEHAV CHNG SMOKING 3-10 MIN: CPT | Performed by: STUDENT IN AN ORGANIZED HEALTH CARE EDUCATION/TRAINING PROGRAM

## 2024-09-30 PROCEDURE — G8427 DOCREV CUR MEDS BY ELIG CLIN: HCPCS | Performed by: STUDENT IN AN ORGANIZED HEALTH CARE EDUCATION/TRAINING PROGRAM

## 2024-09-30 PROCEDURE — 3023F SPIROM DOC REV: CPT | Performed by: STUDENT IN AN ORGANIZED HEALTH CARE EDUCATION/TRAINING PROGRAM

## 2024-09-30 PROCEDURE — 4004F PT TOBACCO SCREEN RCVD TLK: CPT | Performed by: STUDENT IN AN ORGANIZED HEALTH CARE EDUCATION/TRAINING PROGRAM

## 2024-09-30 PROCEDURE — 3017F COLORECTAL CA SCREEN DOC REV: CPT | Performed by: STUDENT IN AN ORGANIZED HEALTH CARE EDUCATION/TRAINING PROGRAM

## 2024-09-30 PROCEDURE — G8417 CALC BMI ABV UP PARAM F/U: HCPCS | Performed by: STUDENT IN AN ORGANIZED HEALTH CARE EDUCATION/TRAINING PROGRAM

## 2024-09-30 RX ORDER — ATORVASTATIN CALCIUM 20 MG/1
20 TABLET, FILM COATED ORAL NIGHTLY
Qty: 90 TABLET | Refills: 1 | Status: SHIPPED | OUTPATIENT
Start: 2024-09-30

## 2024-09-30 RX ORDER — ALBUTEROL SULFATE 90 UG/1
2 INHALANT RESPIRATORY (INHALATION) EVERY 6 HOURS PRN
Qty: 3 EACH | Refills: 1 | Status: SHIPPED | OUTPATIENT
Start: 2024-09-30 | End: 2024-09-30

## 2024-09-30 RX ORDER — ASPIRIN 81 MG/1
81 TABLET ORAL DAILY
Qty: 90 TABLET | Refills: 1 | Status: SHIPPED | OUTPATIENT
Start: 2024-09-30

## 2024-09-30 RX ORDER — ALBUTEROL SULFATE 90 UG/1
2 INHALANT RESPIRATORY (INHALATION) EVERY 6 HOURS PRN
Qty: 1 EACH | Refills: 0 | Status: SHIPPED | OUTPATIENT
Start: 2024-09-30

## 2024-09-30 RX ORDER — BUSPIRONE HYDROCHLORIDE 5 MG/1
5 TABLET ORAL 3 TIMES DAILY
Qty: 270 TABLET | Refills: 1 | Status: SHIPPED | OUTPATIENT
Start: 2024-09-30 | End: 2024-09-30

## 2024-09-30 RX ORDER — BUSPIRONE HYDROCHLORIDE 5 MG/1
5 TABLET ORAL 2 TIMES DAILY
Qty: 60 TABLET | Refills: 0 | Status: SHIPPED | OUTPATIENT
Start: 2024-09-30

## 2024-09-30 RX ORDER — BUDESONIDE, GLYCOPYRROLATE, AND FORMOTEROL FUMARATE 160; 9; 4.8 UG/1; UG/1; UG/1
1 AEROSOL, METERED RESPIRATORY (INHALATION) ONCE
Qty: 1 EACH | Refills: 0 | Status: SHIPPED | COMMUNITY
Start: 2024-09-30 | End: 2024-09-30

## 2024-09-30 RX ORDER — BUPROPION HYDROCHLORIDE 150 MG/1
150 TABLET ORAL EVERY MORNING
Qty: 30 TABLET | Refills: 3 | Status: SHIPPED | OUTPATIENT
Start: 2024-09-30

## 2024-09-30 RX ORDER — BUDESONIDE, GLYCOPYRROLATE, AND FORMOTEROL FUMARATE 160; 9; 4.8 UG/1; UG/1; UG/1
2 AEROSOL, METERED RESPIRATORY (INHALATION)
Qty: 10.7 G | Refills: 1 | Status: SHIPPED | OUTPATIENT
Start: 2024-09-30

## 2024-09-30 RX ORDER — ONDANSETRON 4 MG/1
4 TABLET, FILM COATED ORAL 3 TIMES DAILY PRN
Qty: 15 TABLET | Refills: 0 | Status: SHIPPED | OUTPATIENT
Start: 2024-09-30

## 2024-09-30 SDOH — ECONOMIC STABILITY: INCOME INSECURITY: HOW HARD IS IT FOR YOU TO PAY FOR THE VERY BASICS LIKE FOOD, HOUSING, MEDICAL CARE, AND HEATING?: SOMEWHAT HARD

## 2024-09-30 SDOH — ECONOMIC STABILITY: FOOD INSECURITY: WITHIN THE PAST 12 MONTHS, THE FOOD YOU BOUGHT JUST DIDN'T LAST AND YOU DIDN'T HAVE MONEY TO GET MORE.: NEVER TRUE

## 2024-09-30 SDOH — ECONOMIC STABILITY: FOOD INSECURITY: WITHIN THE PAST 12 MONTHS, YOU WORRIED THAT YOUR FOOD WOULD RUN OUT BEFORE YOU GOT MONEY TO BUY MORE.: NEVER TRUE

## 2024-09-30 ASSESSMENT — ENCOUNTER SYMPTOMS
COUGH: 0
CONSTIPATION: 0
NAUSEA: 1
VOMITING: 0
SHORTNESS OF BREATH: 1
ABDOMINAL PAIN: 0
SORE THROAT: 0
EYE DISCHARGE: 0
WHEEZING: 0
DIARRHEA: 0
CHEST TIGHTNESS: 0

## 2024-09-30 ASSESSMENT — PATIENT HEALTH QUESTIONNAIRE - PHQ9
3. TROUBLE FALLING OR STAYING ASLEEP: NEARLY EVERY DAY
7. TROUBLE CONCENTRATING ON THINGS, SUCH AS READING THE NEWSPAPER OR WATCHING TELEVISION: NEARLY EVERY DAY
10. IF YOU CHECKED OFF ANY PROBLEMS, HOW DIFFICULT HAVE THESE PROBLEMS MADE IT FOR YOU TO DO YOUR WORK, TAKE CARE OF THINGS AT HOME, OR GET ALONG WITH OTHER PEOPLE: VERY DIFFICULT
2. FEELING DOWN, DEPRESSED OR HOPELESS: SEVERAL DAYS
SUM OF ALL RESPONSES TO PHQ QUESTIONS 1-9: 16
SUM OF ALL RESPONSES TO PHQ QUESTIONS 1-9: 16
6. FEELING BAD ABOUT YOURSELF - OR THAT YOU ARE A FAILURE OR HAVE LET YOURSELF OR YOUR FAMILY DOWN: NOT AT ALL
5. POOR APPETITE OR OVEREATING: NEARLY EVERY DAY
SUM OF ALL RESPONSES TO PHQ QUESTIONS 1-9: 16
4. FEELING TIRED OR HAVING LITTLE ENERGY: NEARLY EVERY DAY
9. THOUGHTS THAT YOU WOULD BE BETTER OFF DEAD, OR OF HURTING YOURSELF: NOT AT ALL
8. MOVING OR SPEAKING SO SLOWLY THAT OTHER PEOPLE COULD HAVE NOTICED. OR THE OPPOSITE, BEING SO FIGETY OR RESTLESS THAT YOU HAVE BEEN MOVING AROUND A LOT MORE THAN USUAL: NOT AT ALL
1. LITTLE INTEREST OR PLEASURE IN DOING THINGS: NEARLY EVERY DAY
SUM OF ALL RESPONSES TO PHQ QUESTIONS 1-9: 16
SUM OF ALL RESPONSES TO PHQ9 QUESTIONS 1 & 2: 4

## 2024-09-30 NOTE — PROGRESS NOTES
[facility]  [facility]  [address] [place] [address]  [address]  [facility] [address]  Dept: [phone]  Dept Fax: [phone]    Janis Byers is a 59 y.o. female who is a Established patient, who presents today for her medical conditions/complaints as noted below:  Chief Complaint   Patient presents with    Anxiety    Depression    Medication Check    Rash         HPI:     She is here today to follow-up on anxiety and depression.  She states that she has been feeling more anxious lately and does not feel like her medications are working as well.  She also complains of having fatigue and poor motivation.  She has sleep apnea but has not been using her CPAP machine, states that it is a lot of work to clean the machine and maintain it.  She states that she feels short of breath with climbing stairs or any exertion.  She is also not been using Breztri saying that her insurance did not cover it previously.  She also complains of having frequent nausea.  She is a current everyday smoker and agrees to try Wellbutrin to help with both anxiety and quitting smoking.        Hemoglobin A1C (%)   Date Value   04/06/2024 5.6   05/18/2023 5.7   06/12/2022 6.4 (H)             ( goal A1Cis < 7)   No components found for: \"LABMICR\"  No components found for: \"LDLCHOLESTEROL\", \"LDLCALC\"    (goal LDL is <100)   AST (U/L)   Date Value   04/06/2024 25     ALT (U/L)   Date Value   04/06/2024 20     BUN (mg/dL)   Date Value   04/06/2024 15     BP Readings from Last 3 Encounters:   09/30/24 117/82   03/28/24 132/84   09/25/23 124/82          (goal 120/80)    Past Medical History:   Diagnosis Date    Anxiety     Asthma     Bladder incontinence     At times per pt.    Bladder prolapse, female, acquired     Bronchitis     Cancer (HCC) 11/1996    cervical; treated with hysterectomy    Carpal tunnel syndrome 12/15/2010    Chest pain 1/18/2017    Chronic bilateral low back pain     Colon polyps 10/07/2020    tubular adenomas

## 2024-10-09 ENCOUNTER — HOSPITAL ENCOUNTER (OUTPATIENT)
Age: 59
Discharge: HOME | End: 2024-10-11
Payer: MEDICARE

## 2024-10-09 VITALS — WEIGHT: 276 LBS | BODY MASS INDEX: 44.36 KG/M2 | HEIGHT: 66 IN

## 2024-10-09 DIAGNOSIS — R06.02 SHORTNESS OF BREATH: ICD-10-CM

## 2024-10-09 LAB
ECHO AO ROOT DIAM: 2.9 CM
ECHO AO ROOT INDEX: 1.27 CM/M2
ECHO AV MEAN GRADIENT: 6 MMHG
ECHO AV MEAN VELOCITY: 1.1 M/S
ECHO AV PEAK GRADIENT: 15 MMHG
ECHO AV PEAK VELOCITY: 1.9 M/S
ECHO AV VELOCITY RATIO: 0.89
ECHO AV VTI: 31.8 CM
ECHO BSA: 2.41 M2
ECHO LA AREA 2C: 19.1 CM2
ECHO LA AREA 4C: 17.7 CM2
ECHO LA DIAMETER INDEX: 1.35 CM/M2
ECHO LA DIAMETER: 3.1 CM
ECHO LA MAJOR AXIS: 5.6 CM
ECHO LA MINOR AXIS: 5.1 CM
ECHO LA TO AORTIC ROOT RATIO: 1.07
ECHO LA VOL BP: 53 ML (ref 22–52)
ECHO LA VOL MOD A2C: 57 ML (ref 22–52)
ECHO LA VOL MOD A4C: 44 ML (ref 22–52)
ECHO LA VOL/BSA BIPLANE: 23 ML/M2 (ref 16–34)
ECHO LA VOLUME INDEX MOD A2C: 25 ML/M2 (ref 16–34)
ECHO LA VOLUME INDEX MOD A4C: 19 ML/M2 (ref 16–34)
ECHO LV E' LATERAL VELOCITY: 10.4 CM/S
ECHO LV E' SEPTAL VELOCITY: 10.9 CM/S
ECHO LV EDV A2C: 95 ML
ECHO LV EDV A4C: 95 ML
ECHO LV EDV INDEX A4C: 41 ML/M2
ECHO LV EDV NDEX A2C: 41 ML/M2
ECHO LV EJECTION FRACTION A2C: 64 %
ECHO LV EJECTION FRACTION A4C: 70 %
ECHO LV EJECTION FRACTION BIPLANE: 67 % (ref 55–100)
ECHO LV ESV A2C: 34 ML
ECHO LV ESV A4C: 29 ML
ECHO LV ESV INDEX A2C: 15 ML/M2
ECHO LV ESV INDEX A4C: 13 ML/M2
ECHO LV FRACTIONAL SHORTENING: 41 % (ref 28–44)
ECHO LV INTERNAL DIMENSION DIASTOLE INDEX: 2.01 CM/M2
ECHO LV INTERNAL DIMENSION DIASTOLIC: 4.6 CM (ref 3.9–5.3)
ECHO LV INTERNAL DIMENSION SYSTOLIC INDEX: 1.18 CM/M2
ECHO LV INTERNAL DIMENSION SYSTOLIC: 2.7 CM
ECHO LV IVSD: 1.1 CM (ref 0.6–0.9)
ECHO LV MASS 2D: 181.2 G (ref 67–162)
ECHO LV MASS INDEX 2D: 79.1 G/M2 (ref 43–95)
ECHO LV POSTERIOR WALL DIASTOLIC: 1.1 CM (ref 0.6–0.9)
ECHO LV RELATIVE WALL THICKNESS RATIO: 0.48
ECHO LVOT PEAK GRADIENT: 12 MMHG
ECHO LVOT PEAK VELOCITY: 1.7 M/S
ECHO MV A VELOCITY: 0.82 M/S
ECHO MV E DECELERATION TIME (DT): 278 MS
ECHO MV E VELOCITY: 0.77 M/S
ECHO MV E/A RATIO: 0.94
ECHO MV E/E' LATERAL: 7.4
ECHO MV E/E' RATIO (AVERAGED): 7.23
ECHO MV E/E' SEPTAL: 7.06

## 2024-10-09 PROCEDURE — 93306 TTE W/DOPPLER COMPLETE: CPT

## 2024-11-10 DIAGNOSIS — J44.9 CHRONIC OBSTRUCTIVE PULMONARY DISEASE, UNSPECIFIED COPD TYPE (HCC): ICD-10-CM

## 2024-11-11 RX ORDER — BUDESONIDE, GLYCOPYRROLATE, AND FORMOTEROL FUMARATE 160; 9; 4.8 UG/1; UG/1; UG/1
AEROSOL, METERED RESPIRATORY (INHALATION)
Qty: 21.4 G | Refills: 5 | Status: SHIPPED | OUTPATIENT
Start: 2024-11-11

## 2024-11-11 NOTE — TELEPHONE ENCOUNTER
Janis Byers is calling to request a refill on the following medication(s):    Medication Request:  Requested Prescriptions     Pending Prescriptions Disp Refills    BREZTRI AEROSPHERE 160-9-4.8 MCG/ACT AERO [Pharmacy Med Name: Breztri Aerosphere 160-9-4.8 MCG/ACT Inhalation Aerosol] 21.4 g 5     Sig: USE 2 INHALATIONS BY MOUTH (INTO THE LUNGS) IN THE MORNING AND 2  INHALATIONS IN THE EVENING       Last Visit Date (If Applicable):  9/30/2024    Next Visit Date:    1/2/2025

## 2024-11-21 DIAGNOSIS — M47.817 LUMBOSACRAL SPONDYLOSIS WITHOUT MYELOPATHY: Primary | ICD-10-CM

## 2024-11-21 RX ORDER — LIDOCAINE 50 MG/G
1 PATCH TOPICAL DAILY
Qty: 10 PATCH | Refills: 0 | Status: SHIPPED | OUTPATIENT
Start: 2024-11-21 | End: 2024-12-01

## 2024-12-08 DIAGNOSIS — I70.0 ATHEROSCLEROSIS OF AORTA (HCC): ICD-10-CM

## 2024-12-09 RX ORDER — ATORVASTATIN CALCIUM 20 MG/1
20 TABLET, FILM COATED ORAL NIGHTLY
Qty: 100 TABLET | Refills: 2 | Status: SHIPPED | OUTPATIENT
Start: 2024-12-09

## 2024-12-09 NOTE — TELEPHONE ENCOUNTER
Janis GARCIA [place] is calling to request a refill on the following medication(s):    Medication Request:  Requested Prescriptions     Pending Prescriptions Disp Refills    atorvastatin (LIPITOR) 20 MG tablet [Pharmacy Med Name: Atorvastatin Calcium 20 MG Oral Tablet] 100 tablet 2     Sig: TAKE 1 TABLET BY MOUTH EVERY  NIGHT       Last Visit Date (If Applicable):  9/30/2024    Next Visit Date:    1/2/2025

## 2025-01-21 ENCOUNTER — HOSPITAL ENCOUNTER (EMERGENCY)
Facility: CLINIC | Age: 60
Discharge: HOME | End: 2025-01-21
Attending: EMERGENCY MEDICINE
Payer: MEDICARE

## 2025-01-21 ENCOUNTER — APPOINTMENT (OUTPATIENT)
Dept: GENERAL RADIOLOGY | Facility: CLINIC | Age: 60
End: 2025-01-21
Payer: MEDICARE

## 2025-01-21 VITALS
HEIGHT: 66 IN | DIASTOLIC BLOOD PRESSURE: 73 MMHG | HEART RATE: 82 BPM | WEIGHT: 274 LBS | RESPIRATION RATE: 18 BRPM | SYSTOLIC BLOOD PRESSURE: 103 MMHG | BODY MASS INDEX: 44.03 KG/M2 | TEMPERATURE: 98.2 F | OXYGEN SATURATION: 94 %

## 2025-01-21 DIAGNOSIS — M76.61 ACHILLES TENDINITIS OF RIGHT LOWER EXTREMITY: Primary | ICD-10-CM

## 2025-01-21 PROCEDURE — 73630 X-RAY EXAM OF FOOT: CPT

## 2025-01-21 PROCEDURE — 96372 THER/PROPH/DIAG INJ SC/IM: CPT

## 2025-01-21 PROCEDURE — 6360000002 HC RX W HCPCS: Performed by: NURSE PRACTITIONER

## 2025-01-21 PROCEDURE — 99284 EMERGENCY DEPT VISIT MOD MDM: CPT

## 2025-01-21 RX ORDER — KETOROLAC TROMETHAMINE 15 MG/ML
15 INJECTION, SOLUTION INTRAMUSCULAR; INTRAVENOUS ONCE
Status: COMPLETED | OUTPATIENT
Start: 2025-01-21 | End: 2025-01-21

## 2025-01-21 RX ADMIN — KETOROLAC TROMETHAMINE 15 MG: 15 INJECTION, SOLUTION INTRAMUSCULAR; INTRAVENOUS at 14:42

## 2025-01-21 ASSESSMENT — PAIN - FUNCTIONAL ASSESSMENT
PAIN_FUNCTIONAL_ASSESSMENT: 0-10
PAIN_FUNCTIONAL_ASSESSMENT: ACTIVITIES ARE NOT PREVENTED

## 2025-01-21 ASSESSMENT — PAIN DESCRIPTION - FREQUENCY: FREQUENCY: CONTINUOUS

## 2025-01-21 ASSESSMENT — PAIN DESCRIPTION - PAIN TYPE: TYPE: ACUTE PAIN

## 2025-01-21 ASSESSMENT — PAIN DESCRIPTION - LOCATION: LOCATION: FOOT

## 2025-01-21 ASSESSMENT — PAIN DESCRIPTION - ORIENTATION: ORIENTATION: RIGHT

## 2025-01-21 ASSESSMENT — PAIN DESCRIPTION - ONSET: ONSET: ON-GOING

## 2025-01-21 ASSESSMENT — PAIN DESCRIPTION - DESCRIPTORS: DESCRIPTORS: BURNING;SHOOTING

## 2025-01-21 ASSESSMENT — PAIN SCALES - GENERAL: PAINLEVEL_OUTOF10: 5

## 2025-01-21 NOTE — DISCHARGE INSTRUCTIONS
Please call the podiatry office and advised we did provide a referral.  I would wear the boot to see if this helps with your pain.  If if she worsens pain however remove it.  You can take Tylenol and/or Motrin for pain.  If symptoms worsen or new concerns develop return to the emergency room

## 2025-01-21 NOTE — ED PROVIDER NOTES
[facility] EMERGENCY DEPARTMENT  eMERGENCY dEPARTMENT eNCOUnter   Independent Attestation     Pt Name: Janis Byers  MRN: [id number]  Birthdate 1965  Date of evaluation: 1/21/25       Janis Byers is a 59 y.o. female who presents with Foot Pain        Based on the medical record, the care appears appropriate. I was personally available for consultation in the Emergency Department.    Murali Garza DO  Attending Emergency  Physician                Murali Garza DO  01/21/25 3225    
home with podiatry referral.  Will offer her a walking boot.  She can take Tylenol and/or Motrin for pain.  If your symptoms worsen or new concerns develop she can return to the emergency room     CONSULTS:  None    PROCEDURES:  None    FINAL IMPRESSION      1. Achilles tendinitis of right lower extremity          DISPOSITION / PLAN     DISPOSITION Decision To Discharge    PATIENT REFERRED TO:  Darlene Gonzalez, BRANDON  [address] [id number]  [phone]    Schedule an appointment as soon as possible for a visit in 1 week      Yesica Syed MD  [address] [place], [place] [id number]  [phone]    Schedule an appointment as soon as possible for a visit in 3 days        DISCHARGE MEDICATIONS:  New Prescriptions    No medications on file       JACK Weiner CNP   Emergency Medicine Nurse Practitioner    (Please note that portions of this note were completed with a voice recognitionprogram.  Efforts were made to edit the dictations but occasionally words are mis-transcribed.)      Lenka Chapman APRN - CNP  01/21/25 1443       Lenka Chapman APRN - CNP  01/21/25 [id number]

## 2025-02-03 DIAGNOSIS — Z72.0 TOBACCO ABUSE: ICD-10-CM

## 2025-02-03 DIAGNOSIS — F33.1 MODERATE EPISODE OF RECURRENT MAJOR DEPRESSIVE DISORDER (HCC): ICD-10-CM

## 2025-02-03 RX ORDER — BUPROPION HYDROCHLORIDE 150 MG/1
150 TABLET ORAL EVERY MORNING
Qty: 30 TABLET | Refills: 3 | Status: SHIPPED | OUTPATIENT
Start: 2025-02-03

## 2025-02-03 NOTE — TELEPHONE ENCOUNTER
Requested Prescriptions     Pending Prescriptions Disp Refills    buPROPion (WELLBUTRIN XL) 150 MG extended release tablet [Pharmacy Med Name: buPROPion HCL  MG TABLET] 30 tablet 3     Sig: TAKE 1 TABLET BY MOUTH EVERY MORNING

## 2025-02-11 DIAGNOSIS — M47.817 LUMBOSACRAL SPONDYLOSIS WITHOUT MYELOPATHY: Primary | ICD-10-CM

## 2025-02-11 RX ORDER — LIDOCAINE 50 MG/G
1 PATCH TOPICAL DAILY
Qty: 30 PATCH | Refills: 0 | Status: SHIPPED | OUTPATIENT
Start: 2025-02-11 | End: 2025-03-13

## 2025-03-08 DIAGNOSIS — J44.9 CHRONIC OBSTRUCTIVE PULMONARY DISEASE, UNSPECIFIED COPD TYPE (HCC): ICD-10-CM

## 2025-03-10 ENCOUNTER — APPOINTMENT (OUTPATIENT)
Dept: GENERAL RADIOLOGY | Facility: CLINIC | Age: 60
DRG: 190 | End: 2025-03-10
Payer: MEDICARE

## 2025-03-10 ENCOUNTER — HOSPITAL ENCOUNTER (INPATIENT)
Age: 60
LOS: 1 days | Discharge: HOME | DRG: 190 | End: 2025-03-13
Attending: EMERGENCY MEDICINE | Admitting: INTERNAL MEDICINE
Payer: MEDICARE

## 2025-03-10 DIAGNOSIS — R09.02 HYPOXEMIA: ICD-10-CM

## 2025-03-10 DIAGNOSIS — J44.1 COPD EXACERBATION (HCC): Primary | ICD-10-CM

## 2025-03-10 DIAGNOSIS — J44.1 COPD WITH ACUTE EXACERBATION (HCC): ICD-10-CM

## 2025-03-10 LAB
ALBUMIN SERPL-MCNC: 3.7 G/DL (ref 3.5–5.2)
ALBUMIN/GLOB SERPL: 1.3 {RATIO}
ALP SERPL-CCNC: 73 U/L (ref 35–104)
ALT SERPL-CCNC: 15 U/L (ref 10–35)
ANION GAP SERPL CALCULATED.3IONS-SCNC: 10 MMOL/L (ref 9–16)
AST SERPL-CCNC: 17 U/L (ref 10–35)
BASOPHILS # BLD: 0 K/UL (ref 0–0.2)
BASOPHILS NFR BLD: 0 % (ref 0–2)
BILIRUB SERPL-MCNC: 0.5 MG/DL (ref 0–1.2)
BUN SERPL-MCNC: 9 MG/DL (ref 6–20)
CALCIUM SERPL-MCNC: 8.9 MG/DL (ref 8.6–10.4)
CHLORIDE SERPL-SCNC: 105 MMOL/L (ref 98–107)
CO2 SERPL-SCNC: 29 MMOL/L (ref 20–31)
CREAT SERPL-MCNC: 0.6 MG/DL (ref 0.5–0.9)
EOSINOPHIL # BLD: 0 K/UL (ref 0–0.4)
EOSINOPHILS RELATIVE PERCENT: 0 % (ref 1–4)
ERYTHROCYTE [DISTWIDTH] IN BLOOD BY AUTOMATED COUNT: 14.6 % (ref 12.5–15.4)
FLUAV AG SPEC QL: NEGATIVE
FLUBV AG SPEC QL: NEGATIVE
GFR, ESTIMATED: >90 ML/MIN/1.73M2
GLUCOSE SERPL-MCNC: 128 MG/DL (ref 74–99)
HCT VFR BLD AUTO: 49 % (ref 36–46)
HGB BLD-MCNC: 16.6 G/DL (ref 12–16)
LYMPHOCYTES NFR BLD: 1.5 K/UL (ref 1–4.8)
LYMPHOCYTES RELATIVE PERCENT: 15 % (ref 24–44)
MCH RBC QN AUTO: 30.6 PG (ref 26–34)
MCHC RBC AUTO-ENTMCNC: 33.9 G/DL (ref 31–37)
MCV RBC AUTO: 90.4 FL (ref 80–100)
MONOCYTES NFR BLD: 0.7 K/UL (ref 0.1–1.2)
MONOCYTES NFR BLD: 7 % (ref 2–11)
NEUTROPHILS NFR BLD: 78 % (ref 36–66)
NEUTS SEG NFR BLD: 7.4 K/UL (ref 1.8–7.7)
PLATELET # BLD AUTO: 252 K/UL (ref 140–450)
PMV BLD AUTO: 8.2 FL (ref 6–12)
POTASSIUM SERPL-SCNC: 3.2 MMOL/L (ref 3.7–5.3)
PROT SERPL-MCNC: 6.6 G/DL (ref 6.6–8.7)
RBC # BLD AUTO: 5.42 M/UL (ref 4–5.2)
SARS-COV-2 RDRP RESP QL NAA+PROBE: NOT DETECTED
SODIUM SERPL-SCNC: 144 MMOL/L (ref 136–145)
SPECIMEN DESCRIPTION: NORMAL
TROPONIN I SERPL HS-MCNC: 6 NG/L (ref 0–14)
WBC OTHER # BLD: 9.7 K/UL (ref 3.5–11)

## 2025-03-10 PROCEDURE — 80053 COMPREHEN METABOLIC PANEL: CPT

## 2025-03-10 PROCEDURE — 87635 SARS-COV-2 COVID-19 AMP PRB: CPT

## 2025-03-10 PROCEDURE — 85025 COMPLETE CBC W/AUTO DIFF WBC: CPT

## 2025-03-10 PROCEDURE — 96374 THER/PROPH/DIAG INJ IV PUSH: CPT

## 2025-03-10 PROCEDURE — 87804 INFLUENZA ASSAY W/OPTIC: CPT

## 2025-03-10 PROCEDURE — 6360000002 HC RX W HCPCS: Performed by: EMERGENCY MEDICINE

## 2025-03-10 PROCEDURE — 2580000003 HC RX 258: Performed by: EMERGENCY MEDICINE

## 2025-03-10 PROCEDURE — 93005 ELECTROCARDIOGRAM TRACING: CPT | Performed by: EMERGENCY MEDICINE

## 2025-03-10 PROCEDURE — 99285 EMERGENCY DEPT VISIT HI MDM: CPT

## 2025-03-10 PROCEDURE — 96375 TX/PRO/DX INJ NEW DRUG ADDON: CPT

## 2025-03-10 PROCEDURE — 71046 X-RAY EXAM CHEST 2 VIEWS: CPT

## 2025-03-10 PROCEDURE — 2500000003 HC RX 250 WO HCPCS: Performed by: EMERGENCY MEDICINE

## 2025-03-10 PROCEDURE — 36415 COLL VENOUS BLD VENIPUNCTURE: CPT

## 2025-03-10 PROCEDURE — 84484 ASSAY OF TROPONIN QUANT: CPT

## 2025-03-10 PROCEDURE — 6370000000 HC RX 637 (ALT 250 FOR IP): Performed by: EMERGENCY MEDICINE

## 2025-03-10 RX ORDER — PREDNISONE 20 MG/1
60 TABLET ORAL ONCE
Status: COMPLETED | OUTPATIENT
Start: 2025-03-10 | End: 2025-03-10

## 2025-03-10 RX ORDER — ALBUTEROL SULFATE 90 UG/1
2 INHALANT RESPIRATORY (INHALATION) EVERY 6 HOURS PRN
Qty: 1 EACH | Refills: 0 | Status: SHIPPED | OUTPATIENT
Start: 2025-03-10

## 2025-03-10 RX ORDER — IPRATROPIUM BROMIDE AND ALBUTEROL SULFATE 2.5; .5 MG/3ML; MG/3ML
1 SOLUTION RESPIRATORY (INHALATION) ONCE
Status: COMPLETED | OUTPATIENT
Start: 2025-03-10 | End: 2025-03-10

## 2025-03-10 RX ORDER — ONDANSETRON 2 MG/ML
4 INJECTION INTRAMUSCULAR; INTRAVENOUS ONCE
Status: COMPLETED | OUTPATIENT
Start: 2025-03-10 | End: 2025-03-10

## 2025-03-10 RX ADMIN — PREDNISONE 60 MG: 20 TABLET ORAL at 17:48

## 2025-03-10 RX ADMIN — ONDANSETRON 4 MG: 2 INJECTION, SOLUTION INTRAMUSCULAR; INTRAVENOUS at 19:26

## 2025-03-10 RX ADMIN — IPRATROPIUM BROMIDE AND ALBUTEROL SULFATE 1 DOSE: .5; 2.5 SOLUTION RESPIRATORY (INHALATION) at 16:48

## 2025-03-10 RX ADMIN — WATER 1000 MG: 1 INJECTION INTRAMUSCULAR; INTRAVENOUS; SUBCUTANEOUS at 18:39

## 2025-03-10 RX ADMIN — AZITHROMYCIN MONOHYDRATE 500 MG: 500 INJECTION, POWDER, LYOPHILIZED, FOR SOLUTION INTRAVENOUS at 18:40

## 2025-03-10 ASSESSMENT — PAIN - FUNCTIONAL ASSESSMENT: PAIN_FUNCTIONAL_ASSESSMENT: NONE - DENIES PAIN

## 2025-03-10 NOTE — ED NOTES
Mode of arrival (squad #, walk in, police, etc) : walk in, from home        Chief complaint(s): cough and shortness of breath        Arrival Note (brief scenario, treatment PTA, etc).: Pt presents to the ED c/o cough and shortness of breath that has been ongoing for the past month. Reports the shortness of breath is getting progressively worse. Reports she has a Hx of COPD and has been using her albuterol inhaler and nebulizer at home. Reports she did not use her nebulizer today. Pt alert and oriented, PWD, PMS intact.         C= \"Have you ever felt that you should Cut down on your drinking?\"  No  A= \"Have people Annoyed you by criticizing your drinking?\"  No  G= \"Have you ever felt bad or Guilty about your drinking?\"  No  E= \"Have you ever had a drink as an Eye-opener first thing in the morning to steady your nerves or to help a hangover?\"  No      Deferred []      Reason for deferring: N/A    *If yes to two or more: probable alcohol abuse.*

## 2025-03-10 NOTE — TELEPHONE ENCOUNTER
Janis Byers is calling to request a refill on the following medication(s):    Medication Request:  Requested Prescriptions     Pending Prescriptions Disp Refills    albuterol sulfate HFA (PROAIR HFA) 108 (90 Base) MCG/ACT inhaler 1 each 0     Sig: Inhale 2 puffs into the lungs every 6 hours as needed for Wheezing       Last Visit Date (If Applicable):  9/30/2024    Next Visit Date:    Visit date not found

## 2025-03-10 NOTE — ED PROVIDER NOTES
[facility]  EMERGENCY DEPARTMENT ENCOUNTER      Pt Name: Janis Byers  MRN: [id number]  Birthdate 1965  Date of evaluation: 3/10/2025  Provider: Solis Nagel MD    CHIEF COMPLAINT     Chief Complaint   Patient presents with    Cough    Shortness of Breath         HISTORY OF PRESENT ILLNESS   (Location/Symptom, Timing/Onset, Context/Setting,Quality, Duration, Modifying Factors, Severity)  Note limiting factors.   Janis Byers is a 59 y.o. female who presents to the emergency department with a month-long history of cough but in the last week has been bringing up green-colored sputum.  She reports shortness of breath with mild exertion.  She has a history of COPD and asthma and is on Breztri and albuterol inhaler.  She is a cigarette smoker who states that she quit yesterday.    The history is provided by the patient and medical records.       Nursing Notes werereviewed.    REVIEW OF SYSTEMS    (2-9 systems for level 4, 10 or more for level 5)     Review of Systems   All other systems reviewed and are negative.      Except as noted above the remainder of the review of systems was reviewed and negative.       PAST MEDICAL HISTORY     Past Medical History:   Diagnosis Date    Anxiety     Asthma     Bladder incontinence     At times per pt.    Bladder prolapse, female, acquired     Bronchitis     Cancer (HCC) 11/1996    cervical; treated with hysterectomy    Carpal tunnel syndrome 12/15/2010    Chest pain 1/18/2017    Chronic bilateral low back pain     Colon polyps 10/07/2020    tubular adenomas x4    COPD (chronic obstructive pulmonary disease) (HCC)     Depression     Epigastric pain     Hepatic steatosis     History of cataract extraction with lens replacement 2004    bilateral    History of stress test     Hyperglycemia     Intertrigo     Laryngitis 4/10/2017    Lumbar degenerative disc disease     Nausea     Obesity 9/1/2017    Osteoarthritis     right knee    Osteoarthritis cervical spine

## 2025-03-11 PROBLEM — J44.1 COPD EXACERBATION (HCC): Status: ACTIVE | Noted: 2025-03-11

## 2025-03-11 PROBLEM — E44.0 MODERATE MALNUTRITION: Status: ACTIVE | Noted: 2025-03-11

## 2025-03-11 PROCEDURE — G0378 HOSPITAL OBSERVATION PER HR: HCPCS

## 2025-03-11 PROCEDURE — 87205 SMEAR GRAM STAIN: CPT

## 2025-03-11 PROCEDURE — 94640 AIRWAY INHALATION TREATMENT: CPT

## 2025-03-11 PROCEDURE — 6370000000 HC RX 637 (ALT 250 FOR IP): Performed by: EMERGENCY MEDICINE

## 2025-03-11 PROCEDURE — 6370000000 HC RX 637 (ALT 250 FOR IP): Performed by: NURSE PRACTITIONER

## 2025-03-11 PROCEDURE — 87070 CULTURE OTHR SPECIMN AEROBIC: CPT

## 2025-03-11 PROCEDURE — 87186 SC STD MICRODIL/AGAR DIL: CPT

## 2025-03-11 PROCEDURE — 6360000002 HC RX W HCPCS: Performed by: NURSE PRACTITIONER

## 2025-03-11 PROCEDURE — 99222 1ST HOSP IP/OBS MODERATE 55: CPT | Performed by: INTERNAL MEDICINE

## 2025-03-11 PROCEDURE — 2500000003 HC RX 250 WO HCPCS: Performed by: NURSE PRACTITIONER

## 2025-03-11 PROCEDURE — 94761 N-INVAS EAR/PLS OXIMETRY MLT: CPT

## 2025-03-11 PROCEDURE — 2700000000 HC OXYGEN THERAPY PER DAY

## 2025-03-11 PROCEDURE — 87077 CULTURE AEROBIC IDENTIFY: CPT

## 2025-03-11 RX ORDER — ACETAMINOPHEN 325 MG/1
650 TABLET ORAL EVERY 6 HOURS PRN
Status: DISCONTINUED | OUTPATIENT
Start: 2025-03-11 | End: 2025-03-13 | Stop reason: HOSPADM

## 2025-03-11 RX ORDER — ASPIRIN 81 MG/1
81 TABLET ORAL DAILY
Status: DISCONTINUED | OUTPATIENT
Start: 2025-03-11 | End: 2025-03-11

## 2025-03-11 RX ORDER — GUAIFENESIN 600 MG/1
600 TABLET, EXTENDED RELEASE ORAL 2 TIMES DAILY
Status: DISCONTINUED | OUTPATIENT
Start: 2025-03-11 | End: 2025-03-13 | Stop reason: HOSPADM

## 2025-03-11 RX ORDER — ALBUTEROL SULFATE 0.83 MG/ML
2.5 SOLUTION RESPIRATORY (INHALATION) EVERY 4 HOURS PRN
Status: DISCONTINUED | OUTPATIENT
Start: 2025-03-11 | End: 2025-03-13 | Stop reason: HOSPADM

## 2025-03-11 RX ORDER — BUPROPION HYDROCHLORIDE 150 MG/1
150 TABLET ORAL EVERY MORNING
Status: DISCONTINUED | OUTPATIENT
Start: 2025-03-12 | End: 2025-03-13 | Stop reason: HOSPADM

## 2025-03-11 RX ORDER — AZITHROMYCIN 250 MG/1
500 TABLET, FILM COATED ORAL DAILY
Status: COMPLETED | OUTPATIENT
Start: 2025-03-11 | End: 2025-03-12

## 2025-03-11 RX ORDER — POLYETHYLENE GLYCOL 3350 17 G/17G
17 POWDER, FOR SOLUTION ORAL DAILY PRN
Status: DISCONTINUED | OUTPATIENT
Start: 2025-03-11 | End: 2025-03-13 | Stop reason: HOSPADM

## 2025-03-11 RX ORDER — ALBUTEROL SULFATE 0.83 MG/ML
2.5 SOLUTION RESPIRATORY (INHALATION)
Status: DISCONTINUED | OUTPATIENT
Start: 2025-03-11 | End: 2025-03-11

## 2025-03-11 RX ORDER — ALBUTEROL SULFATE 0.83 MG/ML
2.5 SOLUTION RESPIRATORY (INHALATION) 3 TIMES DAILY
Status: DISCONTINUED | OUTPATIENT
Start: 2025-03-11 | End: 2025-03-13 | Stop reason: HOSPADM

## 2025-03-11 RX ORDER — SODIUM CHLORIDE 9 MG/ML
INJECTION, SOLUTION INTRAVENOUS PRN
Status: DISCONTINUED | OUTPATIENT
Start: 2025-03-11 | End: 2025-03-13 | Stop reason: HOSPADM

## 2025-03-11 RX ORDER — TIZANIDINE 2 MG/1
2 TABLET ORAL NIGHTLY PRN
Status: DISCONTINUED | OUTPATIENT
Start: 2025-03-11 | End: 2025-03-11

## 2025-03-11 RX ORDER — ARIPIPRAZOLE 5 MG/1
5 TABLET ORAL DAILY
Status: DISCONTINUED | OUTPATIENT
Start: 2025-03-11 | End: 2025-03-13 | Stop reason: HOSPADM

## 2025-03-11 RX ORDER — ATORVASTATIN CALCIUM 20 MG/1
20 TABLET, FILM COATED ORAL NIGHTLY
Status: DISCONTINUED | OUTPATIENT
Start: 2025-03-11 | End: 2025-03-11

## 2025-03-11 RX ORDER — ENOXAPARIN SODIUM 100 MG/ML
30 INJECTION SUBCUTANEOUS 2 TIMES DAILY
Status: DISCONTINUED | OUTPATIENT
Start: 2025-03-11 | End: 2025-03-13 | Stop reason: HOSPADM

## 2025-03-11 RX ORDER — SODIUM CHLORIDE 0.9 % (FLUSH) 0.9 %
5-40 SYRINGE (ML) INJECTION PRN
Status: DISCONTINUED | OUTPATIENT
Start: 2025-03-11 | End: 2025-03-13 | Stop reason: HOSPADM

## 2025-03-11 RX ORDER — ONDANSETRON 2 MG/ML
4 INJECTION INTRAMUSCULAR; INTRAVENOUS EVERY 6 HOURS PRN
Status: DISCONTINUED | OUTPATIENT
Start: 2025-03-11 | End: 2025-03-13 | Stop reason: HOSPADM

## 2025-03-11 RX ORDER — ACETAMINOPHEN 650 MG/1
650 SUPPOSITORY RECTAL EVERY 6 HOURS PRN
Status: DISCONTINUED | OUTPATIENT
Start: 2025-03-11 | End: 2025-03-13 | Stop reason: HOSPADM

## 2025-03-11 RX ORDER — BUSPIRONE HYDROCHLORIDE 5 MG/1
5 TABLET ORAL 2 TIMES DAILY
Status: DISCONTINUED | OUTPATIENT
Start: 2025-03-11 | End: 2025-03-13 | Stop reason: HOSPADM

## 2025-03-11 RX ORDER — PREDNISONE 20 MG/1
40 TABLET ORAL DAILY
Status: DISCONTINUED | OUTPATIENT
Start: 2025-03-11 | End: 2025-03-13 | Stop reason: HOSPADM

## 2025-03-11 RX ORDER — SODIUM CHLORIDE 0.9 % (FLUSH) 0.9 %
5-40 SYRINGE (ML) INJECTION EVERY 12 HOURS SCHEDULED
Status: DISCONTINUED | OUTPATIENT
Start: 2025-03-11 | End: 2025-03-13 | Stop reason: HOSPADM

## 2025-03-11 RX ORDER — IPRATROPIUM BROMIDE AND ALBUTEROL SULFATE 2.5; .5 MG/3ML; MG/3ML
1 SOLUTION RESPIRATORY (INHALATION)
Status: DISCONTINUED | OUTPATIENT
Start: 2025-03-11 | End: 2025-03-11 | Stop reason: ALTCHOICE

## 2025-03-11 RX ORDER — ONDANSETRON 4 MG/1
4 TABLET, ORALLY DISINTEGRATING ORAL EVERY 8 HOURS PRN
Status: DISCONTINUED | OUTPATIENT
Start: 2025-03-11 | End: 2025-03-13 | Stop reason: HOSPADM

## 2025-03-11 RX ORDER — BUDESONIDE AND FORMOTEROL FUMARATE DIHYDRATE 160; 4.5 UG/1; UG/1
2 AEROSOL RESPIRATORY (INHALATION)
Status: DISCONTINUED | OUTPATIENT
Start: 2025-03-11 | End: 2025-03-13 | Stop reason: HOSPADM

## 2025-03-11 RX ADMIN — ARIPIPRAZOLE 5 MG: 5 TABLET ORAL at 08:47

## 2025-03-11 RX ADMIN — SODIUM CHLORIDE, PRESERVATIVE FREE 10 ML: 5 INJECTION INTRAVENOUS at 08:52

## 2025-03-11 RX ADMIN — TIOTROPIUM BROMIDE INHALATION SPRAY 2 PUFF: 3.12 SPRAY, METERED RESPIRATORY (INHALATION) at 07:56

## 2025-03-11 RX ADMIN — VORTIOXETINE 20 MG: 20 TABLET, FILM COATED ORAL at 08:47

## 2025-03-11 RX ADMIN — GUAIFENESIN 600 MG: 600 TABLET ORAL at 04:47

## 2025-03-11 RX ADMIN — CHOLECALCIFEROL TAB 125 MCG (5000 UNIT) 5000 UNITS: 125 TAB at 08:47

## 2025-03-11 RX ADMIN — BUDESONIDE AND FORMOTEROL FUMARATE DIHYDRATE 2 PUFF: 160; 4.5 AEROSOL RESPIRATORY (INHALATION) at 20:46

## 2025-03-11 RX ADMIN — GUAIFENESIN 600 MG: 600 TABLET ORAL at 08:47

## 2025-03-11 RX ADMIN — ENOXAPARIN SODIUM 30 MG: 100 INJECTION SUBCUTANEOUS at 08:46

## 2025-03-11 RX ADMIN — WATER 2000 MG: 1 INJECTION INTRAMUSCULAR; INTRAVENOUS; SUBCUTANEOUS at 17:45

## 2025-03-11 RX ADMIN — ALBUTEROL SULFATE 2.5 MG: 2.5 SOLUTION RESPIRATORY (INHALATION) at 15:05

## 2025-03-11 RX ADMIN — BUDESONIDE AND FORMOTEROL FUMARATE DIHYDRATE 2 PUFF: 160; 4.5 AEROSOL RESPIRATORY (INHALATION) at 07:55

## 2025-03-11 RX ADMIN — ALBUTEROL SULFATE 2.5 MG: 2.5 SOLUTION RESPIRATORY (INHALATION) at 20:45

## 2025-03-11 RX ADMIN — ACETAMINOPHEN 650 MG: 325 TABLET, FILM COATED ORAL at 11:58

## 2025-03-11 RX ADMIN — GUAIFENESIN 600 MG: 600 TABLET ORAL at 21:04

## 2025-03-11 RX ADMIN — ENOXAPARIN SODIUM 30 MG: 100 INJECTION SUBCUTANEOUS at 21:04

## 2025-03-11 RX ADMIN — ALBUTEROL SULFATE 2.5 MG: 2.5 SOLUTION RESPIRATORY (INHALATION) at 12:11

## 2025-03-11 RX ADMIN — PREDNISONE 40 MG: 20 TABLET ORAL at 08:47

## 2025-03-11 RX ADMIN — BUSPIRONE HYDROCHLORIDE 5 MG: 5 TABLET ORAL at 21:04

## 2025-03-11 RX ADMIN — BUSPIRONE HYDROCHLORIDE 5 MG: 5 TABLET ORAL at 08:47

## 2025-03-11 RX ADMIN — SODIUM CHLORIDE, PRESERVATIVE FREE 10 ML: 5 INJECTION INTRAVENOUS at 21:05

## 2025-03-11 RX ADMIN — ONDANSETRON 4 MG: 2 INJECTION, SOLUTION INTRAMUSCULAR; INTRAVENOUS at 11:46

## 2025-03-11 RX ADMIN — POTASSIUM BICARBONATE 40 MEQ: 782 TABLET, EFFERVESCENT ORAL at 00:43

## 2025-03-11 RX ADMIN — AZITHROMYCIN DIHYDRATE 500 MG: 250 TABLET ORAL at 17:45

## 2025-03-11 ASSESSMENT — PAIN SCALES - GENERAL
PAINLEVEL_OUTOF10: 4
PAINLEVEL_OUTOF10: 0

## 2025-03-11 ASSESSMENT — PAIN DESCRIPTION - LOCATION: LOCATION: HEAD

## 2025-03-11 NOTE — PROGRESS NOTES
Patient A/O x4 and able to verify home medications, medications verified through dispense report. Medications that were ordered on admission that patient no longer takes have been discontinued by NP.     [x] The Home Med List was verified for accuracy and marked COMPLETED. The following sources were used to assist with Medication Reconciliation:    [] Med Rec Pharmacy tech has already completed home med list in the ED and note reviewed   [] Patient med list was transcribed into the EHR and verified for accuracy.(Note method of verification)  [] Patient provided bottles of their medications for validation  [x] Medications reviewed and confirmed with patient   [] Contacted patient's pharmacy to confirm home medications (Please list the pharmacy)  [x] Home medications reviewed using Dispense Report   [] Contacted physician office to confirm home medications  [] Medical Records received from another facility (list facility and place copy placed in chart)  [x] NP Irma Jerez was notified regarding changes to home med list via [employer] on 3/11/2025 at the 0230        [] There are one or more home medications that need clarification before Medication Reconciliation can be marked completed. The Med List Status has been marked as In Progress.   To assist with Home Medication Reconciliation the following actions have been taken:    [] Family requested to bring medications in their original bottles to the hospital for verification  [] Family requested to call hospital with list of medications  [] Call to physicians off and left message (list physician and who they spoke to, date and time)  [] Request for medical records made to   [] MAR from facility requested to be faxed over  [] Unable to complete due to patient condition  [] Oncoming nurse notified of incomplete med list for follow up  [] Other       *Effective communication is essential throughout this process. It is important for the nurse to document the progress of

## 2025-03-11 NOTE — PROGRESS NOTES
Spiritual Health History and Assessment/Progress Note  [facility]    (P) Initial Encounter, Spiritual/Emotional Needs,  ,  ,      Name: Janis Byers MRN: [id number]    Age: 59 y.o.     Sex: female   Language: English   Episcopal: Non-Roman Catholic   COPD with acute exacerbation (HCC)     Date: 3/11/2025            Total Time Calculated: (P) 15 min              Spiritual Assessment began in [facility]        Referral/Consult From: (P) Rounding   Encounter Overview/Reason: (P) Initial Encounter, Spiritual/Emotional Needs  Service Provided For: (P) Patient and family together (Son Present)    Pat, Belief, Meaning:   Patient identifies as spiritual, is connected with a pat tradition or spiritual practice, and has beliefs or practices that help with coping during difficult times  Family/Friends identify as spiritual, are connected with a pat tradition or spiritual practice, and have beliefs or practices that help with coping during difficult times      Importance and Influence:  Patient has spiritual/personal beliefs that influence decisions regarding their health  Family/Friends have spiritual/personal beliefs that influence decisions regarding the patient's health    Community:  Patient feels well-supported. Support system includes: Children, Friends, and Extended family  Family/Friends feel well-supported. Support system includes: Children, Friends, and Extended family    Assessment and Plan of Care:     Patient Interventions include: Facilitated expression of thoughts and feelings, Explored spiritual coping/struggle/distress, and Provided sacramental/Mormonism ritual  Family/Friends Interventions include: Facilitated expression of thoughts and feelings, Explored spiritual coping/struggle/distress, and Provided sacramental/Mormonism ritual    Patient Plan of Care: Spiritual Care available upon further referral  Family/Friends Plan of Care: Spiritual Care available upon further

## 2025-03-11 NOTE — PROGRESS NOTES
Comprehensive Nutrition Assessment    Type and Reason for Visit:  Positive nutrition screen    Nutrition Recommendations/Plan:   Continue current diet  Add ensure to diet  Monitor po intake, GI status, labs     Malnutrition Assessment:  Malnutrition Status:  Moderate malnutrition (03/11/25 1129)    Context:  Acute Illness     Findings of the 6 clinical characteristics of malnutrition:  Energy Intake:  75% or less of estimated energy requirements for 7 or more days  Weight Loss:  Greater than 2% over 1 week     Body Fat Loss:  No body fat loss     Muscle Mass Loss:  No muscle mass loss    Fluid Accumulation:  Mild     Strength:  Not Performed    Nutrition Assessment:    Pt admitted to hospital with symptoms of a month long cough and SOB. Pt has a hx of COPD and tobacco use. Pt states she recently quit smoking on 3/9. Pt had a positive malnutrition screen for weight loss and decreased appetite. Pt states her normal weight is around 270# and she believes she has lost about 10# within the last month or so. She reports being the  for her 2 small grandchildern and that she is so busy with them that some times she doesn't think about eating. However, pt attributes her weight loss to feeling sick the last 3 weeks. She reports having persistent n/v, which has caused her to have a poor appetite. Pt states for the last 3 weeks she has only eaten one meal per day, typically a sandwich or something small, with limited snacks if any throughout the day. Pt says she cannot fathom having solid food in her mouth as it exacerbates the nausea and vomiting. Pt reports not experiencing a change in taste, but consuming liquids at the moment seems to be more tolerable for her. Pt said she was able to complete breakfast this morning w/o any n/v so far, but having food in her mouth still gives her an ill feeling. RD suggested Ensure if she is still feeling that lunch and dinner will be challenging for her. Pt was agreeable to

## 2025-03-11 NOTE — PROGRESS NOTES
Patient admitted from [facility] ED, she is A/O x4, arrives on 3L via nasal canula. Vitals taken and stable, telemetry and bedside pulse ox applied. Admission database and home med rec completed. Bed alarm on with bed in lowest position. All needs met at this time

## 2025-03-11 NOTE — CARE COORDINATION
Case Management Assessment  Initial Evaluation    Date/Time of Evaluation: 3/11/2025 12:43 PM  Assessment Completed by: Janna Brown    If patient is discharged prior to next notation, then this note serves as note for discharge by case management.    Patient Name: Janis Byers                   YOB: 1965  Diagnosis: Hypoxemia [R09.02]  COPD exacerbation (HCC) [J44.1]                   Date / Time: 3/10/2025  4:12 PM    Patient Admission Status: Observation   Readmission Risk (Low < 19, Mod (19-27), High > 27): No data recorded  Current PCP: Yesica Syed MD  PCP verified by CM? (P) Yes    Chart Reviewed: Yes      History Provided by: (P) Patient  Patient Orientation: (P) Alert and Oriented    Patient Cognition: (P) Alert    Hospitalization in the last 30 days (Readmission):  No    If yes, Readmission Assessment in CM Navigator will be completed.    Advance Directives:      Code Status: Full Code   Patient's Primary Decision Maker is: (P) Legal Next of Kin      Discharge Planning:    Patient lives with: (P) Children, Family Members Type of Home: (P) Trailer/Mobile Home  Primary Care Giver: (P) Self  Patient Support Systems include: (P) Children, Family Members   Current Financial resources: (P) Medicaid, Medicare  Current community resources:    Current services prior to admission: (P) Durable Medical Equipment            Current DME: (P) [facility], Walker            Type of Home Care services:  (P) None    ADLS  Prior functional level: (P) Independent in ADLs/IADLs  Current functional level: (P) Independent in ADLs/IADLs    PT AM-PAC:   /24  OT AM-PAC:   /24    Family can provide assistance at DC: (P) Yes  Would you like Case Management to discuss the discharge plan with any other family members/significant others, and if so, who? (P) No  Plans to Return to Present Housing: (P) Yes  Other Identified Issues/Barriers to RETURNING to current housing: medical complications, currently on 4L O2 and does

## 2025-03-11 NOTE — H&P
[facility]  Office: [phone]  Marcel Kumar DO, Shravan Terrazas DO, Geronimo Rose DO, Morales Valdez DO, Maame Kemp MD, Gavi Day MD, Devan Pisano MD, Cindi Reed MD,  Grant Rojas MD, Charito Silva MD, Jean-Claude Ta MD,  Rianna Austin DO, John Louis MD, Errol Ivy MD, Andi Kumar DO, Lucie Corrales MD,  Neo Gómez DO, Afsaneh Devine MD, Mary Ybarra MD, Jesica Frias MD, Lashonda Guzman MD,  Fady Adkins MD, Colten Llamas MD, Rico Chavez MD, Cosmo Acuña MD, Harley Scott MD, Jairo Desir MD, Austin Lepe DO, Abhijit Johnston MD, Rianna Grove MD, Mohsin Reza, MD, Shirley Waterhouse, CNP,  Cherie Cariln CNP, Austin Rosales, CNP,  Adelia Dc, REGINE, Lisa Vo, CNP, Ruma Smyth, CNP, Irma Jerez, CNP, Esme Martinez CNP, GUERDA Mendes-DEUCE, Thuy Mario, CNP, Barbie Deluna, CNP,  Lary Henderson, CNP, Moon Holt, CNP, Emily Ferrell, CNP,  Cindy Sheth, CNS, Liya Santana CNP, Thao Farnsworth CNP,   Janneth Eckert, CNP         [facility]   IN-PATIENT SERVICE   [facility]    HISTORY AND PHYSICAL EXAMINATION            Date:   3/11/2025  Patient name:  Janis Byers  Date of admission:  3/10/2025  4:12 PM  MRN:   [id number]  Account:  [id number]  YOB: 1965  PCP:    Yesica Syed MD  Room:   2002/2002-02  Code Status:    Full Code    Chief Complaint:     Chief Complaint   Patient presents with    Cough    Shortness of Breath       History Obtained From:     patient    History of Present Illness:     Janis Byers is a 59 y.o. Non- / non  female who presents with Cough and Shortness of Breath   and is admitted to the hospital for the management of COPD with acute exacerbation (HCC).    This is a 59-year-old female that presents with a complaint of shortness of breath and cough.  Symptoms been present for several days slowly worsening.  She has been using her

## 2025-03-11 NOTE — RT PROTOCOL NOTE
RT Nebulizer Bronchodilator Protocol Note    There is a bronchodilator order in the chart from a provider indicating to follow the RT Bronchodilator Protocol and there is an “Initiate RT Bronchodilator Protocol” order as well (see protocol at bottom of note).    CXR Findings:  No results found.    The findings from the last RT Protocol Assessment were as follows:  Smoking: Chronic pulmonary disease  Respiratory Pattern: Dyspnea on exertion or RR 21-25 bpm  Breath Sounds: Intermittent or unilateral wheezes  Cough: Strong, spontaneous, non-productive  Indication for Bronchodilator Therapy: On home bronchodilators, Wheezing associated with pulm disorder  Bronchodilator Assessment Score: 8    Aerosolized bronchodilator medication orders have been revised according to the RT Nebulizer Bronchodilator Protocol below.    Respiratory Therapist to perform RT Therapy Protocol Assessment initially then follow the protocol.  Repeat RT Therapy Protocol Assessment PRN for score 0-3 or on second treatment, BID, and PRN for scores above 3.    No Indications - adjust the frequency to every 6 hours PRN wheezing or bronchospasm, if no treatments needed after 48 hours then discontinue using Per Protocol order mode.     If indication present, adjust the RT bronchodilator orders based on the Bronchodilator Assessment Score as indicated below.  If a patient is on this medication at home then do not decrease Frequency below that used at home.    0-3 - enter or revise RT bronchodilator order(s) to equivalent RT Bronchodilator order with Frequency of every 4 hours PRN for wheezing or increased work of breathing using Per Protocol order mode.       4-6 - enter or revise RT Bronchodilator order(s) to two equivalent RT bronchodilator orders with one order with BID Frequency and one order with Frequency of every 4 hours PRN wheezing or increased work of breathing using Per Protocol order mode.         7-10 - enter or revise RT Bronchodilator

## 2025-03-12 LAB
EKG ATRIAL RATE: 77 BPM
EKG P AXIS: 68 DEGREES
EKG P-R INTERVAL: 162 MS
EKG Q-T INTERVAL: 414 MS
EKG QRS DURATION: 102 MS
EKG QTC CALCULATION (BAZETT): 468 MS
EKG R AXIS: 18 DEGREES
EKG T AXIS: 37 DEGREES
EKG VENTRICULAR RATE: 77 BPM
POTASSIUM SERPL-SCNC: 3.6 MMOL/L (ref 3.7–5.3)

## 2025-03-12 PROCEDURE — 99232 SBSQ HOSP IP/OBS MODERATE 35: CPT | Performed by: INTERNAL MEDICINE

## 2025-03-12 PROCEDURE — 6370000000 HC RX 637 (ALT 250 FOR IP): Performed by: NURSE PRACTITIONER

## 2025-03-12 PROCEDURE — 2700000000 HC OXYGEN THERAPY PER DAY

## 2025-03-12 PROCEDURE — 94761 N-INVAS EAR/PLS OXIMETRY MLT: CPT

## 2025-03-12 PROCEDURE — 6360000002 HC RX W HCPCS: Performed by: NURSE PRACTITIONER

## 2025-03-12 PROCEDURE — 94640 AIRWAY INHALATION TREATMENT: CPT

## 2025-03-12 PROCEDURE — 84132 ASSAY OF SERUM POTASSIUM: CPT

## 2025-03-12 PROCEDURE — G0378 HOSPITAL OBSERVATION PER HR: HCPCS

## 2025-03-12 PROCEDURE — 36415 COLL VENOUS BLD VENIPUNCTURE: CPT

## 2025-03-12 PROCEDURE — 2500000003 HC RX 250 WO HCPCS: Performed by: NURSE PRACTITIONER

## 2025-03-12 RX ADMIN — ALBUTEROL SULFATE 2.5 MG: 2.5 SOLUTION RESPIRATORY (INHALATION) at 08:12

## 2025-03-12 RX ADMIN — ALBUTEROL SULFATE 2.5 MG: 2.5 SOLUTION RESPIRATORY (INHALATION) at 14:31

## 2025-03-12 RX ADMIN — BUDESONIDE AND FORMOTEROL FUMARATE DIHYDRATE 2 PUFF: 160; 4.5 AEROSOL RESPIRATORY (INHALATION) at 18:11

## 2025-03-12 RX ADMIN — BUPROPION HYDROCHLORIDE 150 MG: 150 TABLET, EXTENDED RELEASE ORAL at 09:33

## 2025-03-12 RX ADMIN — BUDESONIDE AND FORMOTEROL FUMARATE DIHYDRATE 2 PUFF: 160; 4.5 AEROSOL RESPIRATORY (INHALATION) at 08:12

## 2025-03-12 RX ADMIN — ENOXAPARIN SODIUM 30 MG: 100 INJECTION SUBCUTANEOUS at 09:32

## 2025-03-12 RX ADMIN — GUAIFENESIN 600 MG: 600 TABLET ORAL at 09:33

## 2025-03-12 RX ADMIN — ACETAMINOPHEN 650 MG: 325 TABLET, FILM COATED ORAL at 12:06

## 2025-03-12 RX ADMIN — ENOXAPARIN SODIUM 30 MG: 100 INJECTION SUBCUTANEOUS at 19:29

## 2025-03-12 RX ADMIN — CHOLECALCIFEROL TAB 125 MCG (5000 UNIT) 5000 UNITS: 125 TAB at 09:33

## 2025-03-12 RX ADMIN — WATER 2000 MG: 1 INJECTION INTRAMUSCULAR; INTRAVENOUS; SUBCUTANEOUS at 18:34

## 2025-03-12 RX ADMIN — PREDNISONE 40 MG: 20 TABLET ORAL at 09:32

## 2025-03-12 RX ADMIN — ARIPIPRAZOLE 5 MG: 5 TABLET ORAL at 09:33

## 2025-03-12 RX ADMIN — SODIUM CHLORIDE, PRESERVATIVE FREE 10 ML: 5 INJECTION INTRAVENOUS at 09:33

## 2025-03-12 RX ADMIN — TIOTROPIUM BROMIDE INHALATION SPRAY 2 PUFF: 3.12 SPRAY, METERED RESPIRATORY (INHALATION) at 08:12

## 2025-03-12 RX ADMIN — BUSPIRONE HYDROCHLORIDE 5 MG: 5 TABLET ORAL at 09:33

## 2025-03-12 RX ADMIN — ALBUTEROL SULFATE 2.5 MG: 2.5 SOLUTION RESPIRATORY (INHALATION) at 18:11

## 2025-03-12 RX ADMIN — GUAIFENESIN 600 MG: 600 TABLET ORAL at 19:28

## 2025-03-12 RX ADMIN — BUSPIRONE HYDROCHLORIDE 5 MG: 5 TABLET ORAL at 19:28

## 2025-03-12 RX ADMIN — AZITHROMYCIN DIHYDRATE 500 MG: 250 TABLET ORAL at 18:34

## 2025-03-12 RX ADMIN — VORTIOXETINE 20 MG: 20 TABLET, FILM COATED ORAL at 09:33

## 2025-03-12 ASSESSMENT — PAIN DESCRIPTION - FREQUENCY: FREQUENCY: INTERMITTENT

## 2025-03-12 ASSESSMENT — PAIN DESCRIPTION - LOCATION: LOCATION: HEAD

## 2025-03-12 ASSESSMENT — PAIN - FUNCTIONAL ASSESSMENT: PAIN_FUNCTIONAL_ASSESSMENT: ACTIVITIES ARE NOT PREVENTED

## 2025-03-12 ASSESSMENT — PAIN SCALES - GENERAL
PAINLEVEL_OUTOF10: 2
PAINLEVEL_OUTOF10: 4

## 2025-03-12 ASSESSMENT — PAIN DESCRIPTION - ONSET: ONSET: GRADUAL

## 2025-03-12 ASSESSMENT — PAIN DESCRIPTION - PAIN TYPE: TYPE: ACUTE PAIN

## 2025-03-12 ASSESSMENT — PAIN DESCRIPTION - DESCRIPTORS: DESCRIPTORS: ACHING

## 2025-03-12 NOTE — RT PROTOCOL NOTE
RT Nebulizer Bronchodilator Protocol Note    There is a bronchodilator order in the chart from a provider indicating to follow the RT Bronchodilator Protocol and there is an “Initiate RT Bronchodilator Protocol” order as well (see protocol at bottom of note).    CXR Findings:  No results found.    The findings from the last RT Protocol Assessment were as follows:  Smoking: Chronic pulmonary disease  Respiratory Pattern: Dyspnea on exertion or RR 21-25 bpm  Breath Sounds: Inspiratory and expiratory or bilateral wheezing and/or rhonchi  Cough: Strong, spontaneous, non-productive  Indication for Bronchodilator Therapy: Decreased or absent breath sounds, Wheezing associated with pulm disorder, On home bronchodilators  Bronchodilator Assessment Score: 10    Aerosolized bronchodilator medication orders have been revised according to the RT Nebulizer Bronchodilator Protocol below.    Respiratory Therapist to perform RT Therapy Protocol Assessment initially then follow the protocol.  Repeat RT Therapy Protocol Assessment PRN for score 0-3 or on second treatment, BID, and PRN for scores above 3.    No Indications - adjust the frequency to every 6 hours PRN wheezing or bronchospasm, if no treatments needed after 48 hours then discontinue using Per Protocol order mode.     If indication present, adjust the RT bronchodilator orders based on the Bronchodilator Assessment Score as indicated below.  If a patient is on this medication at home then do not decrease Frequency below that used at home.    0-3 - enter or revise RT bronchodilator order(s) to equivalent RT Bronchodilator order with Frequency of every 4 hours PRN for wheezing or increased work of breathing using Per Protocol order mode.       4-6 - enter or revise RT Bronchodilator order(s) to two equivalent RT bronchodilator orders with one order with BID Frequency and one order with Frequency of every 4 hours PRN wheezing or increased work of breathing using Per Protocol

## 2025-03-12 NOTE — PROGRESS NOTES
Home Oxygen Evaluation    Room air SpO2 at Rest = 85%    Room air with exercise/exertion = 82%  Pt is on 4LPM continuously, Sp02 93%      Nocturnal Oximetry with patient on room air recommended if the resting SpO2 is 89% to 95%. (Requires additional order)

## 2025-03-12 NOTE — PROGRESS NOTES
[facility]  Office: [phone]  Marcel Kumar DO, Shravan Terrazas DO, Geronimo Rose DO, Morales Valdez DO, Maame Kemp MD, Gavi Day MD, Devan Pisano MD, Cindi Reed MD,  Grant Rojas MD, Charito Silva MD, Jean-Claude Ta MD,  Rianna Austin DO, John Louis MD, Errol Ivy MD, Andi Kumar DO, Lucie Corrales MD,  Neo Gómez DO, Afsaneh Devine MD, Mary Ybarra MD, Jesica Frias MD, Lashonda Guzman MD,  Fady Adkins MD, Colten Llamas MD, Rico Chavez MD, Cosmo Acuña MD, Harley Scott MD, Jairo Desir MD, Austin Lepe DO, Abhijit Johnston MD, Rianna Grove MD, Mohsin Reza, MD, Shirley Waterhouse, CNP,  Cherie Carlin CNP, Austin Rosales, CNP,  Adelia Dc, DNP, Lisa Vo, CNP, Ruma Smyth, CNP, Irma Jerez, CNP, Esme Martinez, CNP, Rhiannon Yi, PA-C, Thuy Mario, CNP, Barbie Deluna, CNP,  Lary Henderson, CNP, Moon Holt, CNP, Emily Ferrell, CNP,  Cindy Sheth, CNS, Liya Santana CNP, Thao Farnsworth CNP,   Janneth Eckert, CNP         [facility]   IN-PATIENT SERVICE   [facility]    Progress Note    3/12/2025    11:37 AM    Name:   Janis Byers  MRN:     [id number]     Acct:      [id number]   Room:   2002/2002-02  IP Day:  0  Admit Date:  3/10/2025  4:12 PM    PCP:   Yesica Syed MD  Code Status:  Full Code    Subjective:     C/C:   Chief Complaint   Patient presents with    Cough    Shortness of Breath     Interval History Status: improved.     Patient is resting in bed and denies any complaints of chest pain, nausea vomiting, fevers or chills.  Continues to have shortness of breath with cough, less wheezing today.    Brief History:     This is a 59-year-old female that presents with a complaint of shortness of breath with cough and congestion.  She has been using her inhalers at home with little relief, and relief was short-lived.  As she was not improving she presented to the emergency room was

## 2025-03-13 VITALS
DIASTOLIC BLOOD PRESSURE: 73 MMHG | HEIGHT: 66 IN | OXYGEN SATURATION: 92 % | BODY MASS INDEX: 44.18 KG/M2 | WEIGHT: 274.9 LBS | SYSTOLIC BLOOD PRESSURE: 116 MMHG | TEMPERATURE: 98.2 F | HEART RATE: 78 BPM | RESPIRATION RATE: 18 BRPM

## 2025-03-13 LAB
MICROORGANISM SPEC CULT: ABNORMAL
MICROORGANISM SPEC CULT: ABNORMAL
MICROORGANISM/AGENT SPEC: ABNORMAL
SERVICE CMNT-IMP: ABNORMAL
SPECIMEN DESCRIPTION: ABNORMAL

## 2025-03-13 PROCEDURE — 94761 N-INVAS EAR/PLS OXIMETRY MLT: CPT

## 2025-03-13 PROCEDURE — G0378 HOSPITAL OBSERVATION PER HR: HCPCS

## 2025-03-13 PROCEDURE — 6360000002 HC RX W HCPCS: Performed by: NURSE PRACTITIONER

## 2025-03-13 PROCEDURE — 94640 AIRWAY INHALATION TREATMENT: CPT

## 2025-03-13 PROCEDURE — 6370000000 HC RX 637 (ALT 250 FOR IP): Performed by: NURSE PRACTITIONER

## 2025-03-13 PROCEDURE — 2700000000 HC OXYGEN THERAPY PER DAY

## 2025-03-13 PROCEDURE — 99238 HOSP IP/OBS DSCHRG MGMT 30/<: CPT | Performed by: INTERNAL MEDICINE

## 2025-03-13 PROCEDURE — 6370000000 HC RX 637 (ALT 250 FOR IP): Performed by: INTERNAL MEDICINE

## 2025-03-13 RX ORDER — CEFDINIR 300 MG/1
300 CAPSULE ORAL EVERY 12 HOURS SCHEDULED
Status: DISCONTINUED | OUTPATIENT
Start: 2025-03-13 | End: 2025-03-13 | Stop reason: HOSPADM

## 2025-03-13 RX ORDER — PREDNISONE 20 MG/1
40 TABLET ORAL DAILY
Qty: 6 TABLET | Refills: 0 | Status: SHIPPED | OUTPATIENT
Start: 2025-03-13 | End: 2025-03-16

## 2025-03-13 RX ORDER — GUAIFENESIN 600 MG/1
600 TABLET, EXTENDED RELEASE ORAL 2 TIMES DAILY
COMMUNITY
Start: 2025-03-13

## 2025-03-13 RX ORDER — CEFDINIR 300 MG/1
300 CAPSULE ORAL EVERY 12 HOURS SCHEDULED
Qty: 14 CAPSULE | Refills: 0 | Status: SHIPPED | OUTPATIENT
Start: 2025-03-13 | End: 2025-03-20

## 2025-03-13 RX ADMIN — CHOLECALCIFEROL TAB 125 MCG (5000 UNIT) 5000 UNITS: 125 TAB at 09:16

## 2025-03-13 RX ADMIN — ARIPIPRAZOLE 5 MG: 5 TABLET ORAL at 09:16

## 2025-03-13 RX ADMIN — TIOTROPIUM BROMIDE INHALATION SPRAY 2 PUFF: 3.12 SPRAY, METERED RESPIRATORY (INHALATION) at 08:16

## 2025-03-13 RX ADMIN — VORTIOXETINE 20 MG: 20 TABLET, FILM COATED ORAL at 09:16

## 2025-03-13 RX ADMIN — PREDNISONE 40 MG: 20 TABLET ORAL at 09:16

## 2025-03-13 RX ADMIN — BUDESONIDE AND FORMOTEROL FUMARATE DIHYDRATE 2 PUFF: 160; 4.5 AEROSOL RESPIRATORY (INHALATION) at 08:16

## 2025-03-13 RX ADMIN — GUAIFENESIN 600 MG: 600 TABLET ORAL at 09:16

## 2025-03-13 RX ADMIN — BUPROPION HYDROCHLORIDE 150 MG: 150 TABLET, EXTENDED RELEASE ORAL at 09:16

## 2025-03-13 RX ADMIN — CEFDINIR 300 MG: 300 CAPSULE ORAL at 09:58

## 2025-03-13 RX ADMIN — ALBUTEROL SULFATE 2.5 MG: 2.5 SOLUTION RESPIRATORY (INHALATION) at 08:16

## 2025-03-13 RX ADMIN — BUSPIRONE HYDROCHLORIDE 5 MG: 5 TABLET ORAL at 09:16

## 2025-03-13 NOTE — PROGRESS NOTES
CLINICAL PHARMACY NOTE: MEDS TO BEDS    Total # of Prescriptions Filled: 2   The following medications were delivered to the patient:  Cefdinir 300mg  Prednisone 20mg    Additional Documentation:

## 2025-03-13 NOTE — CARE COORDINATION
Transitional Planning  Patient currently on 4 L NC O2.  No current home O2.  Faxed [employer] at [phone] demographics, home O2 eval, H/P, RT note, DME order for home O2.  Fax completed/successful.  Job # [id number].  Spoke with Bhakti at [facility] to notify on fax.  Bhakti states she will electronically pull the F2F and it is okay to provide the patient with a portable tank to expedite discharge.    Writer delivered portable tank to patient and instructed to call the highlighted number once leaving the hospital so Joey may bring out the concentrator.  Patient verbalized understanding.  Notified [facility] tank is in patients room.    Patients sister to provide transportation.  Patient denies further discharge needs.

## 2025-03-13 NOTE — PROGRESS NOTES
Physician Progress Note      PATIENT:               MICKEY JONES  CSN #:                  [id number]  :                       1965  ADMIT DATE:       3/10/2025 4:12 PM  DISCH DATE:  RESPONDING  PROVIDER #:        Geronimo Rose DO          QUERY TEXT:    Pt admitted with COPD exacerbation. Pt noted to have SpO2 drop to 87% on RA   and placed on 4 LPM with home oxygen evaluation showing need for 4 LPM   continuously. If possible, please document in the progress notes and discharge   summary if you are evaluating and/or treating any of the following:    The medical record reflects the following:  Risk Factors: COPD exacerbation  Clinical Indicators: presented with COPD exacerbation, SpO2 dropped to 87% on   RA, placed on 4 LPM with SpO2 improving to 92%; assessment reveals tachypneic   with RR up to 24, shallow breathing with dyspnea at rest and with exertion;   home oxygen evaluation showed need for 4 LPM continuously  Treatment: Labs, imaging, monitoring, supplemental oxygen  Options provided:  -- Acute respiratory failure with hypoxia  -- Other - I will add my own diagnosis  -- Disagree - Not applicable / Not valid  -- Disagree - Clinically unable to determine / Unknown  -- Refer to Clinical Documentation Reviewer    PROVIDER RESPONSE TEXT:    This patient is in acute respiratory failure with hypoxia.    Query created by: Nell Bunch on 3/13/2025 10:33 AM      Electronically signed by:  Geronimo Rose DO 3/13/2025 10:44 AM

## 2025-03-13 NOTE — RT PROTOCOL NOTE
RT Inhaler-Nebulizer Bronchodilator Protocol Note    There is a bronchodilator order in the chart from a provider indicating to follow the RT Bronchodilator Protocol and there is an “Initiate RT Inhaler-Nebulizer Bronchodilator Protocol” order as well (see protocol at bottom of note).    CXR Findings:  No results found.    The findings from the last RT Protocol Assessment were as follows:   History Pulmonary Disease: Chronic pulmonary disease  Respiratory Pattern: Dyspnea on exertion or RR 21-25 bpm  Breath Sounds: Intermittent or unilateral wheezes  Cough: Strong, productive  Indication for Bronchodilator Therapy: Decreased or absent breath sounds, Wheezing associated with pulm disorder, On home bronchodilators  Bronchodilator Assessment Score: 9    Aerosolized bronchodilator medication orders have been revised according to the RT Inhaler-Nebulizer Bronchodilator Protocol below.    Respiratory Therapist to perform RT Therapy Protocol Assessment initially then follow the protocol.  Repeat RT Therapy Protocol Assessment PRN for score 0-3 or on second treatment, BID, and PRN for scores above 3.    No Indications - adjust the frequency to every 6 hours PRN wheezing or bronchospasm, if no treatments needed after 48 hours then discontinue using Per Protocol order mode.     If indication present, adjust the RT bronchodilator orders based on the Bronchodilator Assessment Score as indicated below.  Use Inhaler orders unless patient has one or more of the following: on home nebulizer, not able to hold breath for 10 seconds, is not alert and oriented, cannot activate and use MDI correctly, or respiratory rate 25 breaths per minute or more, then use the equivalent nebulizer order(s) with same Frequency and PRN reasons based on the score.  If a patient is on this medication at home then do not decrease Frequency below that used at home.    0-3 - enter or revise RT bronchodilator order(s) to equivalent RT Bronchodilator order

## 2025-03-13 NOTE — DISCHARGE SUMMARY
XL  TAKE 1 TABLET BY MOUTH EVERY MORNING     busPIRone 5 MG tablet  Commonly known as: BUSPAR  Take 1 tablet by mouth 2 times daily     Compressor/Nebulizer Misc  1 vial by Does not apply route 4 times daily as needed. Patient has albuterol prescription at home; needs aerosol machine and set up diagnosis chronic bronchitis     Handicap Placard Misc  by Does not apply route Exp 9/2026     Lancets Misc  Use one Lancet per blood sugar test     lidocaine 5 %  Commonly known as: LIDODERM  Place 1 patch onto the skin daily 12 hours on, 12 hours off.     nystatin 745443 UNIT/GM powder  Commonly known as: Nyamyc  APPLY ONE DOSE TOPICALLY TWICE A DAY     ondansetron 4 MG tablet  Commonly known as: ZOFRAN  Take 1 tablet by mouth 3 times daily as needed for Nausea or Vomiting     Trintellix 20 MG Tabs tablet  Generic drug: VORTIoxetine HBr  TAKE 1 TABLET BY MOUTH DAILY     vitamin D3 125 MCG (5000 UT) Tabs tablet  Commonly known as: CHOLECALCIFEROL  Take 1 tablet by mouth daily            STOP taking these medications      aspirin 81 MG EC tablet     tiZANidine 2 MG tablet  Commonly known as: ZANAFLEX            ASK your doctor about these medications      atorvastatin 20 MG tablet  Commonly known as: LIPITOR  TAKE 1 TABLET BY MOUTH EVERY  NIGHT               Where to Get Your Medications        These medications were sent to [id number] - [place], [place] - [facility] -  [phone] - F [phone]  [address] [place] [address]      Phone: [phone]   cefdinir 300 MG capsule  predniSONE 20 MG tablet       You can get these medications from any pharmacy    You don't need a prescription for these medications  guaiFENesin 600 MG extended release tablet         Discharge Procedure Orders   DME Order for Home Oxygen as OP   Order Comments: You must complete the order parameters below and add the medical necessity documentation for this DME in a separate note.    Stationary Oxygen

## 2025-03-13 NOTE — PROGRESS NOTES
[facility]  Office: [phone]  Marcel Kumar DO, Shravan Terrazas DO, Geronimo Rose DO, Morales Valdez DO, Maame Kemp MD, Gavi Day MD, Devan Pisano MD, Cindi Reed MD,  Grant Rojas MD, Charito Silva MD, Jean-Claude Ta MD,  Rianna Austin DO, John Louis MD, Errol Ivy MD, Andi Kumar DO, Lucei Corrales MD,  Neo Gómez DO, Afsaneh Devine MD, Mary Ybarra MD, Jesica Frias MD, Lashonda Guzman MD,  Fady Adkins MD, Colten Llamas MD, Rico Chavez MD, Cosmo Acuña MD, Harley Scott MD, Jairo Desir MD, Austin Lepe DO, Abhijit Johnston MD, Rianna Groev MD, Mohsin Reza, MD, Shirley Waterhouse, CNP,  Cherie Carlin CNP, Austin Rosales, CNP,  Adelia Dc, DNP, Lisa Vo, CNP, Ruma Smyth, CNP, Irma Jerez, CNP, Esme Martinez, CNP, Rhiannon Yi, PA-C, Thuy Mario, CNP, Barbie Deluna, CNP,  Lary Henderson, CNP, Moon Holt, CNP, Emily Ferrell, CNP,  Cindy Sheth, CNS, Liya Santana CNP, Thao Farnsworth CNP,   Janneth Eckert, CNP         [facility]   IN-PATIENT SERVICE   [facility]    Progress Note    3/13/2025    8:00 AM    Name:   Janis Byers  MRN:     [id number]     Acct:      [id number]   Room:   2002/2002-02  IP Day:  0  Admit Date:  3/10/2025  4:12 PM    PCP:   Yesica Syed MD  Code Status:  Full Code    Subjective:     C/C:   Chief Complaint   Patient presents with    Cough    Shortness of Breath     Interval History Status: improved.     Patient continues to improve with decreasing shortness of breath.  Decreased cough but continues to have sputum production.  Decreasing chest congestion.  Denies any chest pain, nausea or vomiting, fevers or chills.    Brief History:     This is a 59-year-old female that presents with a complaint of shortness of breath with cough and congestion.  She has been using her inhalers at home with little relief, and relief was short-lived.  As she was not improving

## 2025-03-13 NOTE — PROGRESS NOTES
Pt discharged to home in stable condition with belongings  Discharge instructions given  \"Meds To Beds\" medication at bedside  Pt denies having any further questions at this time  Locked up home medication(s)/personal items given to patient at discharge  Patient/family state they have everything they were admitted with.  Left with home oxygen tank and tubing

## 2025-03-13 NOTE — PLAN OF CARE
Problem: Discharge Planning  Goal: Discharge to home or other facility with appropriate resources  3/12/2025 1305 by Janice Herring, RN  Outcome: Progressing  Flowsheets (Taken 3/12/2025 0947)  Discharge to home or other facility with appropriate resources:   Identify barriers to discharge with patient and caregiver   Arrange for needed discharge resources and transportation as appropriate   Identify discharge learning needs (meds, wound care, etc)   Refer to discharge planning if patient needs post-hospital services based on physician order or complex needs related to functional status, cognitive ability or social support system     Problem: Respiratory - Adult  Goal: Achieves optimal ventilation and oxygenation  3/12/2025 1305 by Janice Herring, RN  Outcome: Progressing  Flowsheets (Taken 3/12/2025 0947)  Achieves optimal ventilation and oxygenation:   Assess for changes in respiratory status   Assess for changes in mentation and behavior   Position to facilitate oxygenation and minimize respiratory effort   Oxygen supplementation based on oxygen saturation or arterial blood gases   Initiate smoking cessation protocol as indicated   Encourage broncho-pulmonary hygiene including cough, deep breathe, incentive spirometry   Assess and instruct to report shortness of breath or any respiratory difficulty   Respiratory therapy support as indicated     Problem: Infection - Adult  Goal: Absence of infection at discharge  3/12/2025 1305 by Janice Herring, RN  Outcome: Progressing  Flowsheets (Taken 3/12/2025 0947)  Absence of infection at discharge:   Assess and monitor for signs and symptoms of infection   Monitor lab/diagnostic results   Monitor all insertion sites i.e., indwelling lines, tubes and drains   Administer medications as ordered   Instruct and encourage patient and family to use good hand hygiene technique     Problem: Pain  Goal: Verbalizes/displays adequate comfort level or baseline comfort level  3/12/2025 
  Problem: Discharge Planning  Goal: Discharge to home or other facility with appropriate resources  3/13/2025 1424 by Windy Christopher RN  Outcome: Adequate for Discharge  Flowsheets (Taken 3/13/2025 0816)  Discharge to home or other facility with appropriate resources:   Identify barriers to discharge with patient and caregiver   Arrange for needed discharge resources and transportation as appropriate   Identify discharge learning needs (meds, wound care, etc)  3/13/2025 0131 by Ab Ansari RN  Outcome: Progressing     Problem: Respiratory - Adult  Goal: Achieves optimal ventilation and oxygenation  3/13/2025 1424 by Windy Christopher RN  Outcome: Adequate for Discharge  3/13/2025 0825 by Grazyna Lobato RCP  Outcome: Progressing  Flowsheets (Taken 3/13/2025 0816 by Windy Christopher RN)  Achieves optimal ventilation and oxygenation:   Assess for changes in respiratory status   Assess for changes in mentation and behavior   Oxygen supplementation based on oxygen saturation or arterial blood gases  3/13/2025 0131 by Ab Ansari RN  Outcome: Progressing     Problem: Infection - Adult  Goal: Absence of infection at discharge  3/13/2025 1424 by Windy Christopher RN  Outcome: Adequate for Discharge  Flowsheets (Taken 3/13/2025 0816)  Absence of infection at discharge: Assess and monitor for signs and symptoms of infection  3/13/2025 0131 by Ab Ansari RN  Outcome: Progressing     Problem: Pain  Goal: Verbalizes/displays adequate comfort level or baseline comfort level  3/13/2025 1424 by Windy Christopher RN  Outcome: Adequate for Discharge  3/13/2025 0131 by Ab Ansari RN  Outcome: Progressing     Problem: Nutrition Deficit:  Goal: Optimize nutritional status  3/13/2025 1424 by Windy Christopher RN  Outcome: Adequate for Discharge  3/13/2025 0131 by Ab Ansari RN  Outcome: Progressing     
  Problem: Respiratory - Adult  Goal: Achieves optimal ventilation and oxygenation  3/11/2025 0803 by Riana Bowie RCP  Outcome: Progressing  3/11/2025 0240 by Lauren Horton RN  Outcome: Progressing  Flowsheets (Taken 3/11/2025 0111)  Achieves optimal ventilation and oxygenation: Assess for changes in respiratory status     
  Problem: Respiratory - Adult  Goal: Achieves optimal ventilation and oxygenation  3/11/2025 2046 by Iggy Betts RCP  Outcome: Progressing     Problem: Respiratory - Adult  Goal: Clear lung sounds  3/11/2025 2046 by Iggy Betts RCP  Outcome: Progressing     Problem: Respiratory - Adult  Goal: Able to breathe comfortably  Description: Able to breathe comfortably  3/11/2025 2046 by Iggy Betts RCP  Outcome: Progressing     
  Problem: Respiratory - Adult  Goal: Achieves optimal ventilation and oxygenation  3/13/2025 0825 by Grazyna Lobato RCP  Outcome: Progressing     
Care Plan Note    Problem: Discharge Planning  Goal: Discharge to home or other facility with appropriate resources  3/12/2025 0027 by Jayla Rodriguez, RN  Flowsheets (Taken 3/11/2025 2000)  Discharge to home or other facility with appropriate resources:   Identify barriers to discharge with patient and caregiver   Arrange for needed discharge resources and transportation as appropriate   Identify discharge learning needs (meds, wound care, etc)   Refer to discharge planning if patient needs post-hospital services based on physician order or complex needs related to functional status, cognitive ability or social support system  Note: Pt plans to return home with family support upon discharge - Progressing  3/11/2025 1750 by Rosamaria Blandon RN  Outcome: Progressing     Problem: Respiratory - Adult  Goal: Achieves optimal ventilation and oxygenation  3/12/2025 0027 by Jayla Rodriguez RN  Flowsheets (Taken 3/11/2025 2000)  Achieves optimal ventilation and oxygenation:   Assess for changes in respiratory status   Assess for changes in mentation and behavior   Position to facilitate oxygenation and minimize respiratory effort   Oxygen supplementation based on oxygen saturation or arterial blood gases   Encourage broncho-pulmonary hygiene including cough, deep breathe, incentive spirometry   Assess the need for suctioning and aspirate as needed   Assess and instruct to report shortness of breath or any respiratory difficulty   Respiratory therapy support as indicated  Note: Continues to require supplemental oxygen - Progressing  3/11/2025 2046 by Iggy Betts RCP  Outcome: Progressing  Flowsheets (Taken 3/11/2025 2000 by Jayla Rodriguez, RN)  Achieves optimal ventilation and oxygenation:   Assess for changes in respiratory status   Assess for changes in mentation and behavior   Position to facilitate oxygenation and minimize respiratory effort   Oxygen supplementation based on oxygen saturation or arterial blood 
No acute changes overnight, vitals stable and patient denies pain.   Receiving PO zithro and IV rocephin empirically while awaiting sputum cx. Sputum cx sent this shift.   Currently on 4L via nasal canula sating at 90-92%, she is on room air at baseline. Lung sounds are diminished with expiratory wheezing, complaining of SOB with exertion and at rest.   Free from falls this shift, all needs met at this time.    Problem: Discharge Planning  Goal: Discharge to home or other facility with appropriate resources  Outcome: Progressing     Problem: Respiratory - Adult  Goal: Achieves optimal ventilation and oxygenation  Outcome: Progressing     Problem: Infection - Adult  Goal: Absence of infection at discharge  Outcome: Progressing     Problem: Pain  Goal: Verbalizes/displays adequate comfort level or baseline comfort level  Outcome: Progressing     
ON 4 LPM she does not use oxygen at home.    Barky cough, occ productive.   Rocephin. Zithromax and soulmedrol.      Problem: Discharge Planning  Goal: Discharge to home or other facility with appropriate resources  Outcome: Progressing     Problem: Respiratory - Adult  Goal: Achieves optimal ventilation and oxygenation  3/11/2025 1750 by Rosamaria Blandon, RN  Outcome: Progressing  3/11/2025 1015 by Andree Caro RCP  Outcome: Progressing  3/11/2025 0803 by Riana Bowie RCP  Outcome: Progressing  Goal: Clear lung sounds  3/11/2025 1015 by Andree Caro RCP  Outcome: Progressing  Goal: Able to breathe comfortably  Description: Able to breathe comfortably  3/11/2025 1015 by Andree Caro RCP  Outcome: Progressing     
and encourage patient and family to use good hand hygiene technique     Problem: Pain  Goal: Verbalizes/displays adequate comfort level or baseline comfort level  3/13/2025 0131 by Ab Ansari RN  Outcome: Progressing  3/12/2025 1305 by Janice Herring, RN  Outcome: Progressing  Flowsheets (Taken 3/12/2025 1206)  Verbalizes/displays adequate comfort level or baseline comfort level:   Encourage patient to monitor pain and request assistance   Assess pain using appropriate pain scale   Administer analgesics based on type and severity of pain and evaluate response   Implement non-pharmacological measures as appropriate and evaluate response   Notify Licensed Independent Practitioner if interventions unsuccessful or patient reports new pain     Problem: Nutrition Deficit:  Goal: Optimize nutritional status  3/13/2025 0131 by Ab Ansari RN  Outcome: Progressing  3/12/2025 1305 by Janice Herring RN  Outcome: Progressing  Flowsheets (Taken 3/12/2025 0027 by Jayla Rodriguez, RN)  Nutrient intake appropriate for improving, restoring, or maintaining nutritional needs:   Assess nutritional status and recommend course of action   Monitor oral intake, labs, and treatment plans   Recommend appropriate diets, oral nutritional supplements, and vitamin/mineral supplements   Order, calculate, and assess calorie counts as needed   Provide specific nutrition education to patient or family as appropriate

## 2025-03-14 ENCOUNTER — CARE COORDINATION (OUTPATIENT)
Dept: CARE COORDINATION | Age: 60
End: 2025-03-14

## 2025-03-14 ENCOUNTER — TELEPHONE (OUTPATIENT)
Dept: FAMILY MEDICINE CLINIC | Age: 60
End: 2025-03-14

## 2025-03-14 DIAGNOSIS — J44.1 COPD EXACERBATION (HCC): Primary | ICD-10-CM

## 2025-03-14 PROCEDURE — 1111F DSCHRG MED/CURRENT MED MERGE: CPT | Performed by: STUDENT IN AN ORGANIZED HEALTH CARE EDUCATION/TRAINING PROGRAM

## 2025-03-14 NOTE — CARE COORDINATION
Care Transitions Note    Initial Call - Call within 2 business days of discharge: Yes    Patient Current Location:  Home: [address] [place] [address] [address]  [facility] [address]    Care Transition Nurse contacted the patient by telephone to perform post hospital discharge assessment, verified name and  as identifiers. Provided introduction to self, and explanation of the Care Transition Nurse role.     Patient: Janis Byers    Patient : 1965   MRN: [id number]    Reason for Admission: COPD exacerbation  Discharge Date: 3/13/25  RURS: Readmission Risk Score: 9      Last Discharge Facility       Date Complaint Diagnosis Description Type Department Provider    3/10/25 Cough; Shortness of Breath COPD exacerbation (HCC) ... ED to Hosp-Admission (Discharged) (ADMIT) Geronimo Roach, DO; Adi Nagel...            Was this an external facility discharge? No    Additional needs identified to be addressed with provider   High priority: Patient did not get Mucinex, I have advised to  OTC. She is requesting refill on nebulizer medication and would like Zofran orally disintegrating tablets to replace regular tabs. Please clarify if she is ok to take atorvastatin 20 mg daily. Thank you              Method of communication with provider: chart routing.    Patients top risk factors for readmission: medical condition-COPD    Interventions to address risk factors:   Education: oxygen safety, when to call MD, seek emergency care  Review of patient management of conditions/medications: symptoms, HFU appt, medications  DME: [employer]- [facility]    Care Summary Note: Patient reached for SARAH call, states continues to feel unwell, cough, fatigued. She is home to self care after inpatient admit for COPD exacerbation. Confirms AVS, she has new omnicef and prednisone. She has albuterol inhaler and nebulizer with medications, states running low. She did not received mucinex, advised to get OTC, states understanding. Confirms family

## 2025-03-14 NOTE — TELEPHONE ENCOUNTER
Care Transitions Initial Follow Up Call    Outreach made within 2 business days of discharge: Yes    Patient: Janis Byers Patient : 1965   MRN: [id number]  Reason for Admission: pnemonia  Discharge Date: 3/13/25       Spoke with: Radha     Discharge department/facility: [facility]    TCM Interactive Patient Contact:  Was patient able to fill all prescriptions: Yes  Was patient instructed to bring all medications to the follow-up visit: Yes  Is patient taking all medications as directed in the discharge summary? Yes  Does patient understand their discharge instructions: Yes  Does patient have questions or concerns that need addressed prior to 7-14 day follow up office visit: no    Additional needs identified to be addressed with provider                Scheduled appointment with PCP within 7-14 days    Follow Up  No future appointments.    Emily Gannon MA

## 2025-03-15 DIAGNOSIS — J44.9 CHRONIC OBSTRUCTIVE PULMONARY DISEASE, UNSPECIFIED COPD TYPE (HCC): Primary | ICD-10-CM

## 2025-03-15 DIAGNOSIS — R11.0 NAUSEA: ICD-10-CM

## 2025-03-15 RX ORDER — ALBUTEROL SULFATE 0.83 MG/ML
2.5 SOLUTION RESPIRATORY (INHALATION) EVERY 4 HOURS PRN
Qty: 30 EACH | Refills: 2 | Status: SHIPPED | OUTPATIENT
Start: 2025-03-15 | End: 2025-04-14

## 2025-03-15 RX ORDER — ONDANSETRON 4 MG/1
4 TABLET, FILM COATED ORAL 3 TIMES DAILY PRN
Qty: 15 TABLET | Refills: 0 | Status: SHIPPED | OUTPATIENT
Start: 2025-03-15

## 2025-03-18 ENCOUNTER — CARE COORDINATION (OUTPATIENT)
Dept: CARE COORDINATION | Age: 60
End: 2025-03-18

## 2025-03-18 NOTE — CARE COORDINATION
Care Transitions Note    Follow Up Call     Attempted to reach patient for transitions of care follow up.  Unable to reach patient.      Outreach Attempts:   HIPAA compliant voicemail left for patient.     Care Summary Note: First attempt follow up, left VM    Follow Up Appointment:   Future Appointments         Provider Specialty Dept Phone    3/20/2025 12:00 PM Yesica Syed MD Family Medicine [phone]            Plan for follow-up call in 2-5 days based on severity of symptoms and risk factors. Plan for next call: symptom management-how are respiratory symptoms?     Did she get mucinex? Review if has ACP, offer RPM for COPD    Meagan Willingham RN

## 2025-03-20 ENCOUNTER — OFFICE VISIT (OUTPATIENT)
Dept: FAMILY MEDICINE CLINIC | Age: 60
End: 2025-03-20

## 2025-03-20 VITALS
SYSTOLIC BLOOD PRESSURE: 120 MMHG | HEART RATE: 94 BPM | OXYGEN SATURATION: 90 % | WEIGHT: 270 LBS | BODY MASS INDEX: 43.39 KG/M2 | HEIGHT: 66 IN | DIASTOLIC BLOOD PRESSURE: 80 MMHG | TEMPERATURE: 97 F

## 2025-03-20 DIAGNOSIS — E53.8 LOW SERUM VITAMIN B12: ICD-10-CM

## 2025-03-20 DIAGNOSIS — J44.1 CHRONIC OBSTRUCTIVE PULMONARY DISEASE WITH ACUTE EXACERBATION (HCC): ICD-10-CM

## 2025-03-20 DIAGNOSIS — R11.0 NAUSEA: ICD-10-CM

## 2025-03-20 DIAGNOSIS — F33.1 MODERATE EPISODE OF RECURRENT MAJOR DEPRESSIVE DISORDER (HCC): ICD-10-CM

## 2025-03-20 DIAGNOSIS — Z09 HOSPITAL DISCHARGE FOLLOW-UP: Primary | ICD-10-CM

## 2025-03-20 DIAGNOSIS — Z87.891 PERSONAL HISTORY OF TOBACCO USE: ICD-10-CM

## 2025-03-20 DIAGNOSIS — K76.0 HEPATIC STEATOSIS: ICD-10-CM

## 2025-03-20 DIAGNOSIS — M79.672 LEFT FOOT PAIN: ICD-10-CM

## 2025-03-20 DIAGNOSIS — R73.03 PREDIABETES: ICD-10-CM

## 2025-03-20 DIAGNOSIS — E55.9 VITAMIN D DEFICIENCY: ICD-10-CM

## 2025-03-20 LAB — HBA1C MFR BLD: 5.7 %

## 2025-03-20 RX ORDER — LIDOCAINE 50 MG/G
1 PATCH TOPICAL DAILY
Qty: 10 PATCH | Refills: 0 | Status: SHIPPED | OUTPATIENT
Start: 2025-03-20 | End: 2025-03-30

## 2025-03-20 RX ORDER — ALBUTEROL SULFATE 0.83 MG/ML
2.5 SOLUTION RESPIRATORY (INHALATION) EVERY 4 HOURS PRN
Qty: 30 EACH | Refills: 2 | Status: SHIPPED | OUTPATIENT
Start: 2025-03-20 | End: 2025-04-19

## 2025-03-20 RX ORDER — ONDANSETRON 4 MG/1
4 TABLET, ORALLY DISINTEGRATING ORAL 3 TIMES DAILY PRN
Qty: 21 TABLET | Refills: 0 | Status: SHIPPED | OUTPATIENT
Start: 2025-03-20

## 2025-03-20 SDOH — ECONOMIC STABILITY: FOOD INSECURITY: WITHIN THE PAST 12 MONTHS, THE FOOD YOU BOUGHT JUST DIDN'T LAST AND YOU DIDN'T HAVE MONEY TO GET MORE.: NEVER TRUE

## 2025-03-20 SDOH — ECONOMIC STABILITY: FOOD INSECURITY: WITHIN THE PAST 12 MONTHS, YOU WORRIED THAT YOUR FOOD WOULD RUN OUT BEFORE YOU GOT MONEY TO BUY MORE.: NEVER TRUE

## 2025-03-20 ASSESSMENT — PATIENT HEALTH QUESTIONNAIRE - PHQ9
SUM OF ALL RESPONSES TO PHQ QUESTIONS 1-9: 0
8. MOVING OR SPEAKING SO SLOWLY THAT OTHER PEOPLE COULD HAVE NOTICED. OR THE OPPOSITE, BEING SO FIGETY OR RESTLESS THAT YOU HAVE BEEN MOVING AROUND A LOT MORE THAN USUAL: NOT AT ALL
3. TROUBLE FALLING OR STAYING ASLEEP: NOT AT ALL
1. LITTLE INTEREST OR PLEASURE IN DOING THINGS: NOT AT ALL
5. POOR APPETITE OR OVEREATING: NOT AT ALL
2. FEELING DOWN, DEPRESSED OR HOPELESS: NOT AT ALL
7. TROUBLE CONCENTRATING ON THINGS, SUCH AS READING THE NEWSPAPER OR WATCHING TELEVISION: NOT AT ALL
SUM OF ALL RESPONSES TO PHQ QUESTIONS 1-9: 0
10. IF YOU CHECKED OFF ANY PROBLEMS, HOW DIFFICULT HAVE THESE PROBLEMS MADE IT FOR YOU TO DO YOUR WORK, TAKE CARE OF THINGS AT HOME, OR GET ALONG WITH OTHER PEOPLE: NOT DIFFICULT AT ALL
SUM OF ALL RESPONSES TO PHQ QUESTIONS 1-9: 0
SUM OF ALL RESPONSES TO PHQ QUESTIONS 1-9: 0
6. FEELING BAD ABOUT YOURSELF - OR THAT YOU ARE A FAILURE OR HAVE LET YOURSELF OR YOUR FAMILY DOWN: NOT AT ALL
4. FEELING TIRED OR HAVING LITTLE ENERGY: NOT AT ALL

## 2025-03-20 NOTE — PROGRESS NOTES
Post-Discharge Transitional Care Follow Up      Janis Byers   YOB: 1965    Date of Office Visit:  3/20/2025  Date of Hospital Admission: 3/10/25  Date of Hospital Discharge: 3/13/25  Readmission Risk Score (high >=14%. Medium >=10%):Readmission Risk Score: 9      Care management risk score Rising risk (score 2-5) and Complex Care (Scores >=6): No Risk Score On File     Non face to face  following discharge, date last encounter closed (first attempt may have been earlier): 03/14/2025     Call initiated 2 business days of discharge: Yes     Hospital discharge follow-up  -     UT DISCHARGE MEDS RECONCILED W/ CURRENT OUTPATIENT MED LIST  Chronic obstructive pulmonary disease with acute exacerbation (HCC)  -     albuterol (PROVENTIL) (2.5 MG/3ML) 0.083% nebulizer solution; Take 3 mLs by nebulization every 4 hours as needed for Wheezing or Shortness of Breath, Disp-30 each, R-2Normal  Moderate episode of recurrent major depressive disorder (HCC)  Prediabetes  -     POCT glycosylated hemoglobin (Hb A1C)  -     Comprehensive Metabolic Panel; Future  -     TSH reflex to FT4; Future  Hepatic steatosis  -     Lipid Panel; Future  Low serum vitamin B12  -     CBC with Auto Differential; Future  -     Vitamin B12; Future  Vitamin D deficiency  -     Vitamin D 25 Hydroxy; Future  Left foot pain  -     XR ANKLE LEFT (MIN 3 VIEWS); Future  -     lidocaine (LIDODERM) 5 %; Place 1 patch onto the skin daily for 10 days 12 hours on, 12 hours off., Disp-10 patch, R-0Normal  Nausea  -     ondansetron (ZOFRAN-ODT) 4 MG disintegrating tablet; Take 1 tablet by mouth 3 times daily as needed for Nausea or Vomiting, Disp-21 tablet, R-0Normal  Personal history of tobacco use  -     UT VISIT TO DISCUSS LUNG CA SCREEN W LDCT  -     CT Lung Screen (Initial/Annual/Baseline); Future    Medical Decision Making: moderate complexity  Return in 1 month (on 4/20/2025) for medicare annual wellness visit.    On this date 3/20/2025

## 2025-03-21 ENCOUNTER — CARE COORDINATION (OUTPATIENT)
Dept: CARE COORDINATION | Age: 60
End: 2025-03-21

## 2025-03-21 ASSESSMENT — ENCOUNTER SYMPTOMS
COUGH: 0
DIARRHEA: 0
NAUSEA: 0
CHEST TIGHTNESS: 0
WHEEZING: 0
SORE THROAT: 0
ABDOMINAL PAIN: 0
CONSTIPATION: 0
SHORTNESS OF BREATH: 1
EYE DISCHARGE: 0
VOMITING: 0

## 2025-03-21 NOTE — PATIENT INSTRUCTIONS
Learning About Lung Cancer Screening  What is screening for lung cancer?     Lung cancer screening is a way to find some lung cancers early, before a person has any symptoms of the cancer.  Lung cancer screening may help those who have the highest risk for lung cancer--people age 50 and older who are or were heavy smokers. For most people, who aren't at increased risk, screening for lung cancer probably isn't helpful.  Screening won't prevent cancer. And it may not find all lung cancers. Lung cancer screening may lower the risk of dying from lung cancer in a small number of people.  How is it done?  Lung cancer screening is done with a low-dose CT (computed tomography) scan. A CT scan uses X-rays, or radiation, to make detailed pictures of your body. Experts recommend that screening be done in medical centers that focus on finding and treating lung cancer.  Who is screening recommended for?  Lung cancer screening is recommended for people age 50 and older who are or were heavy smokers. That means people with a smoking history of at least 20 pack years. A pack year is a way to measure how heavy a smoker you are or were.  To figure out your pack years, multiply how many packs a day on average (assuming 20 cigarettes per pack) you have smoked by how many years you have smoked. For example:  If you smoked 1 pack a day for 20 years, that's 1 times 20. So you have a smoking history of 20 pack years.  If you smoked 2 packs a day for 10 years, that's 2 times 10. So you have a smoking history of 20 pack years.  Experts agree that screening is for people who have a high risk of lung cancer. But experts don't agree on what high risk means. Some say people age 50 or older with at least a 20-pack-year smoking history are high risk. Others say it's people age 55 or older with a 30-pack-year history.  To see if you could benefit from screening, first find out if you are at high risk for lung cancer. Your doctor can help you  98.3

## 2025-03-21 NOTE — CARE COORDINATION
Yesica Syed MD Family Medicine [phone]            Care Transition Nurse provided contact information.  Plan for follow-up call in 6-10 days based on severity of symptoms and risk factors.  Plan for next call: symptom management-respiratory symptoms? ACP?       Meagan Willingham RN

## 2025-03-24 ENCOUNTER — TELEPHONE (OUTPATIENT)
Dept: FAMILY MEDICINE CLINIC | Age: 60
End: 2025-03-24

## 2025-03-24 ENCOUNTER — CARE COORDINATION (OUTPATIENT)
Dept: CARE COORDINATION | Age: 60
End: 2025-03-24

## 2025-03-24 NOTE — TELEPHONE ENCOUNTER
----- Message from Dr. Yesica Syed MD sent at 3/21/2025  9:00 AM EDT -----  Please let her know that I have placed order for lung CT for lung cancer screening, she will be due next month

## 2025-03-24 NOTE — CARE COORDINATION
Care Transitions Note    Follow Up Call     Attempted to reach patient for transitions of care follow up.  Unable to reach patient.      Outreach Attempts:   HIPAA compliant voicemail left for patient.     Care Summary Note: First attempt follow up, left VM    Follow Up Appointment:   Future Appointments         Provider Specialty Dept Phone    4/21/2025 10:30 AM Yesica Syed MD Family Medicine [phone]            Plan for follow-up call in 2-5 days based on severity of symptoms and risk factors. Plan for next call: symptom management-respiratory symptoms? ACP?     Meagan Willingham, RN

## 2025-03-25 ENCOUNTER — CARE COORDINATION (OUTPATIENT)
Dept: CARE COORDINATION | Age: 60
End: 2025-03-25

## 2025-03-25 NOTE — CARE COORDINATION
Care Transitions Note    Follow Up Call     Attempted to reach patient for transitions of care follow up.  Unable to reach patient.      Outreach Attempts:   Multiple attempts to contact patient at phone numbers on file.   HIPAA compliant voicemail left for patient.     Care Summary Note: Second attempt, left VM. Closing for SARAH    Follow Up Appointment:   Future Appointments         Provider Specialty Dept Phone    4/21/2025 10:30 AM Yesica Syed MD Family Medicine [phone]            No further follow-up call indicated based on severity of symptoms and risk factors. Plan for next call:  JAIDEN Willingham, RN

## 2025-04-09 DIAGNOSIS — Z72.0 TOBACCO ABUSE: Primary | ICD-10-CM

## 2025-04-09 RX ORDER — NICOTINE 21 MG/24HR
1 PATCH, TRANSDERMAL 24 HOURS TRANSDERMAL DAILY
Qty: 42 PATCH | Refills: 0 | Status: SHIPPED | OUTPATIENT
Start: 2025-04-09 | End: 2025-05-21

## 2025-04-11 ENCOUNTER — HOSPITAL ENCOUNTER (OUTPATIENT)
Dept: GENERAL RADIOLOGY | Facility: CLINIC | Age: 60
Discharge: HOME | End: 2025-04-13
Payer: MEDICARE

## 2025-04-11 ENCOUNTER — HOSPITAL ENCOUNTER (OUTPATIENT)
Dept: CT IMAGING | Facility: CLINIC | Age: 60
Discharge: HOME | End: 2025-04-13
Payer: MEDICARE

## 2025-04-11 ENCOUNTER — HOSPITAL ENCOUNTER (OUTPATIENT)
Facility: CLINIC | Age: 60
Discharge: HOME | End: 2025-04-11
Payer: MEDICARE

## 2025-04-11 VITALS — BODY MASS INDEX: 45 KG/M2 | WEIGHT: 280 LBS | HEIGHT: 66 IN

## 2025-04-11 DIAGNOSIS — K76.0 HEPATIC STEATOSIS: ICD-10-CM

## 2025-04-11 DIAGNOSIS — Z87.891 PERSONAL HISTORY OF TOBACCO USE: ICD-10-CM

## 2025-04-11 DIAGNOSIS — R73.03 PREDIABETES: ICD-10-CM

## 2025-04-11 DIAGNOSIS — E53.8 LOW SERUM VITAMIN B12: ICD-10-CM

## 2025-04-11 DIAGNOSIS — M79.672 LEFT FOOT PAIN: ICD-10-CM

## 2025-04-11 DIAGNOSIS — E55.9 VITAMIN D DEFICIENCY: ICD-10-CM

## 2025-04-11 LAB
25(OH)D3 SERPL-MCNC: 23.5 NG/ML (ref 30–100)
ALBUMIN SERPL-MCNC: 4.1 G/DL (ref 3.5–5.2)
ALBUMIN/GLOB SERPL: 1.6 {RATIO} (ref 1–2.5)
ALP SERPL-CCNC: 87 U/L (ref 35–104)
ALT SERPL-CCNC: 24 U/L (ref 10–35)
ANION GAP SERPL CALCULATED.3IONS-SCNC: 10 MMOL/L (ref 9–16)
AST SERPL-CCNC: 21 U/L (ref 10–35)
BASOPHILS # BLD: 0.07 K/UL (ref 0–0.2)
BASOPHILS NFR BLD: 1 % (ref 0–2)
BILIRUB SERPL-MCNC: 0.5 MG/DL (ref 0–1.2)
BUN SERPL-MCNC: 15 MG/DL (ref 8–23)
CALCIUM SERPL-MCNC: 9.3 MG/DL (ref 8.6–10.4)
CHLORIDE SERPL-SCNC: 103 MMOL/L (ref 98–107)
CHOLEST SERPL-MCNC: 170 MG/DL (ref 0–199)
CHOLESTEROL/HDL RATIO: 3
CO2 SERPL-SCNC: 29 MMOL/L (ref 20–31)
CREAT SERPL-MCNC: 0.8 MG/DL (ref 0.6–0.9)
EOSINOPHIL # BLD: 0.13 K/UL (ref 0–0.44)
EOSINOPHILS RELATIVE PERCENT: 1 % (ref 1–4)
ERYTHROCYTE [DISTWIDTH] IN BLOOD BY AUTOMATED COUNT: 14.4 % (ref 11.8–14.4)
GFR, ESTIMATED: 84 ML/MIN/1.73M2
GLUCOSE SERPL-MCNC: 90 MG/DL (ref 74–99)
HCT VFR BLD AUTO: 50.2 % (ref 36.3–47.1)
HDLC SERPL-MCNC: 56 MG/DL
HGB BLD-MCNC: 15.8 G/DL (ref 11.9–15.1)
IMM GRANULOCYTES # BLD AUTO: 0.05 K/UL (ref 0–0.3)
IMM GRANULOCYTES NFR BLD: 1 %
LDLC SERPL CALC-MCNC: 82 MG/DL (ref 0–100)
LYMPHOCYTES NFR BLD: 1.87 K/UL (ref 1.1–3.7)
LYMPHOCYTES RELATIVE PERCENT: 19 % (ref 24–43)
MCH RBC QN AUTO: 29.5 PG (ref 25.2–33.5)
MCHC RBC AUTO-ENTMCNC: 31.5 G/DL (ref 28.4–34.8)
MCV RBC AUTO: 93.7 FL (ref 82.6–102.9)
MONOCYTES NFR BLD: 0.83 K/UL (ref 0.1–1.2)
MONOCYTES NFR BLD: 9 % (ref 3–12)
NEUTROPHILS NFR BLD: 69 % (ref 36–65)
NEUTS SEG NFR BLD: 6.76 K/UL (ref 1.5–8.1)
NRBC BLD-RTO: 0 PER 100 WBC
PLATELET # BLD AUTO: 228 K/UL (ref 138–453)
PMV BLD AUTO: 11.2 FL (ref 8.1–13.5)
POTASSIUM SERPL-SCNC: 4.5 MMOL/L (ref 3.7–5.3)
PROT SERPL-MCNC: 6.6 G/DL (ref 6.6–8.7)
RBC # BLD AUTO: 5.36 M/UL (ref 3.95–5.11)
SODIUM SERPL-SCNC: 142 MMOL/L (ref 136–145)
TRIGL SERPL-MCNC: 162 MG/DL
TSH SERPL DL<=0.05 MIU/L-ACNC: 1.68 UIU/ML (ref 0.27–4.2)
VIT B12 SERPL-MCNC: 275 PG/ML (ref 232–1245)
VLDLC SERPL CALC-MCNC: 32 MG/DL (ref 1–30)
WBC OTHER # BLD: 9.7 K/UL (ref 3.5–11.3)

## 2025-04-11 PROCEDURE — 73610 X-RAY EXAM OF ANKLE: CPT

## 2025-04-11 PROCEDURE — 85025 COMPLETE CBC W/AUTO DIFF WBC: CPT

## 2025-04-11 PROCEDURE — 82607 VITAMIN B-12: CPT

## 2025-04-11 PROCEDURE — 80053 COMPREHEN METABOLIC PANEL: CPT

## 2025-04-11 PROCEDURE — 36415 COLL VENOUS BLD VENIPUNCTURE: CPT

## 2025-04-11 PROCEDURE — 71271 CT THORAX LUNG CANCER SCR C-: CPT

## 2025-04-11 PROCEDURE — 84443 ASSAY THYROID STIM HORMONE: CPT

## 2025-04-11 PROCEDURE — 80061 LIPID PANEL: CPT

## 2025-04-11 PROCEDURE — 82306 VITAMIN D 25 HYDROXY: CPT

## 2025-04-14 ENCOUNTER — RESULTS FOLLOW-UP (OUTPATIENT)
Dept: FAMILY MEDICINE CLINIC | Age: 60
End: 2025-04-14

## 2025-04-18 SDOH — HEALTH STABILITY: PHYSICAL HEALTH: ON AVERAGE, HOW MANY DAYS PER WEEK DO YOU ENGAGE IN MODERATE TO STRENUOUS EXERCISE (LIKE A BRISK WALK)?: 0 DAYS

## 2025-04-18 SDOH — HEALTH STABILITY: PHYSICAL HEALTH: ON AVERAGE, HOW MANY MINUTES DO YOU ENGAGE IN EXERCISE AT THIS LEVEL?: 0 MIN

## 2025-04-18 ASSESSMENT — PATIENT HEALTH QUESTIONNAIRE - PHQ9
SUM OF ALL RESPONSES TO PHQ QUESTIONS 1-9: 0
6. FEELING BAD ABOUT YOURSELF - OR THAT YOU ARE A FAILURE OR HAVE LET YOURSELF OR YOUR FAMILY DOWN: NOT AT ALL
5. POOR APPETITE OR OVEREATING: NOT AT ALL
SUM OF ALL RESPONSES TO PHQ QUESTIONS 1-9: 0
9. THOUGHTS THAT YOU WOULD BE BETTER OFF DEAD, OR OF HURTING YOURSELF: NOT AT ALL
3. TROUBLE FALLING OR STAYING ASLEEP: NOT AT ALL
10. IF YOU CHECKED OFF ANY PROBLEMS, HOW DIFFICULT HAVE THESE PROBLEMS MADE IT FOR YOU TO DO YOUR WORK, TAKE CARE OF THINGS AT HOME, OR GET ALONG WITH OTHER PEOPLE: NOT DIFFICULT AT ALL
8. MOVING OR SPEAKING SO SLOWLY THAT OTHER PEOPLE COULD HAVE NOTICED. OR THE OPPOSITE, BEING SO FIGETY OR RESTLESS THAT YOU HAVE BEEN MOVING AROUND A LOT MORE THAN USUAL: NOT AT ALL
1. LITTLE INTEREST OR PLEASURE IN DOING THINGS: NOT AT ALL
SUM OF ALL RESPONSES TO PHQ QUESTIONS 1-9: 0
2. FEELING DOWN, DEPRESSED OR HOPELESS: NOT AT ALL
7. TROUBLE CONCENTRATING ON THINGS, SUCH AS READING THE NEWSPAPER OR WATCHING TELEVISION: NOT AT ALL
SUM OF ALL RESPONSES TO PHQ QUESTIONS 1-9: 0
4. FEELING TIRED OR HAVING LITTLE ENERGY: NOT AT ALL

## 2025-04-18 ASSESSMENT — LIFESTYLE VARIABLES
HOW OFTEN DO YOU HAVE SIX OR MORE DRINKS ON ONE OCCASION: 1
HOW MANY STANDARD DRINKS CONTAINING ALCOHOL DO YOU HAVE ON A TYPICAL DAY: 0
HOW OFTEN DO YOU HAVE A DRINK CONTAINING ALCOHOL: 1
HOW OFTEN DO YOU HAVE A DRINK CONTAINING ALCOHOL: NEVER
HOW MANY STANDARD DRINKS CONTAINING ALCOHOL DO YOU HAVE ON A TYPICAL DAY: PATIENT DOES NOT DRINK

## 2025-04-21 ENCOUNTER — OFFICE VISIT (OUTPATIENT)
Dept: FAMILY MEDICINE CLINIC | Age: 60
End: 2025-04-21
Payer: MEDICARE

## 2025-04-21 VITALS
DIASTOLIC BLOOD PRESSURE: 82 MMHG | WEIGHT: 284 LBS | SYSTOLIC BLOOD PRESSURE: 134 MMHG | BODY MASS INDEX: 45.64 KG/M2 | HEART RATE: 88 BPM | HEIGHT: 66 IN | OXYGEN SATURATION: 90 % | TEMPERATURE: 97 F

## 2025-04-21 DIAGNOSIS — E66.813 OBESITY, CLASS 3 (HCC): ICD-10-CM

## 2025-04-21 DIAGNOSIS — Z00.00 MEDICARE ANNUAL WELLNESS VISIT, SUBSEQUENT: Primary | ICD-10-CM

## 2025-04-21 DIAGNOSIS — Z12.31 ENCOUNTER FOR SCREENING MAMMOGRAM FOR MALIGNANT NEOPLASM OF BREAST: ICD-10-CM

## 2025-04-21 DIAGNOSIS — E55.9 VITAMIN D DEFICIENCY: ICD-10-CM

## 2025-04-21 DIAGNOSIS — E53.8 LOW SERUM VITAMIN B12: ICD-10-CM

## 2025-04-21 DIAGNOSIS — M47.817 LUMBOSACRAL SPONDYLOSIS WITHOUT MYELOPATHY: ICD-10-CM

## 2025-04-21 PROCEDURE — G0439 PPPS, SUBSEQ VISIT: HCPCS | Performed by: STUDENT IN AN ORGANIZED HEALTH CARE EDUCATION/TRAINING PROGRAM

## 2025-04-21 PROCEDURE — 3017F COLORECTAL CA SCREEN DOC REV: CPT | Performed by: STUDENT IN AN ORGANIZED HEALTH CARE EDUCATION/TRAINING PROGRAM

## 2025-04-21 RX ORDER — TRAMADOL HYDROCHLORIDE 50 MG/1
50 TABLET ORAL EVERY 8 HOURS PRN
Qty: 18 TABLET | Refills: 0 | Status: SHIPPED | OUTPATIENT
Start: 2025-04-21 | End: 2025-05-21

## 2025-04-21 SDOH — ECONOMIC STABILITY: FOOD INSECURITY: WITHIN THE PAST 12 MONTHS, YOU WORRIED THAT YOUR FOOD WOULD RUN OUT BEFORE YOU GOT MONEY TO BUY MORE.: NEVER TRUE

## 2025-04-21 SDOH — ECONOMIC STABILITY: FOOD INSECURITY: WITHIN THE PAST 12 MONTHS, THE FOOD YOU BOUGHT JUST DIDN'T LAST AND YOU DIDN'T HAVE MONEY TO GET MORE.: NEVER TRUE

## 2025-04-21 NOTE — PATIENT INSTRUCTIONS
https://[email or web address].net/patientEd and enter U357 to learn more about \"Starting a Weight-Loss Plan: Care Instructions.\"  Current as of: April 30, 2024  Content Version: 14.4  © 4439-2162 [employer].   Care instructions adapted under license by [employer]. If you have questions about a medical condition or this instruction, always ask your healthcare professional. [employer], disclaims any warranty or liability for your use of this information.         Advance Directives: Care Instructions  Overview  An advance directive is a legal way to state your wishes at the end of your life. It tells your family and your doctor what to do if you can't say what you want.  There are two main types of advance directives. You can change them any time your wishes change.  Living will.  This form tells your family and your doctor your wishes about life support and other treatment. The form is also called a declaration.  Medical power of .  This form lets you name a person to make treatment decisions for you when you can't speak for yourself. This person is called a health care agent (health care proxy, health care surrogate). The form is also called a durable power of  for health care.  If you do not have an advance directive, decisions about your medical care may be made by a family member, or by a doctor or a  who doesn't know you.  It may help to think of an advance directive as a gift to the people who care for you. If you have one, they won't have to make tough decisions by themselves.  For more information, including forms for your state, see the [employer] website (www.caringinfo.org/planning/advance-directives/).  Follow-up care is a key part of your treatment and safety. Be sure to make and go to all appointments, and call your doctor if you are having problems. It's also a good idea to know your test results and keep a list of the medicines you take.  What should you include

## 2025-04-21 NOTE — PROGRESS NOTES
Medicare Annual Wellness Visit    Janis Byers is here for Medicare AWV    Assessment & Plan   Medicare annual wellness visit, subsequent  Encounter for screening mammogram for malignant neoplasm of breast  -     ZEN DIGITAL SCREEN W OR WO CAD BILATERAL; Future  Body mass index [BMI] 45.0-49.9, adult (Z68.42)  Obesity, class 3 (E66.813)  Lumbosacral spondylosis without myelopathy  -     traMADol (ULTRAM) 50 MG tablet; Take 1 tablet by mouth every 8 hours as needed for Pain for up to 30 days. Intended supply: 3 days. Take lowest dose possible to manage pain Max Daily Amount: 150 mg, Disp-18 tablet, R-0Normal  Low serum vitamin B12  -     cyanocobalamin ([employer] VITAMIN B12) 1000 MCG tablet; Take 1 tablet by mouth daily, Disp-90 tablet, R-1Normal  Vitamin D deficiency  -     Cholecalciferol 50 MCG (2000 UT) TABS; Take 1 tablet by mouth daily, Disp-90 tablet, R-1Normal     Return in 6 months (on 10/21/2025).     Subjective   The following acute and/or chronic problems were also addressed today:  She states that she has fibromyalgia but has not been treated for it, she was previously on Cymbalta which did not work and she was switched to Trintellix.  She wants to to be able to have pain medications to take as needed, agrees to follow-up with pain management, short-term prescription provided until then.  Recent labs showed low B12 and vitamin D levels, prescription provided for supplement.  She states that she is no longer smoking.    Patient's complete Health Risk Assessment and screening values have been reviewed and are found in Flowsheets. The following problems were reviewed today and where indicated follow up appointments were made and/or referrals ordered.    Positive Risk Factor Screenings with Interventions:    Fall Risk:  Do you feel unsteady or are you worried about falling? : (!) (Patient-Rptd) yes  2 or more falls in past year?: (Patient-Rptd) no  Fall with injury in past year?: (Patient-Rptd) no

## 2025-05-21 ENCOUNTER — E-VISIT (OUTPATIENT)
Dept: FAMILY MEDICINE CLINIC | Age: 60
End: 2025-05-21

## 2025-05-21 ENCOUNTER — TELEPHONE (OUTPATIENT)
Dept: FAMILY MEDICINE CLINIC | Age: 60
End: 2025-05-21

## 2025-05-21 DIAGNOSIS — J40 BRONCHITIS: Primary | ICD-10-CM

## 2025-05-21 DIAGNOSIS — R05.9 COUGH, UNSPECIFIED TYPE: Primary | ICD-10-CM

## 2025-05-23 RX ORDER — AZITHROMYCIN 250 MG/1
TABLET, FILM COATED ORAL
Qty: 6 TABLET | Refills: 0 | Status: SHIPPED | OUTPATIENT
Start: 2025-05-23 | End: 2025-06-02

## 2025-05-23 NOTE — PROGRESS NOTES
Thank you for submitting an Evisit.  I'm sorry that you aren't feeling well.  Based upon your responses, I think that you most likely have bronchitis.  I sent the following prescription(s) to your pharmacy: antibiotic medication- azithromycin (Zithromax).  You should also try to drink plenty of fluids and rest.  For additional symptom relief, I suggest the following over-the-counter remedies:   For dry cough: medications containing dextromethorphan, such as Delsym, Robitussin DM or Mucinex DM and medicated throat lozenges     If you have high blood pressure, you should avoid medications containing pseudoephedrine or phenylephrine, such as Sudafed.  Running a humidifier in your bedroom may be helpful for many of your symptoms.  If your cough is keeping you awake at night, you can try raising your head with an extra pillow.  If the skin around your nose and lips becomes sore, you can put some petroleum jelly on the area. If taking an antibiotic, adding a probiotic, such as DanActiv yogurt drink, Florastor or Culturelle, twice daily while on the antibiotic (1-2 hours before or after antibiotic doses) and for 1 week after may help to prevent antibiotic-associated diarrhea.      Please contact my office if your symptoms worsen or do not improve within 10 days.  Please seek more urgent medical attention if you develop any of the following:  Chest pain  Shortness of breath  Fevers greater than 103  Severe headache   Dizziness  A new rash  Confusion  Extreme weakness    Sincerely,  Yesica Syed MD    On this date, 5/23/2025 I have spent 11 minutes reviewing previous notes, test results, as well as documenting on the day of the visit.

## 2025-05-24 DIAGNOSIS — Z72.0 TOBACCO ABUSE: ICD-10-CM

## 2025-06-16 DIAGNOSIS — F33.1 MODERATE EPISODE OF RECURRENT MAJOR DEPRESSIVE DISORDER (HCC): ICD-10-CM

## 2025-06-17 NOTE — TELEPHONE ENCOUNTER
Janis GARCIA Ozark is calling to request a refill on the following medication(s):    Medication Request:  Requested Prescriptions     Pending Prescriptions Disp Refills    TRINTELLIX 20 MG TABS tablet [Pharmacy Med Name: Trintellix 20 MG Oral Tablet] 30 tablet 11     Sig: TAKE 1 TABLET BY MOUTH DAILY       Last Visit Date (If Applicable):  5/21/2025    Next Visit Date:    10/21/2025

## 2025-06-18 RX ORDER — VORTIOXETINE 20 MG/1
20 TABLET, FILM COATED ORAL DAILY
Qty: 30 TABLET | Refills: 11 | Status: SHIPPED | OUTPATIENT
Start: 2025-06-18

## 2025-07-27 DIAGNOSIS — F33.1 MODERATE EPISODE OF RECURRENT MAJOR DEPRESSIVE DISORDER (HCC): ICD-10-CM

## 2025-07-28 RX ORDER — ARIPIPRAZOLE 5 MG/1
5 TABLET ORAL DAILY
Qty: 90 TABLET | Refills: 1 | Status: SHIPPED | OUTPATIENT
Start: 2025-07-28

## 2025-07-28 NOTE — TELEPHONE ENCOUNTER
Janis GARCIA Penn Laird is calling to request a refill on the following medication(s):    Medication Request:  Requested Prescriptions     Pending Prescriptions Disp Refills    ARIPiprazole (ABILIFY) 5 MG tablet [Pharmacy Med Name: ARIPiprazole 5 MG Oral Tablet] 90 tablet 3     Sig: TAKE 1 TABLET BY MOUTH DAILY       Last Visit Date (If Applicable):  5/21/2025    Next Visit Date:    10/21/2025

## (undated) DEVICE — TOWEL,OR,DSP,ST,NATURAL,DLX,4/PK,20PK/CS: Brand: MEDLINE

## (undated) DEVICE — MARKER,SKIN,WI/RULER AND LABELS: Brand: MEDLINE

## (undated) DEVICE — BANDAGE ADH W0.75XL3IN NAT PLAS CURAD

## (undated) DEVICE — TRAY NRV BLK SUPP CUST

## (undated) DEVICE — NEEDLE SPNL 22GA L5IN BLK HUB S STL W/ QNCKE PNT W/OUT

## (undated) DEVICE — GLOVE ORANGE PI 8   MSG9080

## (undated) DEVICE — TRAP SURG QUAD PARABOLA SLOT DSGN SFTY SCRN TRAPEASE

## (undated) DEVICE — NEEDLE FLTR 18GA L1.5IN MEM THK5UM BLNT DISP

## (undated) DEVICE — CURVED SHARP RF CANNULA, RADIOPAQUE MARKER: Brand: RADIOPAQUE RADIOFREQUENCY CANNULA

## (undated) DEVICE — SHEET,DRAPE,40X58,STERILE: Brand: MEDLINE

## (undated) DEVICE — BASIN EMSIS 700ML GRAPHITE PLAS KID SHP GRAD

## (undated) DEVICE — NEEDLE SFTY RETRCT ST 25GAX1IN INTEGRA

## (undated) DEVICE — APPLICATOR MEDICATED 10.5 CC SOLUTION HI LT ORNG CHLORAPREP

## (undated) DEVICE — Z DISCONTINUED APPLICATOR SURG PREP 0.35OZ 2% CHG 70% ISO ALC W/ HI LT

## (undated) DEVICE — ZINACTIVE USE 2537982 PAD GRND FOR RF PAIN MGMT DISP

## (undated) DEVICE — WORKING LENGTH 235CM, WORKING CHANNEL 2.8MM: Brand: RESOLUTION 360 CLIP

## (undated) DEVICE — GAUZE,SPONGE,4"X4",16PLY,STRL,LF,10/TRAY: Brand: MEDLINE

## (undated) DEVICE — SNARE ENDOSCP M L240CM LOOP W27MM SHTH DIA2.4MM OVL FLX

## (undated) DEVICE — ELECTRODE PT RET AD L9FT HI MOIST COND ADH HYDRGEL CORDED